# Patient Record
Sex: MALE | Race: BLACK OR AFRICAN AMERICAN | NOT HISPANIC OR LATINO | URBAN - METROPOLITAN AREA
[De-identification: names, ages, dates, MRNs, and addresses within clinical notes are randomized per-mention and may not be internally consistent; named-entity substitution may affect disease eponyms.]

---

## 2021-01-01 ENCOUNTER — INPATIENT (INPATIENT)
Facility: HOSPITAL | Age: 61
LOS: 24 days | End: 2021-03-25
Attending: INTERNAL MEDICINE | Admitting: INTERNAL MEDICINE
Payer: COMMERCIAL

## 2021-01-01 VITALS
TEMPERATURE: 98 F | RESPIRATION RATE: 30 BRPM | DIASTOLIC BLOOD PRESSURE: 70 MMHG | OXYGEN SATURATION: 87 % | HEART RATE: 106 BPM | SYSTOLIC BLOOD PRESSURE: 145 MMHG

## 2021-01-01 VITALS — RESPIRATION RATE: 30 BRPM

## 2021-01-01 DIAGNOSIS — R09.02 HYPOXEMIA: ICD-10-CM

## 2021-01-01 DIAGNOSIS — E87.1 HYPO-OSMOLALITY AND HYPONATREMIA: ICD-10-CM

## 2021-01-01 DIAGNOSIS — Z02.9 ENCOUNTER FOR ADMINISTRATIVE EXAMINATIONS, UNSPECIFIED: ICD-10-CM

## 2021-01-01 DIAGNOSIS — N17.9 ACUTE KIDNEY FAILURE, UNSPECIFIED: ICD-10-CM

## 2021-01-01 DIAGNOSIS — Z29.9 ENCOUNTER FOR PROPHYLACTIC MEASURES, UNSPECIFIED: ICD-10-CM

## 2021-01-01 DIAGNOSIS — U07.1 COVID-19: ICD-10-CM

## 2021-01-01 DIAGNOSIS — A41.89 OTHER SPECIFIED SEPSIS: ICD-10-CM

## 2021-01-01 DIAGNOSIS — D49.6 NEOPLASM OF UNSPECIFIED BEHAVIOR OF BRAIN: Chronic | ICD-10-CM

## 2021-01-01 DIAGNOSIS — E87.5 HYPERKALEMIA: ICD-10-CM

## 2021-01-01 DIAGNOSIS — E87.2 ACIDOSIS: ICD-10-CM

## 2021-01-01 LAB
-  ENTEROBACTER (NON-CLOACAE COMPLEX): SIGNIFICANT CHANGE UP
A1C WITH ESTIMATED AVERAGE GLUCOSE RESULT: 6 % — HIGH (ref 4–5.6)
ALBUMIN SERPL ELPH-MCNC: 1.8 G/DL — LOW (ref 3.3–5)
ALBUMIN SERPL ELPH-MCNC: 2 G/DL — LOW (ref 3.3–5)
ALBUMIN SERPL ELPH-MCNC: 2.1 G/DL — LOW (ref 3.3–5)
ALBUMIN SERPL ELPH-MCNC: 2.2 G/DL — LOW (ref 3.3–5)
ALBUMIN SERPL ELPH-MCNC: 2.2 G/DL — LOW (ref 3.3–5)
ALBUMIN SERPL ELPH-MCNC: 2.3 G/DL — LOW (ref 3.3–5)
ALBUMIN SERPL ELPH-MCNC: 2.3 G/DL — LOW (ref 3.3–5)
ALBUMIN SERPL ELPH-MCNC: 2.4 G/DL — LOW (ref 3.3–5)
ALBUMIN SERPL ELPH-MCNC: 2.6 G/DL — LOW (ref 3.3–5)
ALBUMIN SERPL ELPH-MCNC: 2.6 G/DL — LOW (ref 3.3–5)
ALBUMIN SERPL ELPH-MCNC: 2.8 G/DL — LOW (ref 3.3–5)
ALBUMIN SERPL ELPH-MCNC: 2.9 G/DL — LOW (ref 3.3–5)
ALBUMIN SERPL ELPH-MCNC: 2.9 G/DL — LOW (ref 3.3–5)
ALBUMIN SERPL ELPH-MCNC: 3 G/DL — LOW (ref 3.3–5)
ALBUMIN SERPL ELPH-MCNC: 3 G/DL — LOW (ref 3.3–5)
ALBUMIN SERPL ELPH-MCNC: 3.1 G/DL — LOW (ref 3.3–5)
ALBUMIN SERPL ELPH-MCNC: 3.2 G/DL — LOW (ref 3.3–5)
ALBUMIN SERPL ELPH-MCNC: 3.2 G/DL — LOW (ref 3.3–5)
ALBUMIN SERPL ELPH-MCNC: 3.3 G/DL — SIGNIFICANT CHANGE UP (ref 3.3–5)
ALBUMIN SERPL ELPH-MCNC: 3.3 G/DL — SIGNIFICANT CHANGE UP (ref 3.3–5)
ALBUMIN SERPL ELPH-MCNC: 3.4 G/DL — SIGNIFICANT CHANGE UP (ref 3.3–5)
ALBUMIN SERPL ELPH-MCNC: 3.4 G/DL — SIGNIFICANT CHANGE UP (ref 3.3–5)
ALP SERPL-CCNC: 102 U/L — SIGNIFICANT CHANGE UP (ref 40–120)
ALP SERPL-CCNC: 1078 U/L — HIGH (ref 40–120)
ALP SERPL-CCNC: 119 U/L — SIGNIFICANT CHANGE UP (ref 40–120)
ALP SERPL-CCNC: 120 U/L — SIGNIFICANT CHANGE UP (ref 40–120)
ALP SERPL-CCNC: 1309 U/L — HIGH (ref 40–120)
ALP SERPL-CCNC: 132 U/L — HIGH (ref 40–120)
ALP SERPL-CCNC: 1339 U/L — HIGH (ref 40–120)
ALP SERPL-CCNC: 369 U/L — HIGH (ref 40–120)
ALP SERPL-CCNC: 392 U/L — HIGH (ref 40–120)
ALP SERPL-CCNC: 410 U/L — HIGH (ref 40–120)
ALP SERPL-CCNC: 500 U/L — HIGH (ref 40–120)
ALP SERPL-CCNC: 632 U/L — HIGH (ref 40–120)
ALP SERPL-CCNC: 65 U/L — SIGNIFICANT CHANGE UP (ref 40–120)
ALP SERPL-CCNC: 66 U/L — SIGNIFICANT CHANGE UP (ref 40–120)
ALP SERPL-CCNC: 67 U/L — SIGNIFICANT CHANGE UP (ref 40–120)
ALP SERPL-CCNC: 69 U/L — SIGNIFICANT CHANGE UP (ref 40–120)
ALP SERPL-CCNC: 71 U/L — SIGNIFICANT CHANGE UP (ref 40–120)
ALP SERPL-CCNC: 72 U/L — SIGNIFICANT CHANGE UP (ref 40–120)
ALP SERPL-CCNC: 73 U/L — SIGNIFICANT CHANGE UP (ref 40–120)
ALP SERPL-CCNC: 74 U/L — SIGNIFICANT CHANGE UP (ref 40–120)
ALP SERPL-CCNC: 74 U/L — SIGNIFICANT CHANGE UP (ref 40–120)
ALP SERPL-CCNC: 76 U/L — SIGNIFICANT CHANGE UP (ref 40–120)
ALP SERPL-CCNC: 883 U/L — HIGH (ref 40–120)
ALP SERPL-CCNC: 90 U/L — SIGNIFICANT CHANGE UP (ref 40–120)
ALT FLD-CCNC: 146 U/L — HIGH (ref 4–41)
ALT FLD-CCNC: 162 U/L — HIGH (ref 4–41)
ALT FLD-CCNC: 178 U/L — HIGH (ref 4–41)
ALT FLD-CCNC: 196 U/L — HIGH (ref 4–41)
ALT FLD-CCNC: 2019 U/L — HIGH (ref 4–41)
ALT FLD-CCNC: 230 U/L — HIGH (ref 4–41)
ALT FLD-CCNC: 276 U/L — HIGH (ref 4–41)
ALT FLD-CCNC: 37 U/L — SIGNIFICANT CHANGE UP (ref 4–41)
ALT FLD-CCNC: 384 U/L — HIGH (ref 4–41)
ALT FLD-CCNC: 39 U/L — SIGNIFICANT CHANGE UP (ref 4–41)
ALT FLD-CCNC: 46 U/L — HIGH (ref 4–41)
ALT FLD-CCNC: 55 U/L — HIGH (ref 4–41)
ALT FLD-CCNC: 55 U/L — HIGH (ref 4–41)
ALT FLD-CCNC: 57 U/L — HIGH (ref 4–41)
ALT FLD-CCNC: 602 U/L — HIGH (ref 4–41)
ALT FLD-CCNC: 66 U/L — HIGH (ref 4–41)
ALT FLD-CCNC: 66 U/L — HIGH (ref 4–41)
ALT FLD-CCNC: 67 U/L — HIGH (ref 4–41)
ALT FLD-CCNC: 72 U/L — HIGH (ref 4–41)
ALT FLD-CCNC: 81 U/L — HIGH (ref 4–41)
ALT FLD-CCNC: 83 U/L — HIGH (ref 4–41)
ALT FLD-CCNC: 85 U/L — HIGH (ref 4–41)
ALT FLD-CCNC: 85 U/L — HIGH (ref 4–41)
ALT FLD-CCNC: 87 U/L — HIGH (ref 4–41)
ANION GAP SERPL CALC-SCNC: 10 MMOL/L — SIGNIFICANT CHANGE UP (ref 7–14)
ANION GAP SERPL CALC-SCNC: 10 MMOL/L — SIGNIFICANT CHANGE UP (ref 7–14)
ANION GAP SERPL CALC-SCNC: 12 MMOL/L — SIGNIFICANT CHANGE UP (ref 7–14)
ANION GAP SERPL CALC-SCNC: 13 MMOL/L — SIGNIFICANT CHANGE UP (ref 7–14)
ANION GAP SERPL CALC-SCNC: 14 MMOL/L — SIGNIFICANT CHANGE UP (ref 7–14)
ANION GAP SERPL CALC-SCNC: 15 MMOL/L — HIGH (ref 7–14)
ANION GAP SERPL CALC-SCNC: 16 MMOL/L — HIGH (ref 7–14)
ANION GAP SERPL CALC-SCNC: 17 MMOL/L — HIGH (ref 7–14)
ANION GAP SERPL CALC-SCNC: 18 MMOL/L — HIGH (ref 7–14)
ANION GAP SERPL CALC-SCNC: 19 MMOL/L — HIGH (ref 7–14)
ANION GAP SERPL CALC-SCNC: 20 MMOL/L — HIGH (ref 7–14)
ANION GAP SERPL CALC-SCNC: 22 MMOL/L — HIGH (ref 7–14)
ANION GAP SERPL CALC-SCNC: 26 MMOL/L — HIGH (ref 7–14)
ANION GAP SERPL CALC-SCNC: 5 MMOL/L — LOW (ref 7–14)
ANION GAP SERPL CALC-SCNC: 6 MMOL/L — LOW (ref 7–14)
ANION GAP SERPL CALC-SCNC: 7 MMOL/L — SIGNIFICANT CHANGE UP (ref 7–14)
ANION GAP SERPL CALC-SCNC: 8 MMOL/L — SIGNIFICANT CHANGE UP (ref 7–14)
ANION GAP SERPL CALC-SCNC: 9 MMOL/L — SIGNIFICANT CHANGE UP (ref 7–14)
APPEARANCE UR: ABNORMAL
APTT BLD: 106 SEC — HIGH (ref 27–36.3)
APTT BLD: 115.4 SEC — SIGNIFICANT CHANGE UP (ref 27–36.3)
APTT BLD: 26.6 SEC — LOW (ref 27–36.3)
APTT BLD: 32.9 SEC — SIGNIFICANT CHANGE UP (ref 27–36.3)
APTT BLD: 33.7 SEC — SIGNIFICANT CHANGE UP (ref 27–36.3)
APTT BLD: 33.9 SEC — SIGNIFICANT CHANGE UP (ref 27–36.3)
APTT BLD: 36.2 SEC — SIGNIFICANT CHANGE UP (ref 27–36.3)
APTT BLD: 37.9 SEC — HIGH (ref 27–36.3)
APTT BLD: 45.2 SEC — HIGH (ref 27–36.3)
APTT BLD: 46.5 SEC — HIGH (ref 27–36.3)
APTT BLD: 51 SEC — HIGH (ref 27–36.3)
APTT BLD: 60.7 SEC — HIGH (ref 27–36.3)
APTT BLD: 61.2 SEC — HIGH (ref 27–36.3)
APTT BLD: 62.3 SEC — HIGH (ref 27–36.3)
APTT BLD: 80.4 SEC — HIGH (ref 27–36.3)
APTT BLD: 82.4 SEC — HIGH (ref 27–36.3)
APTT BLD: 84.2 SEC — HIGH (ref 27–36.3)
APTT BLD: 88.5 SEC — HIGH (ref 27–36.3)
AST SERPL-CCNC: 139 U/L — HIGH (ref 4–40)
AST SERPL-CCNC: 161 U/L — HIGH (ref 4–40)
AST SERPL-CCNC: 169 U/L — HIGH (ref 4–40)
AST SERPL-CCNC: 185 U/L — HIGH (ref 4–40)
AST SERPL-CCNC: 196 U/L — HIGH (ref 4–40)
AST SERPL-CCNC: 257 U/L — HIGH (ref 4–40)
AST SERPL-CCNC: 4185 U/L — HIGH (ref 4–40)
AST SERPL-CCNC: 42 U/L — HIGH (ref 4–40)
AST SERPL-CCNC: 426 U/L — HIGH (ref 4–40)
AST SERPL-CCNC: 47 U/L — HIGH (ref 4–40)
AST SERPL-CCNC: 48 U/L — HIGH (ref 4–40)
AST SERPL-CCNC: 52 U/L — HIGH (ref 4–40)
AST SERPL-CCNC: 58 U/L — HIGH (ref 4–40)
AST SERPL-CCNC: 58 U/L — HIGH (ref 4–40)
AST SERPL-CCNC: 62 U/L — HIGH (ref 4–40)
AST SERPL-CCNC: 63 U/L — HIGH (ref 4–40)
AST SERPL-CCNC: 65 U/L — HIGH (ref 4–40)
AST SERPL-CCNC: 65 U/L — HIGH (ref 4–40)
AST SERPL-CCNC: 66 U/L — HIGH (ref 4–40)
AST SERPL-CCNC: 67 U/L — HIGH (ref 4–40)
AST SERPL-CCNC: 72 U/L — HIGH (ref 4–40)
AST SERPL-CCNC: 72 U/L — HIGH (ref 4–40)
AST SERPL-CCNC: 76 U/L — HIGH (ref 4–40)
AST SERPL-CCNC: 937 U/L — HIGH (ref 4–40)
BACTERIA # UR AUTO: NEGATIVE — SIGNIFICANT CHANGE UP
BASE EXCESS BLDA CALC-SCNC: -14.3 MMOL/L — LOW (ref -2–2)
BASE EXCESS BLDA CALC-SCNC: 3.3 MMOL/L — HIGH (ref -2–2)
BASE EXCESS BLDA CALC-SCNC: 3.4 MMOL/L — HIGH (ref -2–2)
BASE EXCESS BLDA CALC-SCNC: 3.8 MMOL/L — HIGH (ref -2–2)
BASE EXCESS BLDA CALC-SCNC: 4.6 MMOL/L — HIGH (ref -2–2)
BASE EXCESS BLDA CALC-SCNC: 4.8 MMOL/L — HIGH (ref -2–2)
BASE EXCESS BLDA CALC-SCNC: 7.6 MMOL/L — HIGH (ref -2–2)
BASOPHILS # BLD AUTO: 0 K/UL — SIGNIFICANT CHANGE UP (ref 0–0.2)
BASOPHILS NFR BLD AUTO: 0 % — SIGNIFICANT CHANGE UP (ref 0–2)
BILIRUB DIRECT SERPL-MCNC: 0.2 MG/DL — SIGNIFICANT CHANGE UP (ref 0–0.2)
BILIRUB DIRECT SERPL-MCNC: 2 MG/DL — HIGH (ref 0–0.2)
BILIRUB DIRECT SERPL-MCNC: 3.9 MG/DL — HIGH (ref 0–0.2)
BILIRUB DIRECT SERPL-MCNC: 5 MG/DL — HIGH (ref 0–0.2)
BILIRUB DIRECT SERPL-MCNC: 6.8 MG/DL — HIGH (ref 0–0.2)
BILIRUB DIRECT SERPL-MCNC: 6.9 MG/DL — HIGH (ref 0–0.2)
BILIRUB DIRECT SERPL-MCNC: <0.2 MG/DL — SIGNIFICANT CHANGE UP (ref 0–0.2)
BILIRUB INDIRECT FLD-MCNC: 0 MG/DL — SIGNIFICANT CHANGE UP (ref 0–1)
BILIRUB INDIRECT FLD-MCNC: 0 MG/DL — SIGNIFICANT CHANGE UP (ref 0–1)
BILIRUB INDIRECT FLD-MCNC: 0.2 MG/DL — SIGNIFICANT CHANGE UP (ref 0–1)
BILIRUB INDIRECT FLD-MCNC: 0.4 MG/DL — SIGNIFICANT CHANGE UP (ref 0–1)
BILIRUB INDIRECT FLD-MCNC: 0.4 MG/DL — SIGNIFICANT CHANGE UP (ref 0–1)
BILIRUB INDIRECT FLD-MCNC: 0.5 MG/DL — SIGNIFICANT CHANGE UP (ref 0–1)
BILIRUB INDIRECT FLD-MCNC: 0.5 MG/DL — SIGNIFICANT CHANGE UP (ref 0–1)
BILIRUB INDIRECT FLD-MCNC: 0.7 MG/DL — SIGNIFICANT CHANGE UP (ref 0–1)
BILIRUB INDIRECT FLD-MCNC: >0.2 MG/DL — SIGNIFICANT CHANGE UP (ref 0–1)
BILIRUB INDIRECT FLD-MCNC: >0.4 MG/DL — SIGNIFICANT CHANGE UP (ref 0–1)
BILIRUB SERPL-MCNC: 0.4 MG/DL — SIGNIFICANT CHANGE UP (ref 0.2–1.2)
BILIRUB SERPL-MCNC: 0.5 MG/DL — SIGNIFICANT CHANGE UP (ref 0.2–1.2)
BILIRUB SERPL-MCNC: 0.5 MG/DL — SIGNIFICANT CHANGE UP (ref 0.2–1.2)
BILIRUB SERPL-MCNC: 0.6 MG/DL — SIGNIFICANT CHANGE UP (ref 0.2–1.2)
BILIRUB SERPL-MCNC: 0.7 MG/DL — SIGNIFICANT CHANGE UP (ref 0.2–1.2)
BILIRUB SERPL-MCNC: 0.7 MG/DL — SIGNIFICANT CHANGE UP (ref 0.2–1.2)
BILIRUB SERPL-MCNC: 1.4 MG/DL — HIGH (ref 0.2–1.2)
BILIRUB SERPL-MCNC: 1.6 MG/DL — HIGH (ref 0.2–1.2)
BILIRUB SERPL-MCNC: 2 MG/DL — HIGH (ref 0.2–1.2)
BILIRUB SERPL-MCNC: 2.7 MG/DL — HIGH (ref 0.2–1.2)
BILIRUB SERPL-MCNC: 3.9 MG/DL — HIGH (ref 0.2–1.2)
BILIRUB SERPL-MCNC: 5.4 MG/DL — HIGH (ref 0.2–1.2)
BILIRUB SERPL-MCNC: 7.1 MG/DL — HIGH (ref 0.2–1.2)
BILIRUB SERPL-MCNC: 7.4 MG/DL — HIGH (ref 0.2–1.2)
BILIRUB SERPL-MCNC: 7.5 MG/DL — HIGH (ref 0.2–1.2)
BILIRUB UR-MCNC: ABNORMAL
BILIRUB UR-MCNC: NEGATIVE — SIGNIFICANT CHANGE UP
BILIRUB UR-MCNC: NEGATIVE — SIGNIFICANT CHANGE UP
BLOOD GAS ARTERIAL - LYTES,HGB,ICA,LACT RESULT: SIGNIFICANT CHANGE UP
BLOOD GAS ARTERIAL COMPREHENSIVE RESULT: SIGNIFICANT CHANGE UP
BLOOD GAS ARTERIAL COMPREHENSIVE WITH CREATININE RESULT: SIGNIFICANT CHANGE UP
BUN SERPL-MCNC: 10 MG/DL — SIGNIFICANT CHANGE UP (ref 7–23)
BUN SERPL-MCNC: 108 MG/DL — HIGH (ref 7–23)
BUN SERPL-MCNC: 111 MG/DL — HIGH (ref 7–23)
BUN SERPL-MCNC: 113 MG/DL — HIGH (ref 7–23)
BUN SERPL-MCNC: 120 MG/DL — HIGH (ref 7–23)
BUN SERPL-MCNC: 128 MG/DL — HIGH (ref 7–23)
BUN SERPL-MCNC: 130 MG/DL — HIGH (ref 7–23)
BUN SERPL-MCNC: 148 MG/DL — HIGH (ref 7–23)
BUN SERPL-MCNC: 156 MG/DL — HIGH (ref 7–23)
BUN SERPL-MCNC: 156 MG/DL — HIGH (ref 7–23)
BUN SERPL-MCNC: 16 MG/DL — SIGNIFICANT CHANGE UP (ref 7–23)
BUN SERPL-MCNC: 17 MG/DL — SIGNIFICANT CHANGE UP (ref 7–23)
BUN SERPL-MCNC: 174 MG/DL — HIGH (ref 7–23)
BUN SERPL-MCNC: 18 MG/DL — SIGNIFICANT CHANGE UP (ref 7–23)
BUN SERPL-MCNC: 180 MG/DL — HIGH (ref 7–23)
BUN SERPL-MCNC: 24 MG/DL — HIGH (ref 7–23)
BUN SERPL-MCNC: 24 MG/DL — HIGH (ref 7–23)
BUN SERPL-MCNC: 25 MG/DL — HIGH (ref 7–23)
BUN SERPL-MCNC: 26 MG/DL — HIGH (ref 7–23)
BUN SERPL-MCNC: 29 MG/DL — HIGH (ref 7–23)
BUN SERPL-MCNC: 30 MG/DL — HIGH (ref 7–23)
BUN SERPL-MCNC: 32 MG/DL — HIGH (ref 7–23)
BUN SERPL-MCNC: 41 MG/DL — HIGH (ref 7–23)
BUN SERPL-MCNC: 52 MG/DL — HIGH (ref 7–23)
BUN SERPL-MCNC: 60 MG/DL — HIGH (ref 7–23)
BUN SERPL-MCNC: 69 MG/DL — HIGH (ref 7–23)
BUN SERPL-MCNC: 80 MG/DL — HIGH (ref 7–23)
BUN SERPL-MCNC: 98 MG/DL — HIGH (ref 7–23)
CA-I BLDA-SCNC: 1.22 MMOL/L — SIGNIFICANT CHANGE UP (ref 1.15–1.29)
CALCIUM SERPL-MCNC: 7.5 MG/DL — LOW (ref 8.4–10.5)
CALCIUM SERPL-MCNC: 7.5 MG/DL — LOW (ref 8.4–10.5)
CALCIUM SERPL-MCNC: 7.8 MG/DL — LOW (ref 8.4–10.5)
CALCIUM SERPL-MCNC: 8.1 MG/DL — LOW (ref 8.4–10.5)
CALCIUM SERPL-MCNC: 8.1 MG/DL — LOW (ref 8.4–10.5)
CALCIUM SERPL-MCNC: 8.6 MG/DL — SIGNIFICANT CHANGE UP (ref 8.4–10.5)
CALCIUM SERPL-MCNC: 8.6 MG/DL — SIGNIFICANT CHANGE UP (ref 8.4–10.5)
CALCIUM SERPL-MCNC: 8.7 MG/DL — SIGNIFICANT CHANGE UP (ref 8.4–10.5)
CALCIUM SERPL-MCNC: 8.8 MG/DL — SIGNIFICANT CHANGE UP (ref 8.4–10.5)
CALCIUM SERPL-MCNC: 8.9 MG/DL — SIGNIFICANT CHANGE UP (ref 8.4–10.5)
CALCIUM SERPL-MCNC: 9 MG/DL — SIGNIFICANT CHANGE UP (ref 8.4–10.5)
CALCIUM SERPL-MCNC: 9.1 MG/DL — SIGNIFICANT CHANGE UP (ref 8.4–10.5)
CALCIUM SERPL-MCNC: 9.2 MG/DL — SIGNIFICANT CHANGE UP (ref 8.4–10.5)
CALCIUM SERPL-MCNC: 9.3 MG/DL — SIGNIFICANT CHANGE UP (ref 8.4–10.5)
CALCIUM SERPL-MCNC: 9.4 MG/DL — SIGNIFICANT CHANGE UP (ref 8.4–10.5)
CALCIUM SERPL-MCNC: 9.4 MG/DL — SIGNIFICANT CHANGE UP (ref 8.4–10.5)
CALCIUM SERPL-MCNC: 9.5 MG/DL — SIGNIFICANT CHANGE UP (ref 8.4–10.5)
CALCIUM SERPL-MCNC: 9.5 MG/DL — SIGNIFICANT CHANGE UP (ref 8.4–10.5)
CALCIUM SERPL-MCNC: 9.6 MG/DL — SIGNIFICANT CHANGE UP (ref 8.4–10.5)
CHLORIDE SERPL-SCNC: 100 MMOL/L — SIGNIFICANT CHANGE UP (ref 98–107)
CHLORIDE SERPL-SCNC: 101 MMOL/L — SIGNIFICANT CHANGE UP (ref 98–107)
CHLORIDE SERPL-SCNC: 102 MMOL/L — SIGNIFICANT CHANGE UP (ref 98–107)
CHLORIDE SERPL-SCNC: 103 MMOL/L — SIGNIFICANT CHANGE UP (ref 98–107)
CHLORIDE SERPL-SCNC: 104 MMOL/L — SIGNIFICANT CHANGE UP (ref 98–107)
CHLORIDE SERPL-SCNC: 105 MMOL/L — SIGNIFICANT CHANGE UP (ref 98–107)
CHLORIDE SERPL-SCNC: 105 MMOL/L — SIGNIFICANT CHANGE UP (ref 98–107)
CHLORIDE SERPL-SCNC: 107 MMOL/L — SIGNIFICANT CHANGE UP (ref 98–107)
CHLORIDE SERPL-SCNC: 108 MMOL/L — HIGH (ref 98–107)
CHLORIDE SERPL-SCNC: 110 MMOL/L — HIGH (ref 98–107)
CHLORIDE SERPL-SCNC: 95 MMOL/L — LOW (ref 98–107)
CHLORIDE SERPL-SCNC: 97 MMOL/L — LOW (ref 98–107)
CHLORIDE SERPL-SCNC: 98 MMOL/L — SIGNIFICANT CHANGE UP (ref 98–107)
CHLORIDE SERPL-SCNC: 98 MMOL/L — SIGNIFICANT CHANGE UP (ref 98–107)
CHLORIDE SERPL-SCNC: 99 MMOL/L — SIGNIFICANT CHANGE UP (ref 98–107)
CHLORIDE SERPL-SCNC: 99 MMOL/L — SIGNIFICANT CHANGE UP (ref 98–107)
CHOLEST SERPL-MCNC: 123 MG/DL — SIGNIFICANT CHANGE UP
CO2 SERPL-SCNC: 14 MMOL/L — LOW (ref 22–31)
CO2 SERPL-SCNC: 19 MMOL/L — LOW (ref 22–31)
CO2 SERPL-SCNC: 22 MMOL/L — SIGNIFICANT CHANGE UP (ref 22–31)
CO2 SERPL-SCNC: 22 MMOL/L — SIGNIFICANT CHANGE UP (ref 22–31)
CO2 SERPL-SCNC: 23 MMOL/L — SIGNIFICANT CHANGE UP (ref 22–31)
CO2 SERPL-SCNC: 25 MMOL/L — SIGNIFICANT CHANGE UP (ref 22–31)
CO2 SERPL-SCNC: 26 MMOL/L — SIGNIFICANT CHANGE UP (ref 22–31)
CO2 SERPL-SCNC: 27 MMOL/L — SIGNIFICANT CHANGE UP (ref 22–31)
CO2 SERPL-SCNC: 28 MMOL/L — SIGNIFICANT CHANGE UP (ref 22–31)
CO2 SERPL-SCNC: 28 MMOL/L — SIGNIFICANT CHANGE UP (ref 22–31)
CO2 SERPL-SCNC: 29 MMOL/L — SIGNIFICANT CHANGE UP (ref 22–31)
CO2 SERPL-SCNC: 29 MMOL/L — SIGNIFICANT CHANGE UP (ref 22–31)
CO2 SERPL-SCNC: 30 MMOL/L — SIGNIFICANT CHANGE UP (ref 22–31)
CO2 SERPL-SCNC: 31 MMOL/L — SIGNIFICANT CHANGE UP (ref 22–31)
CO2 SERPL-SCNC: 32 MMOL/L — HIGH (ref 22–31)
CO2 SERPL-SCNC: 33 MMOL/L — HIGH (ref 22–31)
COLOR SPEC: ABNORMAL
COLOR SPEC: YELLOW — SIGNIFICANT CHANGE UP
COLOR SPEC: YELLOW — SIGNIFICANT CHANGE UP
CREAT SERPL-MCNC: 0.71 MG/DL — SIGNIFICANT CHANGE UP (ref 0.5–1.3)
CREAT SERPL-MCNC: 0.75 MG/DL — SIGNIFICANT CHANGE UP (ref 0.5–1.3)
CREAT SERPL-MCNC: 0.76 MG/DL — SIGNIFICANT CHANGE UP (ref 0.5–1.3)
CREAT SERPL-MCNC: 0.78 MG/DL — SIGNIFICANT CHANGE UP (ref 0.5–1.3)
CREAT SERPL-MCNC: 0.8 MG/DL — SIGNIFICANT CHANGE UP (ref 0.5–1.3)
CREAT SERPL-MCNC: 0.8 MG/DL — SIGNIFICANT CHANGE UP (ref 0.5–1.3)
CREAT SERPL-MCNC: 0.81 MG/DL — SIGNIFICANT CHANGE UP (ref 0.5–1.3)
CREAT SERPL-MCNC: 0.81 MG/DL — SIGNIFICANT CHANGE UP (ref 0.5–1.3)
CREAT SERPL-MCNC: 0.83 MG/DL — SIGNIFICANT CHANGE UP (ref 0.5–1.3)
CREAT SERPL-MCNC: 0.84 MG/DL — SIGNIFICANT CHANGE UP (ref 0.5–1.3)
CREAT SERPL-MCNC: 0.84 MG/DL — SIGNIFICANT CHANGE UP (ref 0.5–1.3)
CREAT SERPL-MCNC: 0.85 MG/DL — SIGNIFICANT CHANGE UP (ref 0.5–1.3)
CREAT SERPL-MCNC: 0.87 MG/DL — SIGNIFICANT CHANGE UP (ref 0.5–1.3)
CREAT SERPL-MCNC: 0.87 MG/DL — SIGNIFICANT CHANGE UP (ref 0.5–1.3)
CREAT SERPL-MCNC: 0.88 MG/DL — SIGNIFICANT CHANGE UP (ref 0.5–1.3)
CREAT SERPL-MCNC: 0.91 MG/DL — SIGNIFICANT CHANGE UP (ref 0.5–1.3)
CREAT SERPL-MCNC: 0.96 MG/DL — SIGNIFICANT CHANGE UP (ref 0.5–1.3)
CREAT SERPL-MCNC: 0.97 MG/DL — SIGNIFICANT CHANGE UP (ref 0.5–1.3)
CREAT SERPL-MCNC: 1 MG/DL — SIGNIFICANT CHANGE UP (ref 0.5–1.3)
CREAT SERPL-MCNC: 1.53 MG/DL — HIGH (ref 0.5–1.3)
CREAT SERPL-MCNC: 1.67 MG/DL — HIGH (ref 0.5–1.3)
CREAT SERPL-MCNC: 2.04 MG/DL — HIGH (ref 0.5–1.3)
CREAT SERPL-MCNC: 2.1 MG/DL — HIGH (ref 0.5–1.3)
CREAT SERPL-MCNC: 2.16 MG/DL — HIGH (ref 0.5–1.3)
CREAT SERPL-MCNC: 2.22 MG/DL — HIGH (ref 0.5–1.3)
CREAT SERPL-MCNC: 3.08 MG/DL — HIGH (ref 0.5–1.3)
CREAT SERPL-MCNC: 3.6 MG/DL — HIGH (ref 0.5–1.3)
CREAT SERPL-MCNC: 3.93 MG/DL — HIGH (ref 0.5–1.3)
CREAT SERPL-MCNC: 4.03 MG/DL — HIGH (ref 0.5–1.3)
CREAT SERPL-MCNC: 4.04 MG/DL — HIGH (ref 0.5–1.3)
CREAT SERPL-MCNC: 4.14 MG/DL — HIGH (ref 0.5–1.3)
CREAT SERPL-MCNC: 4.2 MG/DL — HIGH (ref 0.5–1.3)
CREAT SERPL-MCNC: 4.3 MG/DL — HIGH (ref 0.5–1.3)
CREAT SERPL-MCNC: 4.8 MG/DL — HIGH (ref 0.5–1.3)
CREAT SERPL-MCNC: 5.11 MG/DL — HIGH (ref 0.5–1.3)
CREAT SERPL-MCNC: 5.16 MG/DL — HIGH (ref 0.5–1.3)
CREAT SERPL-MCNC: 5.36 MG/DL — HIGH (ref 0.5–1.3)
CRP SERPL-MCNC: 170.8 MG/L — HIGH
CRP SERPL-MCNC: 213.2 MG/L — HIGH
CRP SERPL-MCNC: 36.8 MG/L — HIGH
CRP SERPL-MCNC: 48.1 MG/L — HIGH
CRP SERPL-MCNC: 83.4 MG/L — HIGH
CULTURE RESULTS: NO GROWTH — SIGNIFICANT CHANGE UP
CULTURE RESULTS: SIGNIFICANT CHANGE UP
D DIMER BLD IA.RAPID-MCNC: 1311 NG/ML DDU — HIGH
D DIMER BLD IA.RAPID-MCNC: 1608 NG/ML DDU — HIGH
D DIMER BLD IA.RAPID-MCNC: 2003 NG/ML DDU — HIGH
D DIMER BLD IA.RAPID-MCNC: 213 NG/ML DDU — SIGNIFICANT CHANGE UP
D DIMER BLD IA.RAPID-MCNC: 371 NG/ML DDU — HIGH
D DIMER BLD IA.RAPID-MCNC: 452 NG/ML DDU — HIGH
D DIMER BLD IA.RAPID-MCNC: 548 NG/ML DDU — HIGH
D DIMER BLD IA.RAPID-MCNC: 550 NG/ML DDU — HIGH
D DIMER BLD IA.RAPID-MCNC: 722 NG/ML DDU — HIGH
D DIMER BLD IA.RAPID-MCNC: 909 NG/ML DDU — HIGH
DIFF PNL FLD: ABNORMAL
E COLI DNA BLD POS QL NAA+NON-PROBE: SIGNIFICANT CHANGE UP
EOSINOPHIL # BLD AUTO: 0 K/UL — SIGNIFICANT CHANGE UP (ref 0–0.5)
EOSINOPHIL NFR BLD AUTO: 0 % — SIGNIFICANT CHANGE UP (ref 0–6)
EPI CELLS # UR: SIGNIFICANT CHANGE UP /HPF (ref 0–5)
ESTIMATED AVERAGE GLUCOSE: 126 MG/DL — HIGH (ref 68–114)
FERRITIN SERPL-MCNC: 1016 NG/ML — HIGH (ref 30–400)
FERRITIN SERPL-MCNC: 562 NG/ML — HIGH (ref 30–400)
FERRITIN SERPL-MCNC: 588 NG/ML — HIGH (ref 30–400)
FERRITIN SERPL-MCNC: 696 NG/ML — HIGH (ref 30–400)
FERRITIN SERPL-MCNC: 730 NG/ML — HIGH (ref 30–400)
FIBRINOGEN PPP-MCNC: >1000 MG/DL — HIGH (ref 290–520)
GAS PNL BLDA: SIGNIFICANT CHANGE UP
GIANT PLATELETS BLD QL SMEAR: PRESENT — SIGNIFICANT CHANGE UP
GLUCOSE BLDC GLUCOMTR-MCNC: 100 MG/DL — HIGH (ref 70–99)
GLUCOSE BLDC GLUCOMTR-MCNC: 100 MG/DL — HIGH (ref 70–99)
GLUCOSE BLDC GLUCOMTR-MCNC: 102 MG/DL — HIGH (ref 70–99)
GLUCOSE BLDC GLUCOMTR-MCNC: 104 MG/DL — HIGH (ref 70–99)
GLUCOSE BLDC GLUCOMTR-MCNC: 105 MG/DL — HIGH (ref 70–99)
GLUCOSE BLDC GLUCOMTR-MCNC: 105 MG/DL — HIGH (ref 70–99)
GLUCOSE BLDC GLUCOMTR-MCNC: 107 MG/DL — HIGH (ref 70–99)
GLUCOSE BLDC GLUCOMTR-MCNC: 107 MG/DL — HIGH (ref 70–99)
GLUCOSE BLDC GLUCOMTR-MCNC: 108 MG/DL — HIGH (ref 70–99)
GLUCOSE BLDC GLUCOMTR-MCNC: 110 MG/DL — HIGH (ref 70–99)
GLUCOSE BLDC GLUCOMTR-MCNC: 113 MG/DL — HIGH (ref 70–99)
GLUCOSE BLDC GLUCOMTR-MCNC: 114 MG/DL — HIGH (ref 70–99)
GLUCOSE BLDC GLUCOMTR-MCNC: 117 MG/DL — HIGH (ref 70–99)
GLUCOSE BLDC GLUCOMTR-MCNC: 117 MG/DL — HIGH (ref 70–99)
GLUCOSE BLDC GLUCOMTR-MCNC: 119 MG/DL — HIGH (ref 70–99)
GLUCOSE BLDC GLUCOMTR-MCNC: 122 MG/DL — HIGH (ref 70–99)
GLUCOSE BLDC GLUCOMTR-MCNC: 122 MG/DL — HIGH (ref 70–99)
GLUCOSE BLDC GLUCOMTR-MCNC: 125 MG/DL — HIGH (ref 70–99)
GLUCOSE BLDC GLUCOMTR-MCNC: 127 MG/DL — HIGH (ref 70–99)
GLUCOSE BLDC GLUCOMTR-MCNC: 130 MG/DL — HIGH (ref 70–99)
GLUCOSE BLDC GLUCOMTR-MCNC: 131 MG/DL — HIGH (ref 70–99)
GLUCOSE BLDC GLUCOMTR-MCNC: 133 MG/DL — HIGH (ref 70–99)
GLUCOSE BLDC GLUCOMTR-MCNC: 134 MG/DL — HIGH (ref 70–99)
GLUCOSE BLDC GLUCOMTR-MCNC: 135 MG/DL — HIGH (ref 70–99)
GLUCOSE BLDC GLUCOMTR-MCNC: 138 MG/DL — HIGH (ref 70–99)
GLUCOSE BLDC GLUCOMTR-MCNC: 140 MG/DL — HIGH (ref 70–99)
GLUCOSE BLDC GLUCOMTR-MCNC: 143 MG/DL — HIGH (ref 70–99)
GLUCOSE BLDC GLUCOMTR-MCNC: 144 MG/DL — HIGH (ref 70–99)
GLUCOSE BLDC GLUCOMTR-MCNC: 145 MG/DL — HIGH (ref 70–99)
GLUCOSE BLDC GLUCOMTR-MCNC: 148 MG/DL — HIGH (ref 70–99)
GLUCOSE BLDC GLUCOMTR-MCNC: 149 MG/DL — HIGH (ref 70–99)
GLUCOSE BLDC GLUCOMTR-MCNC: 153 MG/DL — HIGH (ref 70–99)
GLUCOSE BLDC GLUCOMTR-MCNC: 156 MG/DL — HIGH (ref 70–99)
GLUCOSE BLDC GLUCOMTR-MCNC: 160 MG/DL — HIGH (ref 70–99)
GLUCOSE BLDC GLUCOMTR-MCNC: 165 MG/DL — HIGH (ref 70–99)
GLUCOSE BLDC GLUCOMTR-MCNC: 167 MG/DL — HIGH (ref 70–99)
GLUCOSE BLDC GLUCOMTR-MCNC: 180 MG/DL — HIGH (ref 70–99)
GLUCOSE BLDC GLUCOMTR-MCNC: 189 MG/DL — HIGH (ref 70–99)
GLUCOSE BLDC GLUCOMTR-MCNC: 206 MG/DL — HIGH (ref 70–99)
GLUCOSE BLDC GLUCOMTR-MCNC: 34 MG/DL — CRITICAL LOW (ref 70–99)
GLUCOSE BLDC GLUCOMTR-MCNC: 60 MG/DL — LOW (ref 70–99)
GLUCOSE BLDC GLUCOMTR-MCNC: 77 MG/DL — SIGNIFICANT CHANGE UP (ref 70–99)
GLUCOSE BLDC GLUCOMTR-MCNC: 81 MG/DL — SIGNIFICANT CHANGE UP (ref 70–99)
GLUCOSE BLDC GLUCOMTR-MCNC: 85 MG/DL — SIGNIFICANT CHANGE UP (ref 70–99)
GLUCOSE BLDC GLUCOMTR-MCNC: 93 MG/DL — SIGNIFICANT CHANGE UP (ref 70–99)
GLUCOSE BLDC GLUCOMTR-MCNC: 94 MG/DL — SIGNIFICANT CHANGE UP (ref 70–99)
GLUCOSE BLDC GLUCOMTR-MCNC: 94 MG/DL — SIGNIFICANT CHANGE UP (ref 70–99)
GLUCOSE BLDC GLUCOMTR-MCNC: 96 MG/DL — SIGNIFICANT CHANGE UP (ref 70–99)
GLUCOSE BLDC GLUCOMTR-MCNC: 98 MG/DL — SIGNIFICANT CHANGE UP (ref 70–99)
GLUCOSE BLDC GLUCOMTR-MCNC: <25 MG/DL — CRITICAL LOW (ref 70–99)
GLUCOSE BLDC GLUCOMTR-MCNC: <25 MG/DL — CRITICAL LOW (ref 70–99)
GLUCOSE SERPL-MCNC: 100 MG/DL — HIGH (ref 70–99)
GLUCOSE SERPL-MCNC: 101 MG/DL — HIGH (ref 70–99)
GLUCOSE SERPL-MCNC: 104 MG/DL — HIGH (ref 70–99)
GLUCOSE SERPL-MCNC: 115 MG/DL — HIGH (ref 70–99)
GLUCOSE SERPL-MCNC: 121 MG/DL — HIGH (ref 70–99)
GLUCOSE SERPL-MCNC: 124 MG/DL — HIGH (ref 70–99)
GLUCOSE SERPL-MCNC: 126 MG/DL — HIGH (ref 70–99)
GLUCOSE SERPL-MCNC: 127 MG/DL — HIGH (ref 70–99)
GLUCOSE SERPL-MCNC: 130 MG/DL — HIGH (ref 70–99)
GLUCOSE SERPL-MCNC: 131 MG/DL — HIGH (ref 70–99)
GLUCOSE SERPL-MCNC: 131 MG/DL — HIGH (ref 70–99)
GLUCOSE SERPL-MCNC: 135 MG/DL — HIGH (ref 70–99)
GLUCOSE SERPL-MCNC: 136 MG/DL — HIGH (ref 70–99)
GLUCOSE SERPL-MCNC: 144 MG/DL — HIGH (ref 70–99)
GLUCOSE SERPL-MCNC: 146 MG/DL — HIGH (ref 70–99)
GLUCOSE SERPL-MCNC: 152 MG/DL — HIGH (ref 70–99)
GLUCOSE SERPL-MCNC: 154 MG/DL — HIGH (ref 70–99)
GLUCOSE SERPL-MCNC: 157 MG/DL — HIGH (ref 70–99)
GLUCOSE SERPL-MCNC: 157 MG/DL — HIGH (ref 70–99)
GLUCOSE SERPL-MCNC: 170 MG/DL — HIGH (ref 70–99)
GLUCOSE SERPL-MCNC: 177 MG/DL — HIGH (ref 70–99)
GLUCOSE SERPL-MCNC: 179 MG/DL — HIGH (ref 70–99)
GLUCOSE SERPL-MCNC: 183 MG/DL — HIGH (ref 70–99)
GLUCOSE SERPL-MCNC: 192 MG/DL — HIGH (ref 70–99)
GLUCOSE SERPL-MCNC: 203 MG/DL — HIGH (ref 70–99)
GLUCOSE SERPL-MCNC: 31 MG/DL — CRITICAL LOW (ref 70–99)
GLUCOSE SERPL-MCNC: 55 MG/DL — LOW (ref 70–99)
GLUCOSE SERPL-MCNC: 92 MG/DL — SIGNIFICANT CHANGE UP (ref 70–99)
GLUCOSE SERPL-MCNC: 93 MG/DL — SIGNIFICANT CHANGE UP (ref 70–99)
GLUCOSE SERPL-MCNC: 98 MG/DL — SIGNIFICANT CHANGE UP (ref 70–99)
GLUCOSE SERPL-MCNC: 98 MG/DL — SIGNIFICANT CHANGE UP (ref 70–99)
GLUCOSE UR QL: ABNORMAL
GLUCOSE UR QL: ABNORMAL
GLUCOSE UR QL: NEGATIVE — SIGNIFICANT CHANGE UP
GRAM STN FLD: SIGNIFICANT CHANGE UP
HCO3 BLDA-SCNC: 12 MMOL/L — LOW (ref 22–26)
HCO3 BLDA-SCNC: 26 MMOL/L — SIGNIFICANT CHANGE UP (ref 22–26)
HCO3 BLDA-SCNC: 27 MMOL/L — HIGH (ref 22–26)
HCO3 BLDA-SCNC: 30 MMOL/L — HIGH (ref 22–26)
HCT VFR BLD CALC: 24.6 % — LOW (ref 39–50)
HCT VFR BLD CALC: 25.8 % — LOW (ref 39–50)
HCT VFR BLD CALC: 26 % — LOW (ref 39–50)
HCT VFR BLD CALC: 27.9 % — LOW (ref 39–50)
HCT VFR BLD CALC: 30.4 % — LOW (ref 39–50)
HCT VFR BLD CALC: 31.7 % — LOW (ref 39–50)
HCT VFR BLD CALC: 32.1 % — LOW (ref 39–50)
HCT VFR BLD CALC: 33.5 % — LOW (ref 39–50)
HCT VFR BLD CALC: 35.1 % — LOW (ref 39–50)
HCT VFR BLD CALC: 35.3 % — LOW (ref 39–50)
HCT VFR BLD CALC: 35.7 % — LOW (ref 39–50)
HCT VFR BLD CALC: 35.9 % — LOW (ref 39–50)
HCT VFR BLD CALC: 36.7 % — LOW (ref 39–50)
HCT VFR BLD CALC: 38.3 % — LOW (ref 39–50)
HCT VFR BLD CALC: 39.7 % — SIGNIFICANT CHANGE UP (ref 39–50)
HCT VFR BLD CALC: 41.3 % — SIGNIFICANT CHANGE UP (ref 39–50)
HCT VFR BLD CALC: 42.8 % — SIGNIFICANT CHANGE UP (ref 39–50)
HCT VFR BLD CALC: 43 % — SIGNIFICANT CHANGE UP (ref 39–50)
HCT VFR BLD CALC: 43.3 % — SIGNIFICANT CHANGE UP (ref 39–50)
HCT VFR BLD CALC: 43.5 % — SIGNIFICANT CHANGE UP (ref 39–50)
HCT VFR BLD CALC: 43.9 % — SIGNIFICANT CHANGE UP (ref 39–50)
HCT VFR BLD CALC: 44.4 % — SIGNIFICANT CHANGE UP (ref 39–50)
HCT VFR BLD CALC: 44.5 % — SIGNIFICANT CHANGE UP (ref 39–50)
HCT VFR BLD CALC: 45 % — SIGNIFICANT CHANGE UP (ref 39–50)
HCT VFR BLD CALC: 45.2 % — SIGNIFICANT CHANGE UP (ref 39–50)
HCT VFR BLD CALC: 45.5 % — SIGNIFICANT CHANGE UP (ref 39–50)
HCT VFR BLD CALC: 46 % — SIGNIFICANT CHANGE UP (ref 39–50)
HCT VFR BLD CALC: 48.7 % — SIGNIFICANT CHANGE UP (ref 39–50)
HCV AB S/CO SERPL IA: 0.12 S/CO — SIGNIFICANT CHANGE UP (ref 0–0.99)
HCV AB SERPL-IMP: SIGNIFICANT CHANGE UP
HDLC SERPL-MCNC: 37 MG/DL — LOW
HGB BLD-MCNC: 10.1 G/DL — LOW (ref 13–17)
HGB BLD-MCNC: 10.1 G/DL — LOW (ref 13–17)
HGB BLD-MCNC: 10.3 G/DL — LOW (ref 13–17)
HGB BLD-MCNC: 10.8 G/DL — LOW (ref 13–17)
HGB BLD-MCNC: 11 G/DL — LOW (ref 13–17)
HGB BLD-MCNC: 11.1 G/DL — LOW (ref 13–17)
HGB BLD-MCNC: 11.1 G/DL — LOW (ref 13–17)
HGB BLD-MCNC: 11.2 G/DL — LOW (ref 13–17)
HGB BLD-MCNC: 12 G/DL — LOW (ref 13–17)
HGB BLD-MCNC: 12.3 G/DL — LOW (ref 13–17)
HGB BLD-MCNC: 13.5 G/DL — SIGNIFICANT CHANGE UP (ref 13–17)
HGB BLD-MCNC: 13.7 G/DL — SIGNIFICANT CHANGE UP (ref 13–17)
HGB BLD-MCNC: 13.7 G/DL — SIGNIFICANT CHANGE UP (ref 13–17)
HGB BLD-MCNC: 14 G/DL — SIGNIFICANT CHANGE UP (ref 13–17)
HGB BLD-MCNC: 14.1 G/DL — SIGNIFICANT CHANGE UP (ref 13–17)
HGB BLD-MCNC: 14.2 G/DL — SIGNIFICANT CHANGE UP (ref 13–17)
HGB BLD-MCNC: 14.3 G/DL — SIGNIFICANT CHANGE UP (ref 13–17)
HGB BLD-MCNC: 14.5 G/DL — SIGNIFICANT CHANGE UP (ref 13–17)
HGB BLD-MCNC: 14.6 G/DL — SIGNIFICANT CHANGE UP (ref 13–17)
HGB BLD-MCNC: 14.8 G/DL — SIGNIFICANT CHANGE UP (ref 13–17)
HGB BLD-MCNC: 15.3 G/DL — SIGNIFICANT CHANGE UP (ref 13–17)
HGB BLD-MCNC: 8 G/DL — LOW (ref 13–17)
HGB BLD-MCNC: 8.1 G/DL — LOW (ref 13–17)
HGB BLD-MCNC: 8.4 G/DL — LOW (ref 13–17)
HGB BLD-MCNC: 9.1 G/DL — LOW (ref 13–17)
HGB BLD-MCNC: 9.6 G/DL — LOW (ref 13–17)
HYALINE CASTS # UR AUTO: SIGNIFICANT CHANGE UP /LPF (ref 0–7)
IANC: 22.84 K/UL — HIGH (ref 1.5–8.5)
INR BLD: 0.95 RATIO — SIGNIFICANT CHANGE UP (ref 0.88–1.16)
INR BLD: 0.99 RATIO — SIGNIFICANT CHANGE UP (ref 0.88–1.16)
INR BLD: 0.99 RATIO — SIGNIFICANT CHANGE UP (ref 0.88–1.16)
INR BLD: 1 RATIO — SIGNIFICANT CHANGE UP (ref 0.88–1.16)
INR BLD: 1.07 RATIO — SIGNIFICANT CHANGE UP (ref 0.88–1.16)
INR BLD: 1.13 RATIO — SIGNIFICANT CHANGE UP (ref 0.88–1.16)
INR BLD: 1.14 RATIO — SIGNIFICANT CHANGE UP (ref 0.88–1.16)
INR BLD: 1.14 RATIO — SIGNIFICANT CHANGE UP (ref 0.88–1.16)
INR BLD: 1.15 RATIO — SIGNIFICANT CHANGE UP (ref 0.88–1.16)
INR BLD: 1.17 RATIO — HIGH (ref 0.88–1.16)
INR BLD: 1.2 RATIO — HIGH (ref 0.88–1.16)
INR BLD: 1.21 RATIO — HIGH (ref 0.88–1.16)
INR BLD: 1.23 RATIO — HIGH (ref 0.88–1.16)
INR BLD: 1.28 RATIO — HIGH (ref 0.88–1.16)
INR BLD: 2.01 RATIO — HIGH (ref 0.88–1.16)
INR BLD: 2.39 RATIO — HIGH (ref 0.88–1.16)
INR BLD: 2.69 RATIO — HIGH (ref 0.88–1.16)
KETONES UR-MCNC: ABNORMAL
KETONES UR-MCNC: NEGATIVE — SIGNIFICANT CHANGE UP
KETONES UR-MCNC: NEGATIVE — SIGNIFICANT CHANGE UP
LACTATE SERPL-SCNC: 11.7 MMOL/L — CRITICAL HIGH (ref 0.5–2)
LDH SERPL L TO P-CCNC: 493 U/L — HIGH (ref 135–225)
LDH SERPL L TO P-CCNC: 533 U/L — HIGH (ref 135–225)
LDH SERPL L TO P-CCNC: 833 U/L — HIGH (ref 135–225)
LDH SERPL L TO P-CCNC: 884 U/L — HIGH (ref 135–225)
LDH SERPL L TO P-CCNC: 886 U/L — HIGH (ref 135–225)
LEUKOCYTE ESTERASE UR-ACNC: ABNORMAL
LEUKOCYTE ESTERASE UR-ACNC: NEGATIVE — SIGNIFICANT CHANGE UP
LEUKOCYTE ESTERASE UR-ACNC: NEGATIVE — SIGNIFICANT CHANGE UP
LIPID PNL WITH DIRECT LDL SERPL: 68 MG/DL — SIGNIFICANT CHANGE UP
LYMPHOCYTES # BLD AUTO: 0.88 K/UL — LOW (ref 1–3.3)
LYMPHOCYTES # BLD AUTO: 3.5 % — LOW (ref 13–44)
MAGNESIUM SERPL-MCNC: 2.1 MG/DL — SIGNIFICANT CHANGE UP (ref 1.6–2.6)
MAGNESIUM SERPL-MCNC: 2.1 MG/DL — SIGNIFICANT CHANGE UP (ref 1.6–2.6)
MAGNESIUM SERPL-MCNC: 2.2 MG/DL — SIGNIFICANT CHANGE UP (ref 1.6–2.6)
MAGNESIUM SERPL-MCNC: 2.4 MG/DL — SIGNIFICANT CHANGE UP (ref 1.6–2.6)
MAGNESIUM SERPL-MCNC: 2.5 MG/DL — SIGNIFICANT CHANGE UP (ref 1.6–2.6)
MAGNESIUM SERPL-MCNC: 2.6 MG/DL — SIGNIFICANT CHANGE UP (ref 1.6–2.6)
MAGNESIUM SERPL-MCNC: 2.7 MG/DL — HIGH (ref 1.6–2.6)
MAGNESIUM SERPL-MCNC: 2.8 MG/DL — HIGH (ref 1.6–2.6)
MAGNESIUM SERPL-MCNC: 2.8 MG/DL — HIGH (ref 1.6–2.6)
MAGNESIUM SERPL-MCNC: 2.9 MG/DL — HIGH (ref 1.6–2.6)
MAGNESIUM SERPL-MCNC: 3 MG/DL — HIGH (ref 1.6–2.6)
MANUAL SMEAR VERIFICATION: SIGNIFICANT CHANGE UP
MCHC RBC-ENTMCNC: 27.4 PG — SIGNIFICANT CHANGE UP (ref 27–34)
MCHC RBC-ENTMCNC: 27.5 PG — SIGNIFICANT CHANGE UP (ref 27–34)
MCHC RBC-ENTMCNC: 27.6 PG — SIGNIFICANT CHANGE UP (ref 27–34)
MCHC RBC-ENTMCNC: 27.6 PG — SIGNIFICANT CHANGE UP (ref 27–34)
MCHC RBC-ENTMCNC: 27.7 PG — SIGNIFICANT CHANGE UP (ref 27–34)
MCHC RBC-ENTMCNC: 27.8 PG — SIGNIFICANT CHANGE UP (ref 27–34)
MCHC RBC-ENTMCNC: 27.9 PG — SIGNIFICANT CHANGE UP (ref 27–34)
MCHC RBC-ENTMCNC: 28 PG — SIGNIFICANT CHANGE UP (ref 27–34)
MCHC RBC-ENTMCNC: 28.1 PG — SIGNIFICANT CHANGE UP (ref 27–34)
MCHC RBC-ENTMCNC: 28.3 PG — SIGNIFICANT CHANGE UP (ref 27–34)
MCHC RBC-ENTMCNC: 28.4 PG — SIGNIFICANT CHANGE UP (ref 27–34)
MCHC RBC-ENTMCNC: 28.7 PG — SIGNIFICANT CHANGE UP (ref 27–34)
MCHC RBC-ENTMCNC: 30.5 GM/DL — LOW (ref 32–36)
MCHC RBC-ENTMCNC: 30.6 GM/DL — LOW (ref 32–36)
MCHC RBC-ENTMCNC: 30.7 GM/DL — LOW (ref 32–36)
MCHC RBC-ENTMCNC: 30.8 GM/DL — LOW (ref 32–36)
MCHC RBC-ENTMCNC: 30.9 GM/DL — LOW (ref 32–36)
MCHC RBC-ENTMCNC: 31 GM/DL — LOW (ref 32–36)
MCHC RBC-ENTMCNC: 31 GM/DL — LOW (ref 32–36)
MCHC RBC-ENTMCNC: 31.1 GM/DL — LOW (ref 32–36)
MCHC RBC-ENTMCNC: 31.3 GM/DL — LOW (ref 32–36)
MCHC RBC-ENTMCNC: 31.3 GM/DL — LOW (ref 32–36)
MCHC RBC-ENTMCNC: 31.4 GM/DL — LOW (ref 32–36)
MCHC RBC-ENTMCNC: 31.5 GM/DL — LOW (ref 32–36)
MCHC RBC-ENTMCNC: 31.6 GM/DL — LOW (ref 32–36)
MCHC RBC-ENTMCNC: 31.9 GM/DL — LOW (ref 32–36)
MCHC RBC-ENTMCNC: 32.2 GM/DL — SIGNIFICANT CHANGE UP (ref 32–36)
MCHC RBC-ENTMCNC: 32.2 GM/DL — SIGNIFICANT CHANGE UP (ref 32–36)
MCHC RBC-ENTMCNC: 32.6 GM/DL — SIGNIFICANT CHANGE UP (ref 32–36)
MCHC RBC-ENTMCNC: 32.7 GM/DL — SIGNIFICANT CHANGE UP (ref 32–36)
MCHC RBC-ENTMCNC: 32.8 GM/DL — SIGNIFICANT CHANGE UP (ref 32–36)
MCHC RBC-ENTMCNC: 32.9 GM/DL — SIGNIFICANT CHANGE UP (ref 32–36)
MCHC RBC-ENTMCNC: 33.2 GM/DL — SIGNIFICANT CHANGE UP (ref 32–36)
MCHC RBC-ENTMCNC: 33.3 GM/DL — SIGNIFICANT CHANGE UP (ref 32–36)
MCV RBC AUTO: 83.8 FL — SIGNIFICANT CHANGE UP (ref 80–100)
MCV RBC AUTO: 84.3 FL — SIGNIFICANT CHANGE UP (ref 80–100)
MCV RBC AUTO: 84.6 FL — SIGNIFICANT CHANGE UP (ref 80–100)
MCV RBC AUTO: 84.6 FL — SIGNIFICANT CHANGE UP (ref 80–100)
MCV RBC AUTO: 85 FL — SIGNIFICANT CHANGE UP (ref 80–100)
MCV RBC AUTO: 85.3 FL — SIGNIFICANT CHANGE UP (ref 80–100)
MCV RBC AUTO: 85.4 FL — SIGNIFICANT CHANGE UP (ref 80–100)
MCV RBC AUTO: 85.7 FL — SIGNIFICANT CHANGE UP (ref 80–100)
MCV RBC AUTO: 86.2 FL — SIGNIFICANT CHANGE UP (ref 80–100)
MCV RBC AUTO: 86.3 FL — SIGNIFICANT CHANGE UP (ref 80–100)
MCV RBC AUTO: 86.9 FL — SIGNIFICANT CHANGE UP (ref 80–100)
MCV RBC AUTO: 87.1 FL — SIGNIFICANT CHANGE UP (ref 80–100)
MCV RBC AUTO: 87.2 FL — SIGNIFICANT CHANGE UP (ref 80–100)
MCV RBC AUTO: 87.5 FL — SIGNIFICANT CHANGE UP (ref 80–100)
MCV RBC AUTO: 87.6 FL — SIGNIFICANT CHANGE UP (ref 80–100)
MCV RBC AUTO: 87.9 FL — SIGNIFICANT CHANGE UP (ref 80–100)
MCV RBC AUTO: 88.1 FL — SIGNIFICANT CHANGE UP (ref 80–100)
MCV RBC AUTO: 88.5 FL — SIGNIFICANT CHANGE UP (ref 80–100)
MCV RBC AUTO: 88.6 FL — SIGNIFICANT CHANGE UP (ref 80–100)
MCV RBC AUTO: 88.6 FL — SIGNIFICANT CHANGE UP (ref 80–100)
MCV RBC AUTO: 88.8 FL — SIGNIFICANT CHANGE UP (ref 80–100)
MCV RBC AUTO: 88.9 FL — SIGNIFICANT CHANGE UP (ref 80–100)
MCV RBC AUTO: 89.7 FL — SIGNIFICANT CHANGE UP (ref 80–100)
MCV RBC AUTO: 90 FL — SIGNIFICANT CHANGE UP (ref 80–100)
MCV RBC AUTO: 90.2 FL — SIGNIFICANT CHANGE UP (ref 80–100)
MCV RBC AUTO: 90.7 FL — SIGNIFICANT CHANGE UP (ref 80–100)
MCV RBC AUTO: 90.8 FL — SIGNIFICANT CHANGE UP (ref 80–100)
MCV RBC AUTO: 91.6 FL — SIGNIFICANT CHANGE UP (ref 80–100)
METAMYELOCYTES # FLD: 0.9 % — SIGNIFICANT CHANGE UP (ref 0–1)
METHOD TYPE: SIGNIFICANT CHANGE UP
MONOCYTES # BLD AUTO: 0.23 K/UL — SIGNIFICANT CHANGE UP (ref 0–0.9)
MONOCYTES NFR BLD AUTO: 0.9 % — LOW (ref 2–14)
NEUTROPHILS # BLD AUTO: 23.8 K/UL — HIGH (ref 1.8–7.4)
NEUTROPHILS NFR BLD AUTO: 94.7 % — HIGH (ref 43–77)
NITRITE UR-MCNC: NEGATIVE — SIGNIFICANT CHANGE UP
NON HDL CHOLESTEROL: 86 MG/DL — SIGNIFICANT CHANGE UP
NRBC # BLD: 0 /100 WBCS — SIGNIFICANT CHANGE UP
NRBC # BLD: 2 /100 WBCS — SIGNIFICANT CHANGE UP
NRBC # BLD: 3 /100 WBCS — SIGNIFICANT CHANGE UP
NRBC # BLD: 3 /100 WBCS — SIGNIFICANT CHANGE UP
NRBC # BLD: 5 /100 WBCS — SIGNIFICANT CHANGE UP
NRBC # BLD: 6 /100 WBCS — SIGNIFICANT CHANGE UP
NRBC # FLD: 0 K/UL — SIGNIFICANT CHANGE UP
NRBC # FLD: 0.06 K/UL — HIGH
NRBC # FLD: 0.21 K/UL — HIGH
NRBC # FLD: 0.59 K/UL — HIGH
NRBC # FLD: 0.79 K/UL — HIGH
NRBC # FLD: 0.81 K/UL — HIGH
NRBC # FLD: 1.08 K/UL — HIGH
NRBC # FLD: 1.37 K/UL — HIGH
PCO2 BLDA: 61 MMHG — HIGH (ref 35–48)
PCO2 BLDA: 63 MMHG — HIGH (ref 35–48)
PCO2 BLDA: 65 MMHG — HIGH (ref 35–48)
PCO2 BLDA: 67 MMHG — HIGH (ref 35–48)
PCO2 BLDA: 68 MMHG — HIGH (ref 35–48)
PCO2 BLDA: 70 MMHG — CRITICAL HIGH (ref 35–48)
PCO2 BLDA: 72 MMHG — CRITICAL HIGH (ref 35–48)
PCO2 BLDA: 73 MMHG — CRITICAL HIGH (ref 35–48)
PCO2 BLDA: 88 MMHG — CRITICAL HIGH (ref 35–48)
PH BLDA: 6.98 — CRITICAL LOW (ref 7.35–7.45)
PH BLDA: 7.19 — CRITICAL LOW (ref 7.35–7.45)
PH BLDA: 7.24 — LOW (ref 7.35–7.45)
PH BLDA: 7.26 — LOW (ref 7.35–7.45)
PH BLDA: 7.26 — LOW (ref 7.35–7.45)
PH BLDA: 7.28 — LOW (ref 7.35–7.45)
PH BLDA: 7.29 — LOW (ref 7.35–7.45)
PH BLDA: 7.3 — LOW (ref 7.35–7.45)
PH BLDA: 7.32 — LOW (ref 7.35–7.45)
PH UR: 6 — SIGNIFICANT CHANGE UP (ref 5–8)
PH UR: 6.5 — SIGNIFICANT CHANGE UP (ref 5–8)
PH UR: 7 — SIGNIFICANT CHANGE UP (ref 5–8)
PHOSPHATE SERPL-MCNC: 2.9 MG/DL — SIGNIFICANT CHANGE UP (ref 2.5–4.5)
PHOSPHATE SERPL-MCNC: 3.2 MG/DL — SIGNIFICANT CHANGE UP (ref 2.5–4.5)
PHOSPHATE SERPL-MCNC: 3.3 MG/DL — SIGNIFICANT CHANGE UP (ref 2.5–4.5)
PHOSPHATE SERPL-MCNC: 3.6 MG/DL — SIGNIFICANT CHANGE UP (ref 2.5–4.5)
PHOSPHATE SERPL-MCNC: 3.7 MG/DL — SIGNIFICANT CHANGE UP (ref 2.5–4.5)
PHOSPHATE SERPL-MCNC: 4 MG/DL — SIGNIFICANT CHANGE UP (ref 2.5–4.5)
PHOSPHATE SERPL-MCNC: 4 MG/DL — SIGNIFICANT CHANGE UP (ref 2.5–4.5)
PHOSPHATE SERPL-MCNC: 4.1 MG/DL — SIGNIFICANT CHANGE UP (ref 2.5–4.5)
PHOSPHATE SERPL-MCNC: 4.6 MG/DL — HIGH (ref 2.5–4.5)
PHOSPHATE SERPL-MCNC: 5.4 MG/DL — HIGH (ref 2.5–4.5)
PHOSPHATE SERPL-MCNC: 5.7 MG/DL — HIGH (ref 2.5–4.5)
PHOSPHATE SERPL-MCNC: 5.7 MG/DL — HIGH (ref 2.5–4.5)
PHOSPHATE SERPL-MCNC: 5.8 MG/DL — HIGH (ref 2.5–4.5)
PHOSPHATE SERPL-MCNC: 8 MG/DL — HIGH (ref 2.5–4.5)
PHOSPHATE SERPL-MCNC: 8.3 MG/DL — HIGH (ref 2.5–4.5)
PLAT MORPH BLD: NORMAL — SIGNIFICANT CHANGE UP
PLATELET # BLD AUTO: 168 K/UL — SIGNIFICANT CHANGE UP (ref 150–400)
PLATELET # BLD AUTO: 215 K/UL — SIGNIFICANT CHANGE UP (ref 150–400)
PLATELET # BLD AUTO: 232 K/UL — SIGNIFICANT CHANGE UP (ref 150–400)
PLATELET # BLD AUTO: 259 K/UL — SIGNIFICANT CHANGE UP (ref 150–400)
PLATELET # BLD AUTO: 286 K/UL — SIGNIFICANT CHANGE UP (ref 150–400)
PLATELET # BLD AUTO: 290 K/UL — SIGNIFICANT CHANGE UP (ref 150–400)
PLATELET # BLD AUTO: 300 K/UL — SIGNIFICANT CHANGE UP (ref 150–400)
PLATELET # BLD AUTO: 321 K/UL — SIGNIFICANT CHANGE UP (ref 150–400)
PLATELET # BLD AUTO: 322 K/UL — SIGNIFICANT CHANGE UP (ref 150–400)
PLATELET # BLD AUTO: 322 K/UL — SIGNIFICANT CHANGE UP (ref 150–400)
PLATELET # BLD AUTO: 324 K/UL — SIGNIFICANT CHANGE UP (ref 150–400)
PLATELET # BLD AUTO: 326 K/UL — SIGNIFICANT CHANGE UP (ref 150–400)
PLATELET # BLD AUTO: 326 K/UL — SIGNIFICANT CHANGE UP (ref 150–400)
PLATELET # BLD AUTO: 330 K/UL — SIGNIFICANT CHANGE UP (ref 150–400)
PLATELET # BLD AUTO: 331 K/UL — SIGNIFICANT CHANGE UP (ref 150–400)
PLATELET # BLD AUTO: 335 K/UL — SIGNIFICANT CHANGE UP (ref 150–400)
PLATELET # BLD AUTO: 336 K/UL — SIGNIFICANT CHANGE UP (ref 150–400)
PLATELET # BLD AUTO: 346 K/UL — SIGNIFICANT CHANGE UP (ref 150–400)
PLATELET # BLD AUTO: 346 K/UL — SIGNIFICANT CHANGE UP (ref 150–400)
PLATELET # BLD AUTO: 347 K/UL — SIGNIFICANT CHANGE UP (ref 150–400)
PLATELET # BLD AUTO: 349 K/UL — SIGNIFICANT CHANGE UP (ref 150–400)
PLATELET # BLD AUTO: 350 K/UL — SIGNIFICANT CHANGE UP (ref 150–400)
PLATELET # BLD AUTO: 354 K/UL — SIGNIFICANT CHANGE UP (ref 150–400)
PLATELET # BLD AUTO: 358 K/UL — SIGNIFICANT CHANGE UP (ref 150–400)
PLATELET # BLD AUTO: 378 K/UL — SIGNIFICANT CHANGE UP (ref 150–400)
PLATELET # BLD AUTO: 390 K/UL — SIGNIFICANT CHANGE UP (ref 150–400)
PLATELET # BLD AUTO: 393 K/UL — SIGNIFICANT CHANGE UP (ref 150–400)
PLATELET # BLD AUTO: 408 K/UL — HIGH (ref 150–400)
PLATELET COUNT - ESTIMATE: NORMAL — SIGNIFICANT CHANGE UP
PO2 BLDA: 124 MMHG — HIGH (ref 83–108)
PO2 BLDA: 56 MMHG — LOW (ref 83–108)
PO2 BLDA: 59 MMHG — LOW (ref 83–108)
PO2 BLDA: 65 MMHG — LOW (ref 83–108)
PO2 BLDA: 73 MMHG — LOW (ref 83–108)
PO2 BLDA: 75 MMHG — LOW (ref 83–108)
PO2 BLDA: 84 MMHG — SIGNIFICANT CHANGE UP (ref 83–108)
PO2 BLDA: 87 MMHG — SIGNIFICANT CHANGE UP (ref 83–108)
PO2 BLDA: 88 MMHG — SIGNIFICANT CHANGE UP (ref 83–108)
POTASSIUM SERPL-MCNC: 3.5 MMOL/L — SIGNIFICANT CHANGE UP (ref 3.5–5.3)
POTASSIUM SERPL-MCNC: 3.6 MMOL/L — SIGNIFICANT CHANGE UP (ref 3.5–5.3)
POTASSIUM SERPL-MCNC: 3.7 MMOL/L — SIGNIFICANT CHANGE UP (ref 3.5–5.3)
POTASSIUM SERPL-MCNC: 3.8 MMOL/L — SIGNIFICANT CHANGE UP (ref 3.5–5.3)
POTASSIUM SERPL-MCNC: 3.9 MMOL/L — SIGNIFICANT CHANGE UP (ref 3.5–5.3)
POTASSIUM SERPL-MCNC: 4 MMOL/L — SIGNIFICANT CHANGE UP (ref 3.5–5.3)
POTASSIUM SERPL-MCNC: 4.1 MMOL/L — SIGNIFICANT CHANGE UP (ref 3.5–5.3)
POTASSIUM SERPL-MCNC: 4.3 MMOL/L — SIGNIFICANT CHANGE UP (ref 3.5–5.3)
POTASSIUM SERPL-MCNC: 4.4 MMOL/L — SIGNIFICANT CHANGE UP (ref 3.5–5.3)
POTASSIUM SERPL-MCNC: 4.4 MMOL/L — SIGNIFICANT CHANGE UP (ref 3.5–5.3)
POTASSIUM SERPL-MCNC: 4.8 MMOL/L — SIGNIFICANT CHANGE UP (ref 3.5–5.3)
POTASSIUM SERPL-MCNC: 4.8 MMOL/L — SIGNIFICANT CHANGE UP (ref 3.5–5.3)
POTASSIUM SERPL-MCNC: 4.9 MMOL/L — SIGNIFICANT CHANGE UP (ref 3.5–5.3)
POTASSIUM SERPL-MCNC: 5 MMOL/L — SIGNIFICANT CHANGE UP (ref 3.5–5.3)
POTASSIUM SERPL-MCNC: 5 MMOL/L — SIGNIFICANT CHANGE UP (ref 3.5–5.3)
POTASSIUM SERPL-MCNC: 5.1 MMOL/L — SIGNIFICANT CHANGE UP (ref 3.5–5.3)
POTASSIUM SERPL-MCNC: 5.2 MMOL/L — SIGNIFICANT CHANGE UP (ref 3.5–5.3)
POTASSIUM SERPL-MCNC: 5.4 MMOL/L — HIGH (ref 3.5–5.3)
POTASSIUM SERPL-MCNC: 5.5 MMOL/L — HIGH (ref 3.5–5.3)
POTASSIUM SERPL-MCNC: 5.5 MMOL/L — HIGH (ref 3.5–5.3)
POTASSIUM SERPL-MCNC: 5.6 MMOL/L — HIGH (ref 3.5–5.3)
POTASSIUM SERPL-MCNC: 5.8 MMOL/L — HIGH (ref 3.5–5.3)
POTASSIUM SERPL-MCNC: 5.9 MMOL/L — HIGH (ref 3.5–5.3)
POTASSIUM SERPL-MCNC: 6 MMOL/L — HIGH (ref 3.5–5.3)
POTASSIUM SERPL-MCNC: 6 MMOL/L — HIGH (ref 3.5–5.3)
POTASSIUM SERPL-MCNC: 6.3 MMOL/L — CRITICAL HIGH (ref 3.5–5.3)
POTASSIUM SERPL-MCNC: 6.8 MMOL/L — CRITICAL HIGH (ref 3.5–5.3)
POTASSIUM SERPL-SCNC: 3.5 MMOL/L — SIGNIFICANT CHANGE UP (ref 3.5–5.3)
POTASSIUM SERPL-SCNC: 3.6 MMOL/L — SIGNIFICANT CHANGE UP (ref 3.5–5.3)
POTASSIUM SERPL-SCNC: 3.7 MMOL/L — SIGNIFICANT CHANGE UP (ref 3.5–5.3)
POTASSIUM SERPL-SCNC: 3.8 MMOL/L — SIGNIFICANT CHANGE UP (ref 3.5–5.3)
POTASSIUM SERPL-SCNC: 3.9 MMOL/L — SIGNIFICANT CHANGE UP (ref 3.5–5.3)
POTASSIUM SERPL-SCNC: 4 MMOL/L — SIGNIFICANT CHANGE UP (ref 3.5–5.3)
POTASSIUM SERPL-SCNC: 4.1 MMOL/L — SIGNIFICANT CHANGE UP (ref 3.5–5.3)
POTASSIUM SERPL-SCNC: 4.3 MMOL/L — SIGNIFICANT CHANGE UP (ref 3.5–5.3)
POTASSIUM SERPL-SCNC: 4.4 MMOL/L — SIGNIFICANT CHANGE UP (ref 3.5–5.3)
POTASSIUM SERPL-SCNC: 4.4 MMOL/L — SIGNIFICANT CHANGE UP (ref 3.5–5.3)
POTASSIUM SERPL-SCNC: 4.8 MMOL/L — SIGNIFICANT CHANGE UP (ref 3.5–5.3)
POTASSIUM SERPL-SCNC: 4.8 MMOL/L — SIGNIFICANT CHANGE UP (ref 3.5–5.3)
POTASSIUM SERPL-SCNC: 4.9 MMOL/L — SIGNIFICANT CHANGE UP (ref 3.5–5.3)
POTASSIUM SERPL-SCNC: 5 MMOL/L — SIGNIFICANT CHANGE UP (ref 3.5–5.3)
POTASSIUM SERPL-SCNC: 5 MMOL/L — SIGNIFICANT CHANGE UP (ref 3.5–5.3)
POTASSIUM SERPL-SCNC: 5.1 MMOL/L — SIGNIFICANT CHANGE UP (ref 3.5–5.3)
POTASSIUM SERPL-SCNC: 5.2 MMOL/L — SIGNIFICANT CHANGE UP (ref 3.5–5.3)
POTASSIUM SERPL-SCNC: 5.4 MMOL/L — HIGH (ref 3.5–5.3)
POTASSIUM SERPL-SCNC: 5.5 MMOL/L — HIGH (ref 3.5–5.3)
POTASSIUM SERPL-SCNC: 5.5 MMOL/L — HIGH (ref 3.5–5.3)
POTASSIUM SERPL-SCNC: 5.6 MMOL/L — HIGH (ref 3.5–5.3)
POTASSIUM SERPL-SCNC: 5.8 MMOL/L — HIGH (ref 3.5–5.3)
POTASSIUM SERPL-SCNC: 5.9 MMOL/L — HIGH (ref 3.5–5.3)
POTASSIUM SERPL-SCNC: 6 MMOL/L — HIGH (ref 3.5–5.3)
POTASSIUM SERPL-SCNC: 6 MMOL/L — HIGH (ref 3.5–5.3)
POTASSIUM SERPL-SCNC: 6.3 MMOL/L — CRITICAL HIGH (ref 3.5–5.3)
POTASSIUM SERPL-SCNC: 6.8 MMOL/L — CRITICAL HIGH (ref 3.5–5.3)
PROCALCITONIN SERPL-MCNC: 0.1 NG/ML — SIGNIFICANT CHANGE UP (ref 0.02–0.1)
PROCALCITONIN SERPL-MCNC: 0.34 NG/ML — HIGH (ref 0.02–0.1)
PROCALCITONIN SERPL-MCNC: 0.34 NG/ML — HIGH (ref 0.02–0.1)
PROCALCITONIN SERPL-MCNC: 0.35 NG/ML — HIGH (ref 0.02–0.1)
PROCALCITONIN SERPL-MCNC: 0.35 NG/ML — HIGH (ref 0.02–0.1)
PROCALCITONIN SERPL-MCNC: 0.4 NG/ML — HIGH (ref 0.02–0.1)
PROCALCITONIN SERPL-MCNC: 0.41 NG/ML — HIGH (ref 0.02–0.1)
PROCALCITONIN SERPL-MCNC: 0.6 NG/ML — HIGH (ref 0.02–0.1)
PROCALCITONIN SERPL-MCNC: 11.08 NG/ML — HIGH (ref 0.02–0.1)
PROT SERPL-MCNC: 5.2 G/DL — LOW (ref 6–8.3)
PROT SERPL-MCNC: 5.4 G/DL — LOW (ref 6–8.3)
PROT SERPL-MCNC: 5.5 G/DL — LOW (ref 6–8.3)
PROT SERPL-MCNC: 5.7 G/DL — LOW (ref 6–8.3)
PROT SERPL-MCNC: 5.7 G/DL — LOW (ref 6–8.3)
PROT SERPL-MCNC: 5.8 G/DL — LOW (ref 6–8.3)
PROT SERPL-MCNC: 5.9 G/DL — LOW (ref 6–8.3)
PROT SERPL-MCNC: 6.2 G/DL — SIGNIFICANT CHANGE UP (ref 6–8.3)
PROT SERPL-MCNC: 6.2 G/DL — SIGNIFICANT CHANGE UP (ref 6–8.3)
PROT SERPL-MCNC: 6.3 G/DL — SIGNIFICANT CHANGE UP (ref 6–8.3)
PROT SERPL-MCNC: 6.4 G/DL — SIGNIFICANT CHANGE UP (ref 6–8.3)
PROT SERPL-MCNC: 6.5 G/DL — SIGNIFICANT CHANGE UP (ref 6–8.3)
PROT SERPL-MCNC: 6.5 G/DL — SIGNIFICANT CHANGE UP (ref 6–8.3)
PROT SERPL-MCNC: 6.7 G/DL — SIGNIFICANT CHANGE UP (ref 6–8.3)
PROT SERPL-MCNC: 6.7 G/DL — SIGNIFICANT CHANGE UP (ref 6–8.3)
PROT SERPL-MCNC: 6.8 G/DL — SIGNIFICANT CHANGE UP (ref 6–8.3)
PROT SERPL-MCNC: 6.9 G/DL — SIGNIFICANT CHANGE UP (ref 6–8.3)
PROT SERPL-MCNC: 6.9 G/DL — SIGNIFICANT CHANGE UP (ref 6–8.3)
PROT SERPL-MCNC: 7.1 G/DL — SIGNIFICANT CHANGE UP (ref 6–8.3)
PROT SERPL-MCNC: 7.2 G/DL — SIGNIFICANT CHANGE UP (ref 6–8.3)
PROT SERPL-MCNC: 7.4 G/DL — SIGNIFICANT CHANGE UP (ref 6–8.3)
PROT SERPL-MCNC: 7.4 G/DL — SIGNIFICANT CHANGE UP (ref 6–8.3)
PROT UR-MCNC: ABNORMAL
PROTHROM AB SERPL-ACNC: 10.9 SEC — SIGNIFICANT CHANGE UP (ref 10.6–13.6)
PROTHROM AB SERPL-ACNC: 11.3 SEC — SIGNIFICANT CHANGE UP (ref 10.6–13.6)
PROTHROM AB SERPL-ACNC: 11.3 SEC — SIGNIFICANT CHANGE UP (ref 10.6–13.6)
PROTHROM AB SERPL-ACNC: 11.4 SEC — SIGNIFICANT CHANGE UP (ref 10.6–13.6)
PROTHROM AB SERPL-ACNC: 12.3 SEC — SIGNIFICANT CHANGE UP (ref 10.6–13.6)
PROTHROM AB SERPL-ACNC: 12.8 SEC — SIGNIFICANT CHANGE UP (ref 10.6–13.6)
PROTHROM AB SERPL-ACNC: 12.9 SEC — SIGNIFICANT CHANGE UP (ref 10.6–13.6)
PROTHROM AB SERPL-ACNC: 13 SEC — SIGNIFICANT CHANGE UP (ref 10.6–13.6)
PROTHROM AB SERPL-ACNC: 13 SEC — SIGNIFICANT CHANGE UP (ref 10.6–13.6)
PROTHROM AB SERPL-ACNC: 13.4 SEC — SIGNIFICANT CHANGE UP (ref 10.6–13.6)
PROTHROM AB SERPL-ACNC: 13.6 SEC — SIGNIFICANT CHANGE UP (ref 10.6–13.6)
PROTHROM AB SERPL-ACNC: 13.7 SEC — HIGH (ref 10.6–13.6)
PROTHROM AB SERPL-ACNC: 13.9 SEC — HIGH (ref 10.6–13.6)
PROTHROM AB SERPL-ACNC: 14.5 SEC — HIGH (ref 10.6–13.6)
PROTHROM AB SERPL-ACNC: 22.4 SEC — HIGH (ref 10.6–13.6)
PROTHROM AB SERPL-ACNC: 26.1 SEC — HIGH (ref 10.6–13.6)
PROTHROM AB SERPL-ACNC: 29.3 SEC — HIGH (ref 10.6–13.6)
RBC # BLD: 2.85 M/UL — LOW (ref 4.2–5.8)
RBC # BLD: 2.9 M/UL — LOW (ref 4.2–5.8)
RBC # BLD: 3.01 M/UL — LOW (ref 4.2–5.8)
RBC # BLD: 3.27 M/UL — LOW (ref 4.2–5.8)
RBC # BLD: 3.42 M/UL — LOW (ref 4.2–5.8)
RBC # BLD: 3.62 M/UL — LOW (ref 4.2–5.8)
RBC # BLD: 3.67 M/UL — LOW (ref 4.2–5.8)
RBC # BLD: 3.69 M/UL — LOW (ref 4.2–5.8)
RBC # BLD: 3.89 M/UL — LOW (ref 4.2–5.8)
RBC # BLD: 3.89 M/UL — LOW (ref 4.2–5.8)
RBC # BLD: 4.03 M/UL — LOW (ref 4.2–5.8)
RBC # BLD: 4.05 M/UL — LOW (ref 4.2–5.8)
RBC # BLD: 4.08 M/UL — LOW (ref 4.2–5.8)
RBC # BLD: 4.18 M/UL — LOW (ref 4.2–5.8)
RBC # BLD: 4.47 M/UL — SIGNIFICANT CHANGE UP (ref 4.2–5.8)
RBC # BLD: 4.88 M/UL — SIGNIFICANT CHANGE UP (ref 4.2–5.8)
RBC # BLD: 4.97 M/UL — SIGNIFICANT CHANGE UP (ref 4.2–5.8)
RBC # BLD: 4.99 M/UL — SIGNIFICANT CHANGE UP (ref 4.2–5.8)
RBC # BLD: 5.05 M/UL — SIGNIFICANT CHANGE UP (ref 4.2–5.8)
RBC # BLD: 5.08 M/UL — SIGNIFICANT CHANGE UP (ref 4.2–5.8)
RBC # BLD: 5.13 M/UL — SIGNIFICANT CHANGE UP (ref 4.2–5.8)
RBC # BLD: 5.14 M/UL — SIGNIFICANT CHANGE UP (ref 4.2–5.8)
RBC # BLD: 5.14 M/UL — SIGNIFICANT CHANGE UP (ref 4.2–5.8)
RBC # BLD: 5.2 M/UL — SIGNIFICANT CHANGE UP (ref 4.2–5.8)
RBC # BLD: 5.22 M/UL — SIGNIFICANT CHANGE UP (ref 4.2–5.8)
RBC # BLD: 5.26 M/UL — SIGNIFICANT CHANGE UP (ref 4.2–5.8)
RBC # BLD: 5.41 M/UL — SIGNIFICANT CHANGE UP (ref 4.2–5.8)
RBC # BLD: 5.53 M/UL — SIGNIFICANT CHANGE UP (ref 4.2–5.8)
RBC # FLD: 12.9 % — SIGNIFICANT CHANGE UP (ref 10.3–14.5)
RBC # FLD: 13 % — SIGNIFICANT CHANGE UP (ref 10.3–14.5)
RBC # FLD: 13.1 % — SIGNIFICANT CHANGE UP (ref 10.3–14.5)
RBC # FLD: 13.1 % — SIGNIFICANT CHANGE UP (ref 10.3–14.5)
RBC # FLD: 13.2 % — SIGNIFICANT CHANGE UP (ref 10.3–14.5)
RBC # FLD: 13.2 % — SIGNIFICANT CHANGE UP (ref 10.3–14.5)
RBC # FLD: 13.3 % — SIGNIFICANT CHANGE UP (ref 10.3–14.5)
RBC # FLD: 13.4 % — SIGNIFICANT CHANGE UP (ref 10.3–14.5)
RBC # FLD: 13.5 % — SIGNIFICANT CHANGE UP (ref 10.3–14.5)
RBC # FLD: 13.5 % — SIGNIFICANT CHANGE UP (ref 10.3–14.5)
RBC # FLD: 13.6 % — SIGNIFICANT CHANGE UP (ref 10.3–14.5)
RBC # FLD: 13.6 % — SIGNIFICANT CHANGE UP (ref 10.3–14.5)
RBC # FLD: 13.7 % — SIGNIFICANT CHANGE UP (ref 10.3–14.5)
RBC # FLD: 13.8 % — SIGNIFICANT CHANGE UP (ref 10.3–14.5)
RBC # FLD: 14 % — SIGNIFICANT CHANGE UP (ref 10.3–14.5)
RBC # FLD: 14 % — SIGNIFICANT CHANGE UP (ref 10.3–14.5)
RBC # FLD: 14.1 % — SIGNIFICANT CHANGE UP (ref 10.3–14.5)
RBC # FLD: 14.6 % — HIGH (ref 10.3–14.5)
RBC # FLD: 14.6 % — HIGH (ref 10.3–14.5)
RBC # FLD: 14.8 % — HIGH (ref 10.3–14.5)
RBC # FLD: 15.5 % — HIGH (ref 10.3–14.5)
RBC # FLD: 15.9 % — HIGH (ref 10.3–14.5)
RBC # FLD: 16.3 % — HIGH (ref 10.3–14.5)
RBC # FLD: 16.6 % — HIGH (ref 10.3–14.5)
RBC # FLD: 16.7 % — HIGH (ref 10.3–14.5)
RBC # FLD: 16.8 % — HIGH (ref 10.3–14.5)
RBC # FLD: 17.2 % — HIGH (ref 10.3–14.5)
RBC # FLD: 17.9 % — HIGH (ref 10.3–14.5)
RBC BLD AUTO: NORMAL — SIGNIFICANT CHANGE UP
RBC CASTS # UR COMP ASSIST: >10 /HPF — HIGH (ref 0–4)
SAO2 % BLDA: 86.5 % — LOW (ref 95–99)
SAO2 % BLDA: 88.3 % — LOW (ref 95–99)
SAO2 % BLDA: 91.7 % — LOW (ref 95–99)
SAO2 % BLDA: 91.8 % — LOW (ref 95–99)
SAO2 % BLDA: 92.7 % — LOW (ref 95–99)
SAO2 % BLDA: 93.8 % — LOW (ref 95–99)
SAO2 % BLDA: 95.1 % — SIGNIFICANT CHANGE UP (ref 95–99)
SAO2 % BLDA: 95.8 % — SIGNIFICANT CHANGE UP (ref 95–99)
SAO2 % BLDA: 98.4 % — SIGNIFICANT CHANGE UP (ref 95–99)
SARS-COV-2 IGG SERPL QL IA: NEGATIVE — SIGNIFICANT CHANGE UP
SARS-COV-2 IGM SERPL IA-ACNC: 0.09 INDEX — SIGNIFICANT CHANGE UP
SMUDGE CELLS # BLD: PRESENT — SIGNIFICANT CHANGE UP
SODIUM SERPL-SCNC: 135 MMOL/L — SIGNIFICANT CHANGE UP (ref 135–145)
SODIUM SERPL-SCNC: 135 MMOL/L — SIGNIFICANT CHANGE UP (ref 135–145)
SODIUM SERPL-SCNC: 137 MMOL/L — SIGNIFICANT CHANGE UP (ref 135–145)
SODIUM SERPL-SCNC: 137 MMOL/L — SIGNIFICANT CHANGE UP (ref 135–145)
SODIUM SERPL-SCNC: 138 MMOL/L — SIGNIFICANT CHANGE UP (ref 135–145)
SODIUM SERPL-SCNC: 138 MMOL/L — SIGNIFICANT CHANGE UP (ref 135–145)
SODIUM SERPL-SCNC: 139 MMOL/L — SIGNIFICANT CHANGE UP (ref 135–145)
SODIUM SERPL-SCNC: 140 MMOL/L — SIGNIFICANT CHANGE UP (ref 135–145)
SODIUM SERPL-SCNC: 141 MMOL/L — SIGNIFICANT CHANGE UP (ref 135–145)
SODIUM SERPL-SCNC: 142 MMOL/L — SIGNIFICANT CHANGE UP (ref 135–145)
SODIUM SERPL-SCNC: 143 MMOL/L — SIGNIFICANT CHANGE UP (ref 135–145)
SODIUM SERPL-SCNC: 144 MMOL/L — SIGNIFICANT CHANGE UP (ref 135–145)
SODIUM SERPL-SCNC: 148 MMOL/L — HIGH (ref 135–145)
SP GR SPEC: 1.02 — SIGNIFICANT CHANGE UP (ref 1.01–1.02)
SP GR SPEC: 1.02 — SIGNIFICANT CHANGE UP (ref 1.01–1.02)
SP GR SPEC: 1.04 — HIGH (ref 1.01–1.02)
SPECIMEN SOURCE: SIGNIFICANT CHANGE UP
TRIGL SERPL-MCNC: 239 MG/DL — HIGH
TRIGL SERPL-MCNC: 381 MG/DL — HIGH
TRIGL SERPL-MCNC: 415 MG/DL — HIGH
TRIGL SERPL-MCNC: 429 MG/DL — HIGH
TRIGL SERPL-MCNC: 712 MG/DL — HIGH
TRIGL SERPL-MCNC: 88 MG/DL — SIGNIFICANT CHANGE UP
TROPONIN T, HIGH SENSITIVITY RESULT: <6 NG/L — SIGNIFICANT CHANGE UP
TSH SERPL-MCNC: 0.18 UIU/ML — LOW (ref 0.27–4.2)
UROBILINOGEN FLD QL: ABNORMAL
UROBILINOGEN FLD QL: SIGNIFICANT CHANGE UP
UROBILINOGEN FLD QL: SIGNIFICANT CHANGE UP
VANCOMYCIN FLD-MCNC: 6.2 UG/ML — SIGNIFICANT CHANGE UP
WBC # BLD: 10.39 K/UL — SIGNIFICANT CHANGE UP (ref 3.8–10.5)
WBC # BLD: 11.4 K/UL — HIGH (ref 3.8–10.5)
WBC # BLD: 13.74 K/UL — HIGH (ref 3.8–10.5)
WBC # BLD: 16.2 K/UL — HIGH (ref 3.8–10.5)
WBC # BLD: 16.94 K/UL — HIGH (ref 3.8–10.5)
WBC # BLD: 17.08 K/UL — HIGH (ref 3.8–10.5)
WBC # BLD: 17.15 K/UL — HIGH (ref 3.8–10.5)
WBC # BLD: 17.91 K/UL — HIGH (ref 3.8–10.5)
WBC # BLD: 18 K/UL — HIGH (ref 3.8–10.5)
WBC # BLD: 18.48 K/UL — HIGH (ref 3.8–10.5)
WBC # BLD: 18.74 K/UL — HIGH (ref 3.8–10.5)
WBC # BLD: 19.63 K/UL — HIGH (ref 3.8–10.5)
WBC # BLD: 19.9 K/UL — HIGH (ref 3.8–10.5)
WBC # BLD: 20.03 K/UL — HIGH (ref 3.8–10.5)
WBC # BLD: 21.32 K/UL — HIGH (ref 3.8–10.5)
WBC # BLD: 21.86 K/UL — HIGH (ref 3.8–10.5)
WBC # BLD: 22.16 K/UL — HIGH (ref 3.8–10.5)
WBC # BLD: 22.84 K/UL — HIGH (ref 3.8–10.5)
WBC # BLD: 24.85 K/UL — HIGH (ref 3.8–10.5)
WBC # BLD: 25.13 K/UL — HIGH (ref 3.8–10.5)
WBC # BLD: 25.68 K/UL — HIGH (ref 3.8–10.5)
WBC # BLD: 25.97 K/UL — HIGH (ref 3.8–10.5)
WBC # BLD: 26.06 K/UL — HIGH (ref 3.8–10.5)
WBC # BLD: 26.72 K/UL — HIGH (ref 3.8–10.5)
WBC # BLD: 28.96 K/UL — HIGH (ref 3.8–10.5)
WBC # BLD: 32.87 K/UL — HIGH (ref 3.8–10.5)
WBC # BLD: 4.97 K/UL — SIGNIFICANT CHANGE UP (ref 3.8–10.5)
WBC # BLD: 9.28 K/UL — SIGNIFICANT CHANGE UP (ref 3.8–10.5)
WBC # FLD AUTO: 10.39 K/UL — SIGNIFICANT CHANGE UP (ref 3.8–10.5)
WBC # FLD AUTO: 11.4 K/UL — HIGH (ref 3.8–10.5)
WBC # FLD AUTO: 13.74 K/UL — HIGH (ref 3.8–10.5)
WBC # FLD AUTO: 16.2 K/UL — HIGH (ref 3.8–10.5)
WBC # FLD AUTO: 16.94 K/UL — HIGH (ref 3.8–10.5)
WBC # FLD AUTO: 17.08 K/UL — HIGH (ref 3.8–10.5)
WBC # FLD AUTO: 17.15 K/UL — HIGH (ref 3.8–10.5)
WBC # FLD AUTO: 17.91 K/UL — HIGH (ref 3.8–10.5)
WBC # FLD AUTO: 18 K/UL — HIGH (ref 3.8–10.5)
WBC # FLD AUTO: 18.48 K/UL — HIGH (ref 3.8–10.5)
WBC # FLD AUTO: 18.74 K/UL — HIGH (ref 3.8–10.5)
WBC # FLD AUTO: 19.63 K/UL — HIGH (ref 3.8–10.5)
WBC # FLD AUTO: 19.9 K/UL — HIGH (ref 3.8–10.5)
WBC # FLD AUTO: 20.03 K/UL — HIGH (ref 3.8–10.5)
WBC # FLD AUTO: 21.32 K/UL — HIGH (ref 3.8–10.5)
WBC # FLD AUTO: 21.86 K/UL — HIGH (ref 3.8–10.5)
WBC # FLD AUTO: 22.16 K/UL — HIGH (ref 3.8–10.5)
WBC # FLD AUTO: 22.84 K/UL — HIGH (ref 3.8–10.5)
WBC # FLD AUTO: 24.85 K/UL — HIGH (ref 3.8–10.5)
WBC # FLD AUTO: 25.13 K/UL — HIGH (ref 3.8–10.5)
WBC # FLD AUTO: 25.68 K/UL — HIGH (ref 3.8–10.5)
WBC # FLD AUTO: 25.97 K/UL — HIGH (ref 3.8–10.5)
WBC # FLD AUTO: 26.06 K/UL — HIGH (ref 3.8–10.5)
WBC # FLD AUTO: 26.72 K/UL — HIGH (ref 3.8–10.5)
WBC # FLD AUTO: 28.96 K/UL — HIGH (ref 3.8–10.5)
WBC # FLD AUTO: 32.87 K/UL — HIGH (ref 3.8–10.5)
WBC # FLD AUTO: 4.97 K/UL — SIGNIFICANT CHANGE UP (ref 3.8–10.5)
WBC # FLD AUTO: 9.28 K/UL — SIGNIFICANT CHANGE UP (ref 3.8–10.5)
WBC UR QL: <5 /HPF — SIGNIFICANT CHANGE UP (ref 0–5)

## 2021-01-01 PROCEDURE — 99233 SBSQ HOSP IP/OBS HIGH 50: CPT

## 2021-01-01 PROCEDURE — 71045 X-RAY EXAM CHEST 1 VIEW: CPT | Mod: 26

## 2021-01-01 PROCEDURE — 99232 SBSQ HOSP IP/OBS MODERATE 35: CPT

## 2021-01-01 PROCEDURE — 71045 X-RAY EXAM CHEST 1 VIEW: CPT | Mod: 26,77

## 2021-01-01 PROCEDURE — 36620 INSERTION CATHETER ARTERY: CPT

## 2021-01-01 PROCEDURE — 99233 SBSQ HOSP IP/OBS HIGH 50: CPT | Mod: GC

## 2021-01-01 PROCEDURE — 99223 1ST HOSP IP/OBS HIGH 75: CPT | Mod: GC

## 2021-01-01 PROCEDURE — 99291 CRITICAL CARE FIRST HOUR: CPT

## 2021-01-01 PROCEDURE — 74018 RADEX ABDOMEN 1 VIEW: CPT | Mod: 26

## 2021-01-01 PROCEDURE — 76705 ECHO EXAM OF ABDOMEN: CPT | Mod: 26

## 2021-01-01 PROCEDURE — 76937 US GUIDE VASCULAR ACCESS: CPT | Mod: 26

## 2021-01-01 PROCEDURE — 93970 EXTREMITY STUDY: CPT | Mod: 26

## 2021-01-01 PROCEDURE — 71045 X-RAY EXAM CHEST 1 VIEW: CPT | Mod: 26,76

## 2021-01-01 PROCEDURE — 99291 CRITICAL CARE FIRST HOUR: CPT | Mod: 25

## 2021-01-01 PROCEDURE — 76770 US EXAM ABDO BACK WALL COMP: CPT | Mod: 26

## 2021-01-01 PROCEDURE — 99222 1ST HOSP IP/OBS MODERATE 55: CPT | Mod: GC

## 2021-01-01 PROCEDURE — 99232 SBSQ HOSP IP/OBS MODERATE 35: CPT | Mod: GC

## 2021-01-01 PROCEDURE — 36800 INSERTION OF CANNULA: CPT

## 2021-01-01 PROCEDURE — 36556 INSERT NON-TUNNEL CV CATH: CPT

## 2021-01-01 RX ORDER — DEXAMETHASONE 0.5 MG/5ML
10 ELIXIR ORAL ONCE
Refills: 0 | Status: COMPLETED | OUTPATIENT
Start: 2021-01-01 | End: 2021-01-01

## 2021-01-01 RX ORDER — REMDESIVIR 5 MG/ML
200 INJECTION INTRAVENOUS EVERY 24 HOURS
Refills: 0 | Status: COMPLETED | OUTPATIENT
Start: 2021-01-01 | End: 2021-01-01

## 2021-01-01 RX ORDER — CALCIUM GLUCONATE 100 MG/ML
2 VIAL (ML) INTRAVENOUS ONCE
Refills: 0 | Status: COMPLETED | OUTPATIENT
Start: 2021-01-01 | End: 2021-01-01

## 2021-01-01 RX ORDER — DEXTROSE 50 % IN WATER 50 %
25 SYRINGE (ML) INTRAVENOUS ONCE
Refills: 0 | Status: DISCONTINUED | OUTPATIENT
Start: 2021-01-01 | End: 2021-01-01

## 2021-01-01 RX ORDER — METOCLOPRAMIDE HCL 10 MG
10 TABLET ORAL EVERY 8 HOURS
Refills: 0 | Status: DISCONTINUED | OUTPATIENT
Start: 2021-01-01 | End: 2021-01-01

## 2021-01-01 RX ORDER — MIDAZOLAM HYDROCHLORIDE 1 MG/ML
2 INJECTION, SOLUTION INTRAMUSCULAR; INTRAVENOUS ONCE
Refills: 0 | Status: DISCONTINUED | OUTPATIENT
Start: 2021-01-01 | End: 2021-01-01

## 2021-01-01 RX ORDER — BUDESONIDE AND FORMOTEROL FUMARATE DIHYDRATE 160; 4.5 UG/1; UG/1
2 AEROSOL RESPIRATORY (INHALATION)
Refills: 0 | Status: DISCONTINUED | OUTPATIENT
Start: 2021-01-01 | End: 2021-01-01

## 2021-01-01 RX ORDER — MEROPENEM 1 G/30ML
1000 INJECTION INTRAVENOUS EVERY 8 HOURS
Refills: 0 | Status: DISCONTINUED | OUTPATIENT
Start: 2021-01-01 | End: 2021-01-01

## 2021-01-01 RX ORDER — LANOLIN ALCOHOL/MO/W.PET/CERES
3 CREAM (GRAM) TOPICAL AT BEDTIME
Refills: 0 | Status: DISCONTINUED | OUTPATIENT
Start: 2021-01-01 | End: 2021-01-01

## 2021-01-01 RX ORDER — DEXAMETHASONE 0.5 MG/5ML
6 ELIXIR ORAL ONCE
Refills: 0 | Status: COMPLETED | OUTPATIENT
Start: 2021-01-01 | End: 2021-01-01

## 2021-01-01 RX ORDER — SODIUM CHLORIDE 9 MG/ML
1000 INJECTION, SOLUTION INTRAVENOUS ONCE
Refills: 0 | Status: COMPLETED | OUTPATIENT
Start: 2021-01-01 | End: 2021-01-01

## 2021-01-01 RX ORDER — SODIUM ZIRCONIUM CYCLOSILICATE 10 G/10G
10 POWDER, FOR SUSPENSION ORAL ONCE
Refills: 0 | Status: COMPLETED | OUTPATIENT
Start: 2021-01-01 | End: 2021-01-01

## 2021-01-01 RX ORDER — INSULIN HUMAN 100 [IU]/ML
10 INJECTION, SOLUTION SUBCUTANEOUS ONCE
Refills: 0 | Status: DISCONTINUED | OUTPATIENT
Start: 2021-01-01 | End: 2021-01-01

## 2021-01-01 RX ORDER — ACETAMINOPHEN 500 MG
650 TABLET ORAL EVERY 6 HOURS
Refills: 0 | Status: DISCONTINUED | OUTPATIENT
Start: 2021-01-01 | End: 2021-01-01

## 2021-01-01 RX ORDER — ACETAMINOPHEN 500 MG
1000 TABLET ORAL ONCE
Refills: 0 | Status: COMPLETED | OUTPATIENT
Start: 2021-01-01 | End: 2021-01-01

## 2021-01-01 RX ORDER — CISATRACURIUM BESYLATE 2 MG/ML
3 INJECTION INTRAVENOUS
Qty: 200 | Refills: 0 | Status: DISCONTINUED | OUTPATIENT
Start: 2021-01-01 | End: 2021-01-01

## 2021-01-01 RX ORDER — ALBUTEROL 90 UG/1
4 AEROSOL, METERED ORAL EVERY 6 HOURS
Refills: 0 | Status: DISCONTINUED | OUTPATIENT
Start: 2021-01-01 | End: 2021-01-01

## 2021-01-01 RX ORDER — NOREPINEPHRINE BITARTRATE/D5W 8 MG/250ML
0.05 PLASTIC BAG, INJECTION (ML) INTRAVENOUS
Qty: 16 | Refills: 0 | Status: DISCONTINUED | OUTPATIENT
Start: 2021-01-01 | End: 2021-01-01

## 2021-01-01 RX ORDER — BUMETANIDE 0.25 MG/ML
4 INJECTION INTRAMUSCULAR; INTRAVENOUS ONCE
Refills: 0 | Status: COMPLETED | OUTPATIENT
Start: 2021-01-01 | End: 2021-01-01

## 2021-01-01 RX ORDER — PIPERACILLIN AND TAZOBACTAM 4; .5 G/20ML; G/20ML
3.38 INJECTION, POWDER, LYOPHILIZED, FOR SOLUTION INTRAVENOUS EVERY 12 HOURS
Refills: 0 | Status: DISCONTINUED | OUTPATIENT
Start: 2021-01-01 | End: 2021-01-01

## 2021-01-01 RX ORDER — ALBUTEROL 90 UG/1
2 AEROSOL, METERED ORAL EVERY 4 HOURS
Refills: 0 | Status: DISCONTINUED | OUTPATIENT
Start: 2021-01-01 | End: 2021-01-01

## 2021-01-01 RX ORDER — SODIUM ZIRCONIUM CYCLOSILICATE 10 G/10G
10 POWDER, FOR SUSPENSION ORAL EVERY 8 HOURS
Refills: 0 | Status: COMPLETED | OUTPATIENT
Start: 2021-01-01 | End: 2021-01-01

## 2021-01-01 RX ORDER — BUMETANIDE 0.25 MG/ML
2 INJECTION INTRAMUSCULAR; INTRAVENOUS
Refills: 0 | Status: DISCONTINUED | OUTPATIENT
Start: 2021-01-01 | End: 2021-01-01

## 2021-01-01 RX ORDER — SODIUM CHLORIDE 9 MG/ML
1000 INJECTION INTRAMUSCULAR; INTRAVENOUS; SUBCUTANEOUS
Refills: 0 | Status: DISCONTINUED | OUTPATIENT
Start: 2021-01-01 | End: 2021-01-01

## 2021-01-01 RX ORDER — REMDESIVIR 5 MG/ML
100 INJECTION INTRAVENOUS EVERY 24 HOURS
Refills: 0 | Status: COMPLETED | OUTPATIENT
Start: 2021-01-01 | End: 2021-01-01

## 2021-01-01 RX ORDER — ASCORBIC ACID 60 MG
500 TABLET,CHEWABLE ORAL DAILY
Refills: 0 | Status: DISCONTINUED | OUTPATIENT
Start: 2021-01-01 | End: 2021-01-01

## 2021-01-01 RX ORDER — DEXAMETHASONE 0.5 MG/5ML
6 ELIXIR ORAL EVERY 24 HOURS
Refills: 0 | Status: COMPLETED | OUTPATIENT
Start: 2021-01-01 | End: 2021-01-01

## 2021-01-01 RX ORDER — PIPERACILLIN AND TAZOBACTAM 4; .5 G/20ML; G/20ML
3.38 INJECTION, POWDER, LYOPHILIZED, FOR SOLUTION INTRAVENOUS ONCE
Refills: 0 | Status: DISCONTINUED | OUTPATIENT
Start: 2021-01-01 | End: 2021-01-01

## 2021-01-01 RX ORDER — CHLORHEXIDINE GLUCONATE 213 G/1000ML
15 SOLUTION TOPICAL EVERY 12 HOURS
Refills: 0 | Status: DISCONTINUED | OUTPATIENT
Start: 2021-01-01 | End: 2021-01-01

## 2021-01-01 RX ORDER — PANTOPRAZOLE SODIUM 20 MG/1
40 TABLET, DELAYED RELEASE ORAL
Refills: 0 | Status: DISCONTINUED | OUTPATIENT
Start: 2021-01-01 | End: 2021-01-01

## 2021-01-01 RX ORDER — PIPERACILLIN AND TAZOBACTAM 4; .5 G/20ML; G/20ML
3.38 INJECTION, POWDER, LYOPHILIZED, FOR SOLUTION INTRAVENOUS EVERY 8 HOURS
Refills: 0 | Status: DISCONTINUED | OUTPATIENT
Start: 2021-01-01 | End: 2021-01-01

## 2021-01-01 RX ORDER — VANCOMYCIN HCL 1 G
750 VIAL (EA) INTRAVENOUS EVERY 12 HOURS
Refills: 0 | Status: DISCONTINUED | OUTPATIENT
Start: 2021-01-01 | End: 2021-01-01

## 2021-01-01 RX ORDER — DEXTROSE 50 % IN WATER 50 %
12.5 SYRINGE (ML) INTRAVENOUS ONCE
Refills: 0 | Status: DISCONTINUED | OUTPATIENT
Start: 2021-01-01 | End: 2021-01-01

## 2021-01-01 RX ORDER — SODIUM BICARBONATE 1 MEQ/ML
50 SYRINGE (ML) INTRAVENOUS ONCE
Refills: 0 | Status: COMPLETED | OUTPATIENT
Start: 2021-01-01 | End: 2021-01-01

## 2021-01-01 RX ORDER — NOREPINEPHRINE BITARTRATE/D5W 8 MG/250ML
0.1 PLASTIC BAG, INJECTION (ML) INTRAVENOUS
Qty: 8 | Refills: 0 | Status: DISCONTINUED | OUTPATIENT
Start: 2021-01-01 | End: 2021-01-01

## 2021-01-01 RX ORDER — SODIUM CHLORIDE 9 MG/ML
1000 INJECTION, SOLUTION INTRAVENOUS
Refills: 0 | Status: DISCONTINUED | OUTPATIENT
Start: 2021-01-01 | End: 2021-01-01

## 2021-01-01 RX ORDER — INSULIN HUMAN 100 [IU]/ML
5 INJECTION, SOLUTION SUBCUTANEOUS ONCE
Refills: 0 | Status: COMPLETED | OUTPATIENT
Start: 2021-01-01 | End: 2021-01-01

## 2021-01-01 RX ORDER — HYDROMORPHONE HYDROCHLORIDE 2 MG/ML
1 INJECTION INTRAMUSCULAR; INTRAVENOUS; SUBCUTANEOUS ONCE
Refills: 0 | Status: DISCONTINUED | OUTPATIENT
Start: 2021-01-01 | End: 2021-01-01

## 2021-01-01 RX ORDER — PANTOPRAZOLE SODIUM 20 MG/1
40 TABLET, DELAYED RELEASE ORAL DAILY
Refills: 0 | Status: DISCONTINUED | OUTPATIENT
Start: 2021-01-01 | End: 2021-01-01

## 2021-01-01 RX ORDER — PIPERACILLIN AND TAZOBACTAM 4; .5 G/20ML; G/20ML
3.38 INJECTION, POWDER, LYOPHILIZED, FOR SOLUTION INTRAVENOUS ONCE
Refills: 0 | Status: COMPLETED | OUTPATIENT
Start: 2021-01-01 | End: 2021-01-01

## 2021-01-01 RX ORDER — FENTANYL CITRATE 50 UG/ML
0.5 INJECTION INTRAVENOUS
Qty: 2500 | Refills: 0 | Status: DISCONTINUED | OUTPATIENT
Start: 2021-01-01 | End: 2021-01-01

## 2021-01-01 RX ORDER — GLUCAGON INJECTION, SOLUTION 0.5 MG/.1ML
1 INJECTION, SOLUTION SUBCUTANEOUS ONCE
Refills: 0 | Status: DISCONTINUED | OUTPATIENT
Start: 2021-01-01 | End: 2021-01-01

## 2021-01-01 RX ORDER — ENOXAPARIN SODIUM 100 MG/ML
40 INJECTION SUBCUTANEOUS DAILY
Refills: 0 | Status: DISCONTINUED | OUTPATIENT
Start: 2021-01-01 | End: 2021-01-01

## 2021-01-01 RX ORDER — ASPIRIN/CALCIUM CARB/MAGNESIUM 324 MG
81 TABLET ORAL DAILY
Refills: 0 | Status: DISCONTINUED | OUTPATIENT
Start: 2021-01-01 | End: 2021-01-01

## 2021-01-01 RX ORDER — DEXAMETHASONE 0.5 MG/5ML
10 ELIXIR ORAL
Refills: 0 | Status: DISCONTINUED | OUTPATIENT
Start: 2021-01-01 | End: 2021-01-01

## 2021-01-01 RX ORDER — DEXTROSE 50 % IN WATER 50 %
50 SYRINGE (ML) INTRAVENOUS ONCE
Refills: 0 | Status: COMPLETED | OUTPATIENT
Start: 2021-01-01 | End: 2021-01-01

## 2021-01-01 RX ORDER — ACETAMINOPHEN 500 MG
650 TABLET ORAL EVERY 4 HOURS
Refills: 0 | Status: DISCONTINUED | OUTPATIENT
Start: 2021-01-01 | End: 2021-01-01

## 2021-01-01 RX ORDER — ALBUMIN HUMAN 25 %
250 VIAL (ML) INTRAVENOUS ONCE
Refills: 0 | Status: COMPLETED | OUTPATIENT
Start: 2021-01-01 | End: 2021-01-01

## 2021-01-01 RX ORDER — SODIUM BICARBONATE 1 MEQ/ML
50 SYRINGE (ML) INTRAVENOUS ONCE
Refills: 0 | Status: DISCONTINUED | OUTPATIENT
Start: 2021-01-01 | End: 2021-01-01

## 2021-01-01 RX ORDER — SODIUM CHLORIDE 0.65 %
1 AEROSOL, SPRAY (ML) NASAL THREE TIMES A DAY
Refills: 0 | Status: DISCONTINUED | OUTPATIENT
Start: 2021-01-01 | End: 2021-01-01

## 2021-01-01 RX ORDER — REMDESIVIR 5 MG/ML
INJECTION INTRAVENOUS
Refills: 0 | Status: COMPLETED | OUTPATIENT
Start: 2021-01-01 | End: 2021-01-01

## 2021-01-01 RX ORDER — HEPARIN SODIUM 5000 [USP'U]/ML
6500 INJECTION INTRAVENOUS; SUBCUTANEOUS ONCE
Refills: 0 | Status: COMPLETED | OUTPATIENT
Start: 2021-01-01 | End: 2021-01-01

## 2021-01-01 RX ORDER — VASOPRESSIN 20 [USP'U]/ML
0.04 INJECTION INTRAVENOUS
Qty: 50 | Refills: 0 | Status: DISCONTINUED | OUTPATIENT
Start: 2021-01-01 | End: 2021-01-01

## 2021-01-01 RX ORDER — INSULIN LISPRO 100/ML
VIAL (ML) SUBCUTANEOUS EVERY 6 HOURS
Refills: 0 | Status: DISCONTINUED | OUTPATIENT
Start: 2021-01-01 | End: 2021-01-01

## 2021-01-01 RX ORDER — MORPHINE SULFATE 50 MG/1
0.5 CAPSULE, EXTENDED RELEASE ORAL ONCE
Refills: 0 | Status: DISCONTINUED | OUTPATIENT
Start: 2021-01-01 | End: 2021-01-01

## 2021-01-01 RX ORDER — HEPARIN SODIUM 5000 [USP'U]/ML
5000 INJECTION INTRAVENOUS; SUBCUTANEOUS EVERY 8 HOURS
Refills: 0 | Status: DISCONTINUED | OUTPATIENT
Start: 2021-01-01 | End: 2021-01-01

## 2021-01-01 RX ORDER — KETAMINE HYDROCHLORIDE 100 MG/ML
0.5 INJECTION INTRAMUSCULAR; INTRAVENOUS
Qty: 1000 | Refills: 0 | Status: DISCONTINUED | OUTPATIENT
Start: 2021-01-01 | End: 2021-01-01

## 2021-01-01 RX ORDER — HEPARIN SODIUM 5000 [USP'U]/ML
5000 INJECTION INTRAVENOUS; SUBCUTANEOUS EVERY 12 HOURS
Refills: 0 | Status: DISCONTINUED | OUTPATIENT
Start: 2021-01-01 | End: 2021-01-01

## 2021-01-01 RX ORDER — BUMETANIDE 0.25 MG/ML
2 INJECTION INTRAMUSCULAR; INTRAVENOUS ONCE
Refills: 0 | Status: COMPLETED | OUTPATIENT
Start: 2021-01-01 | End: 2021-01-01

## 2021-01-01 RX ORDER — BUMETANIDE 0.25 MG/ML
1 INJECTION INTRAMUSCULAR; INTRAVENOUS
Qty: 20 | Refills: 0 | Status: DISCONTINUED | OUTPATIENT
Start: 2021-01-01 | End: 2021-01-01

## 2021-01-01 RX ORDER — DEXTROSE 50 % IN WATER 50 %
15 SYRINGE (ML) INTRAVENOUS ONCE
Refills: 0 | Status: DISCONTINUED | OUTPATIENT
Start: 2021-01-01 | End: 2021-01-01

## 2021-01-01 RX ORDER — BUMETANIDE 0.25 MG/ML
2 INJECTION INTRAMUSCULAR; INTRAVENOUS DAILY
Refills: 0 | Status: DISCONTINUED | OUTPATIENT
Start: 2021-01-01 | End: 2021-01-01

## 2021-01-01 RX ORDER — CHOLECALCIFEROL (VITAMIN D3) 125 MCG
2000 CAPSULE ORAL DAILY
Refills: 0 | Status: DISCONTINUED | OUTPATIENT
Start: 2021-01-01 | End: 2021-01-01

## 2021-01-01 RX ORDER — METOCLOPRAMIDE HCL 10 MG
10 TABLET ORAL ONCE
Refills: 0 | Status: COMPLETED | OUTPATIENT
Start: 2021-01-01 | End: 2021-01-01

## 2021-01-01 RX ORDER — PANTOPRAZOLE SODIUM 20 MG/1
80 TABLET, DELAYED RELEASE ORAL ONCE
Refills: 0 | Status: COMPLETED | OUTPATIENT
Start: 2021-01-01 | End: 2021-01-01

## 2021-01-01 RX ORDER — INFLUENZA VIRUS VACCINE 15; 15; 15; 15 UG/.5ML; UG/.5ML; UG/.5ML; UG/.5ML
0.5 SUSPENSION INTRAMUSCULAR ONCE
Refills: 0 | Status: DISCONTINUED | OUTPATIENT
Start: 2021-01-01 | End: 2021-01-01

## 2021-01-01 RX ORDER — BUMETANIDE 0.25 MG/ML
2 INJECTION INTRAMUSCULAR; INTRAVENOUS
Qty: 20 | Refills: 0 | Status: DISCONTINUED | OUTPATIENT
Start: 2021-01-01 | End: 2021-01-01

## 2021-01-01 RX ORDER — ZINC SULFATE TAB 220 MG (50 MG ZINC EQUIVALENT) 220 (50 ZN) MG
220 TAB ORAL DAILY
Refills: 0 | Status: COMPLETED | OUTPATIENT
Start: 2021-01-01 | End: 2021-01-01

## 2021-01-01 RX ORDER — DEXTROSE 50 % IN WATER 50 %
25 SYRINGE (ML) INTRAVENOUS ONCE
Refills: 0 | Status: COMPLETED | OUTPATIENT
Start: 2021-01-01 | End: 2021-01-01

## 2021-01-01 RX ORDER — CISATRACURIUM BESYLATE 2 MG/ML
20 INJECTION INTRAVENOUS ONCE
Refills: 0 | Status: COMPLETED | OUTPATIENT
Start: 2021-01-01 | End: 2021-01-01

## 2021-01-01 RX ORDER — SENNA PLUS 8.6 MG/1
10 TABLET ORAL AT BEDTIME
Refills: 0 | Status: DISCONTINUED | OUTPATIENT
Start: 2021-01-01 | End: 2021-01-01

## 2021-01-01 RX ORDER — KETOROLAC TROMETHAMINE 30 MG/ML
15 SYRINGE (ML) INJECTION ONCE
Refills: 0 | Status: DISCONTINUED | OUTPATIENT
Start: 2021-01-01 | End: 2021-01-01

## 2021-01-01 RX ORDER — VANCOMYCIN HCL 1 G
1000 VIAL (EA) INTRAVENOUS ONCE
Refills: 0 | Status: COMPLETED | OUTPATIENT
Start: 2021-01-01 | End: 2021-01-01

## 2021-01-01 RX ORDER — FENOFIBRATE,MICRONIZED 130 MG
48 CAPSULE ORAL DAILY
Refills: 0 | Status: DISCONTINUED | OUTPATIENT
Start: 2021-01-01 | End: 2021-01-01

## 2021-01-01 RX ORDER — DIAZEPAM 5 MG
10 TABLET ORAL EVERY 8 HOURS
Refills: 0 | Status: DISCONTINUED | OUTPATIENT
Start: 2021-01-01 | End: 2021-01-01

## 2021-01-01 RX ORDER — BUDESONIDE, MICRONIZED 100 %
0.5 POWDER (GRAM) MISCELLANEOUS
Refills: 0 | Status: DISCONTINUED | OUTPATIENT
Start: 2021-01-01 | End: 2021-01-01

## 2021-01-01 RX ORDER — DEXTROSE 50 % IN WATER 50 %
50 SYRINGE (ML) INTRAVENOUS ONCE
Refills: 0 | Status: DISCONTINUED | OUTPATIENT
Start: 2021-01-01 | End: 2021-01-01

## 2021-01-01 RX ORDER — MIDAZOLAM HYDROCHLORIDE 1 MG/ML
0.02 INJECTION, SOLUTION INTRAMUSCULAR; INTRAVENOUS
Qty: 100 | Refills: 0 | Status: DISCONTINUED | OUTPATIENT
Start: 2021-01-01 | End: 2021-01-01

## 2021-01-01 RX ORDER — SODIUM BICARBONATE 1 MEQ/ML
0.27 SYRINGE (ML) INTRAVENOUS
Qty: 150 | Refills: 0 | Status: DISCONTINUED | OUTPATIENT
Start: 2021-01-01 | End: 2021-01-01

## 2021-01-01 RX ORDER — HYDROCORTISONE 20 MG
100 TABLET ORAL ONCE
Refills: 0 | Status: COMPLETED | OUTPATIENT
Start: 2021-01-01 | End: 2021-01-01

## 2021-01-01 RX ORDER — HEPARIN SODIUM 5000 [USP'U]/ML
6500 INJECTION INTRAVENOUS; SUBCUTANEOUS EVERY 6 HOURS
Refills: 0 | Status: DISCONTINUED | OUTPATIENT
Start: 2021-01-01 | End: 2021-01-01

## 2021-01-01 RX ORDER — DIAZEPAM 5 MG
5 TABLET ORAL EVERY 6 HOURS
Refills: 0 | Status: DISCONTINUED | OUTPATIENT
Start: 2021-01-01 | End: 2021-01-01

## 2021-01-01 RX ORDER — POLYETHYLENE GLYCOL 3350 17 G/17G
17 POWDER, FOR SOLUTION ORAL DAILY
Refills: 0 | Status: DISCONTINUED | OUTPATIENT
Start: 2021-01-01 | End: 2021-01-01

## 2021-01-01 RX ORDER — KETAMINE HYDROCHLORIDE 100 MG/ML
0.25 INJECTION INTRAMUSCULAR; INTRAVENOUS
Qty: 1000 | Refills: 0 | Status: DISCONTINUED | OUTPATIENT
Start: 2021-01-01 | End: 2021-01-01

## 2021-01-01 RX ORDER — HYDROCORTISONE 20 MG
50 TABLET ORAL EVERY 6 HOURS
Refills: 0 | Status: DISCONTINUED | OUTPATIENT
Start: 2021-01-01 | End: 2021-01-01

## 2021-01-01 RX ORDER — HEPARIN SODIUM 5000 [USP'U]/ML
INJECTION INTRAVENOUS; SUBCUTANEOUS
Qty: 25000 | Refills: 0 | Status: DISCONTINUED | OUTPATIENT
Start: 2021-01-01 | End: 2021-01-01

## 2021-01-01 RX ORDER — HEPARIN SODIUM 5000 [USP'U]/ML
3000 INJECTION INTRAVENOUS; SUBCUTANEOUS EVERY 6 HOURS
Refills: 0 | Status: DISCONTINUED | OUTPATIENT
Start: 2021-01-01 | End: 2021-01-01

## 2021-01-01 RX ORDER — PROPOFOL 10 MG/ML
20 INJECTION, EMULSION INTRAVENOUS
Qty: 1000 | Refills: 0 | Status: DISCONTINUED | OUTPATIENT
Start: 2021-01-01 | End: 2021-01-01

## 2021-01-01 RX ADMIN — Medication 10 MILLIGRAM(S): at 21:17

## 2021-01-01 RX ADMIN — Medication 500 MILLIGRAM(S): at 12:22

## 2021-01-01 RX ADMIN — POLYETHYLENE GLYCOL 3350 17 GRAM(S): 17 POWDER, FOR SOLUTION ORAL at 11:32

## 2021-01-01 RX ADMIN — ENOXAPARIN SODIUM 40 MILLIGRAM(S): 100 INJECTION SUBCUTANEOUS at 12:46

## 2021-01-01 RX ADMIN — CISATRACURIUM BESYLATE 14.8 MICROGRAM(S)/KG/MIN: 2 INJECTION INTRAVENOUS at 20:40

## 2021-01-01 RX ADMIN — Medication 250 MILLIGRAM(S): at 13:00

## 2021-01-01 RX ADMIN — PROPOFOL 9.85 MICROGRAM(S)/KG/MIN: 10 INJECTION, EMULSION INTRAVENOUS at 12:46

## 2021-01-01 RX ADMIN — Medication 25 MILLILITER(S): at 17:25

## 2021-01-01 RX ADMIN — CISATRACURIUM BESYLATE 14.8 MICROGRAM(S)/KG/MIN: 2 INJECTION INTRAVENOUS at 13:43

## 2021-01-01 RX ADMIN — Medication 102 MILLIGRAM(S): at 17:11

## 2021-01-01 RX ADMIN — MIDAZOLAM HYDROCHLORIDE 1.64 MG/KG/HR: 1 INJECTION, SOLUTION INTRAMUSCULAR; INTRAVENOUS at 13:22

## 2021-01-01 RX ADMIN — ALBUTEROL 2 PUFF(S): 90 AEROSOL, METERED ORAL at 22:03

## 2021-01-01 RX ADMIN — Medication 10 MILLIGRAM(S): at 06:15

## 2021-01-01 RX ADMIN — Medication 10 MILLIGRAM(S): at 12:37

## 2021-01-01 RX ADMIN — PIPERACILLIN AND TAZOBACTAM 25 GRAM(S): 4; .5 INJECTION, POWDER, LYOPHILIZED, FOR SOLUTION INTRAVENOUS at 22:45

## 2021-01-01 RX ADMIN — ENOXAPARIN SODIUM 40 MILLIGRAM(S): 100 INJECTION SUBCUTANEOUS at 11:54

## 2021-01-01 RX ADMIN — SODIUM ZIRCONIUM CYCLOSILICATE 10 GRAM(S): 10 POWDER, FOR SUSPENSION ORAL at 01:33

## 2021-01-01 RX ADMIN — Medication 1 TABLET(S): at 13:34

## 2021-01-01 RX ADMIN — PIPERACILLIN AND TAZOBACTAM 25 GRAM(S): 4; .5 INJECTION, POWDER, LYOPHILIZED, FOR SOLUTION INTRAVENOUS at 06:52

## 2021-01-01 RX ADMIN — FENTANYL CITRATE 0.82 MICROGRAM(S)/KG/HR: 50 INJECTION INTRAVENOUS at 21:06

## 2021-01-01 RX ADMIN — CHLORHEXIDINE GLUCONATE 15 MILLILITER(S): 213 SOLUTION TOPICAL at 05:28

## 2021-01-01 RX ADMIN — Medication 500 MILLIGRAM(S): at 13:15

## 2021-01-01 RX ADMIN — KETAMINE HYDROCHLORIDE 2.05 MG/KG/HR: 100 INJECTION INTRAMUSCULAR; INTRAVENOUS at 23:16

## 2021-01-01 RX ADMIN — Medication 1000 MILLIGRAM(S): at 17:21

## 2021-01-01 RX ADMIN — FENTANYL CITRATE 0.82 MICROGRAM(S)/KG/HR: 50 INJECTION INTRAVENOUS at 16:22

## 2021-01-01 RX ADMIN — Medication 0.5 MILLIGRAM(S): at 19:24

## 2021-01-01 RX ADMIN — BUDESONIDE AND FORMOTEROL FUMARATE DIHYDRATE 2 PUFF(S): 160; 4.5 AEROSOL RESPIRATORY (INHALATION) at 19:30

## 2021-01-01 RX ADMIN — Medication 10 MILLIGRAM(S): at 21:02

## 2021-01-01 RX ADMIN — ALBUTEROL 2 PUFF(S): 90 AEROSOL, METERED ORAL at 05:09

## 2021-01-01 RX ADMIN — HEPARIN SODIUM 1500 UNIT(S)/HR: 5000 INJECTION INTRAVENOUS; SUBCUTANEOUS at 11:47

## 2021-01-01 RX ADMIN — BUMETANIDE 132 MILLIGRAM(S): 0.25 INJECTION INTRAMUSCULAR; INTRAVENOUS at 14:21

## 2021-01-01 RX ADMIN — POLYETHYLENE GLYCOL 3350 17 GRAM(S): 17 POWDER, FOR SOLUTION ORAL at 11:35

## 2021-01-01 RX ADMIN — FENTANYL CITRATE 0.82 MICROGRAM(S)/KG/HR: 50 INJECTION INTRAVENOUS at 17:06

## 2021-01-01 RX ADMIN — MIDAZOLAM HYDROCHLORIDE 1.64 MG/KG/HR: 1 INJECTION, SOLUTION INTRAMUSCULAR; INTRAVENOUS at 08:53

## 2021-01-01 RX ADMIN — Medication 2000 UNIT(S): at 12:18

## 2021-01-01 RX ADMIN — SENNA PLUS 10 MILLILITER(S): 8.6 TABLET ORAL at 21:05

## 2021-01-01 RX ADMIN — Medication 650 MILLIGRAM(S): at 22:19

## 2021-01-01 RX ADMIN — POLYETHYLENE GLYCOL 3350 17 GRAM(S): 17 POWDER, FOR SOLUTION ORAL at 11:54

## 2021-01-01 RX ADMIN — BUDESONIDE AND FORMOTEROL FUMARATE DIHYDRATE 2 PUFF(S): 160; 4.5 AEROSOL RESPIRATORY (INHALATION) at 21:01

## 2021-01-01 RX ADMIN — BUMETANIDE 10 MG/HR: 0.25 INJECTION INTRAMUSCULAR; INTRAVENOUS at 01:36

## 2021-01-01 RX ADMIN — POLYETHYLENE GLYCOL 3350 17 GRAM(S): 17 POWDER, FOR SOLUTION ORAL at 11:40

## 2021-01-01 RX ADMIN — Medication 48 MILLIGRAM(S): at 12:29

## 2021-01-01 RX ADMIN — Medication 2000 UNIT(S): at 11:54

## 2021-01-01 RX ADMIN — Medication 3.85 MICROGRAM(S)/KG/MIN: at 20:17

## 2021-01-01 RX ADMIN — Medication 1000 MILLIGRAM(S): at 23:35

## 2021-01-01 RX ADMIN — Medication 500 MILLIGRAM(S): at 11:59

## 2021-01-01 RX ADMIN — Medication 81 MILLIGRAM(S): at 11:39

## 2021-01-01 RX ADMIN — PROPOFOL 9.85 MICROGRAM(S)/KG/MIN: 10 INJECTION, EMULSION INTRAVENOUS at 21:12

## 2021-01-01 RX ADMIN — PROPOFOL 9.85 MICROGRAM(S)/KG/MIN: 10 INJECTION, EMULSION INTRAVENOUS at 09:43

## 2021-01-01 RX ADMIN — PANTOPRAZOLE SODIUM 40 MILLIGRAM(S): 20 TABLET, DELAYED RELEASE ORAL at 15:18

## 2021-01-01 RX ADMIN — ALBUTEROL 2 PUFF(S): 90 AEROSOL, METERED ORAL at 10:47

## 2021-01-01 RX ADMIN — PROPOFOL 9.85 MICROGRAM(S)/KG/MIN: 10 INJECTION, EMULSION INTRAVENOUS at 13:10

## 2021-01-01 RX ADMIN — Medication 10 MILLIGRAM(S): at 13:26

## 2021-01-01 RX ADMIN — Medication 3.85 MICROGRAM(S)/KG/MIN: at 13:44

## 2021-01-01 RX ADMIN — ALBUTEROL 2 PUFF(S): 90 AEROSOL, METERED ORAL at 13:18

## 2021-01-01 RX ADMIN — HEPARIN SODIUM 5000 UNIT(S): 5000 INJECTION INTRAVENOUS; SUBCUTANEOUS at 15:17

## 2021-01-01 RX ADMIN — CHLORHEXIDINE GLUCONATE 15 MILLILITER(S): 213 SOLUTION TOPICAL at 05:06

## 2021-01-01 RX ADMIN — KETAMINE HYDROCHLORIDE 4.11 MG/KG/HR: 100 INJECTION INTRAMUSCULAR; INTRAVENOUS at 06:00

## 2021-01-01 RX ADMIN — Medication 2000 UNIT(S): at 11:43

## 2021-01-01 RX ADMIN — Medication 2000 UNIT(S): at 11:30

## 2021-01-01 RX ADMIN — Medication 125 MILLILITER(S): at 03:52

## 2021-01-01 RX ADMIN — ALBUTEROL 2 PUFF(S): 90 AEROSOL, METERED ORAL at 01:55

## 2021-01-01 RX ADMIN — HEPARIN SODIUM 1500 UNIT(S)/HR: 5000 INJECTION INTRAVENOUS; SUBCUTANEOUS at 17:00

## 2021-01-01 RX ADMIN — Medication 1 TABLET(S): at 12:48

## 2021-01-01 RX ADMIN — Medication 102 MILLIGRAM(S): at 17:24

## 2021-01-01 RX ADMIN — ALBUTEROL 2 PUFF(S): 90 AEROSOL, METERED ORAL at 20:05

## 2021-01-01 RX ADMIN — FENTANYL CITRATE 0.82 MICROGRAM(S)/KG/HR: 50 INJECTION INTRAVENOUS at 20:02

## 2021-01-01 RX ADMIN — ALBUTEROL 2 PUFF(S): 90 AEROSOL, METERED ORAL at 05:51

## 2021-01-01 RX ADMIN — MEROPENEM 100 MILLIGRAM(S): 1 INJECTION INTRAVENOUS at 22:17

## 2021-01-01 RX ADMIN — REMDESIVIR 500 MILLIGRAM(S): 5 INJECTION INTRAVENOUS at 05:50

## 2021-01-01 RX ADMIN — FENTANYL CITRATE 0.82 MICROGRAM(S)/KG/HR: 50 INJECTION INTRAVENOUS at 20:39

## 2021-01-01 RX ADMIN — Medication 2000 UNIT(S): at 11:32

## 2021-01-01 RX ADMIN — ALBUTEROL 2 PUFF(S): 90 AEROSOL, METERED ORAL at 17:04

## 2021-01-01 RX ADMIN — Medication 50 MILLILITER(S): at 01:31

## 2021-01-01 RX ADMIN — CHLORHEXIDINE GLUCONATE 15 MILLILITER(S): 213 SOLUTION TOPICAL at 05:01

## 2021-01-01 RX ADMIN — PIPERACILLIN AND TAZOBACTAM 25 GRAM(S): 4; .5 INJECTION, POWDER, LYOPHILIZED, FOR SOLUTION INTRAVENOUS at 22:17

## 2021-01-01 RX ADMIN — MIDAZOLAM HYDROCHLORIDE 1.64 MG/KG/HR: 1 INJECTION, SOLUTION INTRAMUSCULAR; INTRAVENOUS at 00:05

## 2021-01-01 RX ADMIN — Medication 1 TABLET(S): at 11:29

## 2021-01-01 RX ADMIN — ALBUTEROL 2 PUFF(S): 90 AEROSOL, METERED ORAL at 10:42

## 2021-01-01 RX ADMIN — FENTANYL CITRATE 0.82 MICROGRAM(S)/KG/HR: 50 INJECTION INTRAVENOUS at 06:10

## 2021-01-01 RX ADMIN — Medication 10 MILLIGRAM(S): at 21:04

## 2021-01-01 RX ADMIN — INSULIN HUMAN 5 UNIT(S): 100 INJECTION, SOLUTION SUBCUTANEOUS at 01:31

## 2021-01-01 RX ADMIN — FENTANYL CITRATE 0.82 MICROGRAM(S)/KG/HR: 50 INJECTION INTRAVENOUS at 23:16

## 2021-01-01 RX ADMIN — ALBUTEROL 2 PUFF(S): 90 AEROSOL, METERED ORAL at 18:19

## 2021-01-01 RX ADMIN — ALBUTEROL 2 PUFF(S): 90 AEROSOL, METERED ORAL at 03:18

## 2021-01-01 RX ADMIN — Medication 2: at 12:23

## 2021-01-01 RX ADMIN — CISATRACURIUM BESYLATE 14.8 MICROGRAM(S)/KG/MIN: 2 INJECTION INTRAVENOUS at 20:14

## 2021-01-01 RX ADMIN — PANTOPRAZOLE SODIUM 40 MILLIGRAM(S): 20 TABLET, DELAYED RELEASE ORAL at 11:42

## 2021-01-01 RX ADMIN — FENTANYL CITRATE 0.82 MICROGRAM(S)/KG/HR: 50 INJECTION INTRAVENOUS at 09:24

## 2021-01-01 RX ADMIN — FENTANYL CITRATE 0.82 MICROGRAM(S)/KG/HR: 50 INJECTION INTRAVENOUS at 20:11

## 2021-01-01 RX ADMIN — Medication 81 MILLIGRAM(S): at 12:29

## 2021-01-01 RX ADMIN — Medication 2000 UNIT(S): at 11:35

## 2021-01-01 RX ADMIN — SODIUM ZIRCONIUM CYCLOSILICATE 10 GRAM(S): 10 POWDER, FOR SUSPENSION ORAL at 06:17

## 2021-01-01 RX ADMIN — Medication 15 MILLIGRAM(S): at 01:45

## 2021-01-01 RX ADMIN — ALBUTEROL 2 PUFF(S): 90 AEROSOL, METERED ORAL at 10:24

## 2021-01-01 RX ADMIN — Medication 3.85 MICROGRAM(S)/KG/MIN: at 09:51

## 2021-01-01 RX ADMIN — Medication 5 MILLIGRAM(S): at 11:59

## 2021-01-01 RX ADMIN — ALBUTEROL 2 PUFF(S): 90 AEROSOL, METERED ORAL at 15:59

## 2021-01-01 RX ADMIN — Medication 3.85 MICROGRAM(S)/KG/MIN: at 23:16

## 2021-01-01 RX ADMIN — CHLORHEXIDINE GLUCONATE 15 MILLILITER(S): 213 SOLUTION TOPICAL at 05:56

## 2021-01-01 RX ADMIN — SENNA PLUS 10 MILLILITER(S): 8.6 TABLET ORAL at 21:42

## 2021-01-01 RX ADMIN — Medication 650 MILLIGRAM(S): at 00:54

## 2021-01-01 RX ADMIN — BUDESONIDE AND FORMOTEROL FUMARATE DIHYDRATE 2 PUFF(S): 160; 4.5 AEROSOL RESPIRATORY (INHALATION) at 06:59

## 2021-01-01 RX ADMIN — Medication 10 MILLIGRAM(S): at 05:55

## 2021-01-01 RX ADMIN — HEPARIN SODIUM 5000 UNIT(S): 5000 INJECTION INTRAVENOUS; SUBCUTANEOUS at 17:11

## 2021-01-01 RX ADMIN — MEROPENEM 100 MILLIGRAM(S): 1 INJECTION INTRAVENOUS at 05:26

## 2021-01-01 RX ADMIN — VASOPRESSIN 2.4 UNIT(S)/MIN: 20 INJECTION INTRAVENOUS at 23:30

## 2021-01-01 RX ADMIN — HEPARIN SODIUM 5000 UNIT(S): 5000 INJECTION INTRAVENOUS; SUBCUTANEOUS at 05:42

## 2021-01-01 RX ADMIN — ALBUTEROL 2 PUFF(S): 90 AEROSOL, METERED ORAL at 14:26

## 2021-01-01 RX ADMIN — PANTOPRAZOLE SODIUM 80 MILLIGRAM(S): 20 TABLET, DELAYED RELEASE ORAL at 07:00

## 2021-01-01 RX ADMIN — BUMETANIDE 2 MILLIGRAM(S): 0.25 INJECTION INTRAMUSCULAR; INTRAVENOUS at 05:54

## 2021-01-01 RX ADMIN — CHLORHEXIDINE GLUCONATE 15 MILLILITER(S): 213 SOLUTION TOPICAL at 17:12

## 2021-01-01 RX ADMIN — Medication 81 MILLIGRAM(S): at 11:30

## 2021-01-01 RX ADMIN — CHLORHEXIDINE GLUCONATE 15 MILLILITER(S): 213 SOLUTION TOPICAL at 17:20

## 2021-01-01 RX ADMIN — BUMETANIDE 2 MILLIGRAM(S): 0.25 INJECTION INTRAMUSCULAR; INTRAVENOUS at 06:38

## 2021-01-01 RX ADMIN — KETAMINE HYDROCHLORIDE 4.11 MG/KG/HR: 100 INJECTION INTRAMUSCULAR; INTRAVENOUS at 05:47

## 2021-01-01 RX ADMIN — CHLORHEXIDINE GLUCONATE 15 MILLILITER(S): 213 SOLUTION TOPICAL at 18:10

## 2021-01-01 RX ADMIN — Medication 1000 MILLIGRAM(S): at 12:45

## 2021-01-01 RX ADMIN — Medication 500 MILLIGRAM(S): at 11:32

## 2021-01-01 RX ADMIN — Medication 2: at 11:41

## 2021-01-01 RX ADMIN — ALBUTEROL 2 PUFF(S): 90 AEROSOL, METERED ORAL at 06:51

## 2021-01-01 RX ADMIN — KETAMINE HYDROCHLORIDE 4.11 MG/KG/HR: 100 INJECTION INTRAMUSCULAR; INTRAVENOUS at 20:33

## 2021-01-01 RX ADMIN — ZINC SULFATE TAB 220 MG (50 MG ZINC EQUIVALENT) 220 MILLIGRAM(S): 220 (50 ZN) TAB at 12:38

## 2021-01-01 RX ADMIN — Medication 3.85 MICROGRAM(S)/KG/MIN: at 21:19

## 2021-01-01 RX ADMIN — ALBUTEROL 2 PUFF(S): 90 AEROSOL, METERED ORAL at 10:06

## 2021-01-01 RX ADMIN — SENNA PLUS 10 MILLILITER(S): 8.6 TABLET ORAL at 22:46

## 2021-01-01 RX ADMIN — BUDESONIDE AND FORMOTEROL FUMARATE DIHYDRATE 2 PUFF(S): 160; 4.5 AEROSOL RESPIRATORY (INHALATION) at 07:38

## 2021-01-01 RX ADMIN — Medication 10 MILLIGRAM(S): at 15:19

## 2021-01-01 RX ADMIN — HEPARIN SODIUM 5000 UNIT(S): 5000 INJECTION INTRAVENOUS; SUBCUTANEOUS at 06:21

## 2021-01-01 RX ADMIN — HEPARIN SODIUM 1300 UNIT(S)/HR: 5000 INJECTION INTRAVENOUS; SUBCUTANEOUS at 11:23

## 2021-01-01 RX ADMIN — Medication 6 MILLIGRAM(S): at 03:18

## 2021-01-01 RX ADMIN — HEPARIN SODIUM 5000 UNIT(S): 5000 INJECTION INTRAVENOUS; SUBCUTANEOUS at 21:44

## 2021-01-01 RX ADMIN — Medication 81 MILLIGRAM(S): at 11:43

## 2021-01-01 RX ADMIN — PANTOPRAZOLE SODIUM 40 MILLIGRAM(S): 20 TABLET, DELAYED RELEASE ORAL at 12:45

## 2021-01-01 RX ADMIN — Medication 5 MILLIGRAM(S): at 05:51

## 2021-01-01 RX ADMIN — Medication 500 MILLIGRAM(S): at 12:45

## 2021-01-01 RX ADMIN — ALBUTEROL 2 PUFF(S): 90 AEROSOL, METERED ORAL at 23:58

## 2021-01-01 RX ADMIN — Medication 2000 UNIT(S): at 10:30

## 2021-01-01 RX ADMIN — Medication 500 MILLIGRAM(S): at 11:43

## 2021-01-01 RX ADMIN — ENOXAPARIN SODIUM 40 MILLIGRAM(S): 100 INJECTION SUBCUTANEOUS at 12:18

## 2021-01-01 RX ADMIN — ALBUTEROL 2 PUFF(S): 90 AEROSOL, METERED ORAL at 23:10

## 2021-01-01 RX ADMIN — KETAMINE HYDROCHLORIDE 4.11 MG/KG/HR: 100 INJECTION INTRAMUSCULAR; INTRAVENOUS at 09:02

## 2021-01-01 RX ADMIN — KETAMINE HYDROCHLORIDE 4.11 MG/KG/HR: 100 INJECTION INTRAMUSCULAR; INTRAVENOUS at 21:30

## 2021-01-01 RX ADMIN — BUDESONIDE AND FORMOTEROL FUMARATE DIHYDRATE 2 PUFF(S): 160; 4.5 AEROSOL RESPIRATORY (INHALATION) at 08:30

## 2021-01-01 RX ADMIN — ALBUTEROL 2 PUFF(S): 90 AEROSOL, METERED ORAL at 12:45

## 2021-01-01 RX ADMIN — Medication 2: at 17:00

## 2021-01-01 RX ADMIN — Medication 5 MILLIGRAM(S): at 01:10

## 2021-01-01 RX ADMIN — Medication 1 TABLET(S): at 12:38

## 2021-01-01 RX ADMIN — POLYETHYLENE GLYCOL 3350 17 GRAM(S): 17 POWDER, FOR SOLUTION ORAL at 11:29

## 2021-01-01 RX ADMIN — HEPARIN SODIUM 5000 UNIT(S): 5000 INJECTION INTRAVENOUS; SUBCUTANEOUS at 22:18

## 2021-01-01 RX ADMIN — Medication 102 MILLIGRAM(S): at 10:06

## 2021-01-01 RX ADMIN — Medication 4: at 06:01

## 2021-01-01 RX ADMIN — ALBUTEROL 2 PUFF(S): 90 AEROSOL, METERED ORAL at 06:58

## 2021-01-01 RX ADMIN — PROPOFOL 9.85 MICROGRAM(S)/KG/MIN: 10 INJECTION, EMULSION INTRAVENOUS at 00:49

## 2021-01-01 RX ADMIN — SODIUM CHLORIDE 100 MILLILITER(S): 9 INJECTION INTRAMUSCULAR; INTRAVENOUS; SUBCUTANEOUS at 03:05

## 2021-01-01 RX ADMIN — MIDAZOLAM HYDROCHLORIDE 1.64 MG/KG/HR: 1 INJECTION, SOLUTION INTRAMUSCULAR; INTRAVENOUS at 18:10

## 2021-01-01 RX ADMIN — CISATRACURIUM BESYLATE 14.8 MICROGRAM(S)/KG/MIN: 2 INJECTION INTRAVENOUS at 03:23

## 2021-01-01 RX ADMIN — Medication 2000 UNIT(S): at 12:45

## 2021-01-01 RX ADMIN — Medication 500 MILLIGRAM(S): at 11:35

## 2021-01-01 RX ADMIN — POLYETHYLENE GLYCOL 3350 17 GRAM(S): 17 POWDER, FOR SOLUTION ORAL at 12:01

## 2021-01-01 RX ADMIN — HEPARIN SODIUM 5000 UNIT(S): 5000 INJECTION INTRAVENOUS; SUBCUTANEOUS at 13:11

## 2021-01-01 RX ADMIN — Medication 2000 UNIT(S): at 12:38

## 2021-01-01 RX ADMIN — PROPOFOL 9.85 MICROGRAM(S)/KG/MIN: 10 INJECTION, EMULSION INTRAVENOUS at 20:13

## 2021-01-01 RX ADMIN — ALBUTEROL 2 PUFF(S): 90 AEROSOL, METERED ORAL at 07:29

## 2021-01-01 RX ADMIN — Medication 500 MILLIGRAM(S): at 12:46

## 2021-01-01 RX ADMIN — ALBUTEROL 2 PUFF(S): 90 AEROSOL, METERED ORAL at 15:55

## 2021-01-01 RX ADMIN — ALBUTEROL 2 PUFF(S): 90 AEROSOL, METERED ORAL at 04:11

## 2021-01-01 RX ADMIN — MIDAZOLAM HYDROCHLORIDE 1.64 MG/KG/HR: 1 INJECTION, SOLUTION INTRAMUSCULAR; INTRAVENOUS at 01:17

## 2021-01-01 RX ADMIN — Medication 6 MILLIGRAM(S): at 02:01

## 2021-01-01 RX ADMIN — Medication 6 MILLIGRAM(S): at 01:28

## 2021-01-01 RX ADMIN — POLYETHYLENE GLYCOL 3350 17 GRAM(S): 17 POWDER, FOR SOLUTION ORAL at 12:24

## 2021-01-01 RX ADMIN — PANTOPRAZOLE SODIUM 40 MILLIGRAM(S): 20 TABLET, DELAYED RELEASE ORAL at 13:10

## 2021-01-01 RX ADMIN — Medication 5 MILLIGRAM(S): at 12:47

## 2021-01-01 RX ADMIN — ENOXAPARIN SODIUM 40 MILLIGRAM(S): 100 INJECTION SUBCUTANEOUS at 12:38

## 2021-01-01 RX ADMIN — Medication 0.5 MILLIGRAM(S): at 20:54

## 2021-01-01 RX ADMIN — ALBUTEROL 2 PUFF(S): 90 AEROSOL, METERED ORAL at 10:28

## 2021-01-01 RX ADMIN — Medication 50 MILLILITER(S): at 06:36

## 2021-01-01 RX ADMIN — REMDESIVIR 200 MILLIGRAM(S): 5 INJECTION INTRAVENOUS at 09:30

## 2021-01-01 RX ADMIN — CHLORHEXIDINE GLUCONATE 15 MILLILITER(S): 213 SOLUTION TOPICAL at 05:42

## 2021-01-01 RX ADMIN — Medication 500 MILLIGRAM(S): at 11:30

## 2021-01-01 RX ADMIN — SODIUM CHLORIDE 75 MILLILITER(S): 9 INJECTION, SOLUTION INTRAVENOUS at 18:38

## 2021-01-01 RX ADMIN — BUMETANIDE 10 MG/HR: 0.25 INJECTION INTRAMUSCULAR; INTRAVENOUS at 00:44

## 2021-01-01 RX ADMIN — ZINC SULFATE TAB 220 MG (50 MG ZINC EQUIVALENT) 220 MILLIGRAM(S): 220 (50 ZN) TAB at 12:18

## 2021-01-01 RX ADMIN — ALBUTEROL 2 PUFF(S): 90 AEROSOL, METERED ORAL at 00:26

## 2021-01-01 RX ADMIN — KETAMINE HYDROCHLORIDE 2.05 MG/KG/HR: 100 INJECTION INTRAMUSCULAR; INTRAVENOUS at 13:22

## 2021-01-01 RX ADMIN — Medication 10 MILLIGRAM(S): at 21:20

## 2021-01-01 RX ADMIN — ALBUTEROL 2 PUFF(S): 90 AEROSOL, METERED ORAL at 20:37

## 2021-01-01 RX ADMIN — ALBUTEROL 2 PUFF(S): 90 AEROSOL, METERED ORAL at 17:53

## 2021-01-01 RX ADMIN — Medication 1 TABLET(S): at 11:35

## 2021-01-01 RX ADMIN — KETAMINE HYDROCHLORIDE 4.11 MG/KG/HR: 100 INJECTION INTRAMUSCULAR; INTRAVENOUS at 04:22

## 2021-01-01 RX ADMIN — ALBUTEROL 2 PUFF(S): 90 AEROSOL, METERED ORAL at 05:04

## 2021-01-01 RX ADMIN — BUDESONIDE AND FORMOTEROL FUMARATE DIHYDRATE 2 PUFF(S): 160; 4.5 AEROSOL RESPIRATORY (INHALATION) at 07:10

## 2021-01-01 RX ADMIN — ZINC SULFATE TAB 220 MG (50 MG ZINC EQUIVALENT) 220 MILLIGRAM(S): 220 (50 ZN) TAB at 13:34

## 2021-01-01 RX ADMIN — ALBUTEROL 2 PUFF(S): 90 AEROSOL, METERED ORAL at 22:43

## 2021-01-01 RX ADMIN — Medication 100 MILLIGRAM(S): at 23:19

## 2021-01-01 RX ADMIN — KETAMINE HYDROCHLORIDE 4.11 MG/KG/HR: 100 INJECTION INTRAMUSCULAR; INTRAVENOUS at 18:10

## 2021-01-01 RX ADMIN — ALBUTEROL 2 PUFF(S): 90 AEROSOL, METERED ORAL at 23:59

## 2021-01-01 RX ADMIN — BUDESONIDE AND FORMOTEROL FUMARATE DIHYDRATE 2 PUFF(S): 160; 4.5 AEROSOL RESPIRATORY (INHALATION) at 08:28

## 2021-01-01 RX ADMIN — PROPOFOL 9.85 MICROGRAM(S)/KG/MIN: 10 INJECTION, EMULSION INTRAVENOUS at 16:15

## 2021-01-01 RX ADMIN — Medication 48 MILLIGRAM(S): at 11:32

## 2021-01-01 RX ADMIN — PANTOPRAZOLE SODIUM 40 MILLIGRAM(S): 20 TABLET, DELAYED RELEASE ORAL at 11:59

## 2021-01-01 RX ADMIN — MORPHINE SULFATE 0.5 MILLIGRAM(S): 50 CAPSULE, EXTENDED RELEASE ORAL at 06:52

## 2021-01-01 RX ADMIN — Medication 500 MILLIGRAM(S): at 12:10

## 2021-01-01 RX ADMIN — CHLORHEXIDINE GLUCONATE 15 MILLILITER(S): 213 SOLUTION TOPICAL at 17:35

## 2021-01-01 RX ADMIN — PIPERACILLIN AND TAZOBACTAM 25 GRAM(S): 4; .5 INJECTION, POWDER, LYOPHILIZED, FOR SOLUTION INTRAVENOUS at 14:07

## 2021-01-01 RX ADMIN — KETAMINE HYDROCHLORIDE 4.11 MG/KG/HR: 100 INJECTION INTRAMUSCULAR; INTRAVENOUS at 02:44

## 2021-01-01 RX ADMIN — MIDAZOLAM HYDROCHLORIDE 1.64 MG/KG/HR: 1 INJECTION, SOLUTION INTRAMUSCULAR; INTRAVENOUS at 22:27

## 2021-01-01 RX ADMIN — CISATRACURIUM BESYLATE 14.8 MICROGRAM(S)/KG/MIN: 2 INJECTION INTRAVENOUS at 09:50

## 2021-01-01 RX ADMIN — ALBUTEROL 2 PUFF(S): 90 AEROSOL, METERED ORAL at 07:40

## 2021-01-01 RX ADMIN — Medication 102 MILLIGRAM(S): at 05:06

## 2021-01-01 RX ADMIN — ALBUTEROL 2 PUFF(S): 90 AEROSOL, METERED ORAL at 11:56

## 2021-01-01 RX ADMIN — Medication 3.85 MICROGRAM(S)/KG/MIN: at 23:19

## 2021-01-01 RX ADMIN — POLYETHYLENE GLYCOL 3350 17 GRAM(S): 17 POWDER, FOR SOLUTION ORAL at 13:15

## 2021-01-01 RX ADMIN — MIDAZOLAM HYDROCHLORIDE 1.64 MG/KG/HR: 1 INJECTION, SOLUTION INTRAMUSCULAR; INTRAVENOUS at 10:41

## 2021-01-01 RX ADMIN — PIPERACILLIN AND TAZOBACTAM 25 GRAM(S): 4; .5 INJECTION, POWDER, LYOPHILIZED, FOR SOLUTION INTRAVENOUS at 06:40

## 2021-01-01 RX ADMIN — PROPOFOL 9.85 MICROGRAM(S)/KG/MIN: 10 INJECTION, EMULSION INTRAVENOUS at 20:39

## 2021-01-01 RX ADMIN — Medication 3.85 MICROGRAM(S)/KG/MIN: at 13:22

## 2021-01-01 RX ADMIN — ALBUTEROL 2 PUFF(S): 90 AEROSOL, METERED ORAL at 14:37

## 2021-01-01 RX ADMIN — PIPERACILLIN AND TAZOBACTAM 25 GRAM(S): 4; .5 INJECTION, POWDER, LYOPHILIZED, FOR SOLUTION INTRAVENOUS at 06:46

## 2021-01-01 RX ADMIN — Medication 500 MILLIGRAM(S): at 12:29

## 2021-01-01 RX ADMIN — Medication 1 TABLET(S): at 11:47

## 2021-01-01 RX ADMIN — BUDESONIDE AND FORMOTEROL FUMARATE DIHYDRATE 2 PUFF(S): 160; 4.5 AEROSOL RESPIRATORY (INHALATION) at 08:15

## 2021-01-01 RX ADMIN — HEPARIN SODIUM 1500 UNIT(S)/HR: 5000 INJECTION INTRAVENOUS; SUBCUTANEOUS at 16:09

## 2021-01-01 RX ADMIN — FENTANYL CITRATE 0.82 MICROGRAM(S)/KG/HR: 50 INJECTION INTRAVENOUS at 20:34

## 2021-01-01 RX ADMIN — SENNA PLUS 10 MILLILITER(S): 8.6 TABLET ORAL at 22:19

## 2021-01-01 RX ADMIN — MIDAZOLAM HYDROCHLORIDE 1.64 MG/KG/HR: 1 INJECTION, SOLUTION INTRAMUSCULAR; INTRAVENOUS at 00:38

## 2021-01-01 RX ADMIN — POLYETHYLENE GLYCOL 3350 17 GRAM(S): 17 POWDER, FOR SOLUTION ORAL at 12:36

## 2021-01-01 RX ADMIN — PIPERACILLIN AND TAZOBACTAM 200 GRAM(S): 4; .5 INJECTION, POWDER, LYOPHILIZED, FOR SOLUTION INTRAVENOUS at 14:36

## 2021-01-01 RX ADMIN — Medication 400 MILLIGRAM(S): at 21:31

## 2021-01-01 RX ADMIN — Medication 6 MILLIGRAM(S): at 01:01

## 2021-01-01 RX ADMIN — SODIUM CHLORIDE 2000 MILLILITER(S): 9 INJECTION, SOLUTION INTRAVENOUS at 15:35

## 2021-01-01 RX ADMIN — MORPHINE SULFATE 0.5 MILLIGRAM(S): 50 CAPSULE, EXTENDED RELEASE ORAL at 16:30

## 2021-01-01 RX ADMIN — KETAMINE HYDROCHLORIDE 4.11 MG/KG/HR: 100 INJECTION INTRAMUSCULAR; INTRAVENOUS at 01:10

## 2021-01-01 RX ADMIN — Medication 10 MILLIGRAM(S): at 06:48

## 2021-01-01 RX ADMIN — KETAMINE HYDROCHLORIDE 4.11 MG/KG/HR: 100 INJECTION INTRAMUSCULAR; INTRAVENOUS at 01:08

## 2021-01-01 RX ADMIN — KETAMINE HYDROCHLORIDE 4.11 MG/KG/HR: 100 INJECTION INTRAMUSCULAR; INTRAVENOUS at 22:19

## 2021-01-01 RX ADMIN — Medication 2000 UNIT(S): at 12:48

## 2021-01-01 RX ADMIN — Medication 500 MILLIGRAM(S): at 13:33

## 2021-01-01 RX ADMIN — HEPARIN SODIUM 5000 UNIT(S): 5000 INJECTION INTRAVENOUS; SUBCUTANEOUS at 21:05

## 2021-01-01 RX ADMIN — KETAMINE HYDROCHLORIDE 4.11 MG/KG/HR: 100 INJECTION INTRAMUSCULAR; INTRAVENOUS at 00:38

## 2021-01-01 RX ADMIN — Medication 50 MILLIEQUIVALENT(S): at 06:35

## 2021-01-01 RX ADMIN — KETAMINE HYDROCHLORIDE 4.11 MG/KG/HR: 100 INJECTION INTRAMUSCULAR; INTRAVENOUS at 16:18

## 2021-01-01 RX ADMIN — ALBUTEROL 2 PUFF(S): 90 AEROSOL, METERED ORAL at 13:03

## 2021-01-01 RX ADMIN — Medication 2000 UNIT(S): at 11:39

## 2021-01-01 RX ADMIN — FENTANYL CITRATE 0.82 MICROGRAM(S)/KG/HR: 50 INJECTION INTRAVENOUS at 18:24

## 2021-01-01 RX ADMIN — ENOXAPARIN SODIUM 40 MILLIGRAM(S): 100 INJECTION SUBCUTANEOUS at 12:10

## 2021-01-01 RX ADMIN — PANTOPRAZOLE SODIUM 40 MILLIGRAM(S): 20 TABLET, DELAYED RELEASE ORAL at 12:29

## 2021-01-01 RX ADMIN — Medication 5 MILLIGRAM(S): at 21:04

## 2021-01-01 RX ADMIN — BUMETANIDE 2 MILLIGRAM(S): 0.25 INJECTION INTRAMUSCULAR; INTRAVENOUS at 05:01

## 2021-01-01 RX ADMIN — HEPARIN SODIUM 5000 UNIT(S): 5000 INJECTION INTRAVENOUS; SUBCUTANEOUS at 05:28

## 2021-01-01 RX ADMIN — CHLORHEXIDINE GLUCONATE 15 MILLILITER(S): 213 SOLUTION TOPICAL at 06:16

## 2021-01-01 RX ADMIN — Medication 81 MILLIGRAM(S): at 11:35

## 2021-01-01 RX ADMIN — BUMETANIDE 2 MILLIGRAM(S): 0.25 INJECTION INTRAMUSCULAR; INTRAVENOUS at 18:10

## 2021-01-01 RX ADMIN — ALBUTEROL 2 PUFF(S): 90 AEROSOL, METERED ORAL at 22:29

## 2021-01-01 RX ADMIN — FENTANYL CITRATE 0.82 MICROGRAM(S)/KG/HR: 50 INJECTION INTRAVENOUS at 01:09

## 2021-01-01 RX ADMIN — Medication 500 MILLIGRAM(S): at 12:48

## 2021-01-01 RX ADMIN — ENOXAPARIN SODIUM 40 MILLIGRAM(S): 100 INJECTION SUBCUTANEOUS at 10:30

## 2021-01-01 RX ADMIN — HYDROMORPHONE HYDROCHLORIDE 1 MILLIGRAM(S): 2 INJECTION INTRAMUSCULAR; INTRAVENOUS; SUBCUTANEOUS at 17:46

## 2021-01-01 RX ADMIN — BUMETANIDE 10 MG/HR: 0.25 INJECTION INTRAMUSCULAR; INTRAVENOUS at 08:54

## 2021-01-01 RX ADMIN — ALBUTEROL 2 PUFF(S): 90 AEROSOL, METERED ORAL at 10:51

## 2021-01-01 RX ADMIN — SODIUM ZIRCONIUM CYCLOSILICATE 10 GRAM(S): 10 POWDER, FOR SUSPENSION ORAL at 21:21

## 2021-01-01 RX ADMIN — Medication 2000 UNIT(S): at 13:33

## 2021-01-01 RX ADMIN — BUDESONIDE AND FORMOTEROL FUMARATE DIHYDRATE 2 PUFF(S): 160; 4.5 AEROSOL RESPIRATORY (INHALATION) at 21:17

## 2021-01-01 RX ADMIN — ALBUTEROL 2 PUFF(S): 90 AEROSOL, METERED ORAL at 06:52

## 2021-01-01 RX ADMIN — ALBUTEROL 2 PUFF(S): 90 AEROSOL, METERED ORAL at 07:39

## 2021-01-01 RX ADMIN — ALBUTEROL 2 PUFF(S): 90 AEROSOL, METERED ORAL at 02:07

## 2021-01-01 RX ADMIN — CHLORHEXIDINE GLUCONATE 15 MILLILITER(S): 213 SOLUTION TOPICAL at 17:23

## 2021-01-01 RX ADMIN — Medication 1 TABLET(S): at 11:54

## 2021-01-01 RX ADMIN — ALBUTEROL 2 PUFF(S): 90 AEROSOL, METERED ORAL at 20:55

## 2021-01-01 RX ADMIN — HEPARIN SODIUM 5000 UNIT(S): 5000 INJECTION INTRAVENOUS; SUBCUTANEOUS at 06:15

## 2021-01-01 RX ADMIN — ALBUTEROL 2 PUFF(S): 90 AEROSOL, METERED ORAL at 03:29

## 2021-01-01 RX ADMIN — ALBUTEROL 2 PUFF(S): 90 AEROSOL, METERED ORAL at 17:39

## 2021-01-01 RX ADMIN — PANTOPRAZOLE SODIUM 40 MILLIGRAM(S): 20 TABLET, DELAYED RELEASE ORAL at 11:32

## 2021-01-01 RX ADMIN — SODIUM ZIRCONIUM CYCLOSILICATE 10 GRAM(S): 10 POWDER, FOR SUSPENSION ORAL at 17:45

## 2021-01-01 RX ADMIN — Medication 5 MILLIGRAM(S): at 11:43

## 2021-01-01 RX ADMIN — ZINC SULFATE TAB 220 MG (50 MG ZINC EQUIVALENT) 220 MILLIGRAM(S): 220 (50 ZN) TAB at 10:30

## 2021-01-01 RX ADMIN — ALBUTEROL 2 PUFF(S): 90 AEROSOL, METERED ORAL at 22:24

## 2021-01-01 RX ADMIN — CHLORHEXIDINE GLUCONATE 15 MILLILITER(S): 213 SOLUTION TOPICAL at 07:52

## 2021-01-01 RX ADMIN — Medication 1 TABLET(S): at 12:18

## 2021-01-01 RX ADMIN — KETAMINE HYDROCHLORIDE 4.11 MG/KG/HR: 100 INJECTION INTRAMUSCULAR; INTRAVENOUS at 21:21

## 2021-01-01 RX ADMIN — SODIUM CHLORIDE 75 MILLILITER(S): 9 INJECTION, SOLUTION INTRAVENOUS at 05:26

## 2021-01-01 RX ADMIN — ALBUTEROL 2 PUFF(S): 90 AEROSOL, METERED ORAL at 11:31

## 2021-01-01 RX ADMIN — CHLORHEXIDINE GLUCONATE 15 MILLILITER(S): 213 SOLUTION TOPICAL at 17:51

## 2021-01-01 RX ADMIN — MIDAZOLAM HYDROCHLORIDE 1.64 MG/KG/HR: 1 INJECTION, SOLUTION INTRAMUSCULAR; INTRAVENOUS at 23:16

## 2021-01-01 RX ADMIN — PIPERACILLIN AND TAZOBACTAM 25 GRAM(S): 4; .5 INJECTION, POWDER, LYOPHILIZED, FOR SOLUTION INTRAVENOUS at 13:38

## 2021-01-01 RX ADMIN — Medication 81 MILLIGRAM(S): at 11:59

## 2021-01-01 RX ADMIN — PIPERACILLIN AND TAZOBACTAM 200 GRAM(S): 4; .5 INJECTION, POWDER, LYOPHILIZED, FOR SOLUTION INTRAVENOUS at 23:19

## 2021-01-01 RX ADMIN — POLYETHYLENE GLYCOL 3350 17 GRAM(S): 17 POWDER, FOR SOLUTION ORAL at 12:28

## 2021-01-01 RX ADMIN — ALBUTEROL 2 PUFF(S): 90 AEROSOL, METERED ORAL at 15:18

## 2021-01-01 RX ADMIN — BUMETANIDE 5 MG/HR: 0.25 INJECTION INTRAMUSCULAR; INTRAVENOUS at 02:53

## 2021-01-01 RX ADMIN — HEPARIN SODIUM 5000 UNIT(S): 5000 INJECTION INTRAVENOUS; SUBCUTANEOUS at 05:01

## 2021-01-01 RX ADMIN — CHLORHEXIDINE GLUCONATE 15 MILLILITER(S): 213 SOLUTION TOPICAL at 17:09

## 2021-01-01 RX ADMIN — HEPARIN SODIUM 3000 UNIT(S): 5000 INJECTION INTRAVENOUS; SUBCUTANEOUS at 23:55

## 2021-01-01 RX ADMIN — SENNA PLUS 10 MILLILITER(S): 8.6 TABLET ORAL at 00:38

## 2021-01-01 RX ADMIN — CISATRACURIUM BESYLATE 14.8 MICROGRAM(S)/KG/MIN: 2 INJECTION INTRAVENOUS at 17:40

## 2021-01-01 RX ADMIN — BUMETANIDE 2 MILLIGRAM(S): 0.25 INJECTION INTRAMUSCULAR; INTRAVENOUS at 11:40

## 2021-01-01 RX ADMIN — ALBUTEROL 2 PUFF(S): 90 AEROSOL, METERED ORAL at 14:33

## 2021-01-01 RX ADMIN — KETAMINE HYDROCHLORIDE 2.05 MG/KG/HR: 100 INJECTION INTRAMUSCULAR; INTRAVENOUS at 06:36

## 2021-01-01 RX ADMIN — Medication 500 MILLIGRAM(S): at 11:39

## 2021-01-01 RX ADMIN — Medication 5 MILLIGRAM(S): at 18:51

## 2021-01-01 RX ADMIN — Medication 2: at 06:49

## 2021-01-01 RX ADMIN — Medication 3.85 MICROGRAM(S)/KG/MIN: at 11:22

## 2021-01-01 RX ADMIN — Medication 10 MILLIGRAM(S): at 05:53

## 2021-01-01 RX ADMIN — CHLORHEXIDINE GLUCONATE 15 MILLILITER(S): 213 SOLUTION TOPICAL at 18:48

## 2021-01-01 RX ADMIN — ALBUTEROL 2 PUFF(S): 90 AEROSOL, METERED ORAL at 14:57

## 2021-01-01 RX ADMIN — MEROPENEM 100 MILLIGRAM(S): 1 INJECTION INTRAVENOUS at 21:16

## 2021-01-01 RX ADMIN — ALBUTEROL 2 PUFF(S): 90 AEROSOL, METERED ORAL at 15:11

## 2021-01-01 RX ADMIN — Medication 10 MILLIGRAM(S): at 15:17

## 2021-01-01 RX ADMIN — CHLORHEXIDINE GLUCONATE 15 MILLILITER(S): 213 SOLUTION TOPICAL at 17:44

## 2021-01-01 RX ADMIN — HEPARIN SODIUM 5000 UNIT(S): 5000 INJECTION INTRAVENOUS; SUBCUTANEOUS at 05:23

## 2021-01-01 RX ADMIN — FENTANYL CITRATE 0.82 MICROGRAM(S)/KG/HR: 50 INJECTION INTRAVENOUS at 08:53

## 2021-01-01 RX ADMIN — Medication 2000 UNIT(S): at 13:16

## 2021-01-01 RX ADMIN — ALBUTEROL 2 PUFF(S): 90 AEROSOL, METERED ORAL at 03:00

## 2021-01-01 RX ADMIN — Medication 2: at 00:36

## 2021-01-01 RX ADMIN — HEPARIN SODIUM 5000 UNIT(S): 5000 INJECTION INTRAVENOUS; SUBCUTANEOUS at 21:02

## 2021-01-01 RX ADMIN — SENNA PLUS 10 MILLILITER(S): 8.6 TABLET ORAL at 00:52

## 2021-01-01 RX ADMIN — Medication 650 MILLIGRAM(S): at 05:53

## 2021-01-01 RX ADMIN — CISATRACURIUM BESYLATE 14.8 MICROGRAM(S)/KG/MIN: 2 INJECTION INTRAVENOUS at 08:54

## 2021-01-01 RX ADMIN — PROPOFOL 9.85 MICROGRAM(S)/KG/MIN: 10 INJECTION, EMULSION INTRAVENOUS at 14:03

## 2021-01-01 RX ADMIN — ALBUTEROL 2 PUFF(S): 90 AEROSOL, METERED ORAL at 18:31

## 2021-01-01 RX ADMIN — CISATRACURIUM BESYLATE 14.8 MICROGRAM(S)/KG/MIN: 2 INJECTION INTRAVENOUS at 21:06

## 2021-01-01 RX ADMIN — ZINC SULFATE TAB 220 MG (50 MG ZINC EQUIVALENT) 220 MILLIGRAM(S): 220 (50 ZN) TAB at 12:46

## 2021-01-01 RX ADMIN — ALBUTEROL 2 PUFF(S): 90 AEROSOL, METERED ORAL at 07:09

## 2021-01-01 RX ADMIN — Medication 15 MILLIGRAM(S): at 02:45

## 2021-01-01 RX ADMIN — ALBUTEROL 2 PUFF(S): 90 AEROSOL, METERED ORAL at 01:49

## 2021-01-01 RX ADMIN — BUMETANIDE 2 MILLIGRAM(S): 0.25 INJECTION INTRAMUSCULAR; INTRAVENOUS at 17:51

## 2021-01-01 RX ADMIN — Medication 250 MILLIGRAM(S): at 23:19

## 2021-01-01 RX ADMIN — Medication 100 MILLIGRAM(S): at 00:43

## 2021-01-01 RX ADMIN — ENOXAPARIN SODIUM 40 MILLIGRAM(S): 100 INJECTION SUBCUTANEOUS at 14:28

## 2021-01-01 RX ADMIN — ALBUTEROL 2 PUFF(S): 90 AEROSOL, METERED ORAL at 09:50

## 2021-01-01 RX ADMIN — ALBUTEROL 2 PUFF(S): 90 AEROSOL, METERED ORAL at 08:14

## 2021-01-01 RX ADMIN — FENTANYL CITRATE 0.82 MICROGRAM(S)/KG/HR: 50 INJECTION INTRAVENOUS at 16:18

## 2021-01-01 RX ADMIN — KETAMINE HYDROCHLORIDE 2.05 MG/KG/HR: 100 INJECTION INTRAMUSCULAR; INTRAVENOUS at 17:06

## 2021-01-01 RX ADMIN — Medication 2000 UNIT(S): at 12:46

## 2021-01-01 RX ADMIN — Medication 1 TABLET(S): at 11:43

## 2021-01-01 RX ADMIN — Medication 2000 UNIT(S): at 12:22

## 2021-01-01 RX ADMIN — ALBUTEROL 2 PUFF(S): 90 AEROSOL, METERED ORAL at 17:41

## 2021-01-01 RX ADMIN — PANTOPRAZOLE SODIUM 40 MILLIGRAM(S): 20 TABLET, DELAYED RELEASE ORAL at 12:37

## 2021-01-01 RX ADMIN — HEPARIN SODIUM 5000 UNIT(S): 5000 INJECTION INTRAVENOUS; SUBCUTANEOUS at 21:20

## 2021-01-01 RX ADMIN — KETAMINE HYDROCHLORIDE 4.11 MG/KG/HR: 100 INJECTION INTRAMUSCULAR; INTRAVENOUS at 06:15

## 2021-01-01 RX ADMIN — Medication 200 GRAM(S): at 01:31

## 2021-01-01 RX ADMIN — Medication 5 MILLIGRAM(S): at 16:21

## 2021-01-01 RX ADMIN — Medication 3.85 MICROGRAM(S)/KG/MIN: at 14:56

## 2021-01-01 RX ADMIN — REMDESIVIR 500 MILLIGRAM(S): 5 INJECTION INTRAVENOUS at 05:04

## 2021-01-01 RX ADMIN — ALBUTEROL 2 PUFF(S): 90 AEROSOL, METERED ORAL at 03:26

## 2021-01-01 RX ADMIN — Medication 10 MILLIGRAM(S): at 05:36

## 2021-01-01 RX ADMIN — ALBUTEROL 2 PUFF(S): 90 AEROSOL, METERED ORAL at 05:50

## 2021-01-01 RX ADMIN — ALBUTEROL 2 PUFF(S): 90 AEROSOL, METERED ORAL at 15:54

## 2021-01-01 RX ADMIN — Medication 500 MILLIGRAM(S): at 12:36

## 2021-01-01 RX ADMIN — ALBUTEROL 2 PUFF(S): 90 AEROSOL, METERED ORAL at 01:09

## 2021-01-01 RX ADMIN — BUMETANIDE 2 MILLIGRAM(S): 0.25 INJECTION INTRAMUSCULAR; INTRAVENOUS at 18:48

## 2021-01-01 RX ADMIN — ALBUTEROL 2 PUFF(S): 90 AEROSOL, METERED ORAL at 11:32

## 2021-01-01 RX ADMIN — PIPERACILLIN AND TAZOBACTAM 25 GRAM(S): 4; .5 INJECTION, POWDER, LYOPHILIZED, FOR SOLUTION INTRAVENOUS at 13:04

## 2021-01-01 RX ADMIN — Medication 6 MILLIGRAM(S): at 01:57

## 2021-01-01 RX ADMIN — ALBUTEROL 2 PUFF(S): 90 AEROSOL, METERED ORAL at 10:15

## 2021-01-01 RX ADMIN — KETAMINE HYDROCHLORIDE 4.11 MG/KG/HR: 100 INJECTION INTRAMUSCULAR; INTRAVENOUS at 09:25

## 2021-01-01 RX ADMIN — CISATRACURIUM BESYLATE 14.8 MICROGRAM(S)/KG/MIN: 2 INJECTION INTRAVENOUS at 12:00

## 2021-01-01 RX ADMIN — MEROPENEM 100 MILLIGRAM(S): 1 INJECTION INTRAVENOUS at 12:04

## 2021-01-01 RX ADMIN — Medication 1 TABLET(S): at 12:23

## 2021-01-01 RX ADMIN — ALBUTEROL 2 PUFF(S): 90 AEROSOL, METERED ORAL at 00:06

## 2021-01-01 RX ADMIN — MIDAZOLAM HYDROCHLORIDE 1.64 MG/KG/HR: 1 INJECTION, SOLUTION INTRAMUSCULAR; INTRAVENOUS at 06:07

## 2021-01-01 RX ADMIN — PANTOPRAZOLE SODIUM 40 MILLIGRAM(S): 20 TABLET, DELAYED RELEASE ORAL at 18:48

## 2021-01-01 RX ADMIN — Medication 10 MILLIGRAM(S): at 08:42

## 2021-01-01 RX ADMIN — ALBUTEROL 2 PUFF(S): 90 AEROSOL, METERED ORAL at 12:49

## 2021-01-01 RX ADMIN — PIPERACILLIN AND TAZOBACTAM 25 GRAM(S): 4; .5 INJECTION, POWDER, LYOPHILIZED, FOR SOLUTION INTRAVENOUS at 23:22

## 2021-01-01 RX ADMIN — Medication 500 MILLIGRAM(S): at 11:54

## 2021-01-01 RX ADMIN — BUDESONIDE AND FORMOTEROL FUMARATE DIHYDRATE 2 PUFF(S): 160; 4.5 AEROSOL RESPIRATORY (INHALATION) at 20:40

## 2021-01-01 RX ADMIN — POLYETHYLENE GLYCOL 3350 17 GRAM(S): 17 POWDER, FOR SOLUTION ORAL at 11:36

## 2021-01-01 RX ADMIN — KETAMINE HYDROCHLORIDE 4.11 MG/KG/HR: 100 INJECTION INTRAMUSCULAR; INTRAVENOUS at 20:13

## 2021-01-01 RX ADMIN — Medication 1 TABLET(S): at 11:36

## 2021-01-01 RX ADMIN — ALBUTEROL 2 PUFF(S): 90 AEROSOL, METERED ORAL at 20:56

## 2021-01-01 RX ADMIN — FENTANYL CITRATE 0.82 MICROGRAM(S)/KG/HR: 50 INJECTION INTRAVENOUS at 03:23

## 2021-01-01 RX ADMIN — Medication 2000 UNIT(S): at 12:36

## 2021-01-01 RX ADMIN — Medication 400 MILLIGRAM(S): at 11:46

## 2021-01-01 RX ADMIN — BUDESONIDE AND FORMOTEROL FUMARATE DIHYDRATE 2 PUFF(S): 160; 4.5 AEROSOL RESPIRATORY (INHALATION) at 20:14

## 2021-01-01 RX ADMIN — HEPARIN SODIUM 5000 UNIT(S): 5000 INJECTION INTRAVENOUS; SUBCUTANEOUS at 16:22

## 2021-01-01 RX ADMIN — Medication 650 MILLIGRAM(S): at 00:31

## 2021-01-01 RX ADMIN — Medication 1 TABLET(S): at 12:36

## 2021-01-01 RX ADMIN — ALBUTEROL 2 PUFF(S): 90 AEROSOL, METERED ORAL at 08:31

## 2021-01-01 RX ADMIN — ALBUTEROL 4 PUFF(S): 90 AEROSOL, METERED ORAL at 14:52

## 2021-01-01 RX ADMIN — Medication 10 MILLIGRAM(S): at 21:42

## 2021-01-01 RX ADMIN — Medication 10 MILLIGRAM(S): at 12:38

## 2021-01-01 RX ADMIN — Medication 81 MILLIGRAM(S): at 12:45

## 2021-01-01 RX ADMIN — FENTANYL CITRATE 0.82 MICROGRAM(S)/KG/HR: 50 INJECTION INTRAVENOUS at 17:40

## 2021-01-01 RX ADMIN — FENTANYL CITRATE 0.82 MICROGRAM(S)/KG/HR: 50 INJECTION INTRAVENOUS at 14:23

## 2021-01-01 RX ADMIN — ALBUTEROL 2 PUFF(S): 90 AEROSOL, METERED ORAL at 02:19

## 2021-01-01 RX ADMIN — MORPHINE SULFATE 0.5 MILLIGRAM(S): 50 CAPSULE, EXTENDED RELEASE ORAL at 06:30

## 2021-01-01 RX ADMIN — ALBUTEROL 2 PUFF(S): 90 AEROSOL, METERED ORAL at 04:06

## 2021-01-01 RX ADMIN — ALBUTEROL 2 PUFF(S): 90 AEROSOL, METERED ORAL at 10:01

## 2021-01-01 RX ADMIN — ALBUTEROL 2 PUFF(S): 90 AEROSOL, METERED ORAL at 03:24

## 2021-01-01 RX ADMIN — Medication 650 MILLIGRAM(S): at 12:29

## 2021-01-01 RX ADMIN — CISATRACURIUM BESYLATE 14.8 MICROGRAM(S)/KG/MIN: 2 INJECTION INTRAVENOUS at 16:18

## 2021-01-01 RX ADMIN — FENTANYL CITRATE 0.82 MICROGRAM(S)/KG/HR: 50 INJECTION INTRAVENOUS at 12:41

## 2021-01-01 RX ADMIN — PROPOFOL 9.85 MICROGRAM(S)/KG/MIN: 10 INJECTION, EMULSION INTRAVENOUS at 05:51

## 2021-01-01 RX ADMIN — Medication 5 MILLIGRAM(S): at 02:11

## 2021-01-01 RX ADMIN — ALBUTEROL 2 PUFF(S): 90 AEROSOL, METERED ORAL at 19:29

## 2021-01-01 RX ADMIN — CHLORHEXIDINE GLUCONATE 15 MILLILITER(S): 213 SOLUTION TOPICAL at 05:23

## 2021-01-01 RX ADMIN — Medication 1 TABLET(S): at 11:32

## 2021-01-01 RX ADMIN — CISATRACURIUM BESYLATE 14.8 MICROGRAM(S)/KG/MIN: 2 INJECTION INTRAVENOUS at 01:11

## 2021-01-01 RX ADMIN — Medication 10 MILLIGRAM(S): at 05:42

## 2021-01-01 RX ADMIN — Medication 10 MILLIGRAM(S): at 15:57

## 2021-01-01 RX ADMIN — POLYETHYLENE GLYCOL 3350 17 GRAM(S): 17 POWDER, FOR SOLUTION ORAL at 12:46

## 2021-01-01 RX ADMIN — ALBUTEROL 2 PUFF(S): 90 AEROSOL, METERED ORAL at 15:13

## 2021-01-01 RX ADMIN — Medication 400 MILLIGRAM(S): at 19:45

## 2021-01-01 RX ADMIN — MEROPENEM 100 MILLIGRAM(S): 1 INJECTION INTRAVENOUS at 14:15

## 2021-01-01 RX ADMIN — CHLORHEXIDINE GLUCONATE 15 MILLILITER(S): 213 SOLUTION TOPICAL at 17:33

## 2021-01-01 RX ADMIN — KETAMINE HYDROCHLORIDE 2.05 MG/KG/HR: 100 INJECTION INTRAMUSCULAR; INTRAVENOUS at 02:45

## 2021-01-01 RX ADMIN — PROPOFOL 9.85 MICROGRAM(S)/KG/MIN: 10 INJECTION, EMULSION INTRAVENOUS at 22:17

## 2021-01-01 RX ADMIN — ALBUTEROL 2 PUFF(S): 90 AEROSOL, METERED ORAL at 13:02

## 2021-01-01 RX ADMIN — Medication 81 MILLIGRAM(S): at 12:36

## 2021-01-01 RX ADMIN — ALBUTEROL 2 PUFF(S): 90 AEROSOL, METERED ORAL at 11:18

## 2021-01-01 RX ADMIN — MORPHINE SULFATE 0.5 MILLIGRAM(S): 50 CAPSULE, EXTENDED RELEASE ORAL at 17:13

## 2021-01-01 RX ADMIN — CHLORHEXIDINE GLUCONATE 15 MILLILITER(S): 213 SOLUTION TOPICAL at 18:15

## 2021-01-01 RX ADMIN — FENTANYL CITRATE 0.82 MICROGRAM(S)/KG/HR: 50 INJECTION INTRAVENOUS at 06:36

## 2021-01-01 RX ADMIN — Medication 5 MILLIGRAM(S): at 14:03

## 2021-01-01 RX ADMIN — ALBUTEROL 2 PUFF(S): 90 AEROSOL, METERED ORAL at 15:44

## 2021-01-01 RX ADMIN — Medication 10 MILLIGRAM(S): at 21:31

## 2021-01-01 RX ADMIN — HEPARIN SODIUM 5000 UNIT(S): 5000 INJECTION INTRAVENOUS; SUBCUTANEOUS at 21:42

## 2021-01-01 RX ADMIN — CISATRACURIUM BESYLATE 14.8 MICROGRAM(S)/KG/MIN: 2 INJECTION INTRAVENOUS at 10:00

## 2021-01-01 RX ADMIN — ALBUTEROL 2 PUFF(S): 90 AEROSOL, METERED ORAL at 20:14

## 2021-01-01 RX ADMIN — ALBUTEROL 2 PUFF(S): 90 AEROSOL, METERED ORAL at 15:37

## 2021-01-01 RX ADMIN — ALBUTEROL 2 PUFF(S): 90 AEROSOL, METERED ORAL at 22:54

## 2021-01-01 RX ADMIN — Medication 400 MILLIGRAM(S): at 16:36

## 2021-01-01 RX ADMIN — HEPARIN SODIUM 5000 UNIT(S): 5000 INJECTION INTRAVENOUS; SUBCUTANEOUS at 05:36

## 2021-01-01 RX ADMIN — INSULIN HUMAN 5 UNIT(S): 100 INJECTION, SOLUTION SUBCUTANEOUS at 22:54

## 2021-01-01 RX ADMIN — CISATRACURIUM BESYLATE 14.8 MICROGRAM(S)/KG/MIN: 2 INJECTION INTRAVENOUS at 18:19

## 2021-01-01 RX ADMIN — MEROPENEM 100 MILLIGRAM(S): 1 INJECTION INTRAVENOUS at 13:06

## 2021-01-01 RX ADMIN — MIDAZOLAM HYDROCHLORIDE 1.64 MG/KG/HR: 1 INJECTION, SOLUTION INTRAMUSCULAR; INTRAVENOUS at 16:22

## 2021-01-01 RX ADMIN — ALBUTEROL 2 PUFF(S): 90 AEROSOL, METERED ORAL at 08:27

## 2021-01-01 RX ADMIN — Medication 400 MILLIGRAM(S): at 02:20

## 2021-01-01 RX ADMIN — ALBUTEROL 2 PUFF(S): 90 AEROSOL, METERED ORAL at 15:32

## 2021-01-01 RX ADMIN — ALBUTEROL 2 PUFF(S): 90 AEROSOL, METERED ORAL at 04:23

## 2021-01-01 RX ADMIN — ALBUTEROL 2 PUFF(S): 90 AEROSOL, METERED ORAL at 01:28

## 2021-01-01 RX ADMIN — Medication 250 MILLIGRAM(S): at 17:53

## 2021-01-01 RX ADMIN — ZINC SULFATE TAB 220 MG (50 MG ZINC EQUIVALENT) 220 MILLIGRAM(S): 220 (50 ZN) TAB at 12:48

## 2021-01-01 RX ADMIN — ALBUTEROL 2 PUFF(S): 90 AEROSOL, METERED ORAL at 00:01

## 2021-01-01 RX ADMIN — Medication 50 MILLIEQUIVALENT(S): at 01:31

## 2021-01-01 RX ADMIN — ALBUTEROL 2 PUFF(S): 90 AEROSOL, METERED ORAL at 14:40

## 2021-01-01 RX ADMIN — Medication 500 MILLIGRAM(S): at 11:47

## 2021-01-01 RX ADMIN — CISATRACURIUM BESYLATE 20 MILLIGRAM(S): 2 INJECTION INTRAVENOUS at 13:00

## 2021-01-01 RX ADMIN — Medication 10 MILLIGRAM(S): at 14:55

## 2021-01-01 RX ADMIN — ALBUTEROL 2 PUFF(S): 90 AEROSOL, METERED ORAL at 11:47

## 2021-01-01 RX ADMIN — BUDESONIDE AND FORMOTEROL FUMARATE DIHYDRATE 2 PUFF(S): 160; 4.5 AEROSOL RESPIRATORY (INHALATION) at 22:45

## 2021-01-01 RX ADMIN — Medication 2000 UNIT(S): at 11:36

## 2021-01-01 RX ADMIN — ALBUTEROL 4 PUFF(S): 90 AEROSOL, METERED ORAL at 22:42

## 2021-01-01 RX ADMIN — BUDESONIDE AND FORMOTEROL FUMARATE DIHYDRATE 2 PUFF(S): 160; 4.5 AEROSOL RESPIRATORY (INHALATION) at 07:45

## 2021-01-01 RX ADMIN — CISATRACURIUM BESYLATE 14.8 MICROGRAM(S)/KG/MIN: 2 INJECTION INTRAVENOUS at 23:17

## 2021-01-01 RX ADMIN — BUMETANIDE 2 MILLIGRAM(S): 0.25 INJECTION INTRAMUSCULAR; INTRAVENOUS at 17:08

## 2021-01-01 RX ADMIN — Medication 6 MILLIGRAM(S): at 01:55

## 2021-01-01 RX ADMIN — Medication 81 MILLIGRAM(S): at 12:22

## 2021-01-01 RX ADMIN — REMDESIVIR 500 MILLIGRAM(S): 5 INJECTION INTRAVENOUS at 05:51

## 2021-01-01 RX ADMIN — ENOXAPARIN SODIUM 40 MILLIGRAM(S): 100 INJECTION SUBCUTANEOUS at 12:48

## 2021-01-01 RX ADMIN — KETAMINE HYDROCHLORIDE 4.11 MG/KG/HR: 100 INJECTION INTRAMUSCULAR; INTRAVENOUS at 16:56

## 2021-01-01 RX ADMIN — Medication 6 MILLIGRAM(S): at 02:19

## 2021-01-01 RX ADMIN — ALBUTEROL 2 PUFF(S): 90 AEROSOL, METERED ORAL at 04:53

## 2021-01-01 RX ADMIN — Medication 1000 MILLIGRAM(S): at 02:45

## 2021-01-01 RX ADMIN — KETAMINE HYDROCHLORIDE 4.11 MG/KG/HR: 100 INJECTION INTRAMUSCULAR; INTRAVENOUS at 09:09

## 2021-01-01 RX ADMIN — Medication 500 MILLIGRAM(S): at 10:30

## 2021-01-01 RX ADMIN — ALBUTEROL 2 PUFF(S): 90 AEROSOL, METERED ORAL at 21:13

## 2021-01-01 RX ADMIN — ALBUTEROL 2 PUFF(S): 90 AEROSOL, METERED ORAL at 00:13

## 2021-01-01 RX ADMIN — SODIUM ZIRCONIUM CYCLOSILICATE 10 GRAM(S): 10 POWDER, FOR SUSPENSION ORAL at 17:11

## 2021-01-01 RX ADMIN — KETAMINE HYDROCHLORIDE 2.05 MG/KG/HR: 100 INJECTION INTRAMUSCULAR; INTRAVENOUS at 21:17

## 2021-01-01 RX ADMIN — Medication 3.85 MICROGRAM(S)/KG/MIN: at 08:07

## 2021-01-01 RX ADMIN — PIPERACILLIN AND TAZOBACTAM 25 GRAM(S): 4; .5 INJECTION, POWDER, LYOPHILIZED, FOR SOLUTION INTRAVENOUS at 21:38

## 2021-01-01 RX ADMIN — Medication 1 TABLET(S): at 12:10

## 2021-01-01 RX ADMIN — Medication 1 TABLET(S): at 11:39

## 2021-01-01 RX ADMIN — Medication 100 MILLIGRAM(S): at 17:27

## 2021-01-01 RX ADMIN — PIPERACILLIN AND TAZOBACTAM 25 GRAM(S): 4; .5 INJECTION, POWDER, LYOPHILIZED, FOR SOLUTION INTRAVENOUS at 06:45

## 2021-01-01 RX ADMIN — HEPARIN SODIUM 5000 UNIT(S): 5000 INJECTION INTRAVENOUS; SUBCUTANEOUS at 13:15

## 2021-01-01 RX ADMIN — PROPOFOL 9.85 MICROGRAM(S)/KG/MIN: 10 INJECTION, EMULSION INTRAVENOUS at 05:33

## 2021-01-01 RX ADMIN — ALBUTEROL 2 PUFF(S): 90 AEROSOL, METERED ORAL at 17:26

## 2021-01-01 RX ADMIN — ALBUTEROL 2 PUFF(S): 90 AEROSOL, METERED ORAL at 03:45

## 2021-01-01 RX ADMIN — Medication 10 MILLIGRAM(S): at 13:11

## 2021-01-01 RX ADMIN — Medication 150 MEQ/KG/HR: at 17:39

## 2021-01-01 RX ADMIN — KETAMINE HYDROCHLORIDE 2.05 MG/KG/HR: 100 INJECTION INTRAMUSCULAR; INTRAVENOUS at 08:42

## 2021-01-01 RX ADMIN — HEPARIN SODIUM 1700 UNIT(S)/HR: 5000 INJECTION INTRAVENOUS; SUBCUTANEOUS at 23:54

## 2021-01-01 RX ADMIN — PANTOPRAZOLE SODIUM 40 MILLIGRAM(S): 20 TABLET, DELAYED RELEASE ORAL at 11:35

## 2021-01-01 RX ADMIN — ALBUTEROL 2 PUFF(S): 90 AEROSOL, METERED ORAL at 22:23

## 2021-01-01 RX ADMIN — Medication 2000 UNIT(S): at 11:59

## 2021-01-01 RX ADMIN — FENTANYL CITRATE 0.82 MICROGRAM(S)/KG/HR: 50 INJECTION INTRAVENOUS at 02:15

## 2021-01-01 RX ADMIN — ALBUTEROL 4 PUFF(S): 90 AEROSOL, METERED ORAL at 03:42

## 2021-01-01 RX ADMIN — Medication 0.5 MILLIGRAM(S): at 20:09

## 2021-01-01 RX ADMIN — Medication 5 MILLIGRAM(S): at 04:19

## 2021-01-01 RX ADMIN — KETAMINE HYDROCHLORIDE 4.11 MG/KG/HR: 100 INJECTION INTRAMUSCULAR; INTRAVENOUS at 14:02

## 2021-01-01 RX ADMIN — KETAMINE HYDROCHLORIDE 2.05 MG/KG/HR: 100 INJECTION INTRAMUSCULAR; INTRAVENOUS at 22:27

## 2021-01-01 RX ADMIN — Medication 48 MILLIGRAM(S): at 11:43

## 2021-01-01 RX ADMIN — ALBUTEROL 2 PUFF(S): 90 AEROSOL, METERED ORAL at 21:53

## 2021-01-01 RX ADMIN — PROPOFOL 9.85 MICROGRAM(S)/KG/MIN: 10 INJECTION, EMULSION INTRAVENOUS at 16:57

## 2021-01-01 RX ADMIN — SENNA PLUS 10 MILLILITER(S): 8.6 TABLET ORAL at 21:02

## 2021-01-01 RX ADMIN — ALBUTEROL 2 PUFF(S): 90 AEROSOL, METERED ORAL at 07:45

## 2021-01-01 RX ADMIN — FENTANYL CITRATE 0.82 MICROGRAM(S)/KG/HR: 50 INJECTION INTRAVENOUS at 13:22

## 2021-01-01 RX ADMIN — HEPARIN SODIUM 5000 UNIT(S): 5000 INJECTION INTRAVENOUS; SUBCUTANEOUS at 21:30

## 2021-01-01 RX ADMIN — Medication 48 MILLIGRAM(S): at 12:45

## 2021-01-01 RX ADMIN — Medication 650 MILLIGRAM(S): at 12:28

## 2021-01-01 RX ADMIN — ALBUTEROL 2 PUFF(S): 90 AEROSOL, METERED ORAL at 11:21

## 2021-01-01 RX ADMIN — FENTANYL CITRATE 0.82 MICROGRAM(S)/KG/HR: 50 INJECTION INTRAVENOUS at 21:11

## 2021-01-01 RX ADMIN — CHLORHEXIDINE GLUCONATE 15 MILLILITER(S): 213 SOLUTION TOPICAL at 05:52

## 2021-01-01 RX ADMIN — Medication 1000 MILLIGRAM(S): at 21:16

## 2021-01-01 RX ADMIN — SODIUM ZIRCONIUM CYCLOSILICATE 10 GRAM(S): 10 POWDER, FOR SUSPENSION ORAL at 07:46

## 2021-01-01 RX ADMIN — Medication 250 MILLIGRAM(S): at 17:44

## 2021-01-01 RX ADMIN — PANTOPRAZOLE SODIUM 40 MILLIGRAM(S): 20 TABLET, DELAYED RELEASE ORAL at 12:23

## 2021-01-01 RX ADMIN — PROPOFOL 9.85 MICROGRAM(S)/KG/MIN: 10 INJECTION, EMULSION INTRAVENOUS at 05:05

## 2021-01-01 RX ADMIN — HEPARIN SODIUM 5000 UNIT(S): 5000 INJECTION INTRAVENOUS; SUBCUTANEOUS at 05:51

## 2021-01-01 RX ADMIN — Medication 100 MILLIGRAM(S): at 22:19

## 2021-01-01 RX ADMIN — MEROPENEM 100 MILLIGRAM(S): 1 INJECTION INTRAVENOUS at 05:33

## 2021-01-01 RX ADMIN — PROPOFOL 9.85 MICROGRAM(S)/KG/MIN: 10 INJECTION, EMULSION INTRAVENOUS at 03:22

## 2021-01-01 RX ADMIN — Medication 102 MILLIGRAM(S): at 05:51

## 2021-01-01 RX ADMIN — Medication 50 MILLILITER(S): at 17:02

## 2021-01-01 RX ADMIN — Medication 5 MILLIGRAM(S): at 10:07

## 2021-01-01 RX ADMIN — HEPARIN SODIUM 5000 UNIT(S): 5000 INJECTION INTRAVENOUS; SUBCUTANEOUS at 16:17

## 2021-01-01 RX ADMIN — Medication 48 MILLIGRAM(S): at 13:14

## 2021-01-01 RX ADMIN — BUDESONIDE AND FORMOTEROL FUMARATE DIHYDRATE 2 PUFF(S): 160; 4.5 AEROSOL RESPIRATORY (INHALATION) at 06:51

## 2021-01-01 RX ADMIN — Medication 48 MILLIGRAM(S): at 11:36

## 2021-01-01 RX ADMIN — ZINC SULFATE TAB 220 MG (50 MG ZINC EQUIVALENT) 220 MILLIGRAM(S): 220 (50 ZN) TAB at 12:11

## 2021-01-01 RX ADMIN — Medication 1 TABLET(S): at 12:29

## 2021-01-01 RX ADMIN — Medication 81 MILLIGRAM(S): at 17:24

## 2021-01-01 RX ADMIN — Medication 500 MILLIGRAM(S): at 12:18

## 2021-01-01 RX ADMIN — HEPARIN SODIUM 1500 UNIT(S)/HR: 5000 INJECTION INTRAVENOUS; SUBCUTANEOUS at 01:23

## 2021-01-01 RX ADMIN — HEPARIN SODIUM 5000 UNIT(S): 5000 INJECTION INTRAVENOUS; SUBCUTANEOUS at 21:17

## 2021-01-01 RX ADMIN — ALBUTEROL 2 PUFF(S): 90 AEROSOL, METERED ORAL at 05:12

## 2021-01-01 RX ADMIN — FENTANYL CITRATE 0.82 MICROGRAM(S)/KG/HR: 50 INJECTION INTRAVENOUS at 23:29

## 2021-01-01 RX ADMIN — FENTANYL CITRATE 0.82 MICROGRAM(S)/KG/HR: 50 INJECTION INTRAVENOUS at 03:20

## 2021-01-01 RX ADMIN — CHLORHEXIDINE GLUCONATE 15 MILLILITER(S): 213 SOLUTION TOPICAL at 17:43

## 2021-01-01 RX ADMIN — ALBUTEROL 2 PUFF(S): 90 AEROSOL, METERED ORAL at 23:30

## 2021-01-01 RX ADMIN — HEPARIN SODIUM 1500 UNIT(S)/HR: 5000 INJECTION INTRAVENOUS; SUBCUTANEOUS at 18:36

## 2021-01-01 RX ADMIN — ALBUTEROL 2 PUFF(S): 90 AEROSOL, METERED ORAL at 14:41

## 2021-01-01 RX ADMIN — Medication 3.85 MICROGRAM(S)/KG/MIN: at 18:20

## 2021-01-01 RX ADMIN — HEPARIN SODIUM 1500 UNIT(S)/HR: 5000 INJECTION INTRAVENOUS; SUBCUTANEOUS at 06:02

## 2021-01-01 RX ADMIN — Medication 6 MILLIGRAM(S): at 02:06

## 2021-01-01 RX ADMIN — BUMETANIDE 10 MG/HR: 0.25 INJECTION INTRAMUSCULAR; INTRAVENOUS at 20:13

## 2021-01-01 RX ADMIN — ALBUTEROL 2 PUFF(S): 90 AEROSOL, METERED ORAL at 02:09

## 2021-01-01 RX ADMIN — Medication 15.4 MICROGRAM(S)/KG/MIN: at 08:54

## 2021-01-01 RX ADMIN — Medication 500 MILLIGRAM(S): at 12:38

## 2021-01-01 RX ADMIN — Medication 5 MILLIGRAM(S): at 20:54

## 2021-01-01 RX ADMIN — ALBUTEROL 2 PUFF(S): 90 AEROSOL, METERED ORAL at 20:49

## 2021-01-01 RX ADMIN — ALBUTEROL 2 PUFF(S): 90 AEROSOL, METERED ORAL at 04:18

## 2021-01-01 RX ADMIN — HEPARIN SODIUM 5000 UNIT(S): 5000 INJECTION INTRAVENOUS; SUBCUTANEOUS at 13:26

## 2021-01-01 RX ADMIN — FENTANYL CITRATE 0.82 MICROGRAM(S)/KG/HR: 50 INJECTION INTRAVENOUS at 02:45

## 2021-01-01 RX ADMIN — CHLORHEXIDINE GLUCONATE 15 MILLILITER(S): 213 SOLUTION TOPICAL at 05:36

## 2021-01-01 RX ADMIN — CISATRACURIUM BESYLATE 14.8 MICROGRAM(S)/KG/MIN: 2 INJECTION INTRAVENOUS at 05:04

## 2021-01-01 RX ADMIN — ENOXAPARIN SODIUM 40 MILLIGRAM(S): 100 INJECTION SUBCUTANEOUS at 11:43

## 2021-01-01 RX ADMIN — ALBUTEROL 2 PUFF(S): 90 AEROSOL, METERED ORAL at 20:09

## 2021-01-01 RX ADMIN — Medication 25 MILLILITER(S): at 23:26

## 2021-01-01 RX ADMIN — FENTANYL CITRATE 0.82 MICROGRAM(S)/KG/HR: 50 INJECTION INTRAVENOUS at 21:21

## 2021-01-01 RX ADMIN — ALBUTEROL 2 PUFF(S): 90 AEROSOL, METERED ORAL at 23:08

## 2021-01-01 RX ADMIN — Medication 5 MILLIGRAM(S): at 17:44

## 2021-01-01 RX ADMIN — SENNA PLUS 10 MILLILITER(S): 8.6 TABLET ORAL at 05:24

## 2021-01-01 RX ADMIN — Medication 10 MILLIGRAM(S): at 21:44

## 2021-01-01 RX ADMIN — FENTANYL CITRATE 0.82 MICROGRAM(S)/KG/HR: 50 INJECTION INTRAVENOUS at 12:00

## 2021-01-01 RX ADMIN — Medication 650 MILLIGRAM(S): at 01:28

## 2021-01-01 RX ADMIN — Medication 10 MILLIGRAM(S): at 21:45

## 2021-01-01 RX ADMIN — Medication 10 MILLIGRAM(S): at 05:54

## 2021-01-01 RX ADMIN — BUMETANIDE 10 MG/HR: 0.25 INJECTION INTRAMUSCULAR; INTRAVENOUS at 06:36

## 2021-01-01 RX ADMIN — FENTANYL CITRATE 0.82 MICROGRAM(S)/KG/HR: 50 INJECTION INTRAVENOUS at 00:38

## 2021-01-01 RX ADMIN — HEPARIN SODIUM 5000 UNIT(S): 5000 INJECTION INTRAVENOUS; SUBCUTANEOUS at 12:38

## 2021-01-01 RX ADMIN — ENOXAPARIN SODIUM 40 MILLIGRAM(S): 100 INJECTION SUBCUTANEOUS at 15:42

## 2021-01-01 RX ADMIN — INSULIN HUMAN 5 UNIT(S): 100 INJECTION, SOLUTION SUBCUTANEOUS at 06:37

## 2021-01-01 RX ADMIN — ALBUTEROL 2 PUFF(S): 90 AEROSOL, METERED ORAL at 05:03

## 2021-01-01 RX ADMIN — HEPARIN SODIUM 5000 UNIT(S): 5000 INJECTION INTRAVENOUS; SUBCUTANEOUS at 21:04

## 2021-01-01 RX ADMIN — BUDESONIDE AND FORMOTEROL FUMARATE DIHYDRATE 2 PUFF(S): 160; 4.5 AEROSOL RESPIRATORY (INHALATION) at 20:49

## 2021-01-01 RX ADMIN — BUMETANIDE 2 MILLIGRAM(S): 0.25 INJECTION INTRAMUSCULAR; INTRAVENOUS at 05:42

## 2021-01-01 RX ADMIN — KETAMINE HYDROCHLORIDE 2.05 MG/KG/HR: 100 INJECTION INTRAMUSCULAR; INTRAVENOUS at 05:36

## 2021-01-01 RX ADMIN — HYDROMORPHONE HYDROCHLORIDE 1 MILLIGRAM(S): 2 INJECTION INTRAMUSCULAR; INTRAVENOUS; SUBCUTANEOUS at 16:21

## 2021-01-01 RX ADMIN — PROPOFOL 9.85 MICROGRAM(S)/KG/MIN: 10 INJECTION, EMULSION INTRAVENOUS at 10:44

## 2021-01-01 RX ADMIN — HEPARIN SODIUM 5000 UNIT(S): 5000 INJECTION INTRAVENOUS; SUBCUTANEOUS at 14:55

## 2021-01-01 RX ADMIN — FENTANYL CITRATE 0.82 MICROGRAM(S)/KG/HR: 50 INJECTION INTRAVENOUS at 02:19

## 2021-01-01 RX ADMIN — ALBUTEROL 2 PUFF(S): 90 AEROSOL, METERED ORAL at 13:35

## 2021-01-01 RX ADMIN — FENTANYL CITRATE 0.82 MICROGRAM(S)/KG/HR: 50 INJECTION INTRAVENOUS at 17:55

## 2021-01-01 RX ADMIN — Medication 6 MILLIGRAM(S): at 00:31

## 2021-01-01 RX ADMIN — Medication 102 MILLIGRAM(S): at 17:44

## 2021-01-01 RX ADMIN — Medication 1 TABLET(S): at 12:46

## 2021-01-01 RX ADMIN — REMDESIVIR 500 MILLIGRAM(S): 5 INJECTION INTRAVENOUS at 05:03

## 2021-01-01 RX ADMIN — Medication 6 MILLIGRAM(S): at 00:50

## 2021-01-01 RX ADMIN — BUMETANIDE 2 MILLIGRAM(S): 0.25 INJECTION INTRAMUSCULAR; INTRAVENOUS at 01:32

## 2021-01-01 RX ADMIN — HEPARIN SODIUM 5000 UNIT(S): 5000 INJECTION INTRAVENOUS; SUBCUTANEOUS at 15:57

## 2021-01-01 RX ADMIN — CHLORHEXIDINE GLUCONATE 15 MILLILITER(S): 213 SOLUTION TOPICAL at 05:53

## 2021-01-01 RX ADMIN — ALBUTEROL 2 PUFF(S): 90 AEROSOL, METERED ORAL at 19:14

## 2021-01-01 RX ADMIN — Medication 1 TABLET(S): at 12:45

## 2021-01-01 RX ADMIN — MIDAZOLAM HYDROCHLORIDE 1.64 MG/KG/HR: 1 INJECTION, SOLUTION INTRAMUSCULAR; INTRAVENOUS at 05:51

## 2021-01-01 RX ADMIN — ALBUTEROL 2 PUFF(S): 90 AEROSOL, METERED ORAL at 13:17

## 2021-01-01 RX ADMIN — ALBUTEROL 2 PUFF(S): 90 AEROSOL, METERED ORAL at 10:38

## 2021-01-01 RX ADMIN — FENTANYL CITRATE 0.82 MICROGRAM(S)/KG/HR: 50 INJECTION INTRAVENOUS at 05:54

## 2021-01-01 RX ADMIN — Medication 2: at 18:24

## 2021-01-01 RX ADMIN — KETAMINE HYDROCHLORIDE 4.11 MG/KG/HR: 100 INJECTION INTRAMUSCULAR; INTRAVENOUS at 21:44

## 2021-01-01 RX ADMIN — SODIUM ZIRCONIUM CYCLOSILICATE 10 GRAM(S): 10 POWDER, FOR SUSPENSION ORAL at 15:17

## 2021-01-01 RX ADMIN — SODIUM ZIRCONIUM CYCLOSILICATE 10 GRAM(S): 10 POWDER, FOR SUSPENSION ORAL at 05:50

## 2021-01-01 RX ADMIN — Medication 650 MILLIGRAM(S): at 23:01

## 2021-01-01 RX ADMIN — Medication 48 MILLIGRAM(S): at 11:59

## 2021-01-01 RX ADMIN — Medication 250 MILLIGRAM(S): at 06:02

## 2021-01-01 RX ADMIN — Medication 2000 UNIT(S): at 12:11

## 2021-01-01 RX ADMIN — Medication 10 MILLIGRAM(S): at 14:00

## 2021-01-01 RX ADMIN — ALBUTEROL 2 PUFF(S): 90 AEROSOL, METERED ORAL at 07:42

## 2021-01-01 RX ADMIN — Medication 250 MILLIGRAM(S): at 09:03

## 2021-01-01 RX ADMIN — ALBUTEROL 2 PUFF(S): 90 AEROSOL, METERED ORAL at 07:41

## 2021-01-01 RX ADMIN — Medication 48 MILLIGRAM(S): at 12:22

## 2021-01-01 RX ADMIN — MIDAZOLAM HYDROCHLORIDE 1.64 MG/KG/HR: 1 INJECTION, SOLUTION INTRAMUSCULAR; INTRAVENOUS at 20:34

## 2021-01-01 RX ADMIN — HEPARIN SODIUM 1500 UNIT(S)/HR: 5000 INJECTION INTRAVENOUS; SUBCUTANEOUS at 11:29

## 2021-01-01 RX ADMIN — SODIUM ZIRCONIUM CYCLOSILICATE 10 GRAM(S): 10 POWDER, FOR SUSPENSION ORAL at 07:00

## 2021-01-01 RX ADMIN — SENNA PLUS 10 MILLILITER(S): 8.6 TABLET ORAL at 22:17

## 2021-01-01 RX ADMIN — CHLORHEXIDINE GLUCONATE 15 MILLILITER(S): 213 SOLUTION TOPICAL at 05:33

## 2021-01-01 RX ADMIN — ALBUTEROL 2 PUFF(S): 90 AEROSOL, METERED ORAL at 17:45

## 2021-01-01 RX ADMIN — Medication 1 TABLET(S): at 10:30

## 2021-01-01 RX ADMIN — MIDAZOLAM HYDROCHLORIDE 2 MILLIGRAM(S): 1 INJECTION, SOLUTION INTRAMUSCULAR; INTRAVENOUS at 01:53

## 2021-01-01 RX ADMIN — ALBUTEROL 2 PUFF(S): 90 AEROSOL, METERED ORAL at 20:51

## 2021-01-01 RX ADMIN — Medication 2000 UNIT(S): at 11:47

## 2021-01-01 RX ADMIN — BUMETANIDE 5 MG/HR: 0.25 INJECTION INTRAMUSCULAR; INTRAVENOUS at 12:27

## 2021-01-01 RX ADMIN — BUDESONIDE AND FORMOTEROL FUMARATE DIHYDRATE 2 PUFF(S): 160; 4.5 AEROSOL RESPIRATORY (INHALATION) at 20:56

## 2021-01-01 RX ADMIN — FENTANYL CITRATE 0.82 MICROGRAM(S)/KG/HR: 50 INJECTION INTRAVENOUS at 22:27

## 2021-01-01 RX ADMIN — KETAMINE HYDROCHLORIDE 4.11 MG/KG/HR: 100 INJECTION INTRAMUSCULAR; INTRAVENOUS at 16:21

## 2021-01-01 RX ADMIN — KETAMINE HYDROCHLORIDE 4.11 MG/KG/HR: 100 INJECTION INTRAMUSCULAR; INTRAVENOUS at 20:40

## 2021-01-01 RX ADMIN — Medication 81 MILLIGRAM(S): at 13:15

## 2021-01-01 RX ADMIN — PANTOPRAZOLE SODIUM 40 MILLIGRAM(S): 20 TABLET, DELAYED RELEASE ORAL at 11:30

## 2021-01-01 RX ADMIN — Medication 2000 UNIT(S): at 12:29

## 2021-01-01 RX ADMIN — ALBUTEROL 2 PUFF(S): 90 AEROSOL, METERED ORAL at 10:44

## 2021-02-28 NOTE — ED PROVIDER NOTE - CLINICAL SUMMARY MEDICAL DECISION MAKING FREE TEXT BOX
61 yo M with hx of brain ca s/p resection in 2019 p/w sob, cough x 5 days, COVID negative 1 week ago. Hypoxic to 87% uncomfortable and tachypneic, placed on 6L NC with improvement to 94%. Plan for labs, cxr, ekg and admit for hypoxia.

## 2021-02-28 NOTE — ED ADULT NURSE NOTE - OBJECTIVE STATEMENT
Received PT to room 8, AAO4, ambulatory at baseline, c/o SOB x 3 days. PT states was recently tested covid (-), however became symptomatic. Presents to ED tachypneic, sp02 5L NC 98%, RA 90%. Pmhx: brain CA. Denies chest pain, headache, dizziness, fever, dysuria, n/v/d. Left 20g AC placed, labs drawn, bed in lowest position, will continue to monitor. Received PT to room 8, AAO4, ambulatory at baseline, c/o SOB x 3 days. PT states was recently tested covid (-), however became symptomatic. Presents to ED tachypneic, sp02 5L NC 98%, RA 90%. Pmhx: brain CA, left facial paralysis. Denies chest pain, headache, dizziness, fever, dysuria, n/v/d. Left 20g AC placed, labs drawn, bed in lowest position, will continue to monitor.

## 2021-02-28 NOTE — ED ADULT TRIAGE NOTE - CHIEF COMPLAINT QUOTE
sob    sob and cough for 5 days... saw pmd.. had neg covid test and told bronchitis but not feeling better.  o2 sat on ra 87%.... on 4l viz nc is 92%.  hx of brain tumor.  denies fever,  loss of taste or smell, diarrhea.  wife- mara- [phone]

## 2021-02-28 NOTE — ED PROVIDER NOTE - PHYSICAL EXAMINATION
Gen: Patient is nontoxic uncomfortable appearing, AAOx3.  HEENT: NCAT, EOMI, JOAO, normal conjunctiva, tongue midline, oral mucosa moist.  Lung: Tachypneic, CTAB, no wheezes/rhonchi/rales B/L, speaking in full sentences.  CV: RRR, no murmurs, rubs or gallops, distal pulses 2+ b/l.  Abd: soft, NT, ND, no guarding, no rigidity, no rebound tenderness, no CVA tenderness.   MSK: no visible deformities, ROM normal in UE/LE, no back TTP.  Neuro: No focal sensory or motor deficits.  Skin: Warm, well perfused, no rash, no leg swelling.  Psych: normal affect, calm.

## 2021-02-28 NOTE — ED ADULT NURSE NOTE - CHIEF COMPLAINT QUOTE
sob    sob and cough for 5 days... saw pmd.. had neg covid test and told bronchitis but not feeling better.  o2 sat on ra 87%.... on 4l viz nc is 92%.  hx of brain tumor.  denies fever,  loss of taste or smell, diarrhea.  wife- mara- [phone]

## 2021-02-28 NOTE — ED PROVIDER NOTE - ATTENDING CONTRIBUTION TO CARE
Patient is a 61 yo M with history of brain tumor s/p resection in presenting with worsening shortness of breath and non-productive cough for past week. Patient states he went to his doctor, was told it was bronchitis and given an inhaler.  No known sick contacts. Denies fevers, nausea, vomiting, diarrhea. No abdominal pain. No chest pain. No leg swelling. No history of DVT/PE.     VS noted - 87% RA, 94% on 6L NC   Gen. no acute distress, fatigued  HEENT: EOMI, mmm  Lungs: tachypneic, lungs CTA, poor air movement  CVS: RRR   Abd; Soft non tender, non distended   Ext: no edema  Skin: no rash  Neuro AAOx3 non focal clear speech  a/p: hypoxia/SOB/cough - concern for COVID19, PNA. plan for labs, CXR, COVID swab, likely admit.   - Lonny CHACKO

## 2021-02-28 NOTE — ED PROVIDER NOTE - OBJECTIVE STATEMENT
59 yo M with hx of brain ca s/p resection in 2019 p/w sob, cough x 5 days. Patient tested negative 1 week ago for COVID and given inhaler by PMD, no relief. Patient denies cp, n/v/d/c.

## 2021-03-01 NOTE — H&P ADULT - PROBLEM SELECTOR PLAN 3
- Likely 2/2 to COVID-19 pneumonia   - d-dimer unremarkable, unlikely underlying PE.   - procalcitonin 0.17, no leukocytosis, unlikely superimposed bacterial PNA.   - c/w dexamethasone and Remdesivir  - continuous pulse ox  - c/w Supplemental Oxygen via 6L NC

## 2021-03-01 NOTE — H&P ADULT - PROBLEM SELECTOR PLAN 2
- 2/2 to COVID-19 infection  - c/w Dexamethasone and Remdesivir  - Inflammatory markers q72hrs   - Trend fever curve

## 2021-03-01 NOTE — H&P ADULT - HISTORY OF PRESENT ILLNESS
This is a 61yo Male w/ PMHx of a Brain tumor s/p resection in 2018, who is presenting with worsening shortness of breath and non-productive cough for past week. Pt reports he went to his PMD for his symptoms, had a negative COVID swab, was instructed to take an inhaler; however, it has given him little relief.  His SOB was getting worse which prompted him to come to ED. He has increased lethargy and ALVARES. Decreased appetite. He denies having fevers at home, no sick contacts, no recent travel, no loss of taste/smell. Pt denies having chest pain, palpitations, HA, dizziness, visual changes, abdominal pain, N/V/D/C, dysuria, hematuria, melena or hematochezia.     ED Course: EK bpm, Sinus tachycardia, qtc: 419. CXR: c/w COVID PNA. COVID-19 PCR: Positive. Initially SPO2 87% on RA. Now SPO2 95% on 6L NC. s/p Decadron and NS IVF in ED.

## 2021-03-01 NOTE — ED ADULT NURSE REASSESSMENT NOTE - NS ED NURSE REASSESS COMMENT FT1
Pt received from MANNY Mcbride. Pt currently resting in bed, NAD noted. IVF running as ordered. NRB and NC in place, neg SOB at this time. Medicated as per eMAR. Pending bed placement.

## 2021-03-01 NOTE — H&P ADULT - GASTROINTESTINAL DETAILS
soft/nontender/no distention/no masses palpable/bowel sounds normal/no rebound tenderness/no guarding/no rigidity

## 2021-03-01 NOTE — H&P ADULT - NEGATIVE NEUROLOGICAL SYMPTOMS
no transient paralysis/no weakness/no paresthesias/no generalized seizures/no syncope/no tremors/no vertigo/no loss of sensation/no difficulty walking/no headache/no loss of consciousness/no confusion

## 2021-03-01 NOTE — H&P ADULT - NSHPLABSRESULTS_GEN_ALL_CORE
Labs:                        13.4   5.44  )-----------( 162      ( 28 Feb 2021 23:02 )             41.7     02-28    134<L>  |  97<L>  |  9   ----------------------------<  132<H>  4.0   |  25  |  1.09    Ca    8.7      28 Feb 2021 23:00    TPro  7.4  /  Alb  3.5  /  TBili  0.4  /  DBili  x   /  AST  63<H>  /  ALT  49<H>  /  AlkPhos  69  02-28      Blood Gas Venous:  pH: 7.42 | HCO3: 26 | pCO2: 45 | pO2: <24 | Lactate: 2.3 (02-28-21 @ 23:02)      COVID-19/Full RVP Panel:  Detected (02-28-21 @ 23:02)

## 2021-03-01 NOTE — PATIENT PROFILE ADULT - ARE SIGNIFICANT INDICATORS COMPLETE.
Explained post-operative information to the patient. The patient will have a red and sore eye for about a week. This is completely normal. If the pain becomes severe, we ask the patient to come back in immediately, 24/7 ER discussed. We provided post-operative drops with instructions and an eye patch for the patient to wear at night. The patient will begin Moxifloxacin four times a day and Pred Forte every two hours. The patient will need to be in head down positioning for 55 minutes out of every hour for the next week. The presence of an intraocular gas bubble means the patient will not be able to fly until the bubble has dissipated and the patient has been cleared. This will take about 2-2.5 months. We ask that the patient avoid excess water in the eye when showering and try to avoid swimming. This will help reduce possibility of infection. We ask that the patient wear the eye patch we give them at night while sleeping to avoid abrasions and excess rubbing of the eye while sleeping. We ask the patient to sleep on their side or stomach while in bed so the gas bubble is positioned correctly. We also ask that the patient the patient not participate in strenuous activity for the next few weeks and not lift more than 20 lbs for another week or two. Yes

## 2021-03-01 NOTE — H&P ADULT - ASSESSMENT
This is a 61yo Male w/ PMHx of a Brain tumor s/p resection in 2018, who is presenting with worsening shortness of breath and non-productive cough for past week. Found to be COVID-19 Positive. Py hypoxic to 87% on RA CXR c/w COVID pneumonia. Admitted for further treatment of COVID pneumonia/hypoxemia.

## 2021-03-01 NOTE — PROGRESS NOTE ADULT - PROBLEM SELECTOR PLAN 1
- COVID-19 PCR: Positive  - Isolation precautions per hospital protocol   - CXR showing b/l opacities c/w COVID PNA   - Pt was initially hypoxic to 87% on RA.   - Pt currently requiring supplemental oxygen via NC 6L.  - c/w Dexamethasone and Remdesivir   - procalcitonin 0.17, no leukocytosis, unlikely superimposed bacterial PNA. No indication for antibiotics at this time.   - Inflammatory markers q72hrs   - Monitor SP02 on continuous pulse ox  - Prone positioning, Incentive spirometry and Chest PT PRN.  - Albuterol, Tessalon pearls, Tylenol PRN  - HOB elevation

## 2021-03-01 NOTE — H&P ADULT - PROBLEM SELECTOR PLAN 1
- COVID-19 PCR: Positive  - Isolation precautions per hospital protocol   - CXR showing b/l opacities c/w COVID PNA   - Pt was initially hypoxic to 87% on RA.   - Pt currently requiring supplemental oxygen via NC 6L.  - c/w Dexamethasone and Remdesivir   - procalcitonin 0.17, no leukocytosis, unlikely superimposed bacterial PNA. No indication for antibiotics at this time.   - Inflammatory markers q72hrs   - Monitor SP02 on continuous pulse ox  - Prone positioning, Incentive spirometry and Chest PT PRN. - COVID-19 PCR: Positive  - Isolation precautions per hospital protocol   - CXR showing b/l opacities c/w COVID PNA   - Pt was initially hypoxic to 87% on RA.   - Pt currently requiring supplemental oxygen via NC 6L.  - c/w Dexamethasone and Remdesivir   - procalcitonin 0.17, no leukocytosis, unlikely superimposed bacterial PNA. No indication for antibiotics at this time.   - Inflammatory markers q72hrs   - Monitor SP02 on continuous pulse ox  - Prone positioning, Incentive spirometry and Chest PT PRN.  - Albuterol, Tessalon pearls, Tylenol PRN  - HOB elevation

## 2021-03-01 NOTE — PROGRESS NOTE ADULT - SUBJECTIVE AND OBJECTIVE BOX
Medicine Progress Note    Patient is a 60y old  Male who presents with a chief complaint of COVID-19 Pneumonia (01 Mar 2021 05:15)      SUBJECTIVE / OVERNIGHT EVENTS:    ADDITIONAL REVIEW OF SYSTEMS:    MEDICATIONS  (STANDING):  ALBUTerol    90 MICROgram(s) HFA Inhaler 2 Puff(s) Inhalation every 4 hours  ascorbic acid 500 milliGRAM(s) Oral daily  cholecalciferol 2000 Unit(s) Oral daily  enoxaparin Injectable 40 milliGRAM(s) SubCutaneous daily  multivitamin 1 Tablet(s) Oral daily  remdesivir  IVPB   IV Intermittent   zinc sulfate 220 milliGRAM(s) Oral daily    MEDICATIONS  (PRN):  acetaminophen   Tablet .. 650 milliGRAM(s) Oral every 4 hours PRN Temp greater or equal to 38.5C (101.3F)  benzonatate 100 milliGRAM(s) Oral three times a day PRN Cough  melatonin 3 milliGRAM(s) Oral at bedtime PRN Insomnia    CAPILLARY BLOOD GLUCOSE        I&O's Summary    01 Mar 2021 07:01  -  01 Mar 2021 22:14  --------------------------------------------------------  IN: 0 mL / OUT: 200 mL / NET: -200 mL        PHYSICAL EXAM:  Vital Signs Last 24 Hrs  T(C): 36.5 (01 Mar 2021 13:33), Max: 37.3 (01 Mar 2021 02:17)  T(F): 97.7 (01 Mar 2021 13:33), Max: 99.1 (01 Mar 2021 02:17)  HR: 77 (01 Mar 2021 13:33) (77 - 105)  BP: 139/85 (01 Mar 2021 13:33) (132/91 - 139/85)  BP(mean): --  RR: 20 (01 Mar 2021 13:33) (20 - 32)  SpO2: 92% (01 Mar 2021 13:33) (91% - 98%)  CONSTITUTIONAL: NAD, well-developed, well-groomed  ENMT: Moist oral mucosa, no pharyngeal injection or exudates; normal dentition  RESPIRATORY: Normal respiratory effort; lungs are clear to auscultation bilaterally  CARDIOVASCULAR: Regular rate and rhythm, normal S1 and S2, no murmur/rub/gallop; No lower extremity edema; Peripheral pulses are 2+ bilaterally  ABDOMEN: Nontender to palpation, normoactive bowel sounds, no rebound/guarding; No hepatosplenomegaly  PSYCH: A+O to person, place, and time; affect appropriate  NEUROLOGY: CN 2-12 are intact and symmetric; no gross sensory deficits   SKIN: No rashes; no palpable lesions    LABS:                        13.5   4.97  )-----------( 168      ( 01 Mar 2021 08:07 )             41.3     03-01    135  |  100  |  10  ----------------------------<  127<H>  4.4   |  23  |  0.96    Ca    8.8      01 Mar 2021 08:07  Mg     2.1     03-01    TPro  6.9  /  Alb  3.0<L>  /  TBili  0.4  /  DBili  x   /  AST  67<H>  /  ALT  55<H>  /  AlkPhos  65  03-01    PT/INR - ( 01 Mar 2021 06:43 )   PT: 13.0 sec;   INR: 1.15 ratio                   COVID-19 PCR: Detected (28 Feb 2021 23:02)      RADIOLOGY & ADDITIONAL TESTS:  Imaging from Last 24 Hours:    Electrocardiogram/QTc Interval:    COORDINATION OF CARE:  Care Discussed with Consultants/Other Providers:   Medicine Progress Note    Patient is a 60y old  Male who presents with a chief complaint of COVID-19 Pneumonia (01 Mar 2021 05:15)      SUBJECTIVE / OVERNIGHT EVENTS:+ on oxygen     ADDITIONAL REVIEW OF SYSTEMS:    MEDICATIONS  (STANDING):  ALBUTerol    90 MICROgram(s) HFA Inhaler 2 Puff(s) Inhalation every 4 hours  ascorbic acid 500 milliGRAM(s) Oral daily  cholecalciferol 2000 Unit(s) Oral daily  enoxaparin Injectable 40 milliGRAM(s) SubCutaneous daily  multivitamin 1 Tablet(s) Oral daily  remdesivir  IVPB   IV Intermittent   zinc sulfate 220 milliGRAM(s) Oral daily    MEDICATIONS  (PRN):  acetaminophen   Tablet .. 650 milliGRAM(s) Oral every 4 hours PRN Temp greater or equal to 38.5C (101.3F)  benzonatate 100 milliGRAM(s) Oral three times a day PRN Cough  melatonin 3 milliGRAM(s) Oral at bedtime PRN Insomnia    CAPILLARY BLOOD GLUCOSE        I&O's Summary    01 Mar 2021 07:01  -  01 Mar 2021 22:14  --------------------------------------------------------  IN: 0 mL / OUT: 200 mL / NET: -200 mL        PHYSICAL EXAM:  Vital Signs Last 24 Hrs  T(C): 36.5 (01 Mar 2021 13:33), Max: 37.3 (01 Mar 2021 02:17)  T(F): 97.7 (01 Mar 2021 13:33), Max: 99.1 (01 Mar 2021 02:17)  HR: 77 (01 Mar 2021 13:33) (77 - 105)  BP: 139/85 (01 Mar 2021 13:33) (132/91 - 139/85)  BP(mean): --  RR: 20 (01 Mar 2021 13:33) (20 - 32)  SpO2: 92% (01 Mar 2021 13:33) (91% - 98%)  CONSTITUTIONAL: NAD, well-developed, well-groomed  ENMT: Moist oral mucosa, no pharyngeal injection or exudates; normal dentition  RESPIRATORY: Normal respiratory effort; lungs are clear to auscultation bilaterally  CARDIOVASCULAR: Regular rate and rhythm, normal S1 and S2, no murmur/rub/gallop; No lower extremity edema; Peripheral pulses are 2+ bilaterally  ABDOMEN: Nontender to palpation, normoactive bowel sounds, no rebound/guarding; No hepatosplenomegaly  PSYCH: A+O to person, place, and time; affect appropriate  NEUROLOGY: CN 2-12 are intact and symmetric; no gross sensory deficits   SKIN: No rashes; no palpable lesions    LABS:                        13.5   4.97  )-----------( 168      ( 01 Mar 2021 08:07 )             41.3     03-01    135  |  100  |  10  ----------------------------<  127<H>  4.4   |  23  |  0.96    Ca    8.8      01 Mar 2021 08:07  Mg     2.1     03-01    TPro  6.9  /  Alb  3.0<L>  /  TBili  0.4  /  DBili  x   /  AST  67<H>  /  ALT  55<H>  /  AlkPhos  65  03-01    PT/INR - ( 01 Mar 2021 06:43 )   PT: 13.0 sec;   INR: 1.15 ratio                   COVID-19 PCR: Detected (28 Feb 2021 23:02)      RADIOLOGY & ADDITIONAL TESTS:  Imaging from Last 24 Hours:    Electrocardiogram/QTc Interval:    COORDINATION OF CARE:  Care Discussed with Consultants/Other Providers:

## 2021-03-01 NOTE — ED ADULT NURSE REASSESSMENT NOTE - NS ED NURSE REASSESS COMMENT FT1
PT sp02 91% on 6L NC, c/o SOB. PT put on 15L nonrebreather and 2L NC, sp02 95%. MAR and ED resident Dr. Braswell aware, will continue to monitor.

## 2021-03-01 NOTE — H&P ADULT - NEUROLOGICAL DETAILS
alert and oriented x 3/sensation intact/cranial nerves intact/no spontaneous movement/normal strength

## 2021-03-02 NOTE — PROGRESS NOTE ADULT - ASSESSMENT
This is a 59yo Male w/ PMHx of a Brain tumor s/p resection in 2018, who is presenting with worsening shortness of breath and non-productive cough for past week. Found to be COVID-19 Positive. Py hypoxic to 87% on RA CXR c/w COVID pneumonia. Admitted for further treatment of COVID pneumonia/hypoxemia.

## 2021-03-02 NOTE — CONSULT NOTE ADULT - SUBJECTIVE AND OBJECTIVE BOX
CHIEF COMPLAINT:Patient is a 60y old  Male who presents with a chief complaint of COVID-19 Pneumonia (01 Mar 2021 22:14)      HISTORY OF PRESENT ILLNESS:HPI:  This is a 59yo Male w/ PMHx of a Brain tumor s/p resection in 2018, who is presenting with worsening shortness of breath and non-productive cough for past week. Pt reports he went to his PMD for his symptoms, had a negative COVID swab, was instructed to take an inhaler; however, it has given him little relief.  His SOB was getting worse which prompted him to come to ED. He has increased lethargy and ALVARES. Decreased appetite. He denies having fevers at home, no sick contacts, no recent travel, no loss of taste/smell. Pt denies having chest pain, palpitations, HA, dizziness, visual changes, abdominal pain, N/V/D/C, dysuria, hematuria, melena or hematochezia.       PAST MEDICAL & SURGICAL HISTORY:  Brain cancer  in remission.    Brain tumor  s/p ressection in 2018.      MEDICATIONS:  enoxaparin Injectable 40 milliGRAM(s) SubCutaneous daily  remdesivir  IVPB   IV Intermittent   remdesivir  IVPB 100 milliGRAM(s) IV Intermittent every 24 hours  ALBUTerol    90 MICROgram(s) HFA Inhaler 2 Puff(s) Inhalation every 4 hours  benzonatate 100 milliGRAM(s) Oral three times a day PRN  acetaminophen   Tablet .. 650 milliGRAM(s) Oral every 4 hours PRN  melatonin 3 milliGRAM(s) Oral at bedtime PRN  dexAMETHasone  Injectable 6 milliGRAM(s) IV Push every 24 hours  ascorbic acid 500 milliGRAM(s) Oral daily  cholecalciferol 2000 Unit(s) Oral daily  multivitamin 1 Tablet(s) Oral daily  zinc sulfate 220 milliGRAM(s) Oral daily      FAMILY HISTORY:  FH: type 2 diabetes        Non-contributory    SOCIAL HISTORY:    [ ] Tobacco  [ ] Drugs  [ ] Alcohol    Allergies    No Known Allergies    Intolerances    	    REVIEW OF SYSTEMS:  CONSTITUTIONAL: No fever  EYES: No eye pain, visual disturbances, or discharge  ENMT:  No difficulty hearing, tinnitus  NECK: No pain or stiffness  RESPIRATORY: + cough, wheezing + Shortness of breath   CARDIOVASCULAR: No chest pain, palpitations, passing out, dizziness, or leg swelling  GASTROINTESTINAL:  No nausea, vomiting, diarrhea or constipation. No melena.  GENITOURINARY: No dysuria, hematuria  NEUROLOGICAL: No stroke like symptoms  SKIN: No burning or lesions   ENDOCRINE: No heat or cold intolerance  MUSCULOSKELETAL: No joint pain or swelling  PSYCHIATRIC: No  anxiety, mood swings  HEME/LYMPH: No bleeding gums  ALLERGY AND IMMUNOLOGIC: No hives or eczema	    All other ROS negative    PHYSICAL EXAM:  T(C): 36.8 (03-02-21 @ 12:35), Max: 37.1 (03-01-21 @ 21:00)  HR: 83 (03-02-21 @ 12:35) (77 - 90)  BP: 140/84 (03-02-21 @ 12:35) (122/70 - 140/84)  RR: 18 (03-02-21 @ 12:35) (18 - 24)  SpO2: 94% (03-02-21 @ 12:35) (91% - 96%)  Wt(kg): --  I&O's Summary    01 Mar 2021 07:01  -  02 Mar 2021 07:00  --------------------------------------------------------  IN: 480 mL / OUT: 1100 mL / NET: -620 mL      Appearance: Normal	  HEENT:   Normal oral mucosa, EOMI	  Cardiovascular:  S1 S2, No JVD,    Respiratory: Lungs clear to auscultation	  Psychiatry: Alert  Gastrointestinal:  Soft, Non-tender, + BS	  Skin: No rashes   Neurologic: Non-focal  Extremities:  No edema  Vascular: Peripheral pulses palpable    	    	  CARDIAC MARKERS:  Labs personally reviewed by me                        13.5   4.97  )-----------( 168      ( 01 Mar 2021 08:07 )             41.3     03-02    x   |  x   |  x   ----------------------------<  x   x    |  x   |  0.84    Ca    8.8      01 Mar 2021 08:07  Mg     2.1     03-01    TPro  7.4  /  Alb  3.4  /  TBili  0.4  /  DBili  <0.2  /  AST  72<H>  /  ALT  66<H>  /  AlkPhos  71  03-02  EKG: Personally reviewed by me -     Radiology: Personally reviewed by me -   < from: Xray Chest 1 View- PORTABLE-Urgent (Xray Chest 1 View- PORTABLE-Urgent .) (02.28.21 @ 23:33) >  IMPRESSION:  Covid 19 pneumonia.    < end of copied text >    Assessment /Plan:   This is a 59yo Male w/ PMHx of a Brain tumor s/p resection in 2018, who is presenting with worsening shortness of breath and non-productive cough for past week. Found to be COVID-19 Positive.Patient Sinus tachycardic in Emergency room.     1. Sinus Tachycardia 2/2 to hypoxia from COVID-19 Pneumonia vs dehydration- in ED patient was sating 87% on room air  - currently on high flow sating >90% - heart rate well controlled in the 80's   - S/P IVF hydration  - D Dimer 178, low suspicion for Pulmonary Embolus, trop negative  - can obtain TTE to eval heart function once acute issues resolve    2. Hypoxemia 2/2 Covid-19 pna  - remdesevir and decadron initiated   - continuous pulse ox  - c/w Supplemental Oxygen - now on high flow    3. Hyponatremic- 134 on admissiont likely from dehydration   - nearly normalized s/.p IVF hydration    4. Transaminitis  - elevated LFTs on admission, albumin slightly low however now nearly normalized, bilirubin wnl   - continue to monitor       5.  Need for prophylactic measure.  Plan: - DVT: Sub q Lovenox.         Arpita Rodriguez ANP-C  Jaswant Weiss DO [facility]  Cardiovascular Medicine  [address] [place] [address], [address]  Office [phone]  Cell [phone] CHIEF COMPLAINT:Patient is a 60y old  Male who presents with a chief complaint of COVID-19 Pneumonia (01 Mar 2021 22:14)      HISTORY OF PRESENT ILLNESS:HPI:  This is a 61yo Male w/ PMHx of a Brain tumor s/p resection in 2018, who is presenting with worsening shortness of breath and non-productive cough for past week. Pt reports he went to his PMD for his symptoms, had a negative COVID swab, was instructed to take an inhaler; however, it has given him little relief.  His SOB was getting worse which prompted him to come to ED. He has increased lethargy and ALVARES. Decreased appetite. He denies having fevers at home, no sick contacts, no recent travel, no loss of taste/smell. Pt denies having chest pain, palpitations, HA, dizziness, visual changes, abdominal pain, N/V/D/C, dysuria, hematuria, melena or hematochezia.       PAST MEDICAL & SURGICAL HISTORY:  Brain cancer  in remission.    Brain tumor  s/p ressection in 2018.      MEDICATIONS:  enoxaparin Injectable 40 milliGRAM(s) SubCutaneous daily  remdesivir  IVPB   IV Intermittent   remdesivir  IVPB 100 milliGRAM(s) IV Intermittent every 24 hours  ALBUTerol    90 MICROgram(s) HFA Inhaler 2 Puff(s) Inhalation every 4 hours  benzonatate 100 milliGRAM(s) Oral three times a day PRN  acetaminophen   Tablet .. 650 milliGRAM(s) Oral every 4 hours PRN  melatonin 3 milliGRAM(s) Oral at bedtime PRN  dexAMETHasone  Injectable 6 milliGRAM(s) IV Push every 24 hours  ascorbic acid 500 milliGRAM(s) Oral daily  cholecalciferol 2000 Unit(s) Oral daily  multivitamin 1 Tablet(s) Oral daily  zinc sulfate 220 milliGRAM(s) Oral daily      FAMILY HISTORY:  FH: type 2 diabetes        Non-contributory    SOCIAL HISTORY:    not a smoker  Allergies    No Known Allergies    Intolerances    	    REVIEW OF SYSTEMS:  CONSTITUTIONAL: No fever  EYES: No eye pain, visual disturbances, or discharge  ENMT:  No difficulty hearing, tinnitus  NECK: No pain or stiffness  RESPIRATORY: + cough, wheezing + Shortness of breath   CARDIOVASCULAR: No chest pain, palpitations, passing out, dizziness, or leg swelling  GASTROINTESTINAL:  No nausea, vomiting, diarrhea or constipation. No melena.  GENITOURINARY: No dysuria, hematuria  NEUROLOGICAL: No stroke like symptoms  SKIN: No burning or lesions   ENDOCRINE: No heat or cold intolerance  MUSCULOSKELETAL: No joint pain or swelling  PSYCHIATRIC: No  anxiety, mood swings  HEME/LYMPH: No bleeding gums  ALLERGY AND IMMUNOLOGIC: No hives or eczema	    All other ROS negative    PHYSICAL EXAM:  T(C): 36.8 (03-02-21 @ 12:35), Max: 37.1 (03-01-21 @ 21:00)  HR: 83 (03-02-21 @ 12:35) (77 - 90)  BP: 140/84 (03-02-21 @ 12:35) (122/70 - 140/84)  RR: 18 (03-02-21 @ 12:35) (18 - 24)  SpO2: 94% (03-02-21 @ 12:35) (91% - 96%)  Wt(kg): --  I&O's Summary    01 Mar 2021 07:01  -  02 Mar 2021 07:00  --------------------------------------------------------  IN: 480 mL / OUT: 1100 mL / NET: -620 mL      Appearance: Normal	  HEENT:   Normal oral mucosa, EOMI	  Cardiovascular:  S1 S2, No JVD,    Respiratory: Lungs clear to auscultation	  Psychiatry: Alert  Gastrointestinal:  Soft, Non-tender, + BS	  Skin: No rashes   Neurologic: Non-focal  Extremities:  No edema  Vascular: Peripheral pulses palpable    	    	  CARDIAC MARKERS:  Labs personally reviewed by me                        13.5   4.97  )-----------( 168      ( 01 Mar 2021 08:07 )             41.3     03-02    x   |  x   |  x   ----------------------------<  x   x    |  x   |  0.84    Ca    8.8      01 Mar 2021 08:07  Mg     2.1     03-01    TPro  7.4  /  Alb  3.4  /  TBili  0.4  /  DBili  <0.2  /  AST  72<H>  /  ALT  66<H>  /  AlkPhos  71  03-02  EKG: Personally reviewed by me -     Radiology: Personally reviewed by me -   < from: Xray Chest 1 View- PORTABLE-Urgent (Xray Chest 1 View- PORTABLE-Urgent .) (02.28.21 @ 23:33) >  IMPRESSION:  Covid 19 pneumonia.    < end of copied text >    Assessment /Plan:   This is a 61yo Male w/ PMHx of a Brain tumor s/p resection in 2018, who is presenting with worsening shortness of breath and non-productive cough for past week. Found to be COVID-19 Positive.Patient Sinus tachycardic in Emergency room.     1. Sinus Tachycardia 2/2 to hypoxia from COVID-19 Pneumonia vs dehydration- in ED patient was sating 87% on room air  - currently on high flow sating >90% - heart rate well controlled in the 80's   - S/P IVF hydration  - D Dimer 178, low suspicion for Pulmonary Embolus, trop negative  - can obtain TTE to eval heart function once acute issues resolve    2. Hypoxemia 2/2 Covid-19 pna  - remdesevir and decadron initiated   - continuous pulse ox  - c/w Supplemental Oxygen - now on high flow    3. Hyponatremic- 134 on admissiont likely from dehydration   - nearly normalized s/.p IVF hydration    4. Transaminitis  - elevated LFTs on admission, albumin slightly low however now nearly normalized, bilirubin wnl   - continue to monitor       5.  Need for prophylactic measure.  Plan: - DVT: Sub q Lovenox.     Advanced care planning/advanced directives discussed with patient/family. DNR status including forceful chest compressions to attempt to restart the heart, ventilator support/artificial breathing, electric shock, artificial nutrition, health care proxy, Molst form all discussed with pt. Pt wishes to consider.  More than fifteen minutes spent on discussing advanced directives. One hundred twenty five minutes spent on total encounter, of which more than fifty percent of the encounter was spent on counseling and/or coordinating care by the attending physician.      Arpita Rodriguez ANPRashaadC  Jaswant Weiss DO [facility]  Cardiovascular Medicine  [address] [place] [address], [address]  Office [phone]  Cell [phone]

## 2021-03-02 NOTE — PROGRESS NOTE ADULT - SUBJECTIVE AND OBJECTIVE BOX
Medicine Progress Note    Patient is a 60y old  Male who presents with a chief complaint of COVID-19 Pneumonia (02 Mar 2021 13:25)      SUBJECTIVE / OVERNIGHT EVENTS:    ADDITIONAL REVIEW OF SYSTEMS:    MEDICATIONS  (STANDING):  ALBUTerol    90 MICROgram(s) HFA Inhaler 2 Puff(s) Inhalation every 4 hours  ascorbic acid 500 milliGRAM(s) Oral daily  cholecalciferol 2000 Unit(s) Oral daily  dexAMETHasone  Injectable 6 milliGRAM(s) IV Push every 24 hours  enoxaparin Injectable 40 milliGRAM(s) SubCutaneous daily  multivitamin 1 Tablet(s) Oral daily  remdesivir  IVPB   IV Intermittent   remdesivir  IVPB 100 milliGRAM(s) IV Intermittent every 24 hours  zinc sulfate 220 milliGRAM(s) Oral daily    MEDICATIONS  (PRN):  acetaminophen   Tablet .. 650 milliGRAM(s) Oral every 4 hours PRN Temp greater or equal to 38.5C (101.3F)  benzonatate 100 milliGRAM(s) Oral three times a day PRN Cough  melatonin 3 milliGRAM(s) Oral at bedtime PRN Insomnia    CAPILLARY BLOOD GLUCOSE        I&O's Summary    01 Mar 2021 07:01  -  02 Mar 2021 07:00  --------------------------------------------------------  IN: 480 mL / OUT: 1100 mL / NET: -620 mL    02 Mar 2021 07:01  -  02 Mar 2021 23:37  --------------------------------------------------------  IN: 650 mL / OUT: 700 mL / NET: -50 mL        PHYSICAL EXAM:  Vital Signs Last 24 Hrs  T(C): 36.6 (02 Mar 2021 21:10), Max: 36.8 (02 Mar 2021 06:00)  T(F): 97.9 (02 Mar 2021 21:10), Max: 98.3 (02 Mar 2021 06:00)  HR: 86 (02 Mar 2021 21:10) (80 - 90)  BP: 122/71 (02 Mar 2021 21:10) (122/70 - 140/84)  BP(mean): --  RR: 20 (02 Mar 2021 21:10) (18 - 20)  SpO2: 94% (02 Mar 2021 21:10) (90% - 96%)  CONSTITUTIONAL: NAD, well-developed, well-groomed  ENMT: Moist oral mucosa, no pharyngeal injection or exudates; normal dentition  RESPIRATORY: Normal respiratory effort; lungs are clear to auscultation bilaterally  CARDIOVASCULAR: Regular rate and rhythm, normal S1 and S2, no murmur/rub/gallop; No lower extremity edema; Peripheral pulses are 2+ bilaterally  ABDOMEN: Nontender to palpation, normoactive bowel sounds, no rebound/guarding; No hepatosplenomegaly  PSYCH: A+O to person, place, and time; affect appropriate  NEUROLOGY: CN 2-12 are intact and symmetric; no gross sensory deficits   SKIN: No rashes; no palpable lesions    LABS:                        13.5   4.97  )-----------( 168      ( 01 Mar 2021 08:07 )             41.3     03-02    x   |  x   |  x   ----------------------------<  x   x    |  x   |  0.84    Ca    8.8      01 Mar 2021 08:07  Mg     2.1     03-01    TPro  7.4  /  Alb  3.4  /  TBili  0.4  /  DBili  <0.2  /  AST  72<H>  /  ALT  66<H>  /  AlkPhos  71  03-02    PT/INR - ( 02 Mar 2021 07:01 )   PT: 12.3 sec;   INR: 1.07 ratio                   COVID-19 PCR: Detected (28 Feb 2021 23:02)      RADIOLOGY & ADDITIONAL TESTS:  Imaging from Last 24 Hours:    Electrocardiogram/QTc Interval:    COORDINATION OF CARE:  Care Discussed with Consultants/Other Providers:   Medicine Progress Note    Patient is a 60y old  Male who presents with a chief complaint of COVID-19 Pneumonia (02 Mar 2021 13:25)      SUBJECTIVE / OVERNIGHT EVENTS: on [employer]    ADDITIONAL REVIEW OF SYSTEMS:    MEDICATIONS  (STANDING):  ALBUTerol    90 MICROgram(s) HFA Inhaler 2 Puff(s) Inhalation every 4 hours  ascorbic acid 500 milliGRAM(s) Oral daily  cholecalciferol 2000 Unit(s) Oral daily  dexAMETHasone  Injectable 6 milliGRAM(s) IV Push every 24 hours  enoxaparin Injectable 40 milliGRAM(s) SubCutaneous daily  multivitamin 1 Tablet(s) Oral daily  remdesivir  IVPB   IV Intermittent   remdesivir  IVPB 100 milliGRAM(s) IV Intermittent every 24 hours  zinc sulfate 220 milliGRAM(s) Oral daily    MEDICATIONS  (PRN):  acetaminophen   Tablet .. 650 milliGRAM(s) Oral every 4 hours PRN Temp greater or equal to 38.5C (101.3F)  benzonatate 100 milliGRAM(s) Oral three times a day PRN Cough  melatonin 3 milliGRAM(s) Oral at bedtime PRN Insomnia    CAPILLARY BLOOD GLUCOSE        I&O's Summary    01 Mar 2021 07:01  -  02 Mar 2021 07:00  --------------------------------------------------------  IN: 480 mL / OUT: 1100 mL / NET: -620 mL    02 Mar 2021 07:01  -  02 Mar 2021 23:37  --------------------------------------------------------  IN: 650 mL / OUT: 700 mL / NET: -50 mL        PHYSICAL EXAM:  Vital Signs Last 24 Hrs  T(C): 36.6 (02 Mar 2021 21:10), Max: 36.8 (02 Mar 2021 06:00)  T(F): 97.9 (02 Mar 2021 21:10), Max: 98.3 (02 Mar 2021 06:00)  HR: 86 (02 Mar 2021 21:10) (80 - 90)  BP: 122/71 (02 Mar 2021 21:10) (122/70 - 140/84)  BP(mean): --  RR: 20 (02 Mar 2021 21:10) (18 - 20)  SpO2: 94% (02 Mar 2021 21:10) (90% - 96%)  CONSTITUTIONAL: NAD, well-developed, well-groomed  ENMT: Moist oral mucosa, no pharyngeal injection or exudates; normal dentition  RESPIRATORY: Normal respiratory effort; lungs are clear to auscultation bilaterally  CARDIOVASCULAR: Regular rate and rhythm, normal S1 and S2, no murmur/rub/gallop; No lower extremity edema; Peripheral pulses are 2+ bilaterally  ABDOMEN: Nontender to palpation, normoactive bowel sounds, no rebound/guarding; No hepatosplenomegaly  PSYCH: A+O to person, place, and time; affect appropriate  NEUROLOGY: CN 2-12 are intact and symmetric; no gross sensory deficits   SKIN: No rashes; no palpable lesions    LABS:                        13.5   4.97  )-----------( 168      ( 01 Mar 2021 08:07 )             41.3     03-02    x   |  x   |  x   ----------------------------<  x   x    |  x   |  0.84    Ca    8.8      01 Mar 2021 08:07  Mg     2.1     03-01    TPro  7.4  /  Alb  3.4  /  TBili  0.4  /  DBili  <0.2  /  AST  72<H>  /  ALT  66<H>  /  AlkPhos  71  03-02    PT/INR - ( 02 Mar 2021 07:01 )   PT: 12.3 sec;   INR: 1.07 ratio                   COVID-19 PCR: Detected (28 Feb 2021 23:02)      RADIOLOGY & ADDITIONAL TESTS:  Imaging from Last 24 Hours:    Electrocardiogram/QTc Interval:    COORDINATION OF CARE:  Care Discussed with Consultants/Other Providers:

## 2021-03-02 NOTE — PROGRESS NOTE ADULT - PROBLEM SELECTOR PLAN 1
- COVID-19 PCR: Positive  - CXR showing b/l opacities c/w COVID PNA   - Pt was initially hypoxic to 87% on RA.   - Pt currently requiring supplemental oxygen via NC 6L.  - c/w Dexamethasone and Remdesivir   - Inflammatory markers q72hrs   - Monitor SP02 on continuous pulse ox  - Prone positioning, Incentive spirometry and Chest PT PRN.  - Albuterol, Tessalon pearls, Tylenol PRN  - HOB elevation

## 2021-03-03 NOTE — PROGRESS NOTE ADULT - SUBJECTIVE AND OBJECTIVE BOX
DATE OF SERVICE: 03-04-21 @ 00:09    Patient is a 60y old  Male who presents with a chief complaint of COVID-19 Pneumonia (03 Mar 2021 20:19)      INTERVAL HISTORY: feels the same    REVIEW OF SYSTEMS:  CONSTITUTIONAL: No weakness  EYES/ENT: No visual changes;  No throat pain   NECK: No pain or stiffness  RESPIRATORY: + cough, wheezing; + shortness of breath  CARDIOVASCULAR: No chest pain or palpitations  GASTROINTESTINAL: No abdominal  pain. No nausea, vomiting, or hematemesis  GENITOURINARY: No dysuria, frequency or hematuria  NEUROLOGICAL: No stroke like symptoms  SKIN: No rashes         PHYSICAL EXAM:  T(C): 36.4 (03-03-21 @ 22:01), Max: 36.6 (03-03-21 @ 05:50)  HR: 61 (03-03-21 @ 23:43) (61 - 81)  BP: 107/60 (03-03-21 @ 22:01) (107/60 - 111/60)  RR: 18 (03-03-21 @ 23:43) (18 - 21)  SpO2: 99% (03-03-21 @ 23:43) (90% - 99%)  Wt(kg): --  I&O's Summary    02 Mar 2021 07:01  -  03 Mar 2021 07:00  --------------------------------------------------------  IN: 650 mL / OUT: 1000 mL / NET: -350 mL          Appearance: In no distress	  HEENT:    PERRL, EOMI	  Cardiovascular:  S1 S2, No JVD  Respiratory: Lungs clear to auscultation	  Gastrointestinal:  Soft, Non-tender, + BS	  Vascularature:  No edema of LE  Psychiatric: Appropriate affect   Neuro: no acute focal deficits                               14.5   9.28  )-----------( 259      ( 03 Mar 2021 07:28 )             45.0     03-03    140  |  101  |  17  ----------------------------<  115<H>  4.0   |  27  |  0.81    Ca    9.1      03 Mar 2021 07:28  Phos  3.2     03-03  Mg     2.2     03-03    TPro  7.2  /  Alb  3.3  /  TBili  0.4  /  DBili  <0.2  /  AST  58<H>  /  ALT  67<H>  /  AlkPhos  66  03-03        Labs personally reviewed      Radiology: Personally reviewed by me -   < from: Xray Chest 1 View- PORTABLE-Urgent (Xray Chest 1 View- PORTABLE-Urgent .) (02.28.21 @ 23:33) >  IMPRESSION:  Covid 19 pneumonia.    < end of copied text >    Assessment /Plan:   This is a 61yo Male w/ PMHx of a Brain tumor s/p resection in 2018, who is presenting with worsening shortness of breath and non-productive cough for past week. Found to be COVID-19 Positive.Patient Sinus tachycardic in Emergency room.     1. Sinus Tachycardia 2/2 to hypoxia from COVID-19 Pneumonia vs dehydration- in ED patient was sating 87% on room air  - currently on high flow sating >90% - heart rate well controlled in the 80's   - S/P IVF hydration  - D Dimer 178, low suspicion for Pulmonary Embolus, trop negative  - can obtain TTE to eval heart function once acute issues resolve    2. Hypoxemia 2/2 Covid-19 pna  - remdesevir and decadron initiated   - continuous pulse ox  - c/w Supplemental Oxygen - now on high flow    3. Hyponatremic- 134 on admissiont likely from dehydration   - nearly normalized s/.p IVF hydration    4. Transaminitis  - elevated LFTs on admission, albumin slightly low however now nearly normalized, bilirubin wnl   - continue to monitor       5.  Need for prophylactic measure.  Plan: - DVT: Sub q Lovenox.     Advanced care planning/advanced directives discussed with patient/family. DNR status including forceful chest compressions to attempt to restart the heart, ventilator support/artificial breathing, electric shock, artificial nutrition, health care proxy, Molst form all discussed with pt. Pt wishes to consider.  More than fifteen minutes spent on discussing advanced directives. Fifty minutes spent on total encounter, of which more than fifty percent of the encounter was spent on counseling and/or coordinating care by the attending physician.            Jaswant Weiss DO [facility]  Cardiovascular Medicine  [address], [address]  Office: [phone]  Cell: [phone]

## 2021-03-03 NOTE — CONSULT NOTE ADULT - ASSESSMENT
This is a 59yo Male w/ PMHx of a Brain tumor s/p resection in 2018, who is presenting with worsening shortness of breath and non-productive cough for past week. Found to be COVID-19 Positive. Py hypoxic to 87% on RA CXR c/w COVID pneumonia. Admitted for further treatment of COVID pneumonia/hypoxemia.     Covid 19 Pneumonia: Hypoxic resp failure  Brain tumor:        3/3:      Covid 19 Pneumonia: Hypoxic resp failure: on covid rx: he is on 50% fio2: LFTS are mildly elevated: folow d dimer: currently OK  Brain tumor:  s/p resection:  DVT [facility]

## 2021-03-03 NOTE — CONSULT NOTE ADULT - SUBJECTIVE AND OBJECTIVE BOX
03-03-21 @ 13:52    Patient is a 60y old  Male who presents with a chief complaint of COVID-19 Pneumonia (02 Mar 2021 23:36)      HPI:  This is a 59yo Male w/ PMHx of a Brain tumor s/p resection in 2018, who is presenting with worsening shortness of breath and non-productive cough for past week. Pt reports he went to his PMD for his symptoms, had a negative COVID swab, was instructed to take an inhaler; however, it has given him little relief.  His SOB was getting worse which prompted him to come to ED. He has increased lethargy and ALVARES. Decreased appetite. He denies having fevers at home, no sick contacts, no recent travel, no loss of taste/smell. Pt denies having chest pain, palpitations, HA, dizziness, visual changes, abdominal pain, N/V/D/C, dysuria, hematuria, melena or hematochezia.     ED Course: EK bpm, Sinus tachycardia, qtc: 419. CXR: c/w COVID PNA. COVID-19 PCR: Positive. Initially SPO2 87% on RA. Now SPO2 95% on 6L NC. s/p Decadron and NS IVF in ED.  (01 Mar 2021 05:15)    he is on 50% high flow:: he has no underlying  lung disease:       ?FOLLOWING PRESENT  [ x] Hx of PE/DVT, x[ ] Hx COPD, [x ] Hx of Asthma, [ x] Hx of Hospitalization, [ x]  Hx of BiPAP/CPAP use, [x ] Hx of SARIAH    Allergies    No Known Allergies    Intolerances        PAST MEDICAL & SURGICAL HISTORY:  Brain cancer  in remission.    Brain tumor  s/p ressection in 2018.        FAMILY HISTORY:  FH: type 2 diabetes        Social History: [x  ] TOBACCO                  [ x ] ETOH                                 [  x] IVDA/DRUGS    REVIEW OF SYSTEMS      General:	x    Skin/Breast:x  	  Ophthalmologic:x  	  ENMT:	x    Respiratory and Thorax: sob, cough   	  Cardiovascular:	x    Gastrointestinal:	x    Genitourinary:	x    Musculoskeletal:	x    Neurological:x	    Psychiatric:	x    Hematology/Lymphatics:	x    Endocrine:	x    Allergic/Immunologic:	x    MEDICATIONS  (STANDING):  ALBUTerol    90 MICROgram(s) HFA Inhaler 2 Puff(s) Inhalation every 4 hours  ascorbic acid 500 milliGRAM(s) Oral daily  cholecalciferol 2000 Unit(s) Oral daily  dexAMETHasone  Injectable 6 milliGRAM(s) IV Push every 24 hours  enoxaparin Injectable 40 milliGRAM(s) SubCutaneous daily  multivitamin 1 Tablet(s) Oral daily  remdesivir  IVPB   IV Intermittent   remdesivir  IVPB 100 milliGRAM(s) IV Intermittent every 24 hours  zinc sulfate 220 milliGRAM(s) Oral daily    MEDICATIONS  (PRN):  acetaminophen   Tablet .. 650 milliGRAM(s) Oral every 4 hours PRN Temp greater or equal to 38.5C (101.3F)  benzonatate 100 milliGRAM(s) Oral three times a day PRN Cough  melatonin 3 milliGRAM(s) Oral at bedtime PRN Insomnia       Vital Signs Last 24 Hrs  T(C): 36.6 (03 Mar 2021 10:49), Max: 36.6 (02 Mar 2021 21:10)  T(F): 97.8 (03 Mar 2021 10:49), Max: 97.9 (02 Mar 2021 21:10)  HR: 76 (03 Mar 2021 10:49) (67 - 90)  BP: 111/60 (03 Mar 2021 10:49) (109/64 - 122/79)  BP(mean): --  RR: 19 (03 Mar 2021 10:49) (18 - 21)  SpO2: 96% (03 Mar 2021 10:49) (90% - 98%)Orthostatic VS          I&O's Summary    02 Mar 2021 07:01  -  03 Mar 2021 07:00  --------------------------------------------------------  IN: 650 mL / OUT: 1000 mL / NET: -350 mL        Physical Exam:   GENERAL: NAD, well-groomed, well-developed  HEENT: JOAO/   Atraumatic, Normocephalic  ENMT: No tonsillar erythema, exudates, or enlargement; Moist mucous membranes, Good dentition, No lesions  NECK: Supple, No JVD, Normal thyroid  CHEST/LUNG: Clear to auscultation bilaterally  CVS: Regular rate and rhythm; No murmurs, rubs, or gallops  GI: : Soft, Nontender, Nondistended; Bowel sounds present  NERVOUS SYSTEM:  Alert & Oriented X3  EXTREMITIES:  - edema  LYMPH: No lymphadenopathy noted  SKIN: No rashes or lesions  ENDOCRINOLOGY: No Thyromegaly  PSYCH: calm+    Labs:  4.2<45<4>><24<<7.425>>4.2<<3><<4><<5<<<249>>COVID-19 PCR: Detected (2021 23:02)                              14.5   9.28  )-----------( 259      ( 03 Mar 2021 07:28 )             45.0                         13.5   4.97  )-----------( 168      ( 01 Mar 2021 08:07 )             41.3                         13.4   5.44  )-----------( 162      ( 2021 23:02 )             41.7     03-03    140  |  101  |  17  ----------------------------<  115<H>  4.0   |  27  |  0.81  03-02    x   |  x   |  x   ----------------------------<  x   x    |  x   |  0.84  03-01    135  |  100  |  10  ----------------------------<  127<H>  4.4   |  23  |  0.96  03-01    x   |  x   |  x   ----------------------------<  x   x    |  x   |  1.00      134<L>  |  97<L>  |  9   ----------------------------<  132<H>  4.0   |  25  |  1.09    Ca    9.1      03 Mar 2021 07:28  Phos  3.2     03-03  Mg     2.2     03-03    TPro  7.2  /  Alb  3.3  /  TBili  0.4  /  DBili  <0.2  /  AST  58<H>  /  ALT  67<H>  /  AlkPhos  66  03-03  TPro  7.4  /  Alb  3.4  /  TBili  0.4  /  DBili  <0.2  /  AST  72<H>  /  ALT  66<H>  /  AlkPhos  71  -  TPro  6.9  /  Alb  3.0<L>  /  TBili  0.4  /  DBili  x   /  AST  67<H>  /  ALT  55<H>  /  AlkPhos  65    TPro  7.2  /  Alb  3.2<L>  /  TBili  0.4  /  DBili  <0.2  /  AST  65<H>  /  ALT  55<H>  /  AlkPhos  67    TPro  7.4  /  Alb  3.5  /  TBili  0.4  /  DBili  x   /  AST  63<H>  /  ALT  49<H>  /  AlkPhos  69      CAPILLARY BLOOD GLUCOSE      POCT Blood Glucose.: 131 mg/dL (03 Mar 2021 13:02)    LIVER FUNCTIONS - ( 03 Mar 2021 07:28 )  Alb: 3.3 g/dL / Pro: 7.2 g/dL / ALK PHOS: 66 U/L / ALT: 67 U/L / AST: 58 U/L / GGT: x           PT/INR - ( 02 Mar 2021 07:01 )   PT: 12.3 sec;   INR: 1.07 ratio             D DImer  Procalcitonin, Serum: 0.10 ng/mL ( @ 07:28)  Procalcitonin, Serum: 0.17 ng/mL ( @ 23:00)  D-Dimer Assay, Quantitative: 213 ng/mL DDU ( @ 07:28)  D-Dimer Assay, Quantitative: 178 ng/mL DDU ( @ 23:03)      Studies  Chest X-RAY  CT SCAN Chest   CT Abdomen  Venous Dopplers: LE:   Others    r< from: Xray Chest 1 View- PORTABLE-Urgent (Xray Chest 1 View- PORTABLE-Urgent .) (21 @ 23:33) >      PROCEDURE DATE:  2021         INTERPRETATION:  EXAMINATION: XR CHEST URGENT    CLINICAL INDICATION: sob, likely covid    TECHNIQUE: Single portable view of the chest was obtained.    COMPARISON: None.    FINDINGS:    The cardiomediastinal silhouette is not well evaluated in this projection.    Extensive bilateral patchy focal airspace opacities with a predominantly peripheral distribution consistent with Covid 19.    No large pleural effusions orpneumothoraces. Degenerative changes of the spine.    IMPRESSION:  Covid 19 pneumonia.            JANETH CAMPBELL MD; Resident Radiology  This document has been electronically signed.  LEONARDO PELLETIER MD; Attending Radiologist  This document has been electronically signed. Mar  1 2021  6:51AM    < end of copied text >

## 2021-03-03 NOTE — PROGRESS NOTE ADULT - SUBJECTIVE AND OBJECTIVE BOX
Medicine Progress Note    Patient is a 60y old  Male who presents with a chief complaint of COVID-19 Pneumonia (03 Mar 2021 13:51)      SUBJECTIVE / OVERNIGHT EVENTS:    ADDITIONAL REVIEW OF SYSTEMS:    MEDICATIONS  (STANDING):  ALBUTerol    90 MICROgram(s) HFA Inhaler 2 Puff(s) Inhalation every 4 hours  ascorbic acid 500 milliGRAM(s) Oral daily  cholecalciferol 2000 Unit(s) Oral daily  dexAMETHasone  Injectable 6 milliGRAM(s) IV Push every 24 hours  enoxaparin Injectable 40 milliGRAM(s) SubCutaneous daily  multivitamin 1 Tablet(s) Oral daily  remdesivir  IVPB   IV Intermittent   remdesivir  IVPB 100 milliGRAM(s) IV Intermittent every 24 hours  zinc sulfate 220 milliGRAM(s) Oral daily    MEDICATIONS  (PRN):  acetaminophen   Tablet .. 650 milliGRAM(s) Oral every 4 hours PRN Temp greater or equal to 38.5C (101.3F)  benzonatate 100 milliGRAM(s) Oral three times a day PRN Cough  melatonin 3 milliGRAM(s) Oral at bedtime PRN Insomnia    CAPILLARY BLOOD GLUCOSE      POCT Blood Glucose.: 131 mg/dL (03 Mar 2021 13:02)    I&O's Summary    02 Mar 2021 07:01  -  03 Mar 2021 07:00  --------------------------------------------------------  IN: 650 mL / OUT: 1000 mL / NET: -350 mL        PHYSICAL EXAM:  Vital Signs Last 24 Hrs  T(C): 36.6 (03 Mar 2021 10:49), Max: 36.6 (02 Mar 2021 21:10)  T(F): 97.8 (03 Mar 2021 10:49), Max: 97.9 (02 Mar 2021 21:10)  HR: 75 (03 Mar 2021 20:04) (67 - 86)  BP: 111/60 (03 Mar 2021 10:49) (109/64 - 122/71)  BP(mean): --  RR: 18 (03 Mar 2021 20:04) (18 - 21)  SpO2: 93% (03 Mar 2021 20:04) (90% - 98%)  CONSTITUTIONAL: NAD, well-developed, well-groomed  ENMT: Moist oral mucosa, no pharyngeal injection or exudates; normal dentition  RESPIRATORY: Normal respiratory effort; lungs are clear to auscultation bilaterally  CARDIOVASCULAR: Regular rate and rhythm, normal S1 and S2, no murmur/rub/gallop; No lower extremity edema; Peripheral pulses are 2+ bilaterally  ABDOMEN: Nontender to palpation, normoactive bowel sounds, no rebound/guarding; No hepatosplenomegaly  PSYCH: A+O to person, place, and time; affect appropriate  NEUROLOGY: CN 2-12 are intact and symmetric; no gross sensory deficits   SKIN: No rashes; no palpable lesions    LABS:                        14.5   9.28  )-----------( 259      ( 03 Mar 2021 07:28 )             45.0     03-03    140  |  101  |  17  ----------------------------<  115<H>  4.0   |  27  |  0.81    Ca    9.1      03 Mar 2021 07:28  Phos  3.2     03-03  Mg     2.2     03-03    TPro  7.2  /  Alb  3.3  /  TBili  0.4  /  DBili  <0.2  /  AST  58<H>  /  ALT  67<H>  /  AlkPhos  66  03-03    PT/INR - ( 02 Mar 2021 07:01 )   PT: 12.3 sec;   INR: 1.07 ratio                   COVID-19 PCR: Detected (28 Feb 2021 23:02)      RADIOLOGY & ADDITIONAL TESTS:  Imaging from Last 24 Hours:    Electrocardiogram/QTc Interval:    COORDINATION OF CARE:  Care Discussed with Consultants/Other Providers:   Medicine Progress Note    Patient is a 60y old  Male who presents with a chief complaint of COVID-19 Pneumonia (03 Mar 2021 13:51)      SUBJECTIVE / OVERNIGHT EVENTS: seen and examined, remain on Hiflo    ADDITIONAL REVIEW OF SYSTEMS:    MEDICATIONS  (STANDING):  ALBUTerol    90 MICROgram(s) HFA Inhaler 2 Puff(s) Inhalation every 4 hours  ascorbic acid 500 milliGRAM(s) Oral daily  cholecalciferol 2000 Unit(s) Oral daily  dexAMETHasone  Injectable 6 milliGRAM(s) IV Push every 24 hours  enoxaparin Injectable 40 milliGRAM(s) SubCutaneous daily  multivitamin 1 Tablet(s) Oral daily  remdesivir  IVPB   IV Intermittent   remdesivir  IVPB 100 milliGRAM(s) IV Intermittent every 24 hours  zinc sulfate 220 milliGRAM(s) Oral daily    MEDICATIONS  (PRN):  acetaminophen   Tablet .. 650 milliGRAM(s) Oral every 4 hours PRN Temp greater or equal to 38.5C (101.3F)  benzonatate 100 milliGRAM(s) Oral three times a day PRN Cough  melatonin 3 milliGRAM(s) Oral at bedtime PRN Insomnia    CAPILLARY BLOOD GLUCOSE      POCT Blood Glucose.: 131 mg/dL (03 Mar 2021 13:02)    I&O's Summary    02 Mar 2021 07:01  -  03 Mar 2021 07:00  --------------------------------------------------------  IN: 650 mL / OUT: 1000 mL / NET: -350 mL        PHYSICAL EXAM:  Vital Signs Last 24 Hrs  T(C): 36.6 (03 Mar 2021 10:49), Max: 36.6 (02 Mar 2021 21:10)  T(F): 97.8 (03 Mar 2021 10:49), Max: 97.9 (02 Mar 2021 21:10)  HR: 75 (03 Mar 2021 20:04) (67 - 86)  BP: 111/60 (03 Mar 2021 10:49) (109/64 - 122/71)  BP(mean): --  RR: 18 (03 Mar 2021 20:04) (18 - 21)  SpO2: 93% (03 Mar 2021 20:04) (90% - 98%)  CONSTITUTIONAL: NAD, well-developed, well-groomed  ENMT: Moist oral mucosa, no pharyngeal injection or exudates; normal dentition  RESPIRATORY: Normal respiratory effort; lungs are clear to auscultation bilaterally  CARDIOVASCULAR: Regular rate and rhythm, normal S1 and S2, no murmur/rub/gallop; No lower extremity edema; Peripheral pulses are 2+ bilaterally  ABDOMEN: Nontender to palpation, normoactive bowel sounds, no rebound/guarding; No hepatosplenomegaly  PSYCH: A+O to person, place, and time; affect appropriate  NEUROLOGY: CN 2-12 are intact and symmetric; no gross sensory deficits   SKIN: No rashes; no palpable lesions    LABS:                        14.5   9.28  )-----------( 259      ( 03 Mar 2021 07:28 )             45.0     03-03    140  |  101  |  17  ----------------------------<  115<H>  4.0   |  27  |  0.81    Ca    9.1      03 Mar 2021 07:28  Phos  3.2     03-03  Mg     2.2     03-03    TPro  7.2  /  Alb  3.3  /  TBili  0.4  /  DBili  <0.2  /  AST  58<H>  /  ALT  67<H>  /  AlkPhos  66  03-03    PT/INR - ( 02 Mar 2021 07:01 )   PT: 12.3 sec;   INR: 1.07 ratio                   COVID-19 PCR: Detected (28 Feb 2021 23:02)      RADIOLOGY & ADDITIONAL TESTS:  Imaging from Last 24 Hours:    Electrocardiogram/QTc Interval:    COORDINATION OF CARE:  Care Discussed with Consultants/Other Providers:

## 2021-03-04 NOTE — PROGRESS NOTE ADULT - ASSESSMENT
This is a 61yo Male w/ PMHx of a Brain tumor s/p resection in 2018, who is presenting with worsening shortness of breath and non-productive cough for past week. Found to be COVID-19 Positive. Py hypoxic to 87% on RA CXR c/w COVID pneumonia. Admitted for further treatment of COVID pneumonia/hypoxemia.     Covid 19 Pneumonia: Hypoxic resp failure  Brain tumor:        3/3:      Covid 19 Pneumonia: Hypoxic resp failure: on covid rx: he is on 50% fio2: LFTS are mildly elevated: folow d dimer: currently OK  Brain tumor:  s/p resection:  DVT kathleenyxharinder  dw team    3/4:      Covid 19 Pneumonia: Hypoxic resp failure: on covid rx: he is on 90% fio2 today : increased fr om yesterdday : LFTS are mildly elevated: folow d dimer: slightly increased yesterday : cont to follow : all inflammatory markers are up: Increased LFTS : He may end up on bipap   Brain tumor:  s/p resection:  DVT propahyxlianas  dw team

## 2021-03-04 NOTE — PROGRESS NOTE ADULT - ATTENDING COMMENTS
Pt care and plan discussed and reviewed with NP. Plan as outlined above edited by me to reflect our discussion.

## 2021-03-04 NOTE — PROGRESS NOTE ADULT - SUBJECTIVE AND OBJECTIVE BOX
Patient is a 60y old  Male who presents with a chief complaint of COVID-19 Pneumonia (04 Mar 2021 14:29)      INTERVAL HISTORY: feels ok    REVIEW OF SYSTEMS:   CONSTITUTIONAL: No weakness  EYES/ENT: No visual changes; No throat pain  Neck: No pain or stiffness  Respiratory: No cough, wheezing, No shortness of breath  CARDIOVASCULAR: no chest pain or palpitations  GASTROINTESTINAL: No abdominal pain, no nausea, vomiting or hematemesis  GENITOURINARY: No dysuria, frequency or hematuria  NEUROLOGICAL: No stroke like symptoms  SKIN: No rashes      MEDICATIONS:    PHYSICAL EXAM:  T(C): 36.4 (03-04-21 @ 05:45), Max: 36.4 (03-03-21 @ 22:01)  HR: 72 (03-04-21 @ 15:22) (61 - 81)  BP: 137/75 (03-04-21 @ 05:45) (107/60 - 137/75)  RR: 18 (03-04-21 @ 15:22) (17 - 20)  SpO2: 95% (03-04-21 @ 15:22) (93% - 99%)  Wt(kg): --  I&O's Summary    Appearance: In no distress	  HEENT:    PERRL, EOMI	  Cardiovascular:  S1 S2, No JVD  Respiratory: Lungs clear to auscultation	  Gastrointestinal:  Soft, Non-tender, + BS	  Vascularature:  No edema of LE  Psychiatric: Appropriate affect   Neuro: no acute focal deficits                           15.3   10.39 )-----------( 335      ( 04 Mar 2021 07:52 )             48.7     03-04    142  |  101  |  18  ----------------------------<  101<H>  4.0   |  27  |  0.81    Ca    9.2      04 Mar 2021 07:59  Phos  3.2     03-04  Mg     2.2     03-04    TPro  7.4  /  Alb  3.4  /  TBili  0.6  /  DBili  <0.2  /  AST  76<H>  /  ALT  87<H>  /  AlkPhos  76  03-04  Labs personally reviewed    ASSESSMENT/PLAN: 	  This is a 61yo Male w/ PMHx of a Brain tumor s/p resection in 2018, who is presenting with worsening shortness of breath and non-productive cough for past week. Found to be COVID-19 Positive.Patient Sinus tachycardic in Emergency room.     1. Sinus Tachycardia 2/2 to hypoxia from COVID-19 Pneumonia vs dehydration- in ED patient was sating 87% on room air  - currently on high flow sating >90% - heart rate well controlled in the 80's   - S/P IVF hydration  - D Dimer 178, low suspicion for Pulmonary Embolus, trop negative  - can obtain TTE to eval heart function once acute issues resolve    2. Hypoxemia 2/2 Covid-19 pna  - remdesevir and decadron initiated   - continuous pulse ox  - c/w Supplemental Oxygen - now on high flow    3. Hyponatremic- 134 on admissiont likely from dehydration   - nearly normalized s/.p IVF hydration    4. Transaminitis  - elevated LFTs on admission, albumin slightly low however now nearly normalized, bilirubin wnl   - continue to monitor       5.  Need for prophylactic measure.  Plan: - DVT: Sub q Lovenox.           Arpita JONES-C  Jaswant Weiss DO [facility]  Cardiovascular Medicine  [address] [place] [address], [address]  Office: [phone]  Cell: [phone] Patient is a 60y old  Male who presents with a chief complaint of COVID-19 Pneumonia (04 Mar 2021 14:29)      INTERVAL HISTORY: feels ok    REVIEW OF SYSTEMS:   CONSTITUTIONAL: + weakness  EYES/ENT: No visual changes; No throat pain  Neck: No pain or stiffness  Respiratory: + cough, wheezing, + shortness of breath  CARDIOVASCULAR: no chest pain or palpitations  GASTROINTESTINAL: No abdominal pain, no nausea, vomiting or hematemesis  GENITOURINARY: No dysuria, frequency or hematuria  NEUROLOGICAL: No stroke like symptoms  SKIN: No rashes      MEDICATIONS:    PHYSICAL EXAM:  T(C): 36.4 (03-04-21 @ 05:45), Max: 36.4 (03-03-21 @ 22:01)  HR: 72 (03-04-21 @ 15:22) (61 - 81)  BP: 137/75 (03-04-21 @ 05:45) (107/60 - 137/75)  RR: 18 (03-04-21 @ 15:22) (17 - 20)  SpO2: 95% (03-04-21 @ 15:22) (93% - 99%)  Wt(kg): --  I&O's Summary    Appearance: In no distress	  HEENT:    PERRL, EOMI	  Cardiovascular:  S1 S2, No JVD  Respiratory: Lungs clear to auscultation	  Gastrointestinal:  Soft, Non-tender, + BS	  Vascularature:  No edema of LE  Psychiatric: Appropriate affect   Neuro: no acute focal deficits                           15.3   10.39 )-----------( 335      ( 04 Mar 2021 07:52 )             48.7     03-04    142  |  101  |  18  ----------------------------<  101<H>  4.0   |  27  |  0.81    Ca    9.2      04 Mar 2021 07:59  Phos  3.2     03-04  Mg     2.2     03-04    TPro  7.4  /  Alb  3.4  /  TBili  0.6  /  DBili  <0.2  /  AST  76<H>  /  ALT  87<H>  /  AlkPhos  76  03-04  Labs personally reviewed    ASSESSMENT/PLAN: 	  This is a 61yo Male w/ PMHx of a Brain tumor s/p resection in 2018, who is presenting with worsening shortness of breath and non-productive cough for past week. Found to be COVID-19 Positive.Patient Sinus tachycardic in Emergency room.     1. Sinus Tachycardia 2/2 to hypoxia from COVID-19 Pneumonia vs dehydration- in ED patient was sating 87% on room air  - currently on high flow sating >90% - heart rate well controlled in the 80's   - S/P IVF hydration  - D Dimer 178, low suspicion for Pulmonary Embolus, trop negative  - can obtain TTE to eval heart function once acute issues resolve    2. Hypoxemia 2/2 Covid-19 pna  - remdesevir and decadron initiated   - continuous pulse ox  - c/w Supplemental Oxygen - now on high flow not requiring less than yesterday  - pt frustrated that the HF oxygen isnt helping him and not allowing him to eat. Took HF off for few seconds nd demonstrated oxygen say in 70s. Pt agreed to continue wearing HF    3. Hyponatremic- 134 on admissiont likely from dehydration   - nearly normalized s/.p IVF hydration    4. Transaminitis  - elevated LFTs on admission, albumin slightly low however now nearly normalized, bilirubin wnl   - continue to monitor       5.  Need for prophylactic measure.  Plan: - DVT: Sub q Lovenox.           Arpita Rodriguez ANPRashaadC  Jaswant Weiss DO [facility]  Cardiovascular Medicine  [address], [address]  Office: [phone]  Cell: [phone]

## 2021-03-04 NOTE — PROGRESS NOTE ADULT - SUBJECTIVE AND OBJECTIVE BOX
Date of Service: 03-04-21 @ 14:30    Patient is a 60y old  Male who presents with a chief complaint of COVID-19 Pneumonia (03 Mar 2021 20:19)      Any change in ROS: on 90% today     MEDICATIONS  (STANDING):  ALBUTerol    90 MICROgram(s) HFA Inhaler 2 Puff(s) Inhalation every 4 hours  ascorbic acid 500 milliGRAM(s) Oral daily  cholecalciferol 2000 Unit(s) Oral daily  dexAMETHasone  Injectable 6 milliGRAM(s) IV Push every 24 hours  enoxaparin Injectable 40 milliGRAM(s) SubCutaneous daily  multivitamin 1 Tablet(s) Oral daily  remdesivir  IVPB   IV Intermittent   remdesivir  IVPB 100 milliGRAM(s) IV Intermittent every 24 hours  zinc sulfate 220 milliGRAM(s) Oral daily    MEDICATIONS  (PRN):  acetaminophen   Tablet .. 650 milliGRAM(s) Oral every 4 hours PRN Temp greater or equal to 38.5C (101.3F)  benzonatate 100 milliGRAM(s) Oral three times a day PRN Cough  melatonin 3 milliGRAM(s) Oral at bedtime PRN Insomnia    Vital Signs Last 24 Hrs  T(C): 36.4 (04 Mar 2021 05:45), Max: 36.4 (03 Mar 2021 22:01)  T(F): 97.6 (04 Mar 2021 05:45), Max: 97.6 (03 Mar 2021 22:01)  HR: 78 (04 Mar 2021 07:32) (61 - 81)  BP: 137/75 (04 Mar 2021 05:45) (107/60 - 137/75)  BP(mean): --  RR: 20 (04 Mar 2021 07:32) (17 - 20)  SpO2: 94% (04 Mar 2021 07:32) (93% - 99%)    I&O's Summary        Physical Exam:   GENERAL: NAD, well-groomed, well-developed  HEENT: JOAO/   Atraumatic, Normocephalic  ENMT: No tonsillar erythema, exudates, or enlargement; Moist mucous membranes, Good dentition, No lesions  NECK: Supple, No JVD, Normal thyroid  CHEST/LUNG: bibasilar crackles+  CVS: Regular rate and rhythm; No murmurs, rubs, or gallops  GI: : Soft, Nontender, Nondistended; Bowel sounds present  NERVOUS SYSTEM:  Alert & Oriented X3  EXTREMITIES:  2+ Peripheral Pulses, No clubbing, cyanosis, or edema  LYMPH: No lymphadenopathy noted  SKIN: No rashes or lesions  ENDOCRINOLOGY: No Thyromegaly  PSYCH: Appropriate    Labs:  26                            15.3   10.39 )-----------( 335      ( 04 Mar 2021 07:52 )             48.7                         14.5   9.28  )-----------( 259      ( 03 Mar 2021 07:28 )             45.0                         13.5   4.97  )-----------( 168      ( 01 Mar 2021 08:07 )             41.3                         13.4   5.44  )-----------( 162      ( 28 Feb 2021 23:02 )             41.7     03-04    142  |  101  |  18  ----------------------------<  101<H>  4.0   |  27  |  0.81  03-03    140  |  101  |  17  ----------------------------<  115<H>  4.0   |  27  |  0.81  03-02    x   |  x   |  x   ----------------------------<  x   x    |  x   |  0.84  03-01    135  |  100  |  10  ----------------------------<  127<H>  4.4   |  23  |  0.96  03-01    x   |  x   |  x   ----------------------------<  x   x    |  x   |  1.00  02-28    134<L>  |  97<L>  |  9   ----------------------------<  132<H>  4.0   |  25  |  1.09    Ca    9.2      04 Mar 2021 07:59  Ca    9.1      03 Mar 2021 07:28  Phos  3.2     03-04  Phos  3.2     03-03  Mg     2.2     03-04  Mg     2.2     03-03    TPro  7.4  /  Alb  3.4  /  TBili  0.6  /  DBili  <0.2  /  AST  76<H>  /  ALT  87<H>  /  AlkPhos  76  03-04  TPro  7.2  /  Alb  3.3  /  TBili  0.4  /  DBili  <0.2  /  AST  58<H>  /  ALT  67<H>  /  AlkPhos  66  03-03  TPro  7.4  /  Alb  3.4  /  TBili  0.4  /  DBili  <0.2  /  AST  72<H>  /  ALT  66<H>  /  AlkPhos  71  03-02  TPro  6.9  /  Alb  3.0<L>  /  TBili  0.4  /  DBili  x   /  AST  67<H>  /  ALT  55<H>  /  AlkPhos  65  03-01  TPro  7.2  /  Alb  3.2<L>  /  TBili  0.4  /  DBili  <0.2  /  AST  65<H>  /  ALT  55<H>  /  AlkPhos  67  03-01  TPro  7.4  /  Alb  3.5  /  TBili  0.4  /  DBili  x   /  AST  63<H>  /  ALT  49<H>  /  AlkPhos  69  02-28    CAPILLARY BLOOD GLUCOSE          LIVER FUNCTIONS - ( 04 Mar 2021 07:59 )  Alb: 3.4 g/dL / Pro: 7.4 g/dL / ALK PHOS: 76 U/L / ALT: 87 U/L / AST: 76 U/L / GGT: x           PT/INR - ( 04 Mar 2021 07:52 )   PT: 12.9 sec;   INR: 1.14 ratio             D-Dimer Assay, Quantitative: 213 ng/mL DDU (03-03 @ 07:28)  D-Dimer Assay, Quantitative: 178 ng/mL DDU (02-28 @ 23:03)  Procalcitonin, Serum: 0.10 ng/mL (03-03 @ 07:28)  Procalcitonin, Serum: 0.17 ng/mL (02-28 @ 23:00)        RECENT CULTURES:    r< from: Xray Chest 1 View- PORTABLE-Urgent (Xray Chest 1 View- PORTABLE-Urgent .) (02.28.21 @ 23:33) >  EXAM:  XR CHEST PORTABLE URGENT 1V        PROCEDURE DATE:  Feb 28 2021         INTERPRETATION:  EXAMINATION: XR CHEST URGENT    CLINICAL INDICATION: sob, likely covid    TECHNIQUE: Single portable view of the chest was obtained.    COMPARISON: None.    FINDINGS:    The cardiomediastinal silhouette is not well evaluated in this projection.    Extensive bilateral patchy focal airspace opacities with a predominantly peripheral distribution consistent with Covid 19.    No large pleural effusions orpneumothoraces. Degenerative changes of the spine.    IMPRESSION:  Covid 19 pneumonia.            JANETH CAMPBELL MD; Resident Radiology  This document has been electronically signed.  LEONARDO PELLETIER MD; Attending Radiologist  This document has been electronically signed. Mar  1 2021  6:51AM    < end of copied text >      RESPIRATORY CULTURES:          Studies  Chest X-RAY  CT SCAN Chest   Venous Dopplers: LE:   CT Abdomen  Others

## 2021-03-04 NOTE — PROGRESS NOTE ADULT - SUBJECTIVE AND OBJECTIVE BOX
Medicine Progress Note    Patient is a 60y old  Male who presents with a chief complaint of COVID-19 Pneumonia (04 Mar 2021 15:45)      SUBJECTIVE / OVERNIGHT EVENTS: still on Hiflo     ADDITIONAL REVIEW OF SYSTEMS:    MEDICATIONS  (STANDING):  ALBUTerol    90 MICROgram(s) HFA Inhaler 2 Puff(s) Inhalation every 4 hours  ascorbic acid 500 milliGRAM(s) Oral daily  cholecalciferol 2000 Unit(s) Oral daily  dexAMETHasone  Injectable 6 milliGRAM(s) IV Push every 24 hours  enoxaparin Injectable 40 milliGRAM(s) SubCutaneous daily  multivitamin 1 Tablet(s) Oral daily  remdesivir  IVPB   IV Intermittent   remdesivir  IVPB 100 milliGRAM(s) IV Intermittent every 24 hours  zinc sulfate 220 milliGRAM(s) Oral daily    MEDICATIONS  (PRN):  acetaminophen   Tablet .. 650 milliGRAM(s) Oral every 4 hours PRN Temp greater or equal to 38.5C (101.3F)  benzonatate 100 milliGRAM(s) Oral three times a day PRN Cough  melatonin 3 milliGRAM(s) Oral at bedtime PRN Insomnia    CAPILLARY BLOOD GLUCOSE        I&O's Summary      PHYSICAL EXAM:  Vital Signs Last 24 Hrs  T(C): 36.7 (04 Mar 2021 17:05), Max: 36.7 (04 Mar 2021 17:05)  T(F): 98 (04 Mar 2021 17:05), Max: 98 (04 Mar 2021 17:05)  HR: 73 (04 Mar 2021 17:05) (61 - 81)  BP: 117/83 (04 Mar 2021 17:05) (107/60 - 137/75)  BP(mean): --  RR: 20 (04 Mar 2021 17:05) (17 - 20)  SpO2: 97% (04 Mar 2021 17:05) (93% - 99%)  CONSTITUTIONAL: NAD, well-developed, well-groomed  ENMT: Moist oral mucosa, no pharyngeal injection or exudates; normal dentition  RESPIRATORY: Normal respiratory effort; lungs are clear to auscultation bilaterally  CARDIOVASCULAR: Regular rate and rhythm, normal S1 and S2, no murmur/rub/gallop; No lower extremity edema; Peripheral pulses are 2+ bilaterally  ABDOMEN: Nontender to palpation, normoactive bowel sounds, no rebound/guarding; No hepatosplenomegaly  PSYCH: A+O to person, place, and time; affect appropriate  NEUROLOGY: CN 2-12 are intact and symmetric; no gross sensory deficits   SKIN: No rashes; no palpable lesions    LABS:                        15.3   10.39 )-----------( 335      ( 04 Mar 2021 07:52 )             48.7     03-04    142  |  101  |  18  ----------------------------<  101<H>  4.0   |  27  |  0.81    Ca    9.2      04 Mar 2021 07:59  Phos  3.2     03-04  Mg     2.2     03-04    TPro  7.4  /  Alb  3.4  /  TBili  0.6  /  DBili  <0.2  /  AST  76<H>  /  ALT  87<H>  /  AlkPhos  76  03-04    PT/INR - ( 04 Mar 2021 07:52 )   PT: 12.9 sec;   INR: 1.14 ratio                   COVID-19 PCR: Detected (28 Feb 2021 23:02)      RADIOLOGY & ADDITIONAL TESTS:  Imaging from Last 24 Hours:    Electrocardiogram/QTc Interval:    COORDINATION OF CARE:  Care Discussed with Consultants/Other Providers:

## 2021-03-05 NOTE — PROGRESS NOTE ADULT - SUBJECTIVE AND OBJECTIVE BOX
Patient is a 60y old  Male who presents with a chief complaint of COVID-19 Pneumonia (05 Mar 2021 13:03)      INTERVAL HISTORY: feels ok    REVIEW OF SYSTEMS:   CONSTITUTIONAL: No weakness  EYES/ENT: No visual changes; No throat pain  Neck: No pain or stiffness  Respiratory: No cough, wheezing, No shortness of breath  CARDIOVASCULAR: no chest pain or palpitations  GASTROINTESTINAL: No abdominal pain, no nausea, vomiting or hematemesis  GENITOURINARY: No dysuria, frequency or hematuria  NEUROLOGICAL: No stroke like symptoms  SKIN: No rashes      MEDICATIONS:    PHYSICAL EXAM:  T(C): 36.7 (03-05-21 @ 10:10), Max: 36.8 (03-05-21 @ 05:00)  HR: 94 (03-05-21 @ 11:54) (72 - 94)  BP: 118/67 (03-05-21 @ 10:10) (109/65 - 127/69)  RR: 20 (03-05-21 @ 11:54) (18 - 22)  SpO2: 92% (03-05-21 @ 11:54) (91% - 99%)  Wt(kg): --  I&O's Summary    Appearance: In no distress	  HEENT:    PERRL, EOMI	  Cardiovascular:  S1 S2, No JVD  Respiratory: Lungs clear to auscultation	  Gastrointestinal:  Soft, Non-tender, + BS	  Vascularature:  No edema of LE  Psychiatric: Appropriate affect   Neuro: no acute focal deficits                           13.7   11.40 )-----------( 326      ( 05 Mar 2021 07:27 )             43.3     03-05    139  |  102  |  16  ----------------------------<  100<H>  3.9   |  25  |  0.75    Ca    8.9      05 Mar 2021 07:39  Phos  2.9     03-05  Mg     2.1     03-05    TPro  6.8  /  Alb  3.0<L>  /  TBili  0.6  /  DBili  0.2  /  AST  65<H>  /  ALT  83<H>  /  AlkPhos  73  03-05  Labspesonally reviewed    ASSESSMENT/PLAN: 	  This is a 59yo Male w/ PMHx of a Brain tumor s/p resection in 2018, who is presenting with worsening shortness of breath and non-productive cough for past week. Found to be COVID-19 Positive.Patient Sinus tachycardic in Emergency room.     1. Sinus Tachycardia 2/2 to hypoxia from COVID-19 Pneumonia vs dehydration- in ED patient was sating 87% on room air  - currently on high flow sating >90% - heart rate well controlled in the 80's   - S/P IVF hydration  - D Dimer 178, low suspicion for Pulmonary Embolus, trop negative  - can obtain TTE to eval heart function once acute issues resolve    2. Hypoxemia 2/2 Covid-19 pna  - remdesevir and decadron initiated   - continuous pulse ox  - c/w Supplemental Oxygen - now on high flow not requiring less than yesterday  - pt frustrated that the HF oxygen isnt helping him and not allowing him to eat. Took HF off for few seconds and demonstrated oxygen sat in 70s. Pt agreed to continue wearing HF    3. Hyponatremic- 134 on admission likely from dehydration   - nearly normalized s/p IVF hydration    4. Transaminitis  - elevated LFTs on admission, albumin slightly low however now nearly normalized, bilirubin wnl   - continue to monitor       5.  Need for prophylactic measure.  Plan: - DVT: Sub q Lovenox.       Arpita ANNC  Jaswant Weiss DO [facility]  Cardiovascular Medicine  [address], [address]  Office: [phone]  Cell: [phone] Patient is a 60y old  Male who presents with a chief complaint of COVID-19 Pneumonia (05 Mar 2021 13:03)      INTERVAL HISTORY: feels ok    REVIEW OF SYSTEMS:   CONSTITUTIONAL: No weakness  EYES/ENT: No visual changes; No throat pain  Neck: No pain or stiffness  Respiratory: No cough, wheezing, No shortness of breath  CARDIOVASCULAR: no chest pain or palpitations  GASTROINTESTINAL: No abdominal pain, no nausea, vomiting or hematemesis  GENITOURINARY: No dysuria, frequency or hematuria  NEUROLOGICAL: No stroke like symptoms  SKIN: No rashes      MEDICATIONS:    PHYSICAL EXAM:  T(C): 36.7 (03-05-21 @ 10:10), Max: 36.8 (03-05-21 @ 05:00)  HR: 94 (03-05-21 @ 11:54) (72 - 94)  BP: 118/67 (03-05-21 @ 10:10) (109/65 - 127/69)  RR: 20 (03-05-21 @ 11:54) (18 - 22)  SpO2: 92% (03-05-21 @ 11:54) (91% - 99%)  Wt(kg): --  I&O's Summary    Appearance: In no distress	  HEENT:    PERRL, EOMI	  Cardiovascular:  S1 S2, No JVD  Respiratory: Lungs clear to auscultation	  Gastrointestinal:  Soft, Non-tender, + BS	  Vascularature:  No edema of LE  Psychiatric: Appropriate affect   Neuro: no acute focal deficits                           13.7   11.40 )-----------( 326      ( 05 Mar 2021 07:27 )             43.3     03-05    139  |  102  |  16  ----------------------------<  100<H>  3.9   |  25  |  0.75    Ca    8.9      05 Mar 2021 07:39  Phos  2.9     03-05  Mg     2.1     03-05    TPro  6.8  /  Alb  3.0<L>  /  TBili  0.6  /  DBili  0.2  /  AST  65<H>  /  ALT  83<H>  /  AlkPhos  73  03-05  Labspesonally reviewed    ASSESSMENT/PLAN: 	  This is a 59yo Male w/ PMHx of a Brain tumor s/p resection in 2018, who is presenting with worsening shortness of breath and non-productive cough for past week. Found to be COVID-19 Positive.Patient Sinus tachycardic in Emergency room.     1. Sinus Tachycardia 2/2 to hypoxia from COVID-19 Pneumonia vs dehydration- in ED patient was sating 87% on room air  - currently on high flow sating >90% - heart rate well controlled in the 80's   - S/P IVF hydration  - D Dimer 178, low suspicion for Pulmonary Embolus, trop negative  - can obtain TTE to eval heart function once acute issues resolve    2. Hypoxemia 2/2 Covid-19 pna  - remdesevir and decadron initiated   - continuous pulse ox  - c/w Supplemental Oxygen - now on high flow not requiring less than yesterday  - 3/4/21 --pt frustrated that the HF oxygen isnt helping him and not allowing him to eat. Took HF off for few seconds and demonstrated oxygen sat in 70s. Pt agreed to continue wearing HF  - 3/5 oxygenation stable, still requiring high flow     3. Hyponatremic- 134 on admission likely from dehydration   - nearly normalized s/p IVF hydration    4. Transaminitis  - elevated LFTs on admission, albumin slightly low however now nearly normalized, bilirubin wnl   - continue to monitor       5.  Need for prophylactic measure.  Plan: - DVT: Sub q Lovenox.       Arpita JONES-C  Jaswant Weiss DO [facility]  Cardiovascular Medicine  [address], [address]  Office: [phone]  Cell: [phone]

## 2021-03-05 NOTE — PROGRESS NOTE ADULT - ASSESSMENT
This is a 59yo Male w/ PMHx of a Brain tumor s/p resection in 2018, who is presenting with worsening shortness of breath and non-productive cough for past week. Found to be COVID-19 Positive. Py hypoxic to 87% on RA CXR c/w COVID pneumonia. Admitted for further treatment of COVID pneumonia/hypoxemia.     Covid 19 Pneumonia: Hypoxic resp failure  Brain tumor:        3/3:      Covid 19 Pneumonia: Hypoxic resp failure: on covid rx: he is on 50% fio2: LFTS are mildly elevated: folow d dimer: currently OK  Brain tumor:  s/p resection:  DVT [facility]  dw team    3/4:      Covid 19 Pneumonia: Hypoxic resp failure: on covid rx: he is on 50% fio2 today : increased fr om yesterday :looks better then yesterday : no overnight events Cont dexa"for a total of 10 dys: LFT as are improving and d dimer has mildly increased:   Brain tumor:  s/p resection:  DVT [facility] : overall p rognosis is very giuarded"

## 2021-03-05 NOTE — PROGRESS NOTE ADULT - SUBJECTIVE AND OBJECTIVE BOX
Date of Service: 03-05-21 @ 13:03    Patient is a 60y old  Male who presents with a chief complaint of COVID-19 Pneumonia (04 Mar 2021 19:33)      Any change in ROS: doing ok :     MEDICATIONS  (STANDING):  ALBUTerol    90 MICROgram(s) HFA Inhaler 2 Puff(s) Inhalation every 4 hours  ascorbic acid 500 milliGRAM(s) Oral daily  cholecalciferol 2000 Unit(s) Oral daily  dexAMETHasone  Injectable 6 milliGRAM(s) IV Push every 24 hours  enoxaparin Injectable 40 milliGRAM(s) SubCutaneous daily  multivitamin 1 Tablet(s) Oral daily  zinc sulfate 220 milliGRAM(s) Oral daily    MEDICATIONS  (PRN):  acetaminophen   Tablet .. 650 milliGRAM(s) Oral every 4 hours PRN Temp greater or equal to 38.5C (101.3F)  benzonatate 100 milliGRAM(s) Oral three times a day PRN Cough  melatonin 3 milliGRAM(s) Oral at bedtime PRN Insomnia    Vital Signs Last 24 Hrs  T(C): 36.7 (05 Mar 2021 10:10), Max: 36.8 (05 Mar 2021 05:00)  T(F): 98 (05 Mar 2021 10:10), Max: 98.2 (05 Mar 2021 05:00)  HR: 94 (05 Mar 2021 11:54) (72 - 94)  BP: 118/67 (05 Mar 2021 10:10) (109/65 - 127/69)  BP(mean): --  RR: 20 (05 Mar 2021 11:54) (18 - 22)  SpO2: 92% (05 Mar 2021 11:54) (91% - 99%)    I&O's Summary        Physical Exam:   GENERAL: NAD, well-groomed, well-developed  HEENT: JOAO/   Atraumatic, Normocephalic  ENMT: No tonsillar erythema, exudates, or enlargement; Moist mucous membranes, Good dentition, No lesions  NECK: Supple, No JVD, Normal thyroid  CHEST/LUNG: Clear to auscultaion, ; No rales, rhonchi, wheezing, or rubs  CVS: Regular rate and rhythm; No murmurs, rubs, or gallops  GI: : Soft, Nontender, Nondistended; Bowel sounds present  NERVOUS SYSTEM:  Alert & Oriented X3  EXTREMITIES:  2+ Peripheral Pulses, No clubbing, cyanosis, or edema  LYMPH: No lymphadenopathy noted  SKIN: No rashes or lesions  ENDOCRINOLOGY: No Thyromegaly  PSYCH: Appropriate    Labs:  26                            13.7   11.40 )-----------( 326      ( 05 Mar 2021 07:27 )             43.3                         15.3   10.39 )-----------( 335      ( 04 Mar 2021 07:52 )             48.7                         14.5   9.28  )-----------( 259      ( 03 Mar 2021 07:28 )             45.0     03-05    139  |  102  |  16  ----------------------------<  100<H>  3.9   |  25  |  0.75  03-04    142  |  101  |  18  ----------------------------<  101<H>  4.0   |  27  |  0.81  03-03    140  |  101  |  17  ----------------------------<  115<H>  4.0   |  27  |  0.81  03-02    x   |  x   |  x   ----------------------------<  x   x    |  x   |  0.84    Ca    8.9      05 Mar 2021 07:39  Ca    9.2      04 Mar 2021 07:59  Phos  2.9     03-05  Phos  3.2     03-04  Mg     2.1     03-05  Mg     2.2     03-04    TPro  6.8  /  Alb  3.0<L>  /  TBili  0.6  /  DBili  0.2  /  AST  65<H>  /  ALT  83<H>  /  AlkPhos  73  03-05  TPro  7.4  /  Alb  3.4  /  TBili  0.6  /  DBili  <0.2  /  AST  76<H>  /  ALT  87<H>  /  AlkPhos  76  03-04  TPro  7.2  /  Alb  3.3  /  TBili  0.4  /  DBili  <0.2  /  AST  58<H>  /  ALT  67<H>  /  AlkPhos  66  03-03  TPro  7.4  /  Alb  3.4  /  TBili  0.4  /  DBili  <0.2  /  AST  72<H>  /  ALT  66<H>  /  AlkPhos  71  03-02    CAPILLARY BLOOD GLUCOSE          LIVER FUNCTIONS - ( 05 Mar 2021 07:39 )  Alb: 3.0 g/dL / Pro: 6.8 g/dL / ALK PHOS: 73 U/L / ALT: 83 U/L / AST: 65 U/L / GGT: x           PT/INR - ( 04 Mar 2021 07:52 )   PT: 12.9 sec;   INR: 1.14 ratio             D-Dimer Assay, Quantitative: 371 ng/mL DDU (03-05 @ 07:27)  D-Dimer Assay, Quantitative: 213 ng/mL DDU (03-03 @ 07:28)  D-Dimer Assay, Quantitative: 178 ng/mL DDU (02-28 @ 23:03)  Procalcitonin, Serum: 0.10 ng/mL (03-03 @ 07:28)        RECENT CULTURES:  r< from: Xray Chest 1 View- PORTABLE-Urgent (Xray Chest 1 View- PORTABLE-Urgent .) (02.28.21 @ 23:33) >    INTERPRETATION:  EXAMINATION: XR CHEST URGENT    CLINICAL INDICATION: sob, likely covid    TECHNIQUE: Single portable view of the chest was obtained.    COMPARISON: None.    FINDINGS:    The cardiomediastinal silhouette is not well evaluated in this projection.    Extensive bilateral patchy focal airspace opacities with a predominantly peripheral distribution consistent with Covid 19.    No large pleural effusions orpneumothoraces. Degenerative changes of the spine.    IMPRESSION:  Covid 19 pneumonia.            JANETH CAMPBELL MD; Resident Radiology  This document has been electronically signed.  LEONARDO PELLETIER MD; Attending Radiologist  This document has been electronically signed. Mar  1 2021  6:51AM    < end of copied text >        RESPIRATORY CULTURES:          Studies  Chest X-RAY  CT SCAN Chest   Venous Dopplers: LE:   CT Abdomen  Others

## 2021-03-05 NOTE — PROGRESS NOTE ADULT - SUBJECTIVE AND OBJECTIVE BOX
Medicine Progress Note    Patient is a 60y old  Male who presents with a chief complaint of COVID-19 Pneumonia (05 Mar 2021 13:49)      SUBJECTIVE / OVERNIGHT EVENTS: pt. seen and examined , remain on Hiflo     ADDITIONAL REVIEW OF SYSTEMS:    MEDICATIONS  (STANDING):  ALBUTerol    90 MICROgram(s) HFA Inhaler 2 Puff(s) Inhalation every 4 hours  ascorbic acid 500 milliGRAM(s) Oral daily  cholecalciferol 2000 Unit(s) Oral daily  dexAMETHasone  Injectable 6 milliGRAM(s) IV Push every 24 hours  enoxaparin Injectable 40 milliGRAM(s) SubCutaneous daily  multivitamin 1 Tablet(s) Oral daily  zinc sulfate 220 milliGRAM(s) Oral daily    MEDICATIONS  (PRN):  acetaminophen   Tablet .. 650 milliGRAM(s) Oral every 4 hours PRN Temp greater or equal to 38.5C (101.3F)  benzonatate 100 milliGRAM(s) Oral three times a day PRN Cough  melatonin 3 milliGRAM(s) Oral at bedtime PRN Insomnia    CAPILLARY BLOOD GLUCOSE        I&O's Summary      PHYSICAL EXAM:  Vital Signs Last 24 Hrs  T(C): 38.4 (05 Mar 2021 23:01), Max: 38.4 (05 Mar 2021 23:01)  T(F): 101.1 (05 Mar 2021 23:01), Max: 101.1 (05 Mar 2021 23:01)  HR: 97 (05 Mar 2021 22:51) (72 - 100)  BP: 113/73 (05 Mar 2021 22:51) (109/65 - 154/85)  BP(mean): --  RR: 20 (05 Mar 2021 22:51) (18 - 22)  SpO2: 97% (05 Mar 2021 22:51) (83% - 99%)  CONSTITUTIONAL: NAD, well-developed, well-groomed  ENMT: Moist oral mucosa, no pharyngeal injection or exudates; normal dentition  RESPIRATORY: Normal respiratory effort; lungs are clear to auscultation bilaterally  CARDIOVASCULAR: Regular rate and rhythm, normal S1 and S2, no murmur/rub/gallop; No lower extremity edema; Peripheral pulses are 2+ bilaterally  ABDOMEN: Nontender to palpation, normoactive bowel sounds, no rebound/guarding; No hepatosplenomegaly  PSYCH: A+O to person, place, and time; affect appropriate  NEUROLOGY: CN 2-12 are intact and symmetric; no gross sensory deficits   SKIN: No rashes; no palpable lesions    LABS:                        13.7   11.40 )-----------( 326      ( 05 Mar 2021 07:27 )             43.3     03-05    139  |  102  |  16  ----------------------------<  100<H>  3.9   |  25  |  0.75    Ca    8.9      05 Mar 2021 07:39  Phos  2.9     03-05  Mg     2.1     03-05    TPro  6.8  /  Alb  3.0<L>  /  TBili  0.6  /  DBili  0.2  /  AST  65<H>  /  ALT  83<H>  /  AlkPhos  73  03-05    PT/INR - ( 04 Mar 2021 07:52 )   PT: 12.9 sec;   INR: 1.14 ratio                   COVID-19 PCR: Detected (28 Feb 2021 23:02)      RADIOLOGY & ADDITIONAL TESTS:  Imaging from Last 24 Hours:    Electrocardiogram/QTc Interval:    COORDINATION OF CARE:  Care Discussed with Consultants/Other Providers:

## 2021-03-06 NOTE — PROGRESS NOTE ADULT - SUBJECTIVE AND OBJECTIVE BOX
Medicine Progress Note    Patient is a 60y old  Male who presents with a chief complaint of COVID-19 Pneumonia (06 Mar 2021 11:48)      SUBJECTIVE / OVERNIGHT EVENTS: remain on Hiflo , still SOB , unable to taper oxygen     ADDITIONAL REVIEW OF SYSTEMS:    MEDICATIONS  (STANDING):  ALBUTerol    90 MICROgram(s) HFA Inhaler 2 Puff(s) Inhalation every 4 hours  ascorbic acid 500 milliGRAM(s) Oral daily  cholecalciferol 2000 Unit(s) Oral daily  dexAMETHasone  Injectable 6 milliGRAM(s) IV Push every 24 hours  enoxaparin Injectable 40 milliGRAM(s) SubCutaneous daily  multivitamin 1 Tablet(s) Oral daily  zinc sulfate 220 milliGRAM(s) Oral daily    MEDICATIONS  (PRN):  acetaminophen   Tablet .. 650 milliGRAM(s) Oral every 4 hours PRN Temp greater or equal to 38.5C (101.3F)  benzonatate 100 milliGRAM(s) Oral three times a day PRN Cough  melatonin 3 milliGRAM(s) Oral at bedtime PRN Insomnia    CAPILLARY BLOOD GLUCOSE        I&O's Summary      PHYSICAL EXAM:  Vital Signs Last 24 Hrs  T(C): 36.6 (06 Mar 2021 18:26), Max: 38.4 (05 Mar 2021 23:01)  T(F): 97.9 (06 Mar 2021 18:26), Max: 101.1 (05 Mar 2021 23:01)  HR: 97 (06 Mar 2021 19:41) (77 - 104)  BP: 110/62 (06 Mar 2021 18:26) (110/62 - 126/74)  BP(mean): --  RR: 20 (06 Mar 2021 19:41) (20 - 23)  SpO2: 100% (06 Mar 2021 19:41) (91% - 100%)  CONSTITUTIONAL: NAD, well-developed, well-groomed  ENMT: Moist oral mucosa, no pharyngeal injection or exudates; normal dentition  RESPIRATORY: Normal respiratory effort; lungs are clear to auscultation bilaterally  CARDIOVASCULAR: Regular rate and rhythm, normal S1 and S2, no murmur/rub/gallop; No lower extremity edema; Peripheral pulses are 2+ bilaterally  ABDOMEN: Nontender to palpation, normoactive bowel sounds, no rebound/guarding; No hepatosplenomegaly  PSYCH: A+O to person, place, and time; affect appropriate  NEUROLOGY: CN 2-12 are intact and symmetric; no gross sensory deficits   SKIN: No rashes; no palpable lesions    LABS:                        13.7   13.74 )-----------( 346      ( 06 Mar 2021 08:12 )             43.5     03-06    137  |  102  |  18  ----------------------------<  92  4.3   |  25  |  0.78    Ca    9.0      06 Mar 2021 08:12  Phos  3.7     03-06  Mg     2.2     03-06    TPro  6.3  /  Alb  2.9<L>  /  TBili  0.7  /  DBili  0.2  /  AST  63<H>  /  ALT  81<H>  /  AlkPhos  74  03-06              COVID-19 PCR: Detected (28 Feb 2021 23:02)      RADIOLOGY & ADDITIONAL TESTS:  Imaging from Last 24 Hours:    Electrocardiogram/QTc Interval:    COORDINATION OF CARE:  Care Discussed with Consultants/Other Providers:

## 2021-03-06 NOTE — PROGRESS NOTE ADULT - SUBJECTIVE AND OBJECTIVE BOX
Patient is a 60y old  Male who presents with a chief complaint of COVID-19 Pneumonia (05 Mar 2021 19:17)      INTERVAL HISTORY: feels ok    REVIEW OF SYSTEMS:   CONSTITUTIONAL: No weakness  EYES/ENT: No visual changes; No throat pain  Neck: No pain or stiffness  Respiratory: No cough, wheezing, No shortness of breath  CARDIOVASCULAR: no chest pain or palpitations  GASTROINTESTINAL: No abdominal pain, no nausea, vomiting or hematemesis  GENITOURINARY: No dysuria, frequency or hematuria  NEUROLOGICAL: No stroke like symptoms  SKIN: No rashes    	  MEDICATIONS:        PHYSICAL EXAM:  T(C): 37.4 (03-06-21 @ 04:51), Max: 38.4 (03-05-21 @ 23:01)  HR: 86 (03-06-21 @ 10:46) (77 - 100)  BP: 113/61 (03-06-21 @ 04:51) (113/61 - 154/85)  RR: 23 (03-06-21 @ 10:46) (18 - 23)  SpO2: 91% (03-06-21 @ 10:46) (83% - 100%)  Wt(kg): --  I&O's Summary      Appearance: In no distress	  HEENT:    PERRL, EOMI	  Cardiovascular:  S1 S2, No JVD  Respiratory: Lungs clear to auscultation	  Gastrointestinal:  Soft, Non-tender, + BS	  Vascularature:  No edema of LE  Psychiatric: Appropriate affect   Neuro: no acute focal deficits                         13.7   13.74 )-----------( 346      ( 06 Mar 2021 08:12 )             43.5     03-06    137  |  102  |  18  ----------------------------<  92  4.3   |  25  |  0.78    Ca    9.0      06 Mar 2021 08:12  Phos  3.7     03-06  Mg     2.2     03-06    TPro  6.3  /  Alb  2.9<L>  /  TBili  0.7  /  DBili  0.2  /  AST  63<H>  /  ALT  81<H>  /  AlkPhos  74  03-0    Labs personally reviewed    ASSESSMENT/PLAN: 	  This is a 61yo Male w/ PMHx of a Brain tumor s/p resection in 2018, who is presenting with worsening shortness of breath and non-productive cough for past week. Found to be COVID-19 Positive.Patient Sinus tachycardic in Emergency room.     1. Sinus Tachycardia 2/2 to hypoxia from COVID-19 Pneumonia vs dehydration- in ED patient was sating 87% on room air  - currently on high flow sating >90% - heart rate well controlled in the 80's   - S/P IVF hydration  - D Dimer 178, low suspicion for Pulmonary Embolus, trop negative  - can obtain TTE to eval heart function once acute issues resolve    2. Hypoxemia 2/2 Covid-19 pna  - remdesevir and decadron initiated   - continuous pulse ox  - c/w Supplemental Oxygen - now on high flow not requiring less than yesterday  - 3/4/21 --pt frustrated that the HF oxygen isnt helping him and not allowing him to eat. Took HF off for few seconds and demonstrated oxygen sat in 70s. Pt agreed to continue wearing HF  - 3/5 oxygenation stable, still requiring high flow     3. Hyponatremic- 134 on admission likely from dehydration   - nearly normalized s/p IVF hydration    4. Transaminitis  - elevated LFTs on admission, albumin slightly low however now nearly normalized, bilirubin wnl   - continue to monitor       5.  Need for prophylactic measure.  Plan: - DVT: Sub q Lovenox.             Arpita Rodriguez ANP-C  Jaswant Weiss DO [facility]  Cardiovascular Medicine  [address] [place] [address], [address]  Office: [phone]  Cell: [phone]

## 2021-03-07 NOTE — PROGRESS NOTE ADULT - PROBLEM SELECTOR PLAN 1
- COVID-19 PCR: Positive  - CXR showing b/l opacities c/w COVID PNA   - Pt was initially hypoxic to 87% on RA.   - Pt currently requiring supplemental oxygen via NC 6L.  - c/w Dexamethasone 7/10 days , completed  Remdesivir   - Inflammatory markers q72hrs   - Monitor SP02 on continuous pulse ox  - Prone positioning, Incentive spirometry and Chest PT PRN.  - Albuterol, Tessalon pearls, Tylenol PRN  - HOB elevation

## 2021-03-07 NOTE — CONSULT NOTE ADULT - SUBJECTIVE AND OBJECTIVE BOX
HPI:  60 m with Brain tumor s/p resection in 2018, presented 2/28 with SOB and cough and was admitted for hypoxic respiratory failure due to COVID  initially afebrile with normal WBC  s/p remdesivir and on dexa since 3/1, now day 7  started to have fevers 3/5 and WBC increased to 18  procalcitonin was 0.10 and increased to 0.41  he denied new cough, no sputum and was comfortable on high flow     PAST MEDICAL & SURGICAL HISTORY:  Brain cancer  in remission.    Brain tumor  s/p ressection in 2018.        Allergies    No Known Allergies    Intolerances        ANTIMICROBIALS:  piperacillin/tazobactam IVPB. 3.375 once  piperacillin/tazobactam IVPB.. 3.375 every 8 hours      OTHER MEDS:  acetaminophen   Tablet .. 650 milliGRAM(s) Oral every 4 hours PRN  ALBUTerol    90 MICROgram(s) HFA Inhaler 2 Puff(s) Inhalation every 4 hours  ascorbic acid 500 milliGRAM(s) Oral daily  benzonatate 100 milliGRAM(s) Oral three times a day PRN  cholecalciferol 2000 Unit(s) Oral daily  dexAMETHasone  Injectable 6 milliGRAM(s) IV Push every 24 hours  enoxaparin Injectable 40 milliGRAM(s) SubCutaneous daily  melatonin 3 milliGRAM(s) Oral at bedtime PRN  multivitamin 1 Tablet(s) Oral daily      SOCIAL HISTORY:  , lives with his wife  no smoking, alcohol or drug abuse      FAMILY HISTORY:  FH: type 2 diabetes        ROS:    All other systems negative     Constitutional: + fever  Eye: no eye pain, no redness, no vision changes  ENT:  no sore throat, no rhinorrhea  Cardiovascular:  no chest pain, no palpitation  Respiratory:   SOB, no significant cough  GI:  no abd pain, no vomiting, no diarrhea  urinary: no dysuria, no hematuria, no flank pain  : no penile discharge or bleeding  musculoskeletal:  no joint pain, no joint swelling  skin:  no rash  neurology:  no headache, no seizure, no change in mental status  psych: no anxiety, no depression     Physical Exam:    General:  non toxic  Head: atraumatic, normocephalic  Eyes: normal sclera and conjunctiva  ENT:   no oropharyngeal lesions, no LAD, neck supple  Cardio:    regular S1,S2  Respiratory:  comfortable on high flow clear b/l  abd:   soft, BS +, not tender  :     no CVAT, no suprapubic tenderness, no noel  Musculoskeletal : no joint swelling, no edema  Skin:    no rash  vascular: no phlebitis  Neurologic:     no focal deficits  psych: normal affect      Drug Dosing Weight  Height (cm): 180.3 (01 Mar 2021 21:00)  Weight (kg): 82.1 (01 Mar 2021 21:00)  BMI (kg/m2): 25.3 (01 Mar 2021 21:00)  BSA (m2): 2.02 (01 Mar 2021 21:00)    Vital Signs Last 24 Hrs  T(F): 97.8 (03-07-21 @ 10:01), Max: 101.1 (03-05-21 @ 23:01)    Vital Signs Last 24 Hrs  HR: 89 (03-07-21 @ 11:03) (78 - 106)  BP: 126/67 (03-07-21 @ 10:01) (106/62 - 126/67)  RR: 21 (03-07-21 @ 11:03)  SpO2: 94% (03-07-21 @ 11:03) (92% - 100%)  Wt(kg): --                          14.2   18.00 )-----------( 358      ( 07 Mar 2021 07:34 )             42.8       03-07    138  |  101  |  18  ----------------------------<  135<H>  4.1   |  25  |  0.85    Ca    9.1      07 Mar 2021 07:34  Phos  4.0     03-07  Mg     2.2     03-07    TPro  6.3  /  Alb  2.9<L>  /  TBili  0.7  /  DBili  0.2  /  AST  63<H>  /  ALT  81<H>  /  AlkPhos  74  03-06          MICROBIOLOGY:  v              RADIOLOGY:    Images independently visualized and reviewed personally,  findings as below    < from: Xray Chest 1 View- PORTABLE-Urgent (Xray Chest 1 View- PORTABLE-Urgent .) (02.28.21 @ 23:33) >  FINDINGS:    The cardiomediastinal silhouette is not well evaluated in this projection.    Extensive bilateral patchy focal airspace opacities with a predominantly peripheral distribution consistent with Covid 19.    No large pleural effusions orpneumothoraces. Degenerative changes of the spine.    IMPRESSION:  Covid 19 pneumonia.

## 2021-03-07 NOTE — PROGRESS NOTE ADULT - SUBJECTIVE AND OBJECTIVE BOX
Patient is a 60y old  Male who presents with a chief complaint of COVID-19 Pneumonia (07 Mar 2021 13:04)      INTERVAL HISTORY: feels ok    REVIEW OF SYSTEMS:   CONSTITUTIONAL: No weakness  EYES/ENT: No visual changes; No throat pain  Neck: No pain or stiffness  Respiratory: No cough, wheezing, No shortness of breath  CARDIOVASCULAR: no chest pain or palpitations  GASTROINTESTINAL: No abdominal pain, no nausea, vomiting or hematemesis  GENITOURINARY: No dysuria, frequency or hematuria  NEUROLOGICAL: No stroke like symptoms  SKIN: No rashes      MEDICATIONS:    PHYSICAL EXAM:  T(C): 36.6 (03-07-21 @ 10:01), Max: 38.1 (03-06-21 @ 22:10)  HR: 89 (03-07-21 @ 11:03) (78 - 106)  BP: 126/67 (03-07-21 @ 10:01) (106/62 - 126/67)  RR: 21 (03-07-21 @ 11:03) (19 - 24)  SpO2: 94% (03-07-21 @ 11:03) (92% - 100%)  Wt(kg): --  I&O's Summary    Appearance: In no distress	  HEENT:    PERRL, EOMI	  Cardiovascular:  S1 S2, No JVD  Respiratory: Lungs clear to auscultation	  Gastrointestinal:  Soft, Non-tender, + BS	  Vascularature:  No edema of LE  Psychiatric: Appropriate affect   Neuro: no acute focal deficits                         14.2   18.00 )-----------( 358      ( 07 Mar 2021 07:34 )             42.8     03-07    138  |  101  |  18  ----------------------------<  135<H>  4.1   |  25  |  0.85    Ca    9.1      07 Mar 2021 07:34  Phos  4.0     03-07  Mg     2.2     03-07    TPro  6.3  /  Alb  2.9<L>  /  TBili  0.7  /  DBili  0.2  /  AST  63<H>  /  ALT  81<H>  /  AlkPhos  74  03-06  Labs personally reviewed    ASESSMENT/PLAN: 	    This is a 59yo Male w/ PMHx of a Brain tumor s/p resection in 2018, who is presenting with worsening shortness of breath and non-productive cough for past week. Found to be COVID-19 Positive.Patient Sinus tachycardic in Emergency room.     1. Sinus Tachycardia 2/2 to hypoxia from COVID-19 Pneumonia vs dehydration- in ED patient was sating 87% on room air  - currently on high flow sating >90% - heart rate well controlled in the 80's   - S/P IVF hydration  - D Dimer 178, low suspicion for Pulmonary Embolus, trop negative  - can obtain TTE to eval heart function once acute issues resolve    2. Hypoxemia 2/2 Covid-19 pna  - remdesevir and decadron initiated   - continuous pulse ox  - c/w Supplemental Oxygen - now on high flow not requiring less than yesterday  - 3/4/21 --pt frustrated that the HF oxygen isnt helping him and not allowing him to eat. Took HF off for few seconds and demonstrated oxygen sat in 70s. Pt agreed to continue wearing HF  - 3/5 oxygenation stable, still requiring high flow     3. Hyponatremic- 134 on admission likely from dehydration   - nearly normalized s/p IVF hydration    4. Transaminitis  - elevated LFTs on admission, albumin slightly low however now nearly normalized, bilirubin wnl   - continue to monitor       5.  Need for prophylactic measure.  Plan: - DVT: Sub q Lovenox.           Arpita JONES-C  Jaswant Weiss DO [facility]  Cardiovascular Medicine  [address], [address]  Office: [phone]  Cell: [phone] Patient is a 60y old  Male who presents with a chief complaint of COVID-19 Pneumonia (07 Mar 2021 13:04)      INTERVAL HISTORY: feels ok    REVIEW OF SYSTEMS:   CONSTITUTIONAL: No weakness  EYES/ENT: No visual changes; No throat pain  Neck: No pain or stiffness  Respiratory: No cough, wheezing, No shortness of breath  CARDIOVASCULAR: no chest pain or palpitations  GASTROINTESTINAL: No abdominal pain, no nausea, vomiting or hematemesis  GENITOURINARY: No dysuria, frequency or hematuria  NEUROLOGICAL: No stroke like symptoms  SKIN: No rashes      MEDICATIONS:    PHYSICAL EXAM:  T(C): 36.6 (03-07-21 @ 10:01), Max: 38.1 (03-06-21 @ 22:10)  HR: 89 (03-07-21 @ 11:03) (78 - 106)  BP: 126/67 (03-07-21 @ 10:01) (106/62 - 126/67)  RR: 21 (03-07-21 @ 11:03) (19 - 24)  SpO2: 94% (03-07-21 @ 11:03) (92% - 100%)  Wt(kg): --  I&O's Summary    Appearance: In no distress	  HEENT:    PERRL, EOMI	  Cardiovascular:  S1 S2, No JVD  Respiratory: Lungs clear to auscultation	  Gastrointestinal:  Soft, Non-tender, + BS	  Vascularature:  No edema of LE  Psychiatric: Appropriate affect   Neuro: no acute focal deficits                         14.2   18.00 )-----------( 358      ( 07 Mar 2021 07:34 )             42.8     03-07    138  |  101  |  18  ----------------------------<  135<H>  4.1   |  25  |  0.85    Ca    9.1      07 Mar 2021 07:34  Phos  4.0     03-07  Mg     2.2     03-07    TPro  6.3  /  Alb  2.9<L>  /  TBili  0.7  /  DBili  0.2  /  AST  63<H>  /  ALT  81<H>  /  AlkPhos  74  03-06  Labs personally reviewed    ASESSMENT/PLAN: 	    This is a 61yo Male w/ PMHx of a Brain tumor s/p resection in 2018, who is presenting with worsening shortness of breath and non-productive cough for past week. Found to be COVID-19 Positive.Patient Sinus tachycardic in Emergency room.     1. Sinus Tachycardia 2/2 to hypoxia from COVID-19 Pneumonia vs dehydration- in ED patient was sating 87% on room air  - currently on high flow sating >90% but requiring increasing oxygen - heart rate well controlled in the 80's   - S/P IVF hydration  - D Dimer 178, low suspicion for Pulmonary Embolus, trop negative  - can obtain TTE to eval heart function once acute issues resolve    2. Hypoxemia 2/2 Covid-19 pna  - remdesevir and decadron initiated   - continuous pulse ox  - c/w Supplemental Oxygen - now on high flow not requiring less than yesterday  - 3/4/21 --pt frustrated that the HF oxygen isnt helping him and not allowing him to eat. Took HF off for few seconds and demonstrated oxygen sat in 70s. Pt agreed to continue wearing HF  - 3/7 oxygenation stable, still requiring high flow and higher oxygen demand    3. Hyponatremic- 134 on admission likely from dehydration   - nearly normalized s/p IVF hydration    4. Transaminitis  - elevated LFTs on admission, albumin slightly low however now nearly normalized, bilirubin wnl   - continue to monitor       5.  Fevers/Leukocytosis -  Plan: - Started on Zosyn, ID consulted           Arpita Weiss DO [facility]  Cardiovascular Medicine  [address] [place] [address], [address]  Office: [phone]  Cell: [phone]

## 2021-03-07 NOTE — PROGRESS NOTE ADULT - ASSESSMENT
This is a 59yo Male w/ PMHx of a Brain tumor s/p resection in 2018, who is presenting with worsening shortness of breath and non-productive cough for past week. Found to be COVID-19 Positive. Py hypoxic to 87% on RA CXR c/w COVID pneumonia. Admitted for further treatment of COVID pneumonia/hypoxemia.     Covid 19 Pneumonia: Hypoxic resp failure  Brain tumor:        3/3:      Covid 19 Pneumonia: Hypoxic resp failure: on covid rx: he is on 50% fio2: LFTS are mildly elevated: folow d dimer: currently OK  Brain tumor:  s/p resection:  DVT proparminyxlianas  dw team    3/4:      Covid 19 Pneumonia: Hypoxic resp failure: on covid rx: he is on 50% fio2 today : increased fr om yesterday :looks better then yesterday : no overnight events Cont dexa"for a total of 10 dys: LFT as are improving and d dimer has mildly increased:   Brain tumor:  s/p resection:  DVT [facility] : overall p rognosis is very giuarded"     3/7:      Covid 19 Pneumonia: Hypoxic resp failure: on covid rx: he is on100% fio2 today : remdesivir finished: on dexa: 7/10: pretty hypoxic: and has low grade fever: wbc is increasing: will start empiric zosyn: do chest xray : ID consult  Brain tumor:  s/p resection:  DVT propahyxlis  : overall p rognosis is very guarded stll :  KAYLYNN NP

## 2021-03-07 NOTE — PROGRESS NOTE ADULT - SUBJECTIVE AND OBJECTIVE BOX
Medicine Progress Note    Patient is a 60y old  Male who presents with a chief complaint of COVID-19 Pneumonia (07 Mar 2021 13:33)      SUBJECTIVE / OVERNIGHT EVENTS: still on Hiflo , remain SOB , completed remdisivir     ADDITIONAL REVIEW OF SYSTEMS:    MEDICATIONS  (STANDING):  ALBUTerol    90 MICROgram(s) HFA Inhaler 2 Puff(s) Inhalation every 4 hours  ascorbic acid 500 milliGRAM(s) Oral daily  cholecalciferol 2000 Unit(s) Oral daily  dexAMETHasone  Injectable 6 milliGRAM(s) IV Push every 24 hours  enoxaparin Injectable 40 milliGRAM(s) SubCutaneous daily  multivitamin 1 Tablet(s) Oral daily  piperacillin/tazobactam IVPB.. 3.375 Gram(s) IV Intermittent every 8 hours    MEDICATIONS  (PRN):  acetaminophen   Tablet .. 650 milliGRAM(s) Oral every 4 hours PRN Temp greater or equal to 38.5C (101.3F)  benzonatate 100 milliGRAM(s) Oral three times a day PRN Cough  melatonin 3 milliGRAM(s) Oral at bedtime PRN Insomnia    CAPILLARY BLOOD GLUCOSE        I&O's Summary    07 Mar 2021 07:01  -  08 Mar 2021 00:18  --------------------------------------------------------  IN: 0 mL / OUT: 900 mL / NET: -900 mL        PHYSICAL EXAM:  Vital Signs Last 24 Hrs  T(C): 36.6 (07 Mar 2021 22:18), Max: 36.6 (07 Mar 2021 10:01)  T(F): 97.8 (07 Mar 2021 22:18), Max: 97.8 (07 Mar 2021 10:01)  HR: 98 (07 Mar 2021 22:18) (83 - 99)  BP: 114/58 (07 Mar 2021 22:18) (114/58 - 126/67)  BP(mean): --  RR: 20 (07 Mar 2021 22:18) (19 - 21)  SpO2: 94% (07 Mar 2021 22:18) (94% - 100%)  CONSTITUTIONAL: NAD, well-developed, well-groomed  ENMT: Moist oral mucosa, no pharyngeal injection or exudates; normal dentition  RESPIRATORY: Normal respiratory effort; lungs are clear to auscultation bilaterally  CARDIOVASCULAR: Regular rate and rhythm, normal S1 and S2, no murmur/rub/gallop; No lower extremity edema; Peripheral pulses are 2+ bilaterally  ABDOMEN: Nontender to palpation, normoactive bowel sounds, no rebound/guarding; No hepatosplenomegaly  PSYCH: A+O to person, place, and time; affect appropriate  NEUROLOGY: CN 2-12 are intact and symmetric; no gross sensory deficits   SKIN: No rashes; no palpable lesions    LABS:                        14.2   18.00 )-----------( 358      ( 07 Mar 2021 07:34 )             42.8     03-07    138  |  101  |  18  ----------------------------<  135<H>  4.1   |  25  |  0.85    Ca    9.1      07 Mar 2021 07:34  Phos  4.0     03-07  Mg     2.2     03-07    TPro  6.3  /  Alb  2.9<L>  /  TBili  0.7  /  DBili  0.2  /  AST  63<H>  /  ALT  81<H>  /  AlkPhos  74  03-06    PT/INR - ( 07 Mar 2021 07:34 )   PT: 13.0 sec;   INR: 1.14 ratio                   COVID-19 PCR: Detected (28 Feb 2021 23:02)      RADIOLOGY & ADDITIONAL TESTS:  Imaging from Last 24 Hours:    Electrocardiogram/QTc Interval:    COORDINATION OF CARE:  Care Discussed with Consultants/Other Providers:

## 2021-03-07 NOTE — PROGRESS NOTE ADULT - SUBJECTIVE AND OBJECTIVE BOX
Date of Service: 03-07-21 @ 12:03    Patient is a 60y old  Male who presents with a chief complaint of COVID-19 Pneumonia (06 Mar 2021 20:18)      Any change in ROS: on 1005 high flow: he moves and he desats:     MEDICATIONS  (STANDING):  ALBUTerol    90 MICROgram(s) HFA Inhaler 2 Puff(s) Inhalation every 4 hours  ascorbic acid 500 milliGRAM(s) Oral daily  cholecalciferol 2000 Unit(s) Oral daily  dexAMETHasone  Injectable 6 milliGRAM(s) IV Push every 24 hours  enoxaparin Injectable 40 milliGRAM(s) SubCutaneous daily  multivitamin 1 Tablet(s) Oral daily  zinc sulfate 220 milliGRAM(s) Oral daily    MEDICATIONS  (PRN):  acetaminophen   Tablet .. 650 milliGRAM(s) Oral every 4 hours PRN Temp greater or equal to 38.5C (101.3F)  benzonatate 100 milliGRAM(s) Oral three times a day PRN Cough  melatonin 3 milliGRAM(s) Oral at bedtime PRN Insomnia    Vital Signs Last 24 Hrs  T(C): 36.6 (07 Mar 2021 10:01), Max: 38.1 (06 Mar 2021 22:10)  T(F): 97.8 (07 Mar 2021 10:01), Max: 100.6 (06 Mar 2021 22:10)  HR: 89 (07 Mar 2021 11:03) (78 - 106)  BP: 126/67 (07 Mar 2021 10:01) (106/62 - 126/74)  BP(mean): --  RR: 21 (07 Mar 2021 11:03) (19 - 24)  SpO2: 94% (07 Mar 2021 11:03) (92% - 100%)    I&O's Summary        Physical Exam:   GENERAL: NAD, well-groomed, well-developed  HEENT: JOAO/   Atraumatic, Normocephalic  ENMT: No tonsillar erythema, exudates, or enlargement; Moist mucous membranes, Good dentition, No lesions  NECK: Supple, No JVD, Normal thyroid  CHEST/LUNG: bibasilar crackles+  CVS: Regular rate and rhythm; No murmurs, rubs, or gallops  GI: : Soft, Nontender, Nondistended; Bowel sounds present  NERVOUS SYSTEM:  Alert & Oriented X3  EXTREMITIES: - edema  LYMPH: No lymphadenopathy noted  SKIN: No rashes or lesions  ENDOCRINOLOGY: No Thyromegaly  PSYCH: Appropriate    Labs:  26                            14.2   18.00 )-----------( 358      ( 07 Mar 2021 07:34 )             42.8                         13.7   13.74 )-----------( 346      ( 06 Mar 2021 08:12 )             43.5                         13.7   11.40 )-----------( 326      ( 05 Mar 2021 07:27 )             43.3                         15.3   10.39 )-----------( 335      ( 04 Mar 2021 07:52 )             48.7     03-07    138  |  101  |  18  ----------------------------<  135<H>  4.1   |  25  |  0.85  03-06    137  |  102  |  18  ----------------------------<  92  4.3   |  25  |  0.78  03-05    139  |  102  |  16  ----------------------------<  100<H>  3.9   |  25  |  0.75  03-04    142  |  101  |  18  ----------------------------<  101<H>  4.0   |  27  |  0.81    Ca    9.1      07 Mar 2021 07:34  Ca    9.0      06 Mar 2021 08:12  Phos  4.0     03-07  Phos  3.7     03-06  Mg     2.2     03-07  Mg     2.2     03-06    TPro  6.3  /  Alb  2.9<L>  /  TBili  0.7  /  DBili  0.2  /  AST  63<H>  /  ALT  81<H>  /  AlkPhos  74  03-06  TPro  6.8  /  Alb  3.0<L>  /  TBili  0.6  /  DBili  0.2  /  AST  65<H>  /  ALT  83<H>  /  AlkPhos  73  03-05  TPro  7.4  /  Alb  3.4  /  TBili  0.6  /  DBili  <0.2  /  AST  76<H>  /  ALT  87<H>  /  AlkPhos  76  03-04    CAPILLARY BLOOD GLUCOSE          LIVER FUNCTIONS - ( 06 Mar 2021 08:12 )  Alb: 2.9 g/dL / Pro: 6.3 g/dL / ALK PHOS: 74 U/L / ALT: 81 U/L / AST: 63 U/L / GGT: x           PT/INR - ( 07 Mar 2021 07:34 )   PT: 13.0 sec;   INR: 1.14 ratio             D-Dimer Assay, Quantitative: 550 ng/mL DDU (03-06 @ 08:12)  D-Dimer Assay, Quantitative: 371 ng/mL DDU (03-05 @ 07:27)  D-Dimer Assay, Quantitative: 213 ng/mL DDU (03-03 @ 07:28)  D-Dimer Assay, Quantitative: 178 ng/mL DDU (02-28 @ 23:03)  Procalcitonin, Serum: 0.41 ng/mL (03-06 @ 08:12)        RECENT CULTURES:        RESPIRATORY CULTURES:        rds< from: Xray Chest 1 View- PORTABLE-Urgent (Xray Chest 1 View- PORTABLE-Urgent .) (02.28.21 @ 23:33) >  N:  EXAMINATION: XR CHEST URGENT    CLINICAL INDICATION: sob, likely covid    TECHNIQUE: Single portable view of the chest was obtained.    COMPARISON: None.    FINDINGS:    The cardiomediastinal silhouette is not well evaluated in this projection.    Extensive bilateral patchy focal airspace opacities with a predominantly peripheral distribution consistent with Covid 19.    No large pleural effusions orpneumothoraces. Degenerative changes of the spine.    IMPRESSION:  Covid 19 pneumonia.            JANETH CAMPBELL MD; Resident Radiology  This document has been electronically signed.  LEONARDO PELLETIER MD; Attending Radiologist  This document has been electronically signed. Mar  1 2021  6:51AM    < end of copied text >    Studies  Chest X-RAY  CT SCAN Chest   Venous Dopplers: LE:   CT Abdomen  Others

## 2021-03-07 NOTE — CONSULT NOTE ADULT - ASSESSMENT
60 m with Brain tumor s/p resection in 2018, presented 2/28 with SOB and cough and was admitted for hypoxic respiratory failure due to COVID  initially afebrile with normal WBC  s/p remdesivir and on dexa since 3/1, now day 7  started to have fevers 3/5 and WBC increased to 18  procalcitonin was 0.10 and increased to 0.41  he denied new cough, no sputum and was comfortable on high flow     hypoxic respiratory failure due ot COVID pneumonia  new fever and leukocytosis, r/o bacterial infection, could also be reactive or steroid induced  procalcitonin also increased but still not significantly higher now 0.41    * repeat a CXR  * check a blood  cx  * c/w zosyn for now until the cultures are back  * monitor the CRP, Ferritin D-dimer  * monitor the O2 sat and taper as tolerated  * c/w dexa 6 qd for up to a 10 day course, started 3/1, now day 7    The above assessment and plan was discussed with the primary team    Anita Alexis MD  Pager [phone]  After 5pm and on weekends call [phone]

## 2021-03-08 NOTE — PROGRESS NOTE ADULT - SUBJECTIVE AND OBJECTIVE BOX
Follow Up:  COVID, leukocytosis    Interval History: pt still tachycardic and WBC increased to 22, D-Dimer also significantly higher to 2003    ROS:      All other systems negative    Constitutional: no fever, no chills  Respiratory: +SOB, slight dry cough  GI:  no abd pain, no vomiting, no diarrhea  urinary: no dysuria, no hematuria, no flank pain  musculoskeletal:  no joint pain, no joint swelling  skin:  no rash  neurology:  no headache, no seizure    Allergies  No Known Allergies        ANTIMICROBIALS:  piperacillin/tazobactam IVPB.. 3.375 every 8 hours      OTHER MEDS:  acetaminophen   Tablet .. 650 milliGRAM(s) Oral every 4 hours PRN  ALBUTerol    90 MICROgram(s) HFA Inhaler 2 Puff(s) Inhalation every 4 hours  ascorbic acid 500 milliGRAM(s) Oral daily  benzonatate 100 milliGRAM(s) Oral three times a day PRN  cholecalciferol 2000 Unit(s) Oral daily  dexAMETHasone  Injectable 6 milliGRAM(s) IV Push every 24 hours  enoxaparin Injectable 40 milliGRAM(s) SubCutaneous daily  melatonin 3 milliGRAM(s) Oral at bedtime PRN  multivitamin 1 Tablet(s) Oral daily  sodium chloride 0.65% Nasal 1 Spray(s) Both Nostrils three times a day PRN      Vital Signs Last 24 Hrs  T(C): 36.8 (08 Mar 2021 13:02), Max: 36.8 (08 Mar 2021 05:34)  T(F): 98.2 (08 Mar 2021 13:02), Max: 98.2 (08 Mar 2021 05:34)  HR: 117 (08 Mar 2021 14:16) (83 - 119)  BP: 141/74 (08 Mar 2021 13:02) (114/58 - 148/65)  BP(mean): --  RR: 24 (08 Mar 2021 13:02) (20 - 24)  SpO2: 100% (08 Mar 2021 14:16) (92% - 100%)    Physical Exam:  General:  non toxic  Cardio:  tachy  regular S1,S2  Respiratory:  on high flow, slightly tachypneic  abd:   soft, BS +, not tender  :     no CVAT, no suprapubic tenderness, no noel  Musculoskeletal : no joint swelling, no edema  Skin:    no rash  vascular: no phlebitis                          14.6   22.84 )-----------( 322      ( 08 Mar 2021 07:05 )             44.5       03-08    139  |  103  |  18  ----------------------------<  104<H>  3.8   |  22  |  0.88    Ca    9.4      08 Mar 2021 07:05  Phos  3.2     03-08  Mg     2.2     03-08    TPro  6.9  /  Alb  2.9<L>  /  TBili  0.7  /  DBili  0.2  /  AST  62<H>  /  ALT  72<H>  /  AlkPhos  90  03-08          MICROBIOLOGY:  v            RADIOLOGY:  Images independently visualized and reviewed personally, findings as below  < from: Xray Chest 1 View- PORTABLE-Urgent (Xray Chest 1 View- PORTABLE-Urgent .) (03.07.21 @ 13:08) >  FINDINGS/  IMPRESSION:  The heart is similar in size.  Progression of extensive patchy airspace opacities (left more than right).  No large pleural effusion. No pneumothorax.    < end of copied text >

## 2021-03-08 NOTE — CHART NOTE - NSCHARTNOTEFT_GEN_A_CORE
RRT called for hypoxia, pt was noted to be desatting and was subsequently found to be disconnected from Hiflow nasal cannula. Pt satting is low 70's-80s on arrival. Pt was switched to AVAPS during RRT and SPO2 improved to 99%. Pt also noted to be febrile to 101.5 - 1g Tylenol given IV. Discussed with respiratory, will attempt to switch to Bipap later this afternoon/evening if pt remains stable. Pt's wife updated on events. Discussed case with ID as well - cont Zosyn for while blood pending. As per Dr. Dennis start on full dose AC if cleared by neurosurgery due to elevated/increasing D-Dimer.    Was able to get in touch with patient's neurosurgery team at [facility], Dr. Ousmane Lozano's ALISSA Cooper who follows the patient - there is no contraindication to starting full dose AC, will therefore begin AC with full dose heparin gtt. Dr. Paul made aware. RRT called for hypoxia, pt was noted to be desatting and was subsequently found to be disconnected from Hiflow nasal cannula. Pt satting is low 70's-80s on arrival. Pt was switched to AVAPS during RRT and SPO2 improved to 99%. Pt also noted to be febrile to 101.5 - 1g Tylenol given IV. Discussed with respiratory, will attempt to switch to Bipap later this afternoon/evening if pt remains stable. Pt's wife updated on events. Discussed case with ID as well - cont Zosyn for now while blood cultures are pending. As per Dr. Barroso start on full dose AC if cleared by neurosurgery due to elevated/increasing D-Dimer.    Was able to get in touch with patient's neurosurgery team at [facility], Dr. Ousmane Lozano's PA Allison who follows the patient - there is no contraindication to starting full dose AC, will therefore begin AC with full dose heparin gtt. Dr. Paul made aware. RRT called for hypoxia, pt was noted to be desatting and was subsequently found to be disconnected from Hiflow nasal cannula. Pt satting is low 70's-80s on arrival. Pt was switched to AVAPS during RRT and SPO2 improved to 99%. Pt also noted to be febrile to 101.5 - 1g Tylenol given IV. Discussed with respiratory, will attempt to switch to Bipap later this afternoon/evening if pt remains stable. Pt's wife updated on events. Discussed case with ID as well - cont Zosyn for now while blood cultures are pending. As per Dr. Barroso start on full dose AC if cleared by neurosurgery due to elevated/increasing D-Dimer.    Was able to get in touch with patient's neurosurgery team at [facility], Dr. Ousmane Lozano's PA Allison who follows the patient - there is no contraindication to starting full dose AC, will therefore begin AC with full dose heparin gtt. Dr. Paul made aware and updated on events.

## 2021-03-08 NOTE — PROGRESS NOTE ADULT - ASSESSMENT
This is a 61yo Male w/ PMHx of a Brain tumor s/p resection in 2018, who is presenting with worsening shortness of breath and non-productive cough for past week. Found to be COVID-19 Positive. Py hypoxic to 87% on RA CXR c/w COVID pneumonia. Admitted for further treatment of COVID pneumonia/hypoxemia.     Covid 19 Pneumonia: Hypoxic resp failure  Brain tumor:        3/3:      Covid 19 Pneumonia: Hypoxic resp failure: on covid rx: he is on 50% fio2: LFTS are mildly elevated: folow d dimer: currently OK  Brain tumor:  s/p resection:  DVT propahyxlis  dw team    3/4:      Covid 19 Pneumonia: Hypoxic resp failure: on covid rx: he is on 50% fio2 today : increased fr om yesterday :looks better then yesterday : no overnight events Cont dexa"for a total of 10 dys: LFT as are improving and d dimer has mildly increased:   Brain tumor:  s/p resection:  DVT kathleenyxlianas  kaylynn team : overall p rognosis is very giuarded"     3/7:      Covid 19 Pneumonia: Hypoxic resp failure: on covid rx: he is on100% fio2 today : remdesivir finished: on dexa: 7/10: pretty hypoxic: and has low grade fever: wbc is increasing: will start empiric zosyn: do chest xray : ID consult  Brain tumor:  s/p resection:  DVT propahyxlis  : overall p rognosis is very guarded stll :  KAYLYNN NP     3/8    Covid 19 Pneumonia: Hypoxic resp failure: on covid rx: he is on100% fio2 today : remdesivir finished: on dexa: 8/10: pretty hypoxic: and has low grade fever: wbc is increasing:  started empiric zosyn: do chest xray : with worsening: seen by ID:  His d dimer has increased significantly: start heparin  : call MICU with furterh deterioration as he might need intubation any time  Brain tumor:  s/p resection:  DVT propahyxlis  : overall prognosis is very guarded stll :  KAYLYNN CHAIDEZ NP

## 2021-03-08 NOTE — PROGRESS NOTE ADULT - PROBLEM SELECTOR PLAN 1
- COVID-19 PCR: Positive  - CXR showing b/l opacities c/w COVID PNA   - Pt was initially hypoxic to 87% on RA.   - Pt currently requiring supplemental oxygen via NC 6L.  - c/w Dexamethasone 7/10 days , completed  Remdesivir   - Inflammatory markers q72hrs   - Monitor SP02 on continuous pulse ox  - Prone positioning, Incentive spirometry and Chest PT PRN.  - Albuterol, Tessalon pearls, Tylenol PRN  - HOB elevation  started on zosyn

## 2021-03-08 NOTE — PROGRESS NOTE ADULT - PROBLEM SELECTOR PLAN 2
- 2/2 to COVID-19 infection  - c/w Dexamethasone , completed  Remdesivir  - Inflammatory markers q72hrs   - Trend fever curve

## 2021-03-08 NOTE — CONSULT NOTE ADULT - SUBJECTIVE AND OBJECTIVE BOX
CHIEF COMPLAINT:    HPI: 60M w/ PMHx of a Brain tumor s/p resection in 2018, who is presenting with worsening shortness of breath and non-productive cough for past week. Pt reports he went to his PMD for his symptoms, had a negative COVID swab, was instructed to take an inhaler; however, it has given him little relief.  His SOB was getting worse which prompted him to come to ED. He has increased lethargy and ALVARES. Decreased appetite. He denies having fevers at home, no sick contacts, no recent travel, no loss of taste/smell. Pt denies having chest pain, palpitations, HA, dizziness, visual changes, abdominal pain, N/V/D/C, dysuria, hematuria, melena or hematochezia.     ED Course: EK bpm, Sinus tachycardia, qtc: 419. CXR: c/w COVID PNA. COVID-19 PCR: Positive. Initially SPO2 87% on RA. Now SPO2 95% on 6L NC. s/p Decadron and NS IVF in ED.     Pt admitted for hypoxic respiratory failure 2/2 COVID. RRT called for worsening hypoxic respiratory failure today, found to be disconnected from high flow NC. Placed on AVAPS with Vt 350, RR 14, EPAP 6, FiO2 100%. Pt seen and examined at bedside. Says his breathing his subjectively improved since the morning.     PAST MEDICAL & SURGICAL HISTORY:  Brain cancer  in remission.    Brain tumor  s/p resection in 2018.    FAMILY HISTORY:  FH: type 2 diabetes    SOCIAL HISTORY:  No toxic habits x 3. Lives at home with wife.   Advance Directives:    Allergies    No Known Allergies    Intolerances        Home Medications:      REVIEW OF SYSTEMS:  Constitutional:   Eyes:  ENT:  CV:  Resp:  GI:  :  MSK:  Integumentary:  Neurological:  Psychiatric:  Endocrine:  Hematologic/Lymphatic:  Allergic/Immunologic:  [ ] All other systems negative  [X] Unable to assess ROS because difficulty communicating on AVAPS    OBJECTIVE:  ICU Vital Signs Last 24 Hrs  T(C): 37.6 (08 Mar 2021 15:31), Max: 37.6 (08 Mar 2021 15:31)  T(F): 99.7 (08 Mar 2021 15:31), Max: 99.7 (08 Mar 2021 15:31)  HR: 106 (08 Mar 2021 15:31) (95 - 119)  BP: 124/80 (08 Mar 2021 15:31) (114/58 - 148/65)  RR: 24 (08 Mar 2021 15:31) (20 - 24)  SpO2: 96% (08 Mar 2021 15:31) (92% - 100%)    Mode: NIV (Noninvasive Ventilation), RR (machine): 14, TV (machine): 350, FiO2: 100, PEEP: 6, ITime: 1, PIP: 12     @ 07:  -   @ 07:00  --------------------------------------------------------  IN: 120 mL / OUT: 1200 mL / NET: -1080 mL     @ 07: @ 17:07  --------------------------------------------------------  IN: 15 mL / OUT: 400 mL / NET: -385 mL    CAPILLARY BLOOD GLUCOSE    POCT Blood Glucose.: 105 mg/dL (08 Mar 2021 14:00)    PHYSICAL EXAM:  General: resting comfortably sitting supine  HEENT: NCAT  Respiratory: tachypneic, CTABL  Cardiovascular: S1, S2, no JVD  Abdomen: soft, nt ,nd   Extremities: no peripheral edema  Skin: no visible lesions  Neurological: no focal deficits  Psychiatry: appropriate affect    HOSPITAL MEDICATIONS:  MEDICATIONS  (STANDING):  ALBUTerol    90 MICROgram(s) HFA Inhaler 2 Puff(s) Inhalation every 4 hours  ascorbic acid 500 milliGRAM(s) Oral daily  cholecalciferol 2000 Unit(s) Oral daily  dexAMETHasone  Injectable 6 milliGRAM(s) IV Push every 24 hours  heparin  Infusion.  Unit(s)/Hr (15 mL/Hr) IV Continuous <Continuous>  multivitamin 1 Tablet(s) Oral daily  piperacillin/tazobactam IVPB.. 3.375 Gram(s) IV Intermittent every 8 hours    MEDICATIONS  (PRN):  acetaminophen   Tablet .. 650 milliGRAM(s) Oral every 4 hours PRN Temp greater or equal to 38.5C (101.3F)  benzonatate 100 milliGRAM(s) Oral three times a day PRN Cough  heparin   Injectable 6500 Unit(s) IV Push every 6 hours PRN For aPTT less than 40  heparin   Injectable 3000 Unit(s) IV Push every 6 hours PRN For aPTT between 40 - 57  melatonin 3 milliGRAM(s) Oral at bedtime PRN Insomnia  sodium chloride 0.65% Nasal 1 Spray(s) Both Nostrils three times a day PRN Nasal Dryness      LABS:                        14.6   22.84 )-----------( 322      ( 08 Mar 2021 07:05 )             44.5     03-08    139  |  103  |  18  ----------------------------<  104<H>  3.8   |  22  |  0.88    Ca    9.4      08 Mar 2021 07:05  Phos  3.2     03-08  Mg     2.2     03-08    TPro  6.9  /  Alb  2.9<L>  /  TBili  0.7  /  DBili  0.2  /  AST  62<H>  /  ALT  72<H>  /  AlkPhos  90  03-08    PT/INR - ( 08 Mar 2021 07:05 )   PT: 13.6 sec;   INR: 1.20 ratio      MICROBIOLOGY:     RADIOLOGY:  [ ] Reviewed and interpreted by me    EKG:   CHIEF COMPLAINT:    HPI: 60M w/ PMHx of a Brain tumor s/p resection in 2018, who is presenting with worsening shortness of breath and non-productive cough for past week. Pt reports he went to his PMD for his symptoms, had a negative COVID swab, was instructed to take an inhaler; however, it has given him little relief.  His SOB was getting worse which prompted him to come to ED. He has increased lethargy and ALVARES. Decreased appetite. He denies having fevers at home, no sick contacts, no recent travel, no loss of taste/smell. Pt denies having chest pain, palpitations, HA, dizziness, visual changes, abdominal pain, N/V/D/C, dysuria, hematuria, melena or hematochezia.     ED Course: EK bpm, Sinus tachycardia, qtc: 419. CXR: c/w COVID PNA. COVID-19 PCR: Positive. Initially SPO2 87% on RA. Now SPO2 95% on 6L NC. s/p Decadron and NS IVF in ED.     Pt admitted for hypoxic respiratory failure 2/2 COVID. RRT called for worsening hypoxic respiratory failure today, found to be disconnected from high flow NC. Placed on AVAPS with Vt 350, RR 14, EPAP 6, FiO2 100%. Pt seen and examined at bedside. Says his breathing his subjectively improved since the morning.     PAST MEDICAL & SURGICAL HISTORY:  Brain cancer  in remission.    Brain tumor  s/p resection in 2018.    FAMILY HISTORY:  FH: type 2 diabetes    SOCIAL HISTORY:  No toxic habits x 3. Lives at home with wife.   Advance Directives:    Allergies    No Known Allergies    Intolerances      Home Medications:      REVIEW OF SYSTEMS:  Constitutional:   Eyes:  ENT:  CV:  Resp:  GI:  :  MSK:  Integumentary:  Neurological:  Psychiatric:  Endocrine:  Hematologic/Lymphatic:  Allergic/Immunologic:  [ ] All other systems negative  [X] Unable to assess ROS because difficulty communicating on AVAPS    OBJECTIVE:  ICU Vital Signs Last 24 Hrs  T(C): 37.6 (08 Mar 2021 15:31), Max: 37.6 (08 Mar 2021 15:31)  T(F): 99.7 (08 Mar 2021 15:31), Max: 99.7 (08 Mar 2021 15:31)  HR: 106 (08 Mar 2021 15:31) (95 - 119)  BP: 124/80 (08 Mar 2021 15:31) (114/58 - 148/65)  RR: 24 (08 Mar 2021 15:31) (20 - 24)  SpO2: 96% (08 Mar 2021 15:31) (92% - 100%)    Mode: NIV (Noninvasive Ventilation), RR (machine): 14, TV (machine): 350, FiO2: 100, PEEP: 6, ITime: 1, PIP: 12     @ 07:  -   @ 07:00  --------------------------------------------------------  IN: 120 mL / OUT: 1200 mL / NET: -1080 mL     @ 07: @ 17:07  --------------------------------------------------------  IN: 15 mL / OUT: 400 mL / NET: -385 mL    CAPILLARY BLOOD GLUCOSE    POCT Blood Glucose.: 105 mg/dL (08 Mar 2021 14:00)    PHYSICAL EXAM:  General: resting comfortably sitting supine  HEENT: NCAT  Respiratory: tachypneic, L rhonchi otherwise CTABL  Cardiovascular: S1, S2, no JVD  Abdomen: soft, nt ,nd   Extremities: no peripheral edema  Skin: no visible lesions  Neurological: no focal deficits  Psychiatry: appropriate affect    HOSPITAL MEDICATIONS:  MEDICATIONS  (STANDING):  ALBUTerol    90 MICROgram(s) HFA Inhaler 2 Puff(s) Inhalation every 4 hours  ascorbic acid 500 milliGRAM(s) Oral daily  cholecalciferol 2000 Unit(s) Oral daily  dexAMETHasone  Injectable 6 milliGRAM(s) IV Push every 24 hours  heparin  Infusion.  Unit(s)/Hr (15 mL/Hr) IV Continuous <Continuous>  multivitamin 1 Tablet(s) Oral daily  piperacillin/tazobactam IVPB.. 3.375 Gram(s) IV Intermittent every 8 hours    MEDICATIONS  (PRN):  acetaminophen   Tablet .. 650 milliGRAM(s) Oral every 4 hours PRN Temp greater or equal to 38.5C (101.3F)  benzonatate 100 milliGRAM(s) Oral three times a day PRN Cough  heparin   Injectable 6500 Unit(s) IV Push every 6 hours PRN For aPTT less than 40  heparin   Injectable 3000 Unit(s) IV Push every 6 hours PRN For aPTT between 40 - 57  melatonin 3 milliGRAM(s) Oral at bedtime PRN Insomnia  sodium chloride 0.65% Nasal 1 Spray(s) Both Nostrils three times a day PRN Nasal Dryness      LABS:                        14.6   22.84 )-----------( 322      ( 08 Mar 2021 07:05 )             44.5     03-08    139  |  103  |  18  ----------------------------<  104<H>  3.8   |  22  |  0.88    Ca    9.4      08 Mar 2021 07:05  Phos  3.2     03-08  Mg     2.2     03-08    TPro  6.9  /  Alb  2.9<L>  /  TBili  0.7  /  DBili  0.2  /  AST  62<H>  /  ALT  72<H>  /  AlkPhos  90  03-08    PT/INR - ( 08 Mar 2021 07:05 )   PT: 13.6 sec;   INR: 1.20 ratio      MICROBIOLOGY:     RADIOLOGY:  [ ] Reviewed and interpreted by me    EKG:   CHIEF COMPLAINT:    HPI: 60M w/ PMHx of a Brain tumor s/p resection in 2018, who is presenting with worsening shortness of breath and non-productive cough for past week. Pt reports he went to his PMD for his symptoms, had a negative COVID swab, was instructed to take an inhaler; however, it has given him little relief.  His SOB was getting worse which prompted him to come to ED. He has increased lethargy and ALVARES. Decreased appetite. He denies having fevers at home, no sick contacts, no recent travel, no loss of taste/smell. Pt denies having chest pain, palpitations, HA, dizziness, visual changes, abdominal pain, N/V/D/C, dysuria, hematuria, melena or hematochezia.     ED Course: EK bpm, Sinus tachycardia, qtc: 419. CXR: c/w COVID PNA. COVID-19 PCR: Positive. Initially SPO2 87% on RA. Now SPO2 95% on 6L NC. s/p Decadron and NS IVF in ED.     Pt admitted for hypoxic respiratory failure 2/2 COVID. RRT called for worsening hypoxic respiratory failure today, found to be disconnected from high flow NC. Placed on AVAPS with Vt 350, RR 14, EPAP 6, FiO2 100%. Pt seen and examined at bedside. Says his breathing his subjectively improved since the morning. Pt pulling 700-1100 TV with RR 30s-40s. On re-exam, patient was more calm, breathing at 32-34.     PAST MEDICAL & SURGICAL HISTORY:  Brain cancer  in remission.    Brain tumor  s/p resection in 2018.    FAMILY HISTORY:  FH: type 2 diabetes    SOCIAL HISTORY:  No toxic habits x 3. Lives at home with wife.   Advance Directives:    Allergies    No Known Allergies    Intolerances      Home Medications:      REVIEW OF SYSTEMS:  Constitutional:   Eyes:  ENT:  CV:  Resp:  GI:  :  MSK:  Integumentary:  Neurological:  Psychiatric:  Endocrine:  Hematologic/Lymphatic:  Allergic/Immunologic:  [ ] All other systems negative  [X] Unable to assess ROS because difficulty communicating on AVAPS    OBJECTIVE:  ICU Vital Signs Last 24 Hrs  T(C): 37.6 (08 Mar 2021 15:31), Max: 37.6 (08 Mar 2021 15:31)  T(F): 99.7 (08 Mar 2021 15:31), Max: 99.7 (08 Mar 2021 15:31)  HR: 106 (08 Mar 2021 15:31) (95 - 119)  BP: 124/80 (08 Mar 2021 15:31) (114/58 - 148/65)  RR: 24 (08 Mar 2021 15:31) (20 - 24)  SpO2: 96% (08 Mar 2021 15:31) (92% - 100%)    Mode: NIV (Noninvasive Ventilation), RR (machine): 14, TV (machine): 350, FiO2: 100, PEEP: 6, ITime: 1, PIP: 12     @ 07:01  -  -08 @ 07:00  --------------------------------------------------------  IN: 120 mL / OUT: 1200 mL / NET: -1080 mL     @ 07:01  -  08 @ 17:07  --------------------------------------------------------  IN: 15 mL / OUT: 400 mL / NET: -385 mL    CAPILLARY BLOOD GLUCOSE    POCT Blood Glucose.: 105 mg/dL (08 Mar 2021 14:00)    PHYSICAL EXAM:  General: resting comfortably sitting supine  HEENT: NCAT  Respiratory: tachypneic to 30s-40s, L rhonchi otherwise CTABL  Cardiovascular: S1, S2, no JVD  Abdomen: soft, nt ,nd   Extremities: no peripheral edema  Skin: no visible lesions  Neurological: no focal deficits  Psychiatry: appropriate affect    HOSPITAL MEDICATIONS:  MEDICATIONS  (STANDING):  ALBUTerol    90 MICROgram(s) HFA Inhaler 2 Puff(s) Inhalation every 4 hours  ascorbic acid 500 milliGRAM(s) Oral daily  cholecalciferol 2000 Unit(s) Oral daily  dexAMETHasone  Injectable 6 milliGRAM(s) IV Push every 24 hours  heparin  Infusion.  Unit(s)/Hr (15 mL/Hr) IV Continuous <Continuous>  multivitamin 1 Tablet(s) Oral daily  piperacillin/tazobactam IVPB.. 3.375 Gram(s) IV Intermittent every 8 hours    MEDICATIONS  (PRN):  acetaminophen   Tablet .. 650 milliGRAM(s) Oral every 4 hours PRN Temp greater or equal to 38.5C (101.3F)  benzonatate 100 milliGRAM(s) Oral three times a day PRN Cough  heparin   Injectable 6500 Unit(s) IV Push every 6 hours PRN For aPTT less than 40  heparin   Injectable 3000 Unit(s) IV Push every 6 hours PRN For aPTT between 40 - 57  melatonin 3 milliGRAM(s) Oral at bedtime PRN Insomnia  sodium chloride 0.65% Nasal 1 Spray(s) Both Nostrils three times a day PRN Nasal Dryness      LABS:                        14.6   22.84 )-----------( 322      ( 08 Mar 2021 07:05 )             44.5     03-08    139  |  103  |  18  ----------------------------<  104<H>  3.8   |  22  |  0.88    Ca    9.4      08 Mar 2021 07:05  Phos  3.2     03-08  Mg     2.2     03-08    TPro  6.9  /  Alb  2.9<L>  /  TBili  0.7  /  DBili  0.2  /  AST  62<H>  /  ALT  72<H>  /  AlkPhos  90  03-08    PT/INR - ( 08 Mar 2021 07:05 )   PT: 13.6 sec;   INR: 1.20 ratio      MICROBIOLOGY:     RADIOLOGY:  [ ] Reviewed and interpreted by me    EKG:

## 2021-03-08 NOTE — CONSULT NOTE ADULT - ASSESSMENT
60M with PMHx brain tumor s/p resection in 2018 presents with worsening SOB, nonproductive cough, found to have COVID, admitted for hypoxic respiratory failure s/p remdesivir 3/1-3/5, started empirically on zosyn 3/7 for superimposed PNA, dexamethasone 6 mg 3/2 -  60M with PMHx brain tumor s/p resection in 2018 presents with worsening SOB, nonproductive cough, found to have COVID, admitted for hypoxic respiratory failure s/p remdesivir 3/1-3/5, started empirically on zosyn 3/7 for superimposed PNA, dexamethasone 6 mg (3/2 - ) on supplemental oxygen.     # Hypoxic respiratory failure 2/2 COVID  Pt was on HFNC at 60L/100% FiO2 and became hypoxic to 80s. RRT called and was noted to have a disconnected HFNC. Placed on AVAPS  - C/w AVAPS; de-escalate as tolerated  - discussed GoC with patient; pt shook head no to intubation, even if temporary, even if it resulted in his demise. he explained by typing on his phone that he was previously intubated for brain tumor removal and did not want to experience that again. wife was called bedside and reviewed his wishes with her  - continue goals of care  - c/w zosyn for empiric PNA coverage 60M with PMHx brain tumor s/p resection in 2018 presents with worsening SOB, nonproductive cough, found to have COVID, admitted for hypoxic respiratory failure s/p remdesivir 3/1-3/5, started empirically on zosyn 3/7 for superimposed PNA, dexamethasone 6 mg (3/2 - ) on supplemental oxygen.     # Hypoxic respiratory failure 2/2 COVID  Pt was on HFNC at 60L/100% FiO2 and became hypoxic to 80s. RRT called and was noted to have a disconnected HFNC. Placed on AVAPS  - C/w AVAPS; de-escalate as tolerated  - discussed GoC with patient; pt shook head no to intubation, even if temporary, even if it resulted in his demise. he explained by typing on his phone that he was previously intubated for brain tumor removal and did not want to experience that again. wife was called bedside and reviewed his wishes with her  - continue goals of care with patient  - c/w zosyn for empiric PNA coverage    Pt not a MICU candidate at this time. If pt becomes amenable to intubation, reconsult as needed.

## 2021-03-08 NOTE — PROGRESS NOTE ADULT - SUBJECTIVE AND OBJECTIVE BOX
Patient is a 60y old  Male who presents with a chief complaint of COVID-19 Pneumonia (08 Mar 2021 14:35)      INTERVAL HISTORY: feels ok    REVIEW OF SYSTEMS:   CONSTITUTIONAL: No weakness  EYES/ENT: No visual changes; No throat pain  Neck: No pain or stiffness  Respiratory: No cough, wheezing, No shortness of breath  CARDIOVASCULAR: no chest pain or palpitations  GASTROINTESTINAL: No abdominal pain, no nausea, vomiting or hematemesis  GENITOURINARY: No dysuria, frequency or hematuria  NEUROLOGICAL: No stroke like symptoms  SKIN: No rashes  	  MEDICATIONS:    PHYSICAL EXAM:  T(C): 36.8 (03-08-21 @ 13:02), Max: 36.8 (03-08-21 @ 05:34)  HR: 117 (03-08-21 @ 14:16) (83 - 119)  BP: 141/74 (03-08-21 @ 13:02) (114/58 - 148/65)  RR: 24 (03-08-21 @ 13:02) (20 - 24)  SpO2: 100% (03-08-21 @ 14:16) (92% - 100%)  Wt(kg): --  I&O's Summary    07 Mar 2021 07:01  -  08 Mar 2021 07:00  --------------------------------------------------------  IN: 120 mL / OUT: 1200 mL / NET: -1080 mL    08 Mar 2021 07:01  -  08 Mar 2021 14:47  --------------------------------------------------------  IN: 0 mL / OUT: 400 mL / NET: -400 mL    Appearance: In no distress	  HEENT:    PERRL, EOMI	  Cardiovascular:  S1 S2, No JVD  Respiratory: Lungs clear to auscultation	  Gastrointestinal:  Soft, Non-tender, + BS	  Vascularature:  No edema of LE  Psychiatric: Appropriate affect   Neuro: no acute focal deficits                         14.6   22.84 )-----------( 322      ( 08 Mar 2021 07:05 )             44.5     03-08    139  |  103  |  18  ----------------------------<  104<H>  3.8   |  22  |  0.88    Ca    9.4      08 Mar 2021 07:05  Phos  3.2     03-08  Mg     2.2     03-08    TPro  6.9  /  Alb  2.9<L>  /  TBili  0.7  /  DBili  0.2  /  AST  62<H>  /  ALT  72<H>  /  AlkPhos  90  03-08  Labs personally reviewed    ASSESSMENT/PLAN: 	    This is a 61yo Male w/ PMHx of a Brain tumor s/p resection in 2018, who is presenting with worsening shortness of breath and non-productive cough for past week. Found to be COVID-19 Positive.Patient Sinus tachycardic in Emergency room.     1. Sinus Tachycardia 2/2 to hypoxia from COVID-19 Pneumonia vs dehydration- in ED patient was sating 87% on room air  - currently on high flow sating >90% but requiring increasing oxygen - heart rate well controlled in the 80's   - S/P IVF hydration  - trop negative  - D Dimer rising 2003 now- would need CTA r/o PE however given his high oxygen requirement likely not stable enough for test  - can obtain TTE to eval heart function once acute issues resolve    2. Hypoxemia 2/2 Covid-19 pna  - remdesevir completed. Deca for 10 days.. initiated on 3/1   - IZ zosyn on board  - c/w Supplemental Oxygen       3. Hyponatremic- 134 on admission likely from dehydration   - nearly normalized s/p IVF hydration    4. Transaminitis  - elevated LFTs on admission, albumin slightly low however now nearly normalized, bilirubin wnl   - continue to monitor     5.  Fevers/Leukocytosis -  Plan: -on Zosyn, ID following pt  f/u Bcx        Arpita Rodriguez ANP-C  Jaswant Weiss DO [facility]  Cardiovascular Medicine  [address] [place] [address], [address]  Office: [phone]  Cell: [phone] Patient is a 60y old  Male who presents with a chief complaint of COVID-19 Pneumonia (08 Mar 2021 14:35)      INTERVAL HISTORY:s/p RRT for hypoxia- now on AVAP    REVIEW OF SYSTEMS:   CONSTITUTIONAL: No weakness  EYES/ENT: No visual changes; No throat pain  Neck: No pain or stiffness  Respiratory: No cough, wheezing, +shortness of breath  CARDIOVASCULAR: no chest pain or palpitations  GASTROINTESTINAL: No abdominal pain, no nausea, vomiting or hematemesis  GENITOURINARY: No dysuria, frequency or hematuria  NEUROLOGICAL: No stroke like symptoms  SKIN: No rashes  	  MEDICATIONS:    PHYSICAL EXAM:  T(C): 36.8 (03-08-21 @ 13:02), Max: 36.8 (03-08-21 @ 05:34)  HR: 117 (03-08-21 @ 14:16) (83 - 119)  BP: 141/74 (03-08-21 @ 13:02) (114/58 - 148/65)  RR: 24 (03-08-21 @ 13:02) (20 - 24)  SpO2: 100% (03-08-21 @ 14:16) (92% - 100%)  Wt(kg): --  I&O's Summary    07 Mar 2021 07:01  -  08 Mar 2021 07:00  --------------------------------------------------------  IN: 120 mL / OUT: 1200 mL / NET: -1080 mL    08 Mar 2021 07:01  -  08 Mar 2021 14:47  --------------------------------------------------------  IN: 0 mL / OUT: 400 mL / NET: -400 mL    Appearance: In no distress	  HEENT:    PERRL, EOMI	  Cardiovascular:  S1 S2, No JVD  Respiratory: Lungs clear to auscultation	  Gastrointestinal:  Soft, Non-tender, + BS	  Vascularature:  No edema of LE  Psychiatric: Appropriate affect   Neuro: no acute focal deficits                         14.6   22.84 )-----------( 322      ( 08 Mar 2021 07:05 )             44.5     03-08    139  |  103  |  18  ----------------------------<  104<H>  3.8   |  22  |  0.88    Ca    9.4      08 Mar 2021 07:05  Phos  3.2     03-08  Mg     2.2     03-08    TPro  6.9  /  Alb  2.9<L>  /  TBili  0.7  /  DBili  0.2  /  AST  62<H>  /  ALT  72<H>  /  AlkPhos  90  03-08  Labs personally reviewed    ASSESSMENT/PLAN: 	    This is a 61yo Male w/ PMHx of a Brain tumor s/p resection in 2018, who is presenting with worsening shortness of breath and non-productive cough for past week. Found to be COVID-19 Positive.Patient Sinus tachycardic in Emergency room.     1. Sinus Tachycardia 2/2 to hypoxia from COVID-19 Pneumonia vs dehydration- in ED patient was sating 87% on room air  - currently on high flow sating >90% but requiring increasing oxygen - heart rate well controlled in the 80's   - S/P IVF hydration  - trop negative  - D Dimer rising 2003 now- would need CTA r/o PE however given his high oxygen requirement likely not stable enough for test  - can obtain TTE to eval heart function once acute issues resolve    2. Hypoxemia 2/2 Covid-19 pna  - remdesevir completed. Deca for 10 days.. initiated on 3/1   - IZ zosyn on board  - s/p RRT for hypoxia 3/8 - now on avap      3. Hyponatremic- 134 on admission likely from dehydration   - nearly normalized s/p IVF hydration    4. Transaminitis  - elevated LFTs on admission, albumin slightly low however now nearly normalized, bilirubin wnl   - continue to monitor     5.  Fevers/Leukocytosis -  Plan: -on Zosyn, ID following pt  f/u Bcx        Arpita Rodriguez ANP-C  Jaswant Weiss DO [facility]  Cardiovascular Medicine  [address], [address]  Office: [phone]  Cell: [phone]

## 2021-03-08 NOTE — RAPID RESPONSE TEAM SUMMARY - NSSITUATIONBACKGROUNDRRT_GEN_ALL_CORE
59yo Male w/ PMHx of a Brain tumor s/p resection in 2018, who is presenting with worsening shortness of breath and non-productive cough for past week. Found to be COVID-19 Positive. Py hypoxic to 87% on RA CXR c/w COVID pneumonia.    RRT called for hypoxia to low 80s. Patient found on NRB however extremely anxious. SpO2 70s to 80s. Previously on HFNC which disconnected and as per primary team patient is a "mouth breather" and likely patient de-recruited. Placed on AVAPs with SpO2 responding > 92%. HR 120s to 130s. BP initially in 200s with repeat in 170s. On Decadron s/p remdesivir. D-dimer rising to 2000s. Patient appeared much more comfortable once placed on AVAPs and reports feeling better. Rectal temp 101.5F. Give 1g of IV Tylenol. Suspect COVID-induced fever however patient on Zosyn for empiric coverage for bacterial PNA? Procalcitonin levels low and leukocytosis probably 2/2 to glucocorticoids. Blood cultures pending. Recommended to repeat BP in 1 hour as patient was initially in a lot of distress. Clarify GOC. Primary team will f/u with NSGY regarding if safe to start chemoprophylaxis with subQ heparin. Pulm following. Primary and nursing staff in agreement with plan. RRT concluded.

## 2021-03-08 NOTE — CHART NOTE - NSCHARTNOTEFT_GEN_A_CORE
Discussed intubation with patient - patient is agreeable to trial of intubation if needed. MICU made aware - will f/u recs. COntinue AVAPS for now

## 2021-03-08 NOTE — PROGRESS NOTE ADULT - ASSESSMENT
60 m with Brain tumor s/p resection in 2018, presented 2/28 with SOB and cough and was admitted for hypoxic respiratory failure due to COVID  initially afebrile with normal WBC  s/p remdesivir and on dexa since 3/1, now day 7  started to have fevers 3/5 and WBC increased to 18  procalcitonin was 0.10 and increased to 0.41  he denied new cough, no sputum and was comfortable on high flow     hypoxic respiratory failure due ot COVID pneumonia  new fever and leukocytosis, r/o bacterial infection, could also be reactive or steroid induced  procalcitonin also increased but still not significantly higher now 0.41  D-Dimer now 2003  * would get a chest CTA to r/o PE  * f/u the blood  cx  * c/w zosyn for now, started 3/7, now day 2  * monitor the O2 sat and taper as tolerated  * s/p a course of remdesivir  * c/w dexa 6 qd for up to a 10 day course, started 3/1, now day 8    The above assessment and plan was discussed with the primary team    Anita Alexis MD  Pager [phone]  After 5pm and on weekends call [phone]

## 2021-03-08 NOTE — PROGRESS NOTE ADULT - SUBJECTIVE AND OBJECTIVE BOX
Date of Service: 03-08-21 @ 12:53    Patient is a 60y old  Male who presents with a chief complaint of COVID-19 Pneumonia (07 Mar 2021 20:18)      Any change in ROS: not doing well today : he is on 100% high flow as wellas 100% NRB     MEDICATIONS  (STANDING):  ALBUTerol    90 MICROgram(s) HFA Inhaler 2 Puff(s) Inhalation every 4 hours  ascorbic acid 500 milliGRAM(s) Oral daily  cholecalciferol 2000 Unit(s) Oral daily  dexAMETHasone  Injectable 6 milliGRAM(s) IV Push every 24 hours  enoxaparin Injectable 40 milliGRAM(s) SubCutaneous daily  multivitamin 1 Tablet(s) Oral daily  piperacillin/tazobactam IVPB.. 3.375 Gram(s) IV Intermittent every 8 hours    MEDICATIONS  (PRN):  acetaminophen   Tablet .. 650 milliGRAM(s) Oral every 4 hours PRN Temp greater or equal to 38.5C (101.3F)  benzonatate 100 milliGRAM(s) Oral three times a day PRN Cough  melatonin 3 milliGRAM(s) Oral at bedtime PRN Insomnia  sodium chloride 0.65% Nasal 1 Spray(s) Both Nostrils three times a day PRN Nasal Dryness    Vital Signs Last 24 Hrs  T(C): 36.8 (08 Mar 2021 05:34), Max: 36.8 (08 Mar 2021 05:34)  T(F): 98.2 (08 Mar 2021 05:34), Max: 98.2 (08 Mar 2021 05:34)  HR: 114 (08 Mar 2021 11:08) (83 - 119)  BP: 148/65 (08 Mar 2021 05:34) (114/58 - 148/65)  BP(mean): --  RR: 24 (08 Mar 2021 11:08) (20 - 24)  SpO2: 92% (08 Mar 2021 11:08) (92% - 100%)    I&O's Summary    07 Mar 2021 07:01  -  08 Mar 2021 07:00  --------------------------------------------------------  IN: 120 mL / OUT: 1200 mL / NET: -1080 mL    08 Mar 2021 07:01  -  08 Mar 2021 12:53  --------------------------------------------------------  IN: 0 mL / OUT: 400 mL / NET: -400 mL          Physical Exam:   GENERAL: NAD, well-groomed, well-developed  HEENT: JOAO/   Atraumatic, Normocephalic  ENMT: No tonsillar erythema, exudates, or enlargement; Moist mucous membranes, Good dentition, No lesions  NECK: Supple, No JVD, Normal thyroid  CHEST/LUNG: bibasilar crackles+  CVS: Regular rate and rhythm; No murmurs, rubs, or gallops  GI: : Soft, Nontender, Nondistended; Bowel sounds present  NERVOUS SYSTEM:  Alert & Oriented X3  EXTREMITIES:  2+ Peripheral Pulses, No clubbing, cyanosis, or edema  LYMPH: No lymphadenopathy noted  SKIN: No rashes or lesions  ENDOCRINOLOGY: No Thyromegaly  PSYCH: Appropriate    Labs:                              14.6   22.84 )-----------( 322      ( 08 Mar 2021 07:05 )             44.5                         14.2   18.00 )-----------( 358      ( 07 Mar 2021 07:34 )             42.8                         13.7   13.74 )-----------( 346      ( 06 Mar 2021 08:12 )             43.5                         13.7   11.40 )-----------( 326      ( 05 Mar 2021 07:27 )             43.3     03-08    139  |  103  |  18  ----------------------------<  104<H>  3.8   |  22  |  0.88  03-07    138  |  101  |  18  ----------------------------<  135<H>  4.1   |  25  |  0.85  03-06    137  |  102  |  18  ----------------------------<  92  4.3   |  25  |  0.78  03-05    139  |  102  |  16  ----------------------------<  100<H>  3.9   |  25  |  0.75    Ca    9.4      08 Mar 2021 07:05  Ca    9.1      07 Mar 2021 07:34  Phos  3.2     03-08  Phos  4.0     03-07  Mg     2.2     03-08  Mg     2.2     03-07    TPro  6.9  /  Alb  2.9<L>  /  TBili  0.7  /  DBili  0.2  /  AST  62<H>  /  ALT  72<H>  /  AlkPhos  90  03-08  TPro  6.3  /  Alb  2.9<L>  /  TBili  0.7  /  DBili  0.2  /  AST  63<H>  /  ALT  81<H>  /  AlkPhos  74  03-06  TPro  6.8  /  Alb  3.0<L>  /  TBili  0.6  /  DBili  0.2  /  AST  65<H>  /  ALT  83<H>  /  AlkPhos  73  03-05    CAPILLARY BLOOD GLUCOSE          LIVER FUNCTIONS - ( 08 Mar 2021 07:05 )  Alb: 2.9 g/dL / Pro: 6.9 g/dL / ALK PHOS: 90 U/L / ALT: 72 U/L / AST: 62 U/L / GGT: x           PT/INR - ( 08 Mar 2021 07:05 )   PT: 13.6 sec;   INR: 1.20 ratio             D-Dimer Assay, Quantitative: 2003 ng/mL DDU (03-08 @ 07:05)  D-Dimer Assay, Quantitative: 550 ng/mL DDU (03-06 @ 08:12)  D-Dimer Assay, Quantitative: 371 ng/mL DDU (03-05 @ 07:27)  D-Dimer Assay, Quantitative: 213 ng/mL DDU (03-03 @ 07:28)  Procalcitonin, Serum: 0.41 ng/mL (03-06 @ 08:12)        RECENT CULTURES:    r< from: Xray Chest 1 View- PORTABLE-Urgent (Xray Chest 1 View- PORTABLE-Urgent .) (03.07.21 @ 13:08) >    EXAM:  XR CHEST PORTABLE URGENT 1V        PROCEDURE DATE:  Mar  7 2021         INTERPRETATION:  DATE OF STUDY: 3/7/21    PRIOR: 2/28/21    CLINICAL INDICATION: Hypoxia. SOB.    TECHNIQUE: portable chest.    FINDINGS/  IMPRESSION:  The heart is similar in size.  Progression of extensive patchy airspace opacities (left more than right).  No large pleural effusion. No pneumothorax.                ISIDRO DIAZ MD; Attending Radiologist  This document has been electronically signed. Mar  7 2021  3:41PM    < end of copied text >      RESPIRATORY CULTURES:          Studies  Chest X-RAY  CT SCAN Chest   Venous Dopplers: LE:   CT Abdomen  Others    r< from: Xray Chest 1 View- PORTABLE-Urgent (Xray Chest 1 View- PORTABLE-Urgent .) (03.07.21 @ 13:08) >        INTERPRETATION:  DATE OF STUDY: 3/7/21    PRIOR: 2/28/21    CLINICAL INDICATION: Hypoxia. SOB.    TECHNIQUE: portable chest.    FINDINGS/  IMPRESSION:  The heart is similar in size.  Progression of extensive patchy airspace opacities (left more than right).  No large pleural effusion. No pneumothorax.                ISIDRO DIAZ MD; Attending Radiologist  This document has been electronically signed. Mar  7 2021  3:41PM    < end of copied text >

## 2021-03-08 NOTE — PROGRESS NOTE ADULT - SUBJECTIVE AND OBJECTIVE BOX
Medicine Progress Note    Patient is a 60y old  Male who presents with a chief complaint of COVID-19 Pneumonia (08 Mar 2021 17:07)      SUBJECTIVE / OVERNIGHT EVENTS: pt. seen and examined, still on Hiflo   s/p RRT for hypoxia , seen by MICU . stable now     ADDITIONAL REVIEW OF SYSTEMS:    MEDICATIONS  (STANDING):  ALBUTerol    90 MICROgram(s) HFA Inhaler 2 Puff(s) Inhalation every 4 hours  ascorbic acid 500 milliGRAM(s) Oral daily  cholecalciferol 2000 Unit(s) Oral daily  dexAMETHasone  Injectable 6 milliGRAM(s) IV Push every 24 hours  heparin  Infusion.  Unit(s)/Hr (15 mL/Hr) IV Continuous <Continuous>  multivitamin 1 Tablet(s) Oral daily  piperacillin/tazobactam IVPB.. 3.375 Gram(s) IV Intermittent every 8 hours    MEDICATIONS  (PRN):  acetaminophen   Tablet .. 650 milliGRAM(s) Oral every 4 hours PRN Temp greater or equal to 38.5C (101.3F)  benzonatate 100 milliGRAM(s) Oral three times a day PRN Cough  heparin   Injectable 6500 Unit(s) IV Push every 6 hours PRN For aPTT less than 40  heparin   Injectable 3000 Unit(s) IV Push every 6 hours PRN For aPTT between 40 - 57  melatonin 3 milliGRAM(s) Oral at bedtime PRN Insomnia  sodium chloride 0.65% Nasal 1 Spray(s) Both Nostrils three times a day PRN Nasal Dryness    CAPILLARY BLOOD GLUCOSE      POCT Blood Glucose.: 105 mg/dL (08 Mar 2021 14:00)    I&O's Summary    07 Mar 2021 07:01  -  08 Mar 2021 07:00  --------------------------------------------------------  IN: 120 mL / OUT: 1200 mL / NET: -1080 mL    08 Mar 2021 07:01  -  09 Mar 2021 00:12  --------------------------------------------------------  IN: 45 mL / OUT: 400 mL / NET: -355 mL        PHYSICAL EXAM:  Vital Signs Last 24 Hrs  T(C): 37.1 (08 Mar 2021 23:18), Max: 37.6 (08 Mar 2021 15:31)  T(F): 98.7 (08 Mar 2021 23:18), Max: 99.7 (08 Mar 2021 15:31)  HR: 113 (09 Mar 2021 00:01) (96 - 119)  BP: 145/87 (08 Mar 2021 23:18) (124/80 - 148/65)  BP(mean): --  RR: 24 (08 Mar 2021 23:18) (20 - 24)  SpO2: 94% (09 Mar 2021 00:01) (92% - 100%)  CONSTITUTIONAL: NAD, well-developed, well-groomed  ENMT: Moist oral mucosa, no pharyngeal injection or exudates; normal dentition  RESPIRATORY: Normal respiratory effort; lungs are clear to auscultation bilaterally  CARDIOVASCULAR: Regular rate and rhythm, normal S1 and S2, no murmur/rub/gallop; No lower extremity edema; Peripheral pulses are 2+ bilaterally  ABDOMEN: Nontender to palpation, normoactive bowel sounds, no rebound/guarding; No hepatosplenomegaly  PSYCH: A+O to person, place, and time; affect appropriate  NEUROLOGY: CN 2-12 are intact and symmetric; no gross sensory deficits   SKIN: No rashes; no palpable lesions    LABS:                        14.8   21.86 )-----------( 330      ( 08 Mar 2021 23:24 )             46.0     03-08    139  |  103  |  18  ----------------------------<  104<H>  3.8   |  22  |  0.88    Ca    9.4      08 Mar 2021 07:05  Phos  3.2     03-08  Mg     2.2     03-08    TPro  6.9  /  Alb  2.9<L>  /  TBili  0.7  /  DBili  0.2  /  AST  62<H>  /  ALT  72<H>  /  AlkPhos  90  03-08    PT/INR - ( 08 Mar 2021 07:05 )   PT: 13.6 sec;   INR: 1.20 ratio         PTT - ( 08 Mar 2021 23:24 )  PTT:51.0 sec          Culture - Blood (collected 07 Mar 2021 14:31)  Source: .Blood Blood-Peripheral  Preliminary Report (08 Mar 2021 15:02):    No growth to date.    Culture - Blood (collected 07 Mar 2021 14:31)  Source: .Blood Blood-Peripheral  Preliminary Report (08 Mar 2021 15:02):    No growth to date.      COVID-19 PCR: Detected (28 Feb 2021 23:02)      RADIOLOGY & ADDITIONAL TESTS:  Imaging from Last 24 Hours:    Electrocardiogram/QTc Interval:    COORDINATION OF CARE:  Care Discussed with Consultants/Other Providers:

## 2021-03-09 NOTE — CHART NOTE - NSCHARTNOTEFT_GEN_A_CORE
SUBJECTIVE:    CC: Hypoxia  HPI: Alerted by RN that Pt tachycardic, using accessory muscles, O2 Sat intermittently desat to 88 on AVAPs but improved and currently 96% on BiPAP 18/6. Pt assessed at bedside. Hx limited 2/2 NIV mask. He expresses that he would like a drink but is resting comfortably. He affirms that his SOB is improved and is having no difficulty breathing currently. Denies F/C, N/V, CP, current SOB, palpitations, abd pain, pain.     Discussed tenuous oxygen/respiratory status with patient including prognosis and educated on risks and benefits of intubation. Pt nods in affirmation of understanding and affirms that he would want to be intubated if deemed necessary by the medical staff.     Positives and pertinent negatives noted, all other system points negative.     OBJECTIVE:  Vital Signs Last 24 Hrs  T(C): 36.9 (03-09-21 @ 01:42), Max: 37.6 (03-08-21 @ 15:31)  T(F): 98.4 (03-09-21 @ 01:42), Max: 99.7 (03-08-21 @ 15:31)  HR: 112 (03-09-21 @ 03:21) (96 - 119)  BP: 143/84 (03-09-21 @ 01:42) (124/80 - 148/65)  RR: 20 (03-09-21 @ 03:21) (19 - 24)  SpO2: 97% (03-09-21 @ 03:21) (90% - 100%) on (O2)    Physical Exam:   General: NAD, A&Ox4, WN/WD, lying calm and comfortably in bed  Resp: left sided rhonchi, otherwise CTA b/l, no wheezing, rales. No intercostal retractions, abdominal, or other accessory muscle use, RR 20.   Cardio: tachycardic, regular rhythm, nl S1/2, no murmurs, rubs, or gallops  Extremities: no edema, radial/DP pulses 2+ b/l, no acrocyanosis, Cap refill<3 sec  Psych: appropriate mood/affect    Reviewed all relevant lab results, tests, radiology studies, medications, telemetry, nursing assessments, and EKG.     ASSESSMENT:    60y Male admitted with complaints of Patient is a 60y old  Male who presents with a chief complaint of COVID-19 Pneumonia (08 Mar 2021 20:11)    Problem/Plan  1) Covid-19  - C/w BiPAP, O2 Currently 96%, stable >88% throughout night.   - afebrile, hemodynamically stable, tachycardic to 110s throughout day, likely 2/2 presumed PE, c/w hep gtt  - Wean O2 as tolerated  - C/w continuous pulse Ox, Titrate O2 to SpO2 >88  - CXR  - f/u am CBC, CMP  - Acetaminophen prn fever  - C/w Dex  - trend imflammatory markers q72h  - GOC discussed: Currently IS Amendable to Trial of intubation.   - consider MICU c/s if persistently hypoxic SpO2<88 or clinical decompensation    Case d/w RN and Pt, in agreement w/ assessment and plan.     Will continue to monitor SUBJECTIVE:    CC: Hypoxia  HPI: Alerted by RN that Pt tachycardic, using accessory muscles, O2 Sat intermittently desat to 88 on AVAPs but improved and currently 96% on BiPAP 18/6. Pt assessed at bedside. Hx limited 2/2 NIV mask. He expresses that he would like a drink but is resting comfortably. He affirms that his SOB is improved and is having no difficulty breathing currently. Denies F/C, N/V, CP, current SOB, palpitations, abd pain, pain.     Discussed tenuous oxygen/respiratory status with patient including prognosis and educated on risks and benefits of intubation. Pt nods in affirmation of understanding and affirms that he would want to be intubated if deemed necessary by the medical staff.     Positives and pertinent negatives noted, all other system points negative.     OBJECTIVE:  Vital Signs Last 24 Hrs  T(C): 36.9 (03-09-21 @ 01:42), Max: 37.6 (03-08-21 @ 15:31)  T(F): 98.4 (03-09-21 @ 01:42), Max: 99.7 (03-08-21 @ 15:31)  HR: 112 (03-09-21 @ 03:21) (96 - 119)  BP: 143/84 (03-09-21 @ 01:42) (124/80 - 148/65)  RR: 20 (03-09-21 @ 03:21) (19 - 24)  SpO2: 97% (03-09-21 @ 03:21) (90% - 100%) on (O2)    Physical Exam:   General: NAD, A&Ox4, WN/WD, lying calm and comfortably in bed  Resp: left sided rhonchi, otherwise CTA b/l, no wheezing, rales. No intercostal retractions, abdominal, or other accessory muscle use, RR 20.   Cardio: tachycardic, regular rhythm, nl S1/2, no murmurs, rubs, or gallops  Extremities: no edema, radial/DP pulses 2+ b/l, no acrocyanosis, Cap refill<3 sec  Psych: appropriate mood/affect    Reviewed all relevant lab results, tests, radiology studies, medications, telemetry, nursing assessments, and EKG.     ASSESSMENT:    60y Male admitted with complaints of Patient is a 60y old  Male who presents with a chief complaint of COVID-19 Pneumonia (08 Mar 2021 20:11)    Problem/Plan  1) Covid-19  - C/w BiPAP, O2 Currently 96%, stable >88% throughout night.   - afebrile, hemodynamically stable, tachycardic to 110s throughout day, likely 2/2 presumed PE, c/w hep gtt  - Leukocytosis, c/w zosyn  - Wean O2 as tolerated  - C/w continuous pulse Ox, Titrate O2 to SpO2 >88  - CXR  - f/u am CBC, CMP  - Acetaminophen prn fever  - C/w Dex  - trend imflammatory markers q72h  - GOC discussed: Currently IS Amendable to Trial of intubation.   - consider MICU c/s if persistently hypoxic SpO2<88 or clinical decompensation    Case d/w RN and Pt, in agreement w/ assessment and plan.     Will continue to monitor

## 2021-03-09 NOTE — PROGRESS NOTE ADULT - SUBJECTIVE AND OBJECTIVE BOX
DATE OF SERVICE: 03-09-21 @ 23:17    Patient is a 60y old  Male who presents with a chief complaint of COVID-19 Pneumonia (09 Mar 2021 21:04)      INTERVAL HISTORY: still on BIPAP    REVIEW OF SYSTEMS:  CONSTITUTIONAL: + weakness  EYES/ENT: No visual changes;  No throat pain   NECK: No pain or stiffness  RESPIRATORY: + cough, wheezing; + shortness of breath  CARDIOVASCULAR: No chest pain or palpitations  GASTROINTESTINAL: No abdominal  pain. No nausea, vomiting, or hematemesis  GENITOURINARY: No dysuria, frequency or hematuria  NEUROLOGICAL: No stroke like symptoms  SKIN: No rashes        PHYSICAL EXAM:  T(C): 39.3 (03-09-21 @ 21:30), Max: 39.3 (03-09-21 @ 21:30)  HR: 129 (03-09-21 @ 21:30) (101 - 129)  BP: 151/65 (03-09-21 @ 21:30) (129/80 - 151/65)  RR: 22 (03-09-21 @ 21:30) (19 - 24)  SpO2: 92% (03-09-21 @ 21:30) (90% - 97%)  Wt(kg): --  I&O's Summary    08 Mar 2021 07:01  -  09 Mar 2021 07:00  --------------------------------------------------------  IN: 273 mL / OUT: 700 mL / NET: -427 mL          Appearance: In no distress, on BIPAP	  HEENT:    PERRL, EOMI	  Cardiovascular:  S1 S2, No JVD  Respiratory: Lungs clear to auscultation	  Gastrointestinal:  Soft, Non-tender, + BS	  Vascularature:  No edema of LE  Psychiatric: Appropriate affect   Neuro: no acute focal deficits                               14.3   18.48 )-----------( 300      ( 09 Mar 2021 07:19 )             43.0     03-09    139  |  103  |  24<H>  ----------------------------<  131<H>  4.0   |  23  |  0.83    Ca    9.2      09 Mar 2021 07:19  Phos  3.2     03-08  Mg     2.4     03-09    TPro  6.7  /  Alb  2.6<L>  /  TBili  0.6  /  DBili  x   /  AST  42<H>  /  ALT  57<H>  /  AlkPhos  102  03-09        Labs personally reviewed      ASSESSMENT/PLAN: 	    This is a 59yo Male w/ PMHx of a Brain tumor s/p resection in 2018, who is presenting with worsening shortness of breath and non-productive cough for past week. Found to be COVID-19 Positive.Patient Sinus tachycardic in Emergency room.     1. Sinus Tachycardia 2/2 to hypoxia from COVID-19 Pneumonia vs dehydration- in ED patient was sating 87% on room air  - currently on high flow sating >90% but requiring increasing oxygen - heart rate well controlled in the 80's   - S/P IVF hydration  - trop negative  - D Dimer rising 2003 now- would need CTA r/o PE however given his high oxygen requirement likely not stable enough for test  - can obtain TTE to eval heart function once acute issues resolve    2. Hypoxemia 2/2 Covid-19 pna  - remdesevir completed. Deca for 10 days.. initiated on 3/1   - IZ zosyn on board  - s/p RRT for hypoxia 3/8 - now on avap      3. Hyponatremic- 134 on admission likely from dehydration   - nearly normalized s/p IVF hydration    4. Transaminitis  - elevated LFTs on admission, albumin slightly low however now nearly normalized, bilirubin wnl   - continue to monitor     5.  Fevers/Leukocytosis -  Plan: -on Zosyn, ID following pt  f/u Bcx               Jaswant Weiss DO [facility]  Cardiovascular Medicine  [address] [place] [address], [address]  Office: [phone]  Cell: [phone]

## 2021-03-09 NOTE — PROGRESS NOTE ADULT - SUBJECTIVE AND OBJECTIVE BOX
Follow Up:  COVID, leukocytosis    Interval History: pt had more respiratory distress and was placed on BiPAP    ROS:      All other systems negative    Constitutional: no fever, no chills  Respiratory: +SOB, slight dry cough  GI:  no abd pain, no vomiting, no diarrhea  urinary: no dysuria, no hematuria, no flank pain  musculoskeletal:  no joint pain, no joint swelling  skin:  no rash  neurology:  no headache, no seizure        Allergies  No Known Allergies        ANTIMICROBIALS:  piperacillin/tazobactam IVPB.. 3.375 every 8 hours      OTHER MEDS:  acetaminophen   Tablet .. 650 milliGRAM(s) Oral every 4 hours PRN  ALBUTerol    90 MICROgram(s) HFA Inhaler 2 Puff(s) Inhalation every 4 hours  ascorbic acid 500 milliGRAM(s) Oral daily  benzonatate 100 milliGRAM(s) Oral three times a day PRN  cholecalciferol 2000 Unit(s) Oral daily  dexAMETHasone  Injectable 6 milliGRAM(s) IV Push every 24 hours  heparin   Injectable 6500 Unit(s) IV Push every 6 hours PRN  heparin   Injectable 3000 Unit(s) IV Push every 6 hours PRN  heparin  Infusion.  Unit(s)/Hr IV Continuous <Continuous>  melatonin 3 milliGRAM(s) Oral at bedtime PRN  multivitamin 1 Tablet(s) Oral daily  sodium chloride 0.65% Nasal 1 Spray(s) Both Nostrils three times a day PRN      Vital Signs Last 24 Hrs  T(C): 37.1 (09 Mar 2021 06:38), Max: 37.6 (08 Mar 2021 15:31)  T(F): 98.7 (09 Mar 2021 06:38), Max: 99.7 (08 Mar 2021 15:31)  HR: 105 (09 Mar 2021 11:58) (101 - 118)  BP: 137/60 (09 Mar 2021 10:51) (124/80 - 145/87)  BP(mean): --  RR: 20 (09 Mar 2021 10:51) (19 - 24)  SpO2: 96% (09 Mar 2021 11:58) (90% - 100%)    Physical Exam:  General:  non toxic  Cardio:  tachy  regular S1,S2  Respiratory:  on BiPAP but comfortable not in distress  abd:   soft, BS +, not tender  :     no CVAT, no suprapubic tenderness, no noel  Musculoskeletal : no joint swelling, no edema  Skin:    no rash  vascular: no phlebitis                        14.3   18.48 )-----------( 300      ( 09 Mar 2021 07:19 )             43.0       03-09    139  |  103  |  24<H>  ----------------------------<  131<H>  4.0   |  23  |  0.83    Ca    9.2      09 Mar 2021 07:19  Phos  3.2     03-08  Mg     2.4     03-09    TPro  6.7  /  Alb  2.6<L>  /  TBili  0.6  /  DBili  x   /  AST  42<H>  /  ALT  57<H>  /  AlkPhos  102  03-09          MICROBIOLOGY:  v  .Blood Blood-Peripheral  03-07-21   No growth to date.  --  --                RADIOLOGY:  Images independently visualized and reviewed personally, findings as below  < from: Xray Chest 1 View- PORTABLE-Urgent (Xray Chest 1 View- PORTABLE-Urgent .) (03.09.21 @ 04:41) >  IMPRESSION: Bilateral diffuse hazy airspace opacities, unchanged.      < end of copied text >

## 2021-03-09 NOTE — PROGRESS NOTE ADULT - ASSESSMENT
This is a 61yo Male w/ PMHx of a Brain tumor s/p resection in 2018, who is presenting with worsening shortness of breath and non-productive cough for past week. Found to be COVID-19 Positive. Py hypoxic to 87% on RA CXR c/w COVID pneumonia. Admitted for further treatment of COVID pneumonia/hypoxemia.     Covid 19 Pneumonia: Hypoxic resp failure  Brain tumor:        3/3:      Covid 19 Pneumonia: Hypoxic resp failure: on covid rx: he is on 50% fio2: LFTS are mildly elevated: folow d dimer: currently OK  Brain tumor:  s/p resection:  DVT proparminyxlis  kaylynn team    3/4:      Covid 19 Pneumonia: Hypoxic resp failure: on covid rx: he is on 50% fio2 today : increased fr om yesterday :looks better then yesterday : no overnight events Cont dexa"for a total of 10 dys: LFT as are improving and d dimer has mildly increased:   Brain tumor:  s/p resection:  DVT cole chaidez team : overall p rognosis is very giuarded"     3/7:      Covid 19 Pneumonia: Hypoxic resp failure: on covid rx: he is on100% fio2 today : remdesivir finished: on dexa: 7/10: pretty hypoxic: and has low grade fever: wbc is increasing: will start empiric zosyn: do chest xray : ID consult  Brain tumor:  s/p resection:  DVT propahyxlis  : overall p rognosis is very guarded stll :  KAYLYNN NP     3/8    Covid 19 Pneumonia: Hypoxic resp failure: on covid rx: he is on100% fio2 today : remdesivir finished: on dexa: 8/10: pretty hypoxic: and has low grade fever: wbc is increasing:  started empiric zosyn: do chest xray : with worsening: seen by ID:  His d dimer has increased significantly: start heparin  : call MICU with furterh deterioration as he might need intubation any time  Brain tumor:  s/p resection:  DVT propahyxlis  : overall prognosis is very guarded stll :  KAYLYNN CHAIDEZ NP     3/9:    Covid 19 Pneumonia: Hypoxic resp failure: on covid rx: he is on100% fio2 today : remdesivir finished: on dexa: 9/10: on zosyn: on bipap at 1005: try to wean down o2 today : his dexa may need to be extended as he isnot doing well; Yesterday his d dimer had increased 4 x : started on empiric heparin today d d iemr has decreased!  Brain tumor:  s/p resection:  DVT propahyxlis  overall prognosis is very guarded stll :  KAYLYNN Gray

## 2021-03-09 NOTE — PROGRESS NOTE ADULT - PROBLEM SELECTOR PLAN 1
still not doing well   -ac resp failure   -on Bipap  pul / cardio/ ID following  -completed remdisivir   -c/w dexa and lovenox

## 2021-03-09 NOTE — PROGRESS NOTE ADULT - SUBJECTIVE AND OBJECTIVE BOX
Medicine Progress Note    Patient is a 60y old  Male who presents with a chief complaint of COVID-19 Pneumonia (09 Mar 2021 12:35)      SUBJECTIVE / OVERNIGHT EVENTS:    ADDITIONAL REVIEW OF SYSTEMS:    MEDICATIONS  (STANDING):  ALBUTerol    90 MICROgram(s) HFA Inhaler 2 Puff(s) Inhalation every 4 hours  ascorbic acid 500 milliGRAM(s) Oral daily  cholecalciferol 2000 Unit(s) Oral daily  dexAMETHasone  Injectable 6 milliGRAM(s) IV Push every 24 hours  heparin  Infusion.  Unit(s)/Hr (15 mL/Hr) IV Continuous <Continuous>  multivitamin 1 Tablet(s) Oral daily  piperacillin/tazobactam IVPB.. 3.375 Gram(s) IV Intermittent every 8 hours    MEDICATIONS  (PRN):  acetaminophen   Tablet .. 650 milliGRAM(s) Oral every 4 hours PRN Temp greater or equal to 38.5C (101.3F)  benzonatate 100 milliGRAM(s) Oral three times a day PRN Cough  heparin   Injectable 6500 Unit(s) IV Push every 6 hours PRN For aPTT less than 40  heparin   Injectable 3000 Unit(s) IV Push every 6 hours PRN For aPTT between 40 - 57  melatonin 3 milliGRAM(s) Oral at bedtime PRN Insomnia  sodium chloride 0.65% Nasal 1 Spray(s) Both Nostrils three times a day PRN Nasal Dryness    CAPILLARY BLOOD GLUCOSE        I&O's Summary    08 Mar 2021 07:01  -  09 Mar 2021 07:00  --------------------------------------------------------  IN: 273 mL / OUT: 700 mL / NET: -427 mL        PHYSICAL EXAM:  Vital Signs Last 24 Hrs  T(C): 39.3 (09 Mar 2021 21:30), Max: 39.3 (09 Mar 2021 21:30)  T(F): 102.7 (09 Mar 2021 21:30), Max: 102.7 (09 Mar 2021 21:30)  HR: 129 (09 Mar 2021 21:30) (101 - 129)  BP: 151/65 (09 Mar 2021 21:30) (129/80 - 151/65)  BP(mean): --  RR: 22 (09 Mar 2021 21:30) (19 - 24)  SpO2: 92% (09 Mar 2021 21:30) (90% - 97%)  CONSTITUTIONAL: NAD, well-developed, well-groomed  ENMT: Moist oral mucosa, no pharyngeal injection or exudates; normal dentition  RESPIRATORY: Normal respiratory effort; lungs are clear to auscultation bilaterally  CARDIOVASCULAR: Regular rate and rhythm, normal S1 and S2, no murmur/rub/gallop; No lower extremity edema; Peripheral pulses are 2+ bilaterally  ABDOMEN: Nontender to palpation, normoactive bowel sounds, no rebound/guarding; No hepatosplenomegaly  PSYCH: A+O to person, place, and time; affect appropriate  NEUROLOGY: CN 2-12 are intact and symmetric; no gross sensory deficits   SKIN: No rashes; no palpable lesions    LABS:                        14.3   18.48 )-----------( 300      ( 09 Mar 2021 07:19 )             43.0     03-09    139  |  103  |  24<H>  ----------------------------<  131<H>  4.0   |  23  |  0.83    Ca    9.2      09 Mar 2021 07:19  Phos  3.2     03-08  Mg     2.4     03-09    TPro  6.7  /  Alb  2.6<L>  /  TBili  0.6  /  DBili  x   /  AST  42<H>  /  ALT  57<H>  /  AlkPhos  102  03-09    PT/INR - ( 08 Mar 2021 07:05 )   PT: 13.6 sec;   INR: 1.20 ratio         PTT - ( 09 Mar 2021 18:13 )  PTT:62.3 sec          Culture - Blood (collected 07 Mar 2021 14:31)  Source: .Blood Blood-Peripheral  Preliminary Report (08 Mar 2021 15:02):    No growth to date.    Culture - Blood (collected 07 Mar 2021 14:31)  Source: .Blood Blood-Peripheral  Preliminary Report (08 Mar 2021 15:02):    No growth to date.      COVID-19 PCR: Detected (28 Feb 2021 23:02)      RADIOLOGY & ADDITIONAL TESTS:  Imaging from Last 24 Hours:    Electrocardiogram/QTc Interval:    COORDINATION OF CARE:  Care Discussed with Consultants/Other Providers:   Medicine Progress Note    Patient is a 60y old  Male who presents with a chief complaint of COVID-19 Pneumonia (09 Mar 2021 12:35)      SUBJECTIVE / OVERNIGHT EVENTS: still SOB and on Bipap    ADDITIONAL REVIEW OF SYSTEMS:    MEDICATIONS  (STANDING):  ALBUTerol    90 MICROgram(s) HFA Inhaler 2 Puff(s) Inhalation every 4 hours  ascorbic acid 500 milliGRAM(s) Oral daily  cholecalciferol 2000 Unit(s) Oral daily  dexAMETHasone  Injectable 6 milliGRAM(s) IV Push every 24 hours  heparin  Infusion.  Unit(s)/Hr (15 mL/Hr) IV Continuous <Continuous>  multivitamin 1 Tablet(s) Oral daily  piperacillin/tazobactam IVPB.. 3.375 Gram(s) IV Intermittent every 8 hours    MEDICATIONS  (PRN):  acetaminophen   Tablet .. 650 milliGRAM(s) Oral every 4 hours PRN Temp greater or equal to 38.5C (101.3F)  benzonatate 100 milliGRAM(s) Oral three times a day PRN Cough  heparin   Injectable 6500 Unit(s) IV Push every 6 hours PRN For aPTT less than 40  heparin   Injectable 3000 Unit(s) IV Push every 6 hours PRN For aPTT between 40 - 57  melatonin 3 milliGRAM(s) Oral at bedtime PRN Insomnia  sodium chloride 0.65% Nasal 1 Spray(s) Both Nostrils three times a day PRN Nasal Dryness    CAPILLARY BLOOD GLUCOSE        I&O's Summary    08 Mar 2021 07:01  -  09 Mar 2021 07:00  --------------------------------------------------------  IN: 273 mL / OUT: 700 mL / NET: -427 mL        PHYSICAL EXAM:  Vital Signs Last 24 Hrs  T(C): 39.3 (09 Mar 2021 21:30), Max: 39.3 (09 Mar 2021 21:30)  T(F): 102.7 (09 Mar 2021 21:30), Max: 102.7 (09 Mar 2021 21:30)  HR: 129 (09 Mar 2021 21:30) (101 - 129)  BP: 151/65 (09 Mar 2021 21:30) (129/80 - 151/65)  BP(mean): --  RR: 22 (09 Mar 2021 21:30) (19 - 24)  SpO2: 92% (09 Mar 2021 21:30) (90% - 97%)  CONSTITUTIONAL: NAD, well-developed, well-groomed  ENMT: Moist oral mucosa, no pharyngeal injection or exudates; normal dentition  RESPIRATORY: Normal respiratory effort; lungs are clear to auscultation bilaterally  CARDIOVASCULAR: Regular rate and rhythm, normal S1 and S2, no murmur/rub/gallop; No lower extremity edema; Peripheral pulses are 2+ bilaterally  ABDOMEN: Nontender to palpation, normoactive bowel sounds, no rebound/guarding; No hepatosplenomegaly  PSYCH: A+O to person, place, and time; affect appropriate  NEUROLOGY: CN 2-12 are intact and symmetric; no gross sensory deficits   SKIN: No rashes; no palpable lesions    LABS:                        14.3   18.48 )-----------( 300      ( 09 Mar 2021 07:19 )             43.0     03-09    139  |  103  |  24<H>  ----------------------------<  131<H>  4.0   |  23  |  0.83    Ca    9.2      09 Mar 2021 07:19  Phos  3.2     03-08  Mg     2.4     03-09    TPro  6.7  /  Alb  2.6<L>  /  TBili  0.6  /  DBili  x   /  AST  42<H>  /  ALT  57<H>  /  AlkPhos  102  03-09    PT/INR - ( 08 Mar 2021 07:05 )   PT: 13.6 sec;   INR: 1.20 ratio         PTT - ( 09 Mar 2021 18:13 )  PTT:62.3 sec          Culture - Blood (collected 07 Mar 2021 14:31)  Source: .Blood Blood-Peripheral  Preliminary Report (08 Mar 2021 15:02):    No growth to date.    Culture - Blood (collected 07 Mar 2021 14:31)  Source: .Blood Blood-Peripheral  Preliminary Report (08 Mar 2021 15:02):    No growth to date.      COVID-19 PCR: Detected (28 Feb 2021 23:02)      RADIOLOGY & ADDITIONAL TESTS:  Imaging from Last 24 Hours:    Electrocardiogram/QTc Interval:    COORDINATION OF CARE:  Care Discussed with Consultants/Other Providers:

## 2021-03-09 NOTE — PROGRESS NOTE ADULT - SUBJECTIVE AND OBJECTIVE BOX
Date of Service: 03-09-21 @ 12:35    Patient is a 60y old  Male who presents with a chief complaint of COVID-19 Pneumonia (09 Mar 2021 12:02)      Any change in ROS:   Doing worse:   on bipap now at 100% bipap:  he had refused for intubation yesterday :  Today he looks al ittel better:       MEDICATIONS  (STANDING):  ALBUTerol    90 MICROgram(s) HFA Inhaler 2 Puff(s) Inhalation every 4 hours  ascorbic acid 500 milliGRAM(s) Oral daily  cholecalciferol 2000 Unit(s) Oral daily  dexAMETHasone  Injectable 6 milliGRAM(s) IV Push every 24 hours  heparin  Infusion.  Unit(s)/Hr (15 mL/Hr) IV Continuous <Continuous>  multivitamin 1 Tablet(s) Oral daily  piperacillin/tazobactam IVPB.. 3.375 Gram(s) IV Intermittent every 8 hours    MEDICATIONS  (PRN):  acetaminophen   Tablet .. 650 milliGRAM(s) Oral every 4 hours PRN Temp greater or equal to 38.5C (101.3F)  benzonatate 100 milliGRAM(s) Oral three times a day PRN Cough  heparin   Injectable 6500 Unit(s) IV Push every 6 hours PRN For aPTT less than 40  heparin   Injectable 3000 Unit(s) IV Push every 6 hours PRN For aPTT between 40 - 57  melatonin 3 milliGRAM(s) Oral at bedtime PRN Insomnia  sodium chloride 0.65% Nasal 1 Spray(s) Both Nostrils three times a day PRN Nasal Dryness    Vital Signs Last 24 Hrs  T(C): 37.1 (09 Mar 2021 06:38), Max: 37.6 (08 Mar 2021 15:31)  T(F): 98.7 (09 Mar 2021 06:38), Max: 99.7 (08 Mar 2021 15:31)  HR: 105 (09 Mar 2021 11:58) (101 - 118)  BP: 137/60 (09 Mar 2021 10:51) (124/80 - 145/87)  BP(mean): --  RR: 20 (09 Mar 2021 10:51) (19 - 24)  SpO2: 96% (09 Mar 2021 11:58) (90% - 100%)  Mode: NIV (Noninvasive Ventilation)  RR (machine): 14  TV (machine): 350  FiO2: 90  PEEP: 6  ITime: 1  PIP: 11    I&O's Summary    08 Mar 2021 07:01  -  09 Mar 2021 07:00  --------------------------------------------------------  IN: 273 mL / OUT: 700 mL / NET: -427 mL          Physical Exam:   GENERAL: NAD, well-groomed, well-developed  HEENT: JOAO/   Atraumatic, Normocephalic  ENMT: No tonsillar erythema, exudates, or enlargement; Moist mucous membranes, Good dentition, No lesions  NECK: Supple, No JVD, Normal thyroid  CHEST/LUNG: bibasilar crackels+  CVS: Regular rate and rhythm; No murmurs, rubs, or gallops  GI: : Soft, Nontender, Nondistended; Bowel sounds present  NERVOUS SYSTEM:  Alert & Oriented X3  EXTREMITIES: -edema  LYMPH: No lymphadenopathy noted  SKIN: No rashes or lesions  ENDOCRINOLOGY: No Thyromegaly  PSYCH: Appropriate    Labs:                              14.3   18.48 )-----------( 300      ( 09 Mar 2021 07:19 )             43.0                         14.8   21.86 )-----------( 330      ( 08 Mar 2021 23:24 )             46.0                         14.6   22.84 )-----------( 322      ( 08 Mar 2021 07:05 )             44.5                         14.2   18.00 )-----------( 358      ( 07 Mar 2021 07:34 )             42.8                         13.7   13.74 )-----------( 346      ( 06 Mar 2021 08:12 )             43.5     03-09    139  |  103  |  24<H>  ----------------------------<  131<H>  4.0   |  23  |  0.83  03-08    139  |  103  |  18  ----------------------------<  104<H>  3.8   |  22  |  0.88  03-07    138  |  101  |  18  ----------------------------<  135<H>  4.1   |  25  |  0.85  03-06    137  |  102  |  18  ----------------------------<  92  4.3   |  25  |  0.78    Ca    9.2      09 Mar 2021 07:19  Ca    9.4      08 Mar 2021 07:05  Phos  3.2     03-08  Mg     2.4     03-09  Mg     2.2     03-08    TPro  6.7  /  Alb  2.6<L>  /  TBili  0.6  /  DBili  x   /  AST  42<H>  /  ALT  57<H>  /  AlkPhos  102  03-09  TPro  6.9  /  Alb  2.9<L>  /  TBili  0.7  /  DBili  0.2  /  AST  62<H>  /  ALT  72<H>  /  AlkPhos  90  03-08  TPro  6.3  /  Alb  2.9<L>  /  TBili  0.7  /  DBili  0.2  /  AST  63<H>  /  ALT  81<H>  /  AlkPhos  74  03-06    CAPILLARY BLOOD GLUCOSE      POCT Blood Glucose.: 105 mg/dL (08 Mar 2021 14:00)      LIVER FUNCTIONS - ( 09 Mar 2021 07:19 )  Alb: 2.6 g/dL / Pro: 6.7 g/dL / ALK PHOS: 102 U/L / ALT: 57 U/L / AST: 42 U/L / GGT: x           PT/INR - ( 08 Mar 2021 07:05 )   PT: 13.6 sec;   INR: 1.20 ratio         PTT - ( 09 Mar 2021 11:08 )  PTT:106.0 sec    D-Dimer Assay, Quantitative: 1311 ng/mL DDU (03-09 @ 07:19)  D-Dimer Assay, Quantitative: 2003 ng/mL DDU (03-08 @ 07:05)  D-Dimer Assay, Quantitative: 550 ng/mL DDU (03-06 @ 08:12)  D-Dimer Assay, Quantitative: 371 ng/mL DDU (03-05 @ 07:27)  D-Dimer Assay, Quantitative: 213 ng/mL DDU (03-03 @ 07:28)  Procalcitonin, Serum: 0.35 ng/mL (03-09 @ 07:19)  Procalcitonin, Serum: 0.41 ng/mL (03-06 @ 08:12)        RECENT CULTURES:  03-07 @ 14:31 .Blood Blood-Peripheral   rad< from: Xray Chest 1 View- PORTABLE-Urgent (Xray Chest 1 View- PORTABLE-Urgent .) (03.09.21 @ 04:41) >  EXAM:  XR CHEST PORTABLE URGENT 1V        PROCEDURE DATE:  Mar  9 2021         INTERPRETATION:  HISTORY: COVID    TECHNIQUE: 2 portable views of the chest    COMPARISON: 3/7/2021    FINDINGS:  The cardiac silhouette is normal in size. There are bilateral diffuse, hazy airspace opacities, not significantly changed. There is no pleural effusion. No pneumothorax is seen. The hilar and mediastinal structures appear unremarkable. The osseous structures are intact.    IMPRESSION: Bilateral diffuse hazy airspace opacities, unchanged.              HERIBERTO DAVID MD; Attending Radiologist  This document has been electronically signed. Mar  9 2021  9:27AM    < end of copied text >  < from: Xray Chest 1 View- PORTABLE-Urgent (Xray Chest 1 View- PORTABLE-Urgent .) (03.07.21 @ 13:08) >  UDY: 3/7/21    PRIOR: 2/28/21    CLINICAL INDICATION: Hypoxia. SOB.    TECHNIQUE: portable chest.    FINDINGS/  IMPRESSION:  The heart is similar in size.  Progression of extensive patchy airspace opacities (left more than right).  No large pleural effusion. No pneumothorax.                ISIDRO DIAZ MD; Attending Radiologist  This document has been electronically signed. Mar  7 2021  3:41PM    < end of copied text >               No growth to date.          RESPIRATORY CULTURES:          Studies  Chest X-RAY  CT SCAN Chest   Venous Dopplers: LE:   CT Abdomen  Others

## 2021-03-09 NOTE — PROGRESS NOTE ADULT - ASSESSMENT
60 m with Brain tumor s/p resection in 2018, presented 2/28 with SOB and cough and was admitted for hypoxic respiratory failure due to COVID  initially afebrile with normal WBC  s/p remdesivir and on dexa since 3/1, now day 7  started to have fevers 3/5 and WBC increased to 18  procalcitonin was 0.10 and increased to 0.41  he denied new cough, no sputum and was comfortable on high flow     hypoxic respiratory failure due ot COVID pneumonia  new fever and leukocytosis, r/o bacterial infection, could also be reactive or steroid induced  procalcitonin also increased but still not significantly higher now 0.41  D-Dimer went to 2003  and pt was started on anticoagulation, no CTA done    * blood  cx negative, WBC slightly better to 18  * c/w zosyn for now, started 3/7, now day 3, will complete a short course, not convinced this is a bacterial pneumonia  * monitor the O2 sat and taper as tolerated  * s/p a course of remdesivir  * c/w dexa 6 qd  to complete a 10 day course tomorrow, started 3/1, now day 9    The above assessment and plan was discussed with the primary team    Anita Alexis MD  Pager [phone]  After 5pm and on weekends call [phone]

## 2021-03-10 NOTE — PROVIDER CONTACT NOTE (OTHER) - BACKGROUND
pt admitted for covid
COVID + pt on heparin gtt for elevated d-dimer
Covid +  Currently on APAP
Covid +  PMH: Brain CA
covid+; heparin drip for PE

## 2021-03-10 NOTE — PROGRESS NOTE ADULT - SUBJECTIVE AND OBJECTIVE BOX
ID coverage    Patient is a 60y old  Male who presents with a chief complaint of COVID-19 Pneumonia (09 Mar 2021 21:04)    Being followed by ID for COVID hypoxia    Interval history:on bipap  some cough   No acute events      ROS:  No SOB,CP  No N/V/D./abd pain  No other complaints      Antimicrobials:    piperacillin/tazobactam IVPB.. 3.375 Gram(s) IV Intermittent every 8 hours since 3/7    Other medications reviewed  MEDICATIONS  (STANDING):  ALBUTerol    90 MICROgram(s) HFA Inhaler 2 Puff(s) Inhalation every 4 hours  ascorbic acid 500 milliGRAM(s) Oral daily  cholecalciferol 2000 Unit(s) Oral daily  heparin  Infusion.  Unit(s)/Hr (15 mL/Hr) IV Continuous <Continuous>  multivitamin 1 Tablet(s) Oral daily  piperacillin/tazobactam IVPB.. 3.375 Gram(s) IV Intermittent every 8 hours      Vital Signs Last 24 Hrs  T(C): 36.6 (03-10-21 @ 10:30), Max: 39.3 (03-09-21 @ 21:30)  T(F): 97.9 (03-10-21 @ 10:30), Max: 102.7 (03-09-21 @ 21:30)  HR: 102 (03-10-21 @ 10:35) (94 - 129)  BP: 112/68 (03-10-21 @ 10:30) (112/68 - 151/65)  BP(mean): --  RR: 22 (03-10-21 @ 10:30) (18 - 22)  SpO2: 97% (03-10-21 @ 10:35) (90% - 100%)    Physical Exam:    on bipap    HEENT PERRLA EOMI    No oral exudate or erythema    Chest Good AE,basal crackles     CVS RRR S1 S2 WNl No murmur or rub or gallop    Abd soft BS normal No tenderness no masses    IV site no erythema tenderness or discharge    CNS AAO X 2 no focal    Lab Data:                          14.3   19.63 )-----------( 322      ( 10 Mar 2021 07:45 )             45.2     WBC Count: 19.63 (03-10-21 @ 07:45)  WBC Count: 18.48 (03-09-21 @ 07:19)  WBC Count: 21.86 (03-08-21 @ 23:24)  WBC Count: 22.84 (03-08-21 @ 07:05)  WBC Count: 18.00 (03-07-21 @ 07:34)  WBC Count: 13.74 (03-06-21 @ 08:12)  WBC Count: 11.40 (03-05-21 @ 07:27)  WBC Count: 10.39 (03-04-21 @ 07:52)    03-10    142  |  104  |  29<H>  ----------------------------<  124<H>  4.1   |  25  |  0.87    Ca    9.2      10 Mar 2021 07:43  Mg     2.5     03-10    TPro  6.5  /  Alb  2.8<L>  /  TBili  0.5  /  DBili  x   /  AST  52<H>  /  ALT  46<H>  /  AlkPhos  119  03-10        Culture - Blood (collected 07 Mar 2021 14:31)  Source: .Blood Blood-Peripheral  Preliminary Report (08 Mar 2021 15:02):    No growth to date.    Culture - Blood (collected 07 Mar 2021 14:31)  Source: .Blood Blood-Peripheral  Preliminary Report (08 Mar 2021 15:02):    No growth to date.    < from: Xray Chest 1 View- PORTABLE-Urgent (Xray Chest 1 View- PORTABLE-Urgent .) (03.09.21 @ 04:41) >  IMPRESSION: Bilateral diffuse hazy airspace opacities, unchanged.        < end of copied text >

## 2021-03-10 NOTE — PROGRESS NOTE ADULT - ASSESSMENT
This is a 61yo Male w/ PMHx of a Brain tumor s/p resection in 2018, who is presenting with worsening shortness of breath and non-productive cough for past week. Found to be COVID-19 Positive. Py hypoxic to 87% on RA CXR c/w COVID pneumonia. Admitted for further treatment of COVID pneumonia/hypoxemia.     Covid 19 Pneumonia: Hypoxic resp failure  Brain tumor:        3/3:      Covid 19 Pneumonia: Hypoxic resp failure: on covid rx: he is on 50% fio2: LFTS are mildly elevated: folow d dimer: currently OK  Brain tumor:  s/p resection:  DVT proparminyxlis  ruddy team    3/4:      Covid 19 Pneumonia: Hypoxic resp failure: on covid rx: he is on 50% fio2 today : increased fr om yesterday :looks better then yesterday : no overnight events Cont dexa"for a total of 10 dys: LFT as are improving and d dimer has mildly increased:   Brain tumor:  s/p resection:  DVT cole chaidez team : overall p rognosis is very giuarded"     3/7:      Covid 19 Pneumonia: Hypoxic resp failure: on covid rx: he is on100% fio2 today : remdesivir finished: on dexa: 7/10: pretty hypoxic: and has low grade fever: wbc is increasing: will start empiric zosyn: do chest xray : ID consult  Brain tumor:  s/p resection:  DVT propahyxlis  : overall p rognosis is very guarded stll :  RUDDY NP     3/8    Covid 19 Pneumonia: Hypoxic resp failure: on covid rx: he is on100% fio2 today : remdesivir finished: on dexa: 8/10: pretty hypoxic: and has low grade fever: wbc is increasing:  started empiric zosyn: do chest xray : with worsening: seen by ID:  His d dimer has increased significantly: start heparin  : call MICU with furterh deterioration as he might need intubation any time  Brain tumor:  s/p resection:  DVT propahyxlis  : overall prognosis is very guarded stll :  RUDDY CHAIDEZ NP     3/9:    Covid 19 Pneumonia: Hypoxic resp failure: on covid rx: he is on100% fio2 today : remdesivir finished: on dexa: 9/10: on zosyn: on bipap at 1005: try to wean down o2 today : his dexa may need to be extended as he isnot doing well; Yesterday his d dimer had increased 4 x : started on empiric heparin today d d iemr has decreased!  Brain tumor:  s/p resection:  DVT propahyxlis  overall prognosis is very guarded stll :  RUDDY Gray    3/10:    Covid 19 Pneumonia: he is not doign well: he is on AVAPS: on 1005 oxygen: very tenuous resp status;' cant be moved for cta: he is alerday on empiric full dose ac: try to wean off oxygen: he is alreadyon antibtiocs and is on zosyn ? upgrade to leonidas: would defer to primary ID team   Brain tumor:  s/p resection:  DVT prophylaxis  overall prognosis is very guarded still :  RUDDY Gray

## 2021-03-10 NOTE — PROGRESS NOTE ADULT - SUBJECTIVE AND OBJECTIVE BOX
Medicine Progress Note    Patient is a 60y old  Male who presents with a chief complaint of COVID-19 Pneumonia (10 Mar 2021 17:39)      SUBJECTIVE / OVERNIGHT EVENTS: on 100% fio2 , still SOB/ hypoxia     ADDITIONAL REVIEW OF SYSTEMS:    MEDICATIONS  (STANDING):  ALBUTerol    90 MICROgram(s) HFA Inhaler 2 Puff(s) Inhalation every 4 hours  ascorbic acid 500 milliGRAM(s) Oral daily  cholecalciferol 2000 Unit(s) Oral daily  dextrose 5% + sodium chloride 0.9%. 1000 milliLiter(s) (75 mL/Hr) IV Continuous <Continuous>  heparin  Infusion.  Unit(s)/Hr (15 mL/Hr) IV Continuous <Continuous>  multivitamin 1 Tablet(s) Oral daily  piperacillin/tazobactam IVPB.. 3.375 Gram(s) IV Intermittent every 8 hours    MEDICATIONS  (PRN):  acetaminophen   Tablet .. 650 milliGRAM(s) Oral every 4 hours PRN Temp greater or equal to 38.5C (101.3F)  benzonatate 100 milliGRAM(s) Oral three times a day PRN Cough  heparin   Injectable 6500 Unit(s) IV Push every 6 hours PRN For aPTT less than 40  heparin   Injectable 3000 Unit(s) IV Push every 6 hours PRN For aPTT between 40 - 57  melatonin 3 milliGRAM(s) Oral at bedtime PRN Insomnia  sodium chloride 0.65% Nasal 1 Spray(s) Both Nostrils three times a day PRN Nasal Dryness    CAPILLARY BLOOD GLUCOSE      POCT Blood Glucose.: 96 mg/dL (11 Mar 2021 01:06)  POCT Blood Glucose.: 104 mg/dL (10 Mar 2021 12:10)    I&O's Summary      PHYSICAL EXAM:  Vital Signs Last 24 Hrs  T(C): 36.6 (10 Mar 2021 21:59), Max: 37 (10 Mar 2021 18:45)  T(F): 97.8 (10 Mar 2021 21:59), Max: 98.6 (10 Mar 2021 18:45)  HR: 105 (10 Mar 2021 23:20) (94 - 115)  BP: 128/77 (10 Mar 2021 21:59) (112/68 - 147/80)  BP(mean): --  RR: 25 (10 Mar 2021 21:59) (18 - 25)  SpO2: 93% (10 Mar 2021 23:20) (93% - 100%)  CONSTITUTIONAL: NAD, well-developed, well-groomed  ENMT: Moist oral mucosa, no pharyngeal injection or exudates; normal dentition  RESPIRATORY: Normal respiratory effort; lungs are clear to auscultation bilaterally  CARDIOVASCULAR: Regular rate and rhythm, normal S1 and S2, no murmur/rub/gallop; No lower extremity edema; Peripheral pulses are 2+ bilaterally  ABDOMEN: Nontender to palpation, normoactive bowel sounds, no rebound/guarding; No hepatosplenomegaly  PSYCH: A+O to person, place, and time; affect appropriate  NEUROLOGY: CN 2-12 are intact and symmetric; no gross sensory deficits   SKIN: No rashes; no palpable lesions    LABS:                        14.3   19.63 )-----------( 322      ( 10 Mar 2021 07:45 )             45.2     03-10    142  |  104  |  29<H>  ----------------------------<  124<H>  4.1   |  25  |  0.87    Ca    9.2      10 Mar 2021 07:43  Mg     2.5     03-10    TPro  6.5  /  Alb  2.8<L>  /  TBili  0.5  /  DBili  x   /  AST  52<H>  /  ALT  46<H>  /  AlkPhos  119  03-10    PT/INR - ( 10 Mar 2021 10:56 )   PT: 13.9 sec;   INR: 1.23 ratio         PTT - ( 10 Mar 2021 10:56 )  PTT:84.2 sec          COVID-19 PCR: Detected (28 Feb 2021 23:02)      RADIOLOGY & ADDITIONAL TESTS:  Imaging from Last 24 Hours:    Electrocardiogram/QTc Interval:    COORDINATION OF CARE:  Care Discussed with Consultants/Other Providers:

## 2021-03-10 NOTE — PROGRESS NOTE ADULT - PROBLEM SELECTOR PLAN 1
still not doing well   -ac resp failure   -on Bipap  pul / cardio/ ID following  -completed remdisivir   -c/w dexa and lovenox full dose

## 2021-03-10 NOTE — PROVIDER CONTACT NOTE (OTHER) - REASON
heparin nomogram clarification
, patient is using accessory muscles while breathing, RR 24
increased temp
pt temp high rectal

## 2021-03-10 NOTE — PROGRESS NOTE ADULT - SUBJECTIVE AND OBJECTIVE BOX
DATE OF SERVICE: 03-10-21 @ 21:43    Patient is a 60y old  Male who presents with a chief complaint of COVID-19 Pneumonia (10 Mar 2021 12:53)      INTERVAL HISTORY: feels better, on BIPAP    REVIEW OF SYSTEMS:  CONSTITUTIONAL: No weakness  EYES/ENT: No visual changes;  No throat pain   NECK: No pain or stiffness  RESPIRATORY: No cough, wheezing; + shortness of breath  CARDIOVASCULAR: No chest pain or palpitations  GASTROINTESTINAL: No abdominal  pain. No nausea, vomiting, or hematemesis  GENITOURINARY: No dysuria, frequency or hematuria  NEUROLOGICAL: No stroke like symptoms  SKIN: No rashes      TELEMETRY Personally reviewed:  	  MEDICATIONS:        PHYSICAL EXAM:  T(C): 37 (03-10-21 @ 18:45), Max: 37.7 (03-09-21 @ 23:35)  HR: 115 (03-10-21 @ 19:20) (94 - 116)  BP: 147/80 (03-10-21 @ 18:45) (112/68 - 147/80)  RR: 21 (03-10-21 @ 18:45) (18 - 22)  SpO2: 95% (03-10-21 @ 19:20) (94% - 100%)  Wt(kg): --  I&O's Summary        Appearance: In no distress	  HEENT:    PERRL, EOMI	  Cardiovascular:  S1 S2, No JVD  Respiratory: Lungs clear to auscultation	  Gastrointestinal:  Soft, Non-tender, + BS	  Vascularature:  No edema of LE  Psychiatric: Appropriate affect   Neuro: no acute focal deficits                               14.3   19.63 )-----------( 322      ( 10 Mar 2021 07:45 )             45.2     03-10    142  |  104  |  29<H>  ----------------------------<  124<H>  4.1   |  25  |  0.87    Ca    9.2      10 Mar 2021 07:43  Mg     2.5     03-10    TPro  6.5  /  Alb  2.8<L>  /  TBili  0.5  /  DBili  x   /  AST  52<H>  /  ALT  46<H>  /  AlkPhos  119  03-10        Labs personally reviewed      This is a 59yo Male w/ PMHx of a Brain tumor s/p resection in 2018, who is presenting with worsening shortness of breath and non-productive cough for past week. Found to be COVID-19 Positive.Patient Sinus tachycardic in Emergency room.     1. Sinus Tachycardia 2/2 to hypoxia from COVID-19 Pneumonia vs dehydration- in ED patient was sating 87% on room air  - currently on high flow sating >90% but requiring increasing oxygen - heart rate well controlled in the 80's   - S/P IVF hydration  - trop negative  - D Dimer rising 2003 now- would need CTA r/o PE however given his high oxygen requirement likely not stable enough for test.   - Treating prophylactically for DVT/PE with heparin gtt  - can obtain TTE to eval heart function once acute issues resolve    2. Hypoxemia 2/2 Covid-19 pna  - remdesevir completed. Deca for 10 days.. initiated on 3/1   - IV zosyn on board  - s/p RRT for hypoxia 3/8 - now on avap      3. Hyponatremic- 134 on admission likely from dehydration   - nearly normalized s/p IVF hydration    4. Transaminitis  - elevated LFTs on admission, albumin slightly low however now nearly normalized, bilirubin wnl   - continue to monitor     5.  Fevers/Leukocytosis -  Plan: -on Zosyn, ID following pt  f/u Bcx          Jaswant Weiss DO [facility]  Cardiovascular Medicine  [address] [place] [address], [address]  Office: [phone]  Cell: [phone]

## 2021-03-10 NOTE — PROVIDER CONTACT NOTE (OTHER) - ASSESSMENT
TEmp 98.7 (axillary), , /87, RR24, Oxygen saturation 95 on APAP
aptt therapeutic x2 most recent value at 11 am = 84.2
Temp 99.6,  - , Oxygen saturation 95 on APAP
vital signs stable otherwise. patient side lying position for increased comfort breathing.
pt has rectal temp fo 100.7

## 2021-03-10 NOTE — PROVIDER CONTACT NOTE (OTHER) - SITUATION
patient rectal temperature increased to 102.7, patient tachycardic to the 130s. IV Tylenol given.
Pt has rectal temp of 100.7
pt on heparin gtt @ 15ml/hr
, patient is using accessory muscles while breathing

## 2021-03-10 NOTE — PROGRESS NOTE ADULT - ASSESSMENT
60 m with Brain tumor s/p resection in 2018, presented 2/28 with SOB and cough and was admitted for hypoxic respiratory failure due to COVID  initially afebrile with normal WBC  s/p remdesivir and on dexa since 3/1, now day 7  started to have fevers 3/5 and WBC increased to 18  procalcitonin was 0.10 and increased to 0.41  he denied new cough, no sputum and was comfortable on high flow     hypoxic respiratory failure due ot COVID pneumonia  new fever and leukocytosis, r/o bacterial infection, could also be reactive or steroid induced  procalcitonin also increased but still not significantly higher now 0.41  D-Dimer went to 2003  and pt was started on anticoagulation, no CTA done    * blood  cx negative, WBC slightly better to 18  * c/w zosyn for now, started 3/7, now day 4, will complete a short course, not convinced this is a bacterial pneumonia  * monitor the O2 sat and taper as tolerated  * s/p a course of remdesivir  * leucocytosis could be due to dexa  consider CTA if able to    Will tailor plan for ID issues  per course,results.Will defer to primary team on management of other issues.  Assessment, plan and recommendations as detailed above were discussed with the medical/primary  team.  Infectious Diseases Service will continue to follow-pls call if issues

## 2021-03-10 NOTE — PROVIDER CONTACT NOTE (OTHER) - ACTION/TREATMENT ORDERED:
[facility]
Team aware. ALISSA Walton seen patient at bedside. No further orders at this time. Will continue to monitor.
Team aware. No further orders at this time. Will continue to monitor.
recheck temperature later on, ice packs.
heparin nomogram q24hr

## 2021-03-11 NOTE — PROGRESS NOTE ADULT - PROBLEM SELECTOR PLAN 3
ac resp failure   -c/w Hiflo  -pul consult Dr Narayanan following  pt. is in resp failure , remain full code , can be intubated anytime   c/w chest PT and prone position as much as possible

## 2021-03-11 NOTE — CHART NOTE - NSCHARTNOTEFT_GEN_A_CORE
SUBJECTIVE:    CC: Hypoxia  HPI: Alerted by RN that Pt tachycardic, using accessory muscles, O2 Sat 83 on AVAPs. Pt assessed at bedside. Hx limited 2/2 NIV mask. He endorses that he feels "ok." and offers no acute complaints. Denies F/C, N/V, CP, SOB, palpitations, abd pain.    Discussed tenuous oxygen/respiratory status with patient including prognosis and educated on risks and benefits of intubation. Pt nods and gives thumbs up in affirmation of understanding and affirms that he would want to be intubated if deemed necessary by the medical staff.     Positives and pertinent negatives noted, all other system points negative.     OBJECTIVE:  Vital Signs Last 24 Hrs  T(C): 36.9 (03-09-21 @ 01:42), Max: 37.6 (03-08-21 @ 15:31)  T(F): 98.4 (03-09-21 @ 01:42), Max: 99.7 (03-08-21 @ 15:31)  HR: 112 (03-09-21 @ 03:21) (96 - 119)  BP: 143/84 (03-09-21 @ 01:42) (124/80 - 148/65)  RR: 20 (03-09-21 @ 03:21) (19 - 24)  SpO2: 97% (03-09-21 @ 03:21) (90% - 100%) on (O2)    Physical Exam:     General: initially mild respiratory distress, A&Ox4, WN/WD, Right lateral decubitus position  Resp: bibasilar crackles, otherwise CTA b/l, no wheezing, rhonchi, rales. slight abdominal accessory muscle use, no intercostal retractions, RR 40s.   Cardio: tachycardic, regular rhythm, nl S1/2, no murmurs, rubs, or gallops  Extremities: no edema, radial/DP pulses 2+ b/l, no acrocyanosis, Cap refill<3 sec  Psych: appropriate mood/affect, calm    Initially febrile to 101.3, After 1G Ofirmev and ice packs, Fever resolved, O2 Sat improved to mid 90s, and tachypnea improved to 20s. Pt now in NAD.     Reviewed all relevant lab results, tests, radiology studies, medications, telemetry, nursing assessments, and EKG.     ASSESSMENT:    60y Male admitted with complaints of Patient is a 60y old  Male who presents with a chief complaint of COVID-19 Pneumonia (08 Mar 2021 20:11)    Problem/Plan  1) Covid-19  - C/w AVAPs, goal O2>88%  - Desaturated while febrile, Vital signs improved once fever treated w/ Ice packs and 1g Acetaminophen IV.   - C/w continuous pulse Ox, Titrate O2 to SpO2 >88  - GOC discussed: Currently IS Amendable to Trial of intubation.     Case d/w RN and Pt, in agreement w/ assessment and plan.     Will continue to monitor. SUBJECTIVE:    CC: Hypoxia  HPI: Alerted by RN that Pt tachycardic, using accessory muscles, O2 Sat 83 on AVAPs. Pt assessed at bedside. Hx limited 2/2 NIV mask. He endorses that he feels "ok." and offers no acute complaints. Denies F/C, N/V, CP, SOB, palpitations, abd pain.    Discussed tenuous oxygen/respiratory status with patient including prognosis and educated on risks and benefits of intubation. Pt nods and gives thumbs up in affirmation of understanding and affirms that he would want to be intubated if deemed necessary by the medical staff.     Positives and pertinent negatives noted, all other system points negative.     OBJECTIVE:  Vital Signs Last 24 Hrs  T(C): 36.9 (03-09-21 @ 01:42), Max: 37.6 (03-08-21 @ 15:31)  T(F): 98.4 (03-09-21 @ 01:42), Max: 99.7 (03-08-21 @ 15:31)  HR: 112 (03-09-21 @ 03:21) (96 - 119)  BP: 143/84 (03-09-21 @ 01:42) (124/80 - 148/65)  RR: 20 (03-09-21 @ 03:21) (19 - 24)  SpO2: 97% (03-09-21 @ 03:21) (90% - 100%) on (O2)    Physical Exam:     General: initially mild respiratory distress, A&Ox4, WN/WD, Right lateral decubitus position  Resp: bibasilar crackles, otherwise CTA b/l, no wheezing, rhonchi, rales. slight abdominal accessory muscle use, no intercostal retractions, RR 40s.   Cardio: tachycardic, regular rhythm, nl S1/2, no murmurs, rubs, or gallops  Extremities: no edema, radial/DP pulses 2+ b/l, no acrocyanosis, Cap refill<3 sec  Psych: appropriate mood/affect, calm    Initially febrile to 101.3, After 1G Ofirmev and ice packs, Fever resolved, O2 Sat improved to mid 90s, and tachypnea improved to 20s. Pt now in NAD.     Reviewed all relevant lab results, tests, radiology studies, medications, telemetry, nursing assessments, and EKG.     ASSESSMENT:    60y Male admitted with complaints of Patient is a 60y old  Male who presents with a chief complaint of COVID-19 Pneumonia (08 Mar 2021 20:11)    Problem/Plan  1) Covid-19  - C/w AVAPs, goal O2>88%  - Desaturated while febrile, Vital signs improved once fever treated w/ Ice packs and 1g Acetaminophen IV.   - C/w continuous pulse Ox, Titrate O2 to SpO2 >88  - GOC discussed: Currently IS Amendable to Trial of intubation.     Case d/w RN and Pt, in agreement w/ assessment and plan.     Will continue to monitor.    Addendum: 6:00    RRT called for hypoxia to 83-85 after a coughing episode. Pt assessed at bedside w/ RRT, Pt still R lateral decubitus position, O2 Saturation returned to 93%. Afebrile now however still Tachypneic to 40s w/ some abdominal accessory muscle use. Will trial 0.5mg IV morphine for Tachypnea/WOB, additional 0.5mg morphine prn. SUBJECTIVE:    CC: Hypoxia  HPI: Alerted by RN that Pt tachycardic, using accessory muscles, O2 Sat 83 on AVAPs. Pt assessed at bedside. Hx limited 2/2 NIV mask. He endorses that he feels "ok." and offers no acute complaints. Denies F/C, N/V, CP, SOB, palpitations, abd pain.    Discussed tenuous oxygen/respiratory status with patient including prognosis and educated on risks and benefits of intubation. Pt nods and gives thumbs up in affirmation of understanding and affirms that he would want to be intubated if deemed necessary by the medical staff.     Positives and pertinent negatives noted, all other system points negative.     OBJECTIVE:  Vital Signs Last 24 Hrs  T(C): 36.9 (03-09-21 @ 01:42), Max: 37.6 (03-08-21 @ 15:31)  T(F): 98.4 (03-09-21 @ 01:42), Max: 99.7 (03-08-21 @ 15:31)  HR: 112 (03-09-21 @ 03:21) (96 - 119)  BP: 143/84 (03-09-21 @ 01:42) (124/80 - 148/65)  RR: 20 (03-09-21 @ 03:21) (19 - 24)  SpO2: 97% (03-09-21 @ 03:21) (90% - 100%) on (O2)    Physical Exam:     General: initially mild respiratory distress, A&Ox4, WN/WD, Right lateral decubitus position  Resp: bibasilar crackles, otherwise CTA b/l, no wheezing, rhonchi, rales. slight abdominal accessory muscle use, no intercostal retractions, RR 40s.   Cardio: tachycardic, regular rhythm, nl S1/2, no murmurs, rubs, or gallops  Extremities: no edema, radial/DP pulses 2+ b/l, no acrocyanosis, Cap refill<3 sec  Psych: appropriate mood/affect, calm    Initially febrile to 101.3, After 1G Ofirmev and ice packs, Fever resolved, O2 Sat improved to mid 90s, and tachypnea improved to 20s. Pt now in NAD.     Reviewed all relevant lab results, tests, radiology studies, medications, telemetry, nursing assessments, and EKG.     ASSESSMENT:    60y Male admitted with complaints of Patient is a 60y old  Male who presents with a chief complaint of COVID-19 Pneumonia (08 Mar 2021 20:11)    Problem/Plan  1) Covid-19  - C/w AVAPs, goal O2>88%  - Desaturated while febrile, Vital signs improved once fever treated w/ Ice packs and 1g Acetaminophen IV.   - C/w continuous pulse Ox, Titrate O2 to SpO2 >88  - GOC discussed: Currently IS Amendable to Trial of intubation.     Case d/w RN and Pt, in agreement w/ assessment and plan.     Will continue to monitor.    Addendum: 6:00    RRT called for hypoxia to 83-85 after a coughing episode. Pt assessed at bedside w/ RRT, Pt still R lateral decubitus position, O2 Saturation returned to 93%. Afebrile now however still Tachypneic to 40s w/ some abdominal accessory muscle use. A&Ox4 and able to speak in full sentences. Will trial 0.5mg IV morphine for Tachypnea/WOB, additional 0.5mg morphine prn.    Will f/u Pulm recs in am to assess for benefit of early intubation SUBJECTIVE:    CC: Hypoxia  HPI: Alerted by RN that Pt tachycardic, using accessory muscles, O2 Sat 83 on AVAPs. Pt assessed at bedside. Hx limited 2/2 NIV mask. He endorses that he feels "ok." and offers no acute complaints. Denies F/C, N/V, CP, SOB, palpitations, abd pain.    Discussed tenuous oxygen/respiratory status with patient including prognosis and educated on risks and benefits of intubation. Pt nods and gives thumbs up in affirmation of understanding and affirms that he would want to be intubated if deemed necessary by the medical staff.     Positives and pertinent negatives noted, all other system points negative.     OBJECTIVE:  Vital Signs Last 24 Hrs  T(C): 36.9 (03-09-21 @ 01:42), Max: 37.6 (03-08-21 @ 15:31)  T(F): 98.4 (03-09-21 @ 01:42), Max: 99.7 (03-08-21 @ 15:31)  HR: 112 (03-09-21 @ 03:21) (96 - 119)  BP: 143/84 (03-09-21 @ 01:42) (124/80 - 148/65)  RR: 20 (03-09-21 @ 03:21) (19 - 24)  SpO2: 97% (03-09-21 @ 03:21) (90% - 100%) on (O2)    Physical Exam:     General: initially mild respiratory distress, A&Ox4, WN/WD, Right lateral decubitus position  Resp: bibasilar crackles, otherwise CTA b/l, no wheezing, rhonchi, rales. slight abdominal accessory muscle use, no intercostal retractions, RR 40s.   Cardio: tachycardic, regular rhythm, nl S1/2, no murmurs, rubs, or gallops  Extremities: no edema, radial/DP pulses 2+ b/l, no acrocyanosis, Cap refill<3 sec  Psych: appropriate mood/affect, calm    Initially febrile to 101.3, After 1G Ofirmev and ice packs, Fever resolved, O2 Sat improved to mid 90s, and tachypnea improved to 20s. Pt now in NAD.     Reviewed all relevant lab results, tests, radiology studies, medications, telemetry, nursing assessments, and EKG.     ASSESSMENT:    60y Male admitted with complaints of Patient is a 60y old  Male who presents with a chief complaint of COVID-19 Pneumonia (08 Mar 2021 20:11)    Problem/Plan  1) Covid-19  - C/w AVAPs, goal O2>88%  - Desaturated while febrile, Vital signs improved once fever treated w/ Ice packs and 1g Acetaminophen IV.   - C/w continuous pulse Ox, Titrate O2 to SpO2 >88  - GOC discussed: Currently IS Amendable to Trial of intubation.     Case d/w RN and Pt, in agreement w/ assessment and plan.     Will continue to monitor.    Addendum: 6:00    RRT called for hypoxia to 83-85 after a coughing episode. Pt assessed at bedside w/ RRT, Pt still R lateral decubitus position, O2 Saturation returned to 93%. Afebrile now however still Tachypneic to 40s w/ some abdominal accessory muscle use. A&Ox4 and able to speak in full sentences. Will trial 0.5mg IV morphine for Tachypnea/WOB given opioid naive, additional 0.5mg morphine prn.    Will f/u Pulm recs in am to assess for benefit of early intubation

## 2021-03-11 NOTE — RAPID RESPONSE TEAM SUMMARY - NSSITUATIONBACKGROUNDRRT_GEN_ALL_CORE
This is a 59yo Male w/ PMHx of a Brain tumor s/p resection in 2018, who is presenting with worsening shortness of breath and non-productive cough for past week. Found to be COVID-19 Positive. Py hypoxic to 87% on RA CXR c/w COVID pneumonia. Admitted for further treatment of COVID pneumonia/hypoxemia. Also suspected to have PE, being anticoagulated with heparin gtt.     RRT called for hypoxia. Per staff, patient had a coughing fit and desaturated to 70s-80s. Upon arrival, patient on right side with AVAPS on, saturating 90-95%, but tachypneic to 30s and 40s. Patient talking in full sentences without issues, saying he feels the same as he always has.  This is a 59yo Male w/ PMHx of a Brain tumor s/p resection in 2018, who is presenting with worsening shortness of breath and non-productive cough for past week. Found to be COVID-19 Positive. Py hypoxic to 87% on RA CXR c/w COVID pneumonia. Admitted for further treatment of COVID pneumonia/hypoxemia. Also suspected to have PE, being anticoagulated with heparin gtt.     RRT called for hypoxia. Per staff, patient had a coughing fit and desaturated to 70s-80s. Upon arrival, patient on right side with AVAPS on, saturating 90-95%, but tachypneic to 30s and 40s. Patient talking in full sentences without issues, saying he feels the same as he always has. Will give morphine 0.5 mg to help with dyspnea. Primary team to follow up with pulmonary team.

## 2021-03-11 NOTE — PROGRESS NOTE ADULT - ASSESSMENT
60 m with Brain tumor s/p resection in 2018, presented 2/28 with SOB and cough and was admitted for hypoxic respiratory failure due to COVID  initially afebrile with normal WBC  s/p remdesivir and steroids  Fevers since 3/5  on empiric zosyn  Worsening resp status  now s/p RRT  on bipap  No growth on Cx  ? PE though on AC  ? resistant pathogen(less likely)  ? Fibrosis  ? Other occult pathology     Rec:  A) Resp failure  O2 supplementation  supplementa s needed  may need invasive ventilation    B) fevers  ? infection  Repeat Cx  Check nasal MRSA PCR  sputum cx if able to   will broaden coverage to vanco 750 Iv q 12 + merem 1 gm IV q 8  Check vanco trough and creatinine  If MRSA PCR or sputum CX no MRSA DC Vanco  AC per primary      C) COVID s/p RDV  s/p steroids  as above    Overall prognosis poor  Will tailor plan for ID issues  per course,results.Will defer to primary team on management of other issues.  Assessment, plan and recommendations as detailed above were discussed with the medical/primary  team.  ID will continue to follow-pls call if issues

## 2021-03-11 NOTE — PROGRESS NOTE ADULT - ASSESSMENT
This is a 59yo Male w/ PMHx of a Brain tumor s/p resection in 2018, who is presenting with worsening shortness of breath and non-productive cough for past week. Found to be COVID-19 Positive. Py hypoxic to 87% on RA CXR c/w COVID pneumonia. Admitted for further treatment of COVID pneumonia/hypoxemia.     Covid 19 Pneumonia: Hypoxic resp failure  Brain tumor:        3/3:      Covid 19 Pneumonia: Hypoxic resp failure: on covid rx: he is on 50% fio2: LFTS are mildly elevated: folow d dimer: currently OK  Brain tumor:  s/p resection:  DVT proparminyxlis  ruddy team    3/4:      Covid 19 Pneumonia: Hypoxic resp failure: on covid rx: he is on 50% fio2 today : increased fr om yesterday :looks better then yesterday : no overnight events Cont dexa"for a total of 10 dys: LFT as are improving and d dimer has mildly increased:   Brain tumor:  s/p resection:  DVT cole chaidez team : overall p rognosis is very giuarded"     3/7:      Covid 19 Pneumonia: Hypoxic resp failure: on covid rx: he is on100% fio2 today : remdesivir finished: on dexa: 7/10: pretty hypoxic: and has low grade fever: wbc is increasing: will start empiric zosyn: do chest xray : ID consult  Brain tumor:  s/p resection:  DVT propahyxlis  : overall p rognosis is very guarded stll :  RUDDY NP     3/8    Covid 19 Pneumonia: Hypoxic resp failure: on covid rx: he is on100% fio2 today : remdesivir finished: on dexa: 8/10: pretty hypoxic: and has low grade fever: wbc is increasing:  started empiric zosyn: do chest xray : with worsening: seen by ID:  His d dimer has increased significantly: start heparin  : call MICU with furterh deterioration as he might need intubation any time  Brain tumor:  s/p resection:  DVT propahyxlis  : overall prognosis is very guarded stll :  RUDDY CHAIDEZ NP     3/9:    Covid 19 Pneumonia: Hypoxic resp failure: on covid rx: he is on100% fio2 today : remdesivir finished: on dexa: 9/10: on zosyn: on bipap at 1005: try to wean down o2 today : his dexa may need to be extended as he isnot doing well; Yesterday his d dimer had increased 4 x : started on empiric heparin today d d iemr has decreased!  Brain tumor:  s/p resection:  DVT propahyxlis  overall prognosis is very guarded stll :  RUDDY Gray    3/10:    Covid 19 Pneumonia: he is not doign well: he is on AVAPS: on 1005 oxygen: very tenuous resp status;' cant be moved for cta: he is alerday on empiric full dose ac: try to wean off oxygen: he is alreadyon antibtiocs and is on zosyn ? upgrade to leonidas: would defer to primary ID team   Brain tumor:  s/p resection:  DVT prophylaxis  overall prognosis is very guarded still :  RUDDY Gray    3/11:  Covid 19 Pneumonia: not do ing well: on 1005 avpas: antibtiocs broadened: by ID: he wants to alycia ntuabted as a last resort: ruddy jane on floor: He might need intuabtion any time: Heis on full dose ac and his d dimer i s decreasing!  Brain tumor:  s/p resection:  DVT prophylaxis  overall prognosis is very guarded still :  RUDDY jane: [place]

## 2021-03-11 NOTE — PROGRESS NOTE ADULT - SUBJECTIVE AND OBJECTIVE BOX
Medicine Progress Note    Patient is a 60y old  Male who presents with a chief complaint of COVID-19 Pneumonia (11 Mar 2021 17:49)      SUBJECTIVE / OVERNIGHT EVENTS: not doing well , still on 100% o2   still SOB     ADDITIONAL REVIEW OF SYSTEMS:    MEDICATIONS  (STANDING):  ALBUTerol    90 MICROgram(s) HFA Inhaler 2 Puff(s) Inhalation every 4 hours  ascorbic acid 500 milliGRAM(s) Oral daily  cholecalciferol 2000 Unit(s) Oral daily  dextrose 5% + sodium chloride 0.9%. 1000 milliLiter(s) (75 mL/Hr) IV Continuous <Continuous>  heparin  Infusion.  Unit(s)/Hr (15 mL/Hr) IV Continuous <Continuous>  meropenem  IVPB 1000 milliGRAM(s) IV Intermittent every 8 hours  multivitamin 1 Tablet(s) Oral daily  vancomycin  IVPB 750 milliGRAM(s) IV Intermittent every 12 hours    MEDICATIONS  (PRN):  acetaminophen   Tablet .. 650 milliGRAM(s) Oral every 4 hours PRN Temp greater or equal to 38.5C (101.3F)  benzonatate 100 milliGRAM(s) Oral three times a day PRN Cough  heparin   Injectable 6500 Unit(s) IV Push every 6 hours PRN For aPTT less than 40  heparin   Injectable 3000 Unit(s) IV Push every 6 hours PRN For aPTT between 40 - 57  melatonin 3 milliGRAM(s) Oral at bedtime PRN Insomnia  sodium chloride 0.65% Nasal 1 Spray(s) Both Nostrils three times a day PRN Nasal Dryness    CAPILLARY BLOOD GLUCOSE      POCT Blood Glucose.: 100 mg/dL (11 Mar 2021 21:49)  POCT Blood Glucose.: 107 mg/dL (11 Mar 2021 17:49)  POCT Blood Glucose.: 144 mg/dL (11 Mar 2021 13:29)  POCT Blood Glucose.: 117 mg/dL (11 Mar 2021 06:01)  POCT Blood Glucose.: 96 mg/dL (11 Mar 2021 01:06)    I&O's Summary      PHYSICAL EXAM:  Vital Signs Last 24 Hrs  T(C): 38.2 (11 Mar 2021 21:10), Max: 38.5 (11 Mar 2021 02:15)  T(F): 100.7 (11 Mar 2021 21:10), Max: 101.3 (11 Mar 2021 02:15)  HR: 115 (11 Mar 2021 21:10) (107 - 137)  BP: 144/80 (11 Mar 2021 21:10) (100/83 - 144/80)  BP(mean): --  RR: 28 (11 Mar 2021 21:10) (24 - 33)  SpO2: 92% (11 Mar 2021 21:10) (84% - 96%)  CONSTITUTIONAL: NAD, well-developed, well-groomed  ENMT: Moist oral mucosa, no pharyngeal injection or exudates; normal dentition  RESPIRATORY: Normal respiratory effort; lungs are clear to auscultation bilaterally  CARDIOVASCULAR: Regular rate and rhythm, normal S1 and S2, no murmur/rub/gallop; No lower extremity edema; Peripheral pulses are 2+ bilaterally  ABDOMEN: Nontender to palpation, normoactive bowel sounds, no rebound/guarding; No hepatosplenomegaly  PSYCH: A+O to person, place, and time; affect appropriate  NEUROLOGY: CN 2-12 are intact and symmetric; no gross sensory deficits   SKIN: No rashes; no palpable lesions    LABS:                        14.1   19.90 )-----------( 336      ( 11 Mar 2021 07:46 )             45.5     03-11    144  |  104  |  30<H>  ----------------------------<  192<H>  3.7   |  25  |  0.91    Ca    9.1      11 Mar 2021 07:46  Phos  3.3     03-11  Mg     2.5     03-11    TPro  6.5  /  Alb  2.8<L>  /  TBili  0.5  /  DBili  x   /  AST  52<H>  /  ALT  46<H>  /  AlkPhos  119  03-10    PT/INR - ( 11 Mar 2021 07:46 )   PT: 13.4 sec;   INR: 1.17 ratio         PTT - ( 11 Mar 2021 07:46 )  PTT:61.2 sec          COVID-19 PCR: Detected (28 Feb 2021 23:02)      RADIOLOGY & ADDITIONAL TESTS:  Imaging from Last 24 Hours:    Electrocardiogram/QTc Interval:    COORDINATION OF CARE:  Care Discussed with Consultants/Other Providers:

## 2021-03-11 NOTE — CHART NOTE - NSCHARTNOTEFT_GEN_A_CORE
Medicine Subsequent Hospital Care Note- ACP  CC:  hypoxia   HPI/Subjective:  61 y/o Male, with a PmHx of a Brain tumor s/p resection in 2018, who p/w worsening SOB and non-productive cough x 1 week. Found to be COVID-19 Positive.  ROS:  Denies fever, chills, diaphoresis , malaise, night sweat, generalized weakness, SOB cough, sputum production, wheezing, hemoptysis, CP, ALVARES, orthopnea, PND, palpitations, diaphoresis, lightheadedness, dizziness, syncope, edema. nausea, vomiting, diarrhea, constipation, abdominal pain, melena, hematochezia, dysphagia, dysuria, frequency, urgency, hematuria, nocturia.  pt offers  no complaints.     -------------------------------------------------------------------------------------------------------------------------------------------------  Vital Signs:  Vital Signs Last 24 Hrs  T(C): 38.2 (03-11-21 @ 21:10), Max: 38.5 (03-11-21 @ 02:15)  T(F): 100.7 (03-11-21 @ 21:10), Max: 101.3 (03-11-21 @ 02:15)  HR: 115 (03-11-21 @ 21:10) (105 - 137)  BP: 144/80 (03-11-21 @ 21:10) (100/83 - 144/80)  RR: 28 (03-11-21 @ 21:10) (24 - 33)  SpO2: 92% (03-11-21 @ 21:10) (84% - 96%) on (O2)    Telemetry/Alarms:  General: WN/WD NAD  Neurology: Awake,  MORGAN x 4  Eyes: Scleras clear,    Respiratory: CTA B/L, No wheezing, rales, rhonchi. + Shallow breathing   CV: RRR,   Extremities: No edema, + peripheral pulses  Psych: Oriented x 3, normal affect    Relevant labs, radiology and Medications reviewed                        14.1   19.90 )-----------( 336      ( 11 Mar 2021 07:46 )             45.5     03-11    144  |  104  |  30<H>  ----------------------------<  192<H>  3.7   |  25  |  0.91    Ca    9.1      11 Mar 2021 07:46  Phos  3.3     03-11  Mg     2.5     03-11    TPro  6.5  /  Alb  2.8<L>  /  TBili  0.5  /  DBili  x   /  AST  52<H>  /  ALT  46<H>  /  AlkPhos  119  03-10    PT/INR - ( 11 Mar 2021 07:46 )   PT: 13.4 sec;   INR: 1.17 ratio         PTT - ( 11 Mar 2021 07:46 )  PTT:61.2 sec  MEDICATIONS  (STANDING):  ALBUTerol    90 MICROgram(s) HFA Inhaler 2 Puff(s) Inhalation every 4 hours  ascorbic acid 500 milliGRAM(s) Oral daily  cholecalciferol 2000 Unit(s) Oral daily  dextrose 5% + sodium chloride 0.9%. 1000 milliLiter(s) (75 mL/Hr) IV Continuous <Continuous>  heparin  Infusion.  Unit(s)/Hr (15 mL/Hr) IV Continuous <Continuous>  meropenem  IVPB 1000 milliGRAM(s) IV Intermittent every 8 hours  multivitamin 1 Tablet(s) Oral daily  vancomycin  IVPB 750 milliGRAM(s) IV Intermittent every 12 hours    MEDICATIONS  (PRN):  acetaminophen   Tablet .. 650 milliGRAM(s) Oral every 4 hours PRN Temp greater or equal to 38.5C (101.3F)  benzonatate 100 milliGRAM(s) Oral three times a day PRN Cough  heparin   Injectable 6500 Unit(s) IV Push every 6 hours PRN For aPTT less than 40  heparin   Injectable 3000 Unit(s) IV Push every 6 hours PRN For aPTT between 40 - 57  melatonin 3 milliGRAM(s) Oral at bedtime PRN Insomnia  sodium chloride 0.65% Nasal 1 Spray(s) Both Nostrils three times a day PRN Nasal Dryness    I&O's Summary    I reviewed the above lab results, tests, telemetry, and  EKG interpretation. .  Assessment  60y Male  w/ PAST MEDICAL & SURGICAL HISTORY:  Brain cancer  in remission.    Brain tumor  s/p ressection in 2018.    admitted with complaints of Patient is a 60y old  Male who presents with a chief complaint of COVID-19 Pneumonia (11 Mar 2021 17:49)  .now noted with 02 saturation mid 80's on bipap 100% 02.     PLAN  1)  chest PT  2)  encourage proned position. Pt remained on left side only  3) pt s/p steroids and remdesivir  4) will continue to monitor     Disposition: Full Code    [ x ]Mod complexity/risk ( Time> 250min)

## 2021-03-11 NOTE — DIETITIAN INITIAL EVALUATION ADULT. - OTHER INFO
Unable to conduct a face to face interview or nutrition-focused physical exam due to limited contact restrictions related to Pt's medical condition and isolation precautions. Collateral obtained from chart review.   No GI distress noted as per chart. Pt is currently NPO. Consider alternate means of nutrition.

## 2021-03-11 NOTE — PROGRESS NOTE ADULT - SUBJECTIVE AND OBJECTIVE BOX
Date of Service: 03-11-21 @ 12:19    Patient is a 60y old  Male who presents with a chief complaint of COVID-19 Pneumonia (11 Mar 2021 11:05)      Any change in ROS: Heis not doing well: no SOB : no cough : no chest apin though :  on 100% AVAPS    MEDICATIONS  (STANDING):  ALBUTerol    90 MICROgram(s) HFA Inhaler 2 Puff(s) Inhalation every 4 hours  ascorbic acid 500 milliGRAM(s) Oral daily  cholecalciferol 2000 Unit(s) Oral daily  dextrose 5% + sodium chloride 0.9%. 1000 milliLiter(s) (75 mL/Hr) IV Continuous <Continuous>  heparin  Infusion.  Unit(s)/Hr (15 mL/Hr) IV Continuous <Continuous>  meropenem  IVPB 1000 milliGRAM(s) IV Intermittent every 8 hours  multivitamin 1 Tablet(s) Oral daily  vancomycin  IVPB 750 milliGRAM(s) IV Intermittent every 12 hours    MEDICATIONS  (PRN):  acetaminophen   Tablet .. 650 milliGRAM(s) Oral every 4 hours PRN Temp greater or equal to 38.5C (101.3F)  benzonatate 100 milliGRAM(s) Oral three times a day PRN Cough  heparin   Injectable 6500 Unit(s) IV Push every 6 hours PRN For aPTT less than 40  heparin   Injectable 3000 Unit(s) IV Push every 6 hours PRN For aPTT between 40 - 57  melatonin 3 milliGRAM(s) Oral at bedtime PRN Insomnia  sodium chloride 0.65% Nasal 1 Spray(s) Both Nostrils three times a day PRN Nasal Dryness    Vital Signs Last 24 Hrs  T(C): 38.3 (11 Mar 2021 10:03), Max: 38.5 (11 Mar 2021 02:15)  T(F): 101 (11 Mar 2021 10:03), Max: 101.3 (11 Mar 2021 02:15)  HR: 111 (11 Mar 2021 10:03) (98 - 137)  BP: 137/68 (11 Mar 2021 10:03) (100/83 - 147/80)  BP(mean): --  RR: 30 (11 Mar 2021 10:03) (21 - 31)  SpO2: 92% (11 Mar 2021 10:03) (84% - 99%)  Mode: NIV (Noninvasive Ventilation)  RR (machine): 14  TV (machine): 400  FiO2: 100  PEEP: 10  ITime: 1  P-High: 25  P-Low: 15  PIP: 20    I&O's Summary        Physical Exam:   GENERAL: febrile: on AVPAS  HEENT: JOAO/   Atraumatic, Normocephalic  ENMT: No tonsillar erythema, exudates, or enlargement; Moist mucous membranes, Good dentition, No lesions  NECK: Supple, No JVD, Normal thyroid  CHEST/LUNG: Crackles bilaterally:   CVS: Regular rate and rhythm; No murmurs, rubs, or gallops  GI: : Soft, Nontender, Nondistended; Bowel sounds present  NERVOUS SYSTEM:  Alert & Oriented X3  EXTREMITIES:  2+ Peripheral Pulses, No clubbing, cyanosis, or edema  LYMPH: No lymphadenopathy noted  SKIN: No rashes or lesions  ENDOCRINOLOGY: No Thyromegaly  PSYCH: Appropriate    Labs:                              14.1   19.90 )-----------( 336      ( 11 Mar 2021 07:46 )             45.5                         14.3   19.63 )-----------( 322      ( 10 Mar 2021 07:45 )             45.2                         14.3   18.48 )-----------( 300      ( 09 Mar 2021 07:19 )             43.0                         14.8   21.86 )-----------( 330      ( 08 Mar 2021 23:24 )             46.0                         14.6   22.84 )-----------( 322      ( 08 Mar 2021 07:05 )             44.5     03-11    144  |  104  |  30<H>  ----------------------------<  192<H>  3.7   |  25  |  0.91  03-10    142  |  104  |  29<H>  ----------------------------<  124<H>  4.1   |  25  |  0.87  03-09    139  |  103  |  24<H>  ----------------------------<  131<H>  4.0   |  23  |  0.83  03-08    139  |  103  |  18  ----------------------------<  104<H>  3.8   |  22  |  0.88    Ca    9.1      11 Mar 2021 07:46  Ca    9.2      10 Mar 2021 07:43  Phos  3.3     03-11  Mg     2.5     03-11  Mg     2.5     03-10    TPro  6.5  /  Alb  2.8<L>  /  TBili  0.5  /  DBili  x   /  AST  52<H>  /  ALT  46<H>  /  AlkPhos  119  03-10  TPro  6.7  /  Alb  2.6<L>  /  TBili  0.6  /  DBili  x   /  AST  42<H>  /  ALT  57<H>  /  AlkPhos  102  03-09  TPro  6.9  /  Alb  2.9<L>  /  TBili  0.7  /  DBili  0.2  /  AST  62<H>  /  ALT  72<H>  /  AlkPhos  90  03-08    CAPILLARY BLOOD GLUCOSE      POCT Blood Glucose.: 117 mg/dL (11 Mar 2021 06:01)  POCT Blood Glucose.: 96 mg/dL (11 Mar 2021 01:06)      LIVER FUNCTIONS - ( 10 Mar 2021 07:43 )  Alb: 2.8 g/dL / Pro: 6.5 g/dL / ALK PHOS: 119 U/L / ALT: 46 U/L / AST: 52 U/L / GGT: x           PT/INR - ( 11 Mar 2021 07:46 )   PT: 13.4 sec;   INR: 1.17 ratio         PTT - ( 11 Mar 2021 07:46 )  PTT:61.2 sec    D-Dimer Assay, Quantitative: 909 ng/mL DDU (03-10 @ 07:45)  D-Dimer Assay, Quantitative: 1311 ng/mL DDU (03-09 @ 07:19)  D-Dimer Assay, Quantitative: 2003 ng/mL DDU (03-08 @ 07:05)  D-Dimer Assay, Quantitative: 550 ng/mL DDU (03-06 @ 08:12)  D-Dimer Assay, Quantitative: 371 ng/mL DDU (03-05 @ 07:27)  Procalcitonin, Serum: 0.35 ng/mL (03-09 @ 07:19)        RECENT CULTURES:  03-07 @ 14:31 .Blood Blood-Peripheral         r< from: Xray Chest 1 View- PORTABLE-Urgent (Xray Chest 1 View- PORTABLE-Urgent .) (03.09.21 @ 04:41) >    EXAM:  XR CHEST PORTABLE URGENT 1V        PROCEDURE DATE:  Mar  9 2021         INTERPRETATION:  HISTORY: COVID    TECHNIQUE: 2 portable views of the chest    COMPARISON: 3/7/2021    FINDINGS:  The cardiac silhouette is normal in size. There are bilateral diffuse, hazy airspace opacities, not significantly changed. There is no pleural effusion. No pneumothorax is seen. The hilar and mediastinal structures appear unremarkable. The osseous structures are intact.    IMPRESSION: Bilateral diffuse hazy airspace opacities, unchanged.              HERIBERTO DAVID MD; Attending Radiologist  This document has been electronically signed. Mar  9 2021  9:27AM    < end of copied text >         No growth to date.          RESPIRATORY CULTURES:          Studies  Chest X-RAY  CT SCAN Chest   Venous Dopplers: LE:   CT Abdomen  Others

## 2021-03-11 NOTE — PROGRESS NOTE ADULT - SUBJECTIVE AND OBJECTIVE BOX
ID Coverage    Patient is a 60y old  Male who presents with a chief complaint of COVID-19 Pneumonia (10 Mar 2021 20:17)    Being followed by ID for COVID, fever, resp failure     Interval history:still on bipap  Uncomfortable  some cough  minimal sputum  No CP     No acute events      ROS:    No N/V/D./abd pain  No other complaints      Antimicrobials:    piperacillin/tazobactam IVPB.. 3.375 Gram(s) IV Intermittent every 8 hours    Other medications reviewed  MEDICATIONS  (STANDING):  acetaminophen  IVPB .. 1000 milliGRAM(s) IV Intermittent once  ALBUTerol    90 MICROgram(s) HFA Inhaler 2 Puff(s) Inhalation every 4 hours  ascorbic acid 500 milliGRAM(s) Oral daily  cholecalciferol 2000 Unit(s) Oral daily  dextrose 5% + sodium chloride 0.9%. 1000 milliLiter(s) (75 mL/Hr) IV Continuous <Continuous>  heparin  Infusion.  Unit(s)/Hr (15 mL/Hr) IV Continuous <Continuous>  multivitamin 1 Tablet(s) Oral daily  piperacillin/tazobactam IVPB.. 3.375 Gram(s) IV Intermittent every 8 hours      Vital Signs Last 24 Hrs  T(C): 38.3 (03-11-21 @ 10:03), Max: 38.5 (03-11-21 @ 02:15)  T(F): 101 (03-11-21 @ 10:03), Max: 101.3 (03-11-21 @ 02:15)  HR: 111 (03-11-21 @ 10:03) (98 - 137)  BP: 137/68 (03-11-21 @ 10:03) (100/83 - 147/80)  BP(mean): --  RR: 30 (03-11-21 @ 10:03) (21 - 31)  SpO2: 92% (03-11-21 @ 10:03) (84% - 99%)    Physical Exam:      on bipap    HEENT PERRLA EOMI    No oral exudate or erythema    Chest Good AE,basal crackles     CVS RRR S1 S2 WNl No murmur or rub or gallop    Abd soft BS normal No tenderness no masses    IV site no erythema tenderness or discharge    CNS AAO X 2 no focal  Lab Data:                          14.1   19.90 )-----------( 336      ( 11 Mar 2021 07:46 )             45.5       03-11    144  |  104  |  30<H>  ----------------------------<  192<H>  3.7   |  25  |  0.91    Ca    9.1      11 Mar 2021 07:46  Phos  3.3     03-11  Mg     2.5     03-11    TPro  6.5  /  Alb  2.8<L>  /  TBili  0.5  /  DBili  x   /  AST  52<H>  /  ALT  46<H>  /  AlkPhos  119  03-10        Culture - Blood (collected 07 Mar 2021 14:31)  Source: .Blood Blood-Peripheral  Preliminary Report (08 Mar 2021 15:02):    No growth to date.    Culture - Blood (collected 07 Mar 2021 14:31)  Source: .Blood Blood-Peripheral  Preliminary Report (08 Mar 2021 15:02):    No growth to date.        < from: Xray Chest 1 View- PORTABLE-Urgent (Xray Chest 1 View- PORTABLE-Urgent .) (03.09.21 @ 04:41) >  IMPRESSION: Bilateral diffuse hazy airspace opacities, unchanged.          < end of copied text >

## 2021-03-11 NOTE — PROGRESS NOTE ADULT - SUBJECTIVE AND OBJECTIVE BOX
DATE OF SERVICE: 03-11-21 @ 22:50    Patient is a 60y old  Male who presents with a chief complaint of COVID-19 Pneumonia (11 Mar 2021 15:13)      INTERVAL HISTORY: RRT this AM for hypoxia, tachycardia      PHYSICAL EXAM:  T(C): 38.2 (03-11-21 @ 21:10), Max: 38.5 (03-11-21 @ 02:15)  HR: 115 (03-11-21 @ 21:10) (105 - 137)  BP: 144/80 (03-11-21 @ 21:10) (100/83 - 144/80)  RR: 28 (03-11-21 @ 21:10) (24 - 33)  SpO2: 92% (03-11-21 @ 21:10) (84% - 96%)  Wt(kg): --  I&O's Summary        PE: As per COVID guidelines                            14.1   19.90 )-----------( 336      ( 11 Mar 2021 07:46 )             45.5     03-11    144  |  104  |  30<H>  ----------------------------<  192<H>  3.7   |  25  |  0.91    Ca    9.1      11 Mar 2021 07:46  Phos  3.3     03-11  Mg     2.5     03-11    TPro  6.5  /  Alb  2.8<L>  /  TBili  0.5  /  DBili  x   /  AST  52<H>  /  ALT  46<H>  /  AlkPhos  119  03-10        Labs personally reviewed      This is a 59yo Male w/ PMHx of a Brain tumor s/p resection in 2018, who is presenting with worsening shortness of breath and non-productive cough for past week. Found to be COVID-19 Positive.Patient Sinus tachycardic in Emergency room.     1. Sinus Tachycardia 2/2 to worsening hypoxia from COVID-19 Pneumonia   - trop negative  - D Dimer continues rising - would need CTA r/o PE however given his high oxygen requirement likely not stable enough for test.   - Treating prophylactically for DVT/PE with heparin gtt  - can obtain TTE to eval heart function once acute issues resolve    2. Hypoxemia 2/2 Covid-19 pna  - remdesevir completed. Deca for 10 days.. initiated on 3/1   - IV zosyn on board  - s/p RRT for hypoxia 3/8 and again 3/11 - now on avap  - pt will likely need to be intubated       3. Hyponatremic- 134 on admission likely from dehydration   - nearly normalized s/p IVF hydration    4. Transaminitis  - elevated LFTs on admission, albumin slightly low however now nearly normalized, bilirubin wnl   - continue to monitor     5.  Fevers/Leukocytosis -  Plan: -on Zosyn, ID following pt  f/u Bcx      Pt is critically ill. Low threshold for MICU consult and intubation        Jaswant Weiss DO [facility]  Cardiovascular Medicine  [address] [place] [address], [address]  Office: [phone]  Cell: [phone]

## 2021-03-11 NOTE — PROGRESS NOTE ADULT - PROBLEM SELECTOR PLAN 1
still not doing well   -ac resp failure   -on Bipap / 100% fio2   pul / cardio/ ID following  -completed remdisivir   -c/w dexa and lovenox full dose

## 2021-03-12 NOTE — PROGRESS NOTE ADULT - ASSESSMENT
Aborto espontáneo completo  El examen que se le hizo hoy indica que collado embarazo terminó de manera repentina. Entendemos que esto es emocionalmente difícil. Es muy poco lo que podemos decir respecto de la forma en que se siente. Tyrell debe entender que los abortos espontáneos son comunes.  Alrededor de mannie o dos de cada dario embarazos terminan de vahe modo. Algunos terminan incluso antes de que usted sepa que está embarazada. [place] suele suceder por diferentes razones, y, por lo general, nunca se conocen las causas exactas. Es importante que sepa que no es collado culpa. No se debe a que usted haya hecho algo mal.  Tener relaciones sexuales o hacer actividad física no son cosas que provoquen un aborto espontáneo. Estas actividades suelen ser seguras a menos que sienta dolor o tenga sangrado, o que collado médico le diga que no lo bhaskar. Incluso las caídas menores no causan un aborto espontáneo. Los abortos espontáneos suceden cuando las cosas no se desarrollan jose francisco deberían haberlo hecho.     Parece que collado aborto espontáneo está completo. Todos los tejidos del embarazo victor salido de collado útero. Si partes del tejido del embarazo permanecen en el útero, probablemente tendrá más german y sangrado. El sangrado puede ser un manchado leve o similar a un período menstrual, tyrell no suele ser abundante. También puede que despida algo de tejido.     Cuando se haya recuperado, debería poder quedar embarazada otra vez. [place]rell, antes de intentarlo, hable con collado proveedor de atención médica.  Cuidados en la casa  Siga estos consejos para cuidar de usted en collado casa:  · Puede volver a romeo actividades normales si no tiene dolor o sangrado muy nakul.  · Puede que tenga algo de german y sangrado, tyrell no deberían ser muy nakul.  Hasta tanto el sangrado se detenga por completo y para evitar marcelino infección:  · No tenga relaciones sexuales hasta que collado proveedor de atención médica lo autorice.  · Use toallitas sanitarias en lugar de tampones.  · No  tome duchas vaginales.  Tener un aborto espontáneo puede ser muy difícil emocionalmente. Es común sentir tristeza o [place]. Puede ayudarle hablar sobre romeo sentimientos con romeo familiares y amigos, o con un consejero.  Visita de control  Programe marcelino marielena para emilie a collado proveedor de atención médica en marcelino o dos semanas para hacerse un chequeo.  Si los german y el sangrado regresan y continúan por más de unos pocos días, llame a collado proveedor de atención médica o regrese para hacerse un examen. Para evitar marcelino infección en el útero, es posible que collado proveedor deba sacar todo tejido que permanezca allí. También es posible que le den medicamentos que usted tomará en collado casa para ayudar a expulsar el irvin del tejido.  Nota: Si le hicieron marcelino ecografía, un radiólogo la evaluará. Le informarán de los nuevos hallazgos que puedan afectar collado atención médica.  Llame al 911  Llame al 911 si ocurre algo de lo siguiente:  · Tiene dolor elkin y sangrado muy abundante  · Se siente muy aturdida o se desmaya  · Ritmo cardíaco acelerado  · Dificultades para respirar  · Confusión o dificultades para despertarse  ¿Cuándo debe buscar atención médica?  Llame a collado proveedor de atención médica de inmediato si sucede cualquiera de las siguientes cosas:  · Sangrado abundante. [place] significa que empapa marcelino toallita sanitaria nueva por hora oscar danielle horas  · Sangrado que no se detiene después de 10 días  · Secreción vaginal con olor desagradable  · Fiebre de 100.4 °F (38.0 °C) o más, o según le haya indicado collado proveedor de atención médica  · Dolor en la parte baja del abdomen que va en aumento  · Debilidad o mareo  © [id number] [employer]. [address] [place] [address], [place] [address]. Todos los derechos reservados. Esta información no pretende sustituir la atención médica profesional. Sólo collado médico puede diagnosticar y tratar un problema de kayode.        Completed Spontaneous Miscarriage  Today's exams show your pregnancy has  ended suddenly. We understand that this is emotionally difficult. There is little we can say to change the way you feel. But understand that miscarriages are common.  About 1 or 2 out of every 10 pregnancies end this way. Some end even before you know you are pregnant. This happens for a number of reasons, and usually we never figure out why. It’s important you know that it is not your fault. It didn’t happen because you did anything wrong.  Having sex or exercising does not cause a miscarriage. These activities are usually safe unless you have pain or bleeding or your doctor tells you to stop. Even minor falls won’t cause a miscarriage. Miscarriages happen because things were not developing as they were supposed to.  It appears that your miscarriage is complete. All tissue from the pregnancy should have passed out of your uterus. If parts of the pregnancy tissue remains in the uterus, you will probably have more cramping and bleeding. The bleeding can be light spotting or like a period, but it is usually not heavy. You may also pass some tissue.  After you have recovered, you should still be able to get pregnant again. But before trying, talk with your health care provider.  Home care  Follow these tips to take of  yourself at home:  · You can go back to your normal activities if you don’t have heavy bleeding or pain.  · You may have some cramping and bleeding, but it shouldn’t be severe.  Until the bleeding stops completely and to prevent infection:  · Don’t have sex until your health care provider says it’s OK  · Use sanitary napkins instead of tampons.  · Don’t douche.  Having a miscarriage can be very difficult emotionally. It is natural to feel sadness or grief. It may help to talk about your feelings with family and friends, or with a counselor.  Follow-up care  Make an appointment to see your health care provider in 1 to 2 weeks for a checkup.  If cramping and bleeding return and continue for more than a few  This is a 59yo Male w/ PMHx of a Brain tumor s/p resection in 2018, who is presenting with worsening shortness of breath and non-productive cough for past week. Found to be COVID-19 Positive. Py hypoxic to 87% on RA CXR c/w COVID pneumonia. Admitted for further treatment of COVID pneumonia/hypoxemia.     Covid 19 Pneumonia: Hypoxic resp failure  Brain tumor:        3/3:      Covid 19 Pneumonia: Hypoxic resp failure: on covid rx: he is on 50% fio2: LFTS are mildly elevated: folow d dimer: currently OK  Brain tumor:  s/p resection:  DVT proparminyxlis  ruddy team    3/4:      Covid 19 Pneumonia: Hypoxic resp failure: on covid rx: he is on 50% fio2 today : increased fr om yesterday :looks better then yesterday : no overnight events Cont dexa"for a total of 10 dys: LFT as are improving and d dimer has mildly increased:   Brain tumor:  s/p resection:  DVT cole chaidez team : overall p rognosis is very giuarded"     3/7:      Covid 19 Pneumonia: Hypoxic resp failure: on covid rx: he is on100% fio2 today : remdesivir finished: on dexa: 7/10: pretty hypoxic: and has low grade fever: wbc is increasing: will start empiric zosyn: do chest xray : ID consult  Brain tumor:  s/p resection:  DVT propahyxlis  : overall p rognosis is very guarded stll :  RUDDY NP     3/8    Covid 19 Pneumonia: Hypoxic resp failure: on covid rx: he is on100% fio2 today : remdesivir finished: on dexa: 8/10: pretty hypoxic: and has low grade fever: wbc is increasing:  started empiric zosyn: do chest xray : with worsening: seen by ID:  His d dimer has increased significantly: start heparin  : call MICU with furterh deterioration as he might need intubation any time  Brain tumor:  s/p resection:  DVT propahyxlis  : overall prognosis is very guarded stll :  RUDDY CHAIDEZ NP     3/9:    Covid 19 Pneumonia: Hypoxic resp failure: on covid rx: he is on100% fio2 today : remdesivir finished: on dexa: 9/10: on zosyn: on bipap at 1005: try to wean down o2 today : his dexa may need to be extended as he isnot doing well; Yesterday his d dimer had increased 4 x : started on empiric heparin today d d iemr has decreased!  Brain tumor:  s/p resection:  DVT propahyxlis  overall prognosis is very guarded stll :  RUDDY Gray    3/10:    Covid 19 Pneumonia: he is not doign well: he is on AVAPS: on 1005 oxygen: very tenuous resp status;' cant be moved for cta: he is alerday on empiric full dose ac: try to wean off oxygen: he is alreadyon antibtiocs and is on zosyn ? upgrade to leonidas: would defer to primary ID team   Brain tumor:  s/p resection:  DVT prophylaxis  overall prognosis is very guarded still :  RUDDY Gray    3/11:  Covid 19 Pneumonia: not do ing well: on 1005 avpas: antibtiocs broadened: by ID: he wants to alycia ntuabted as a last resort: ruddy jane on floor: He might need intuabtion any time: Heis on full dose ac and his d dimer i s decreasing!  Brain tumor:  s/p resection:  DVT prophylaxis  overall prognosis is very guarded still :  RUDDY pa: [place]    3/12:    Covid 19 Pneumonia:i intubated finally today for rep failure with severe refractory hypoxemia: Now paralysed and sedated in MICU and on full vent support : lot of secretions on intubation on broad spectrum antibiotics WBC increased: follow cultures:   Brain tumor:  s/p resection:  DVT prophylaxis  Condition critical :   dw team         days, call your health care provider or return here for an exam. To prevent infection in the uterus, your provider might need to take out any tissue that remains. Or you may be given medication to take at home to help your body expel the rest of the tissue.  Note: If you had an ultrasound, a radiologist will review it. You will be told of any new findings that may affect your care.  Call 911  Call 911 if you have:  · Severe pain and very heavy bleeding  · Severe lightheadedness, passing out, or fainting  · Rapid heart rate  · Difficulty breathing  · Confusion or difficulty waking up  When to seek medical advice  Call your health care provider right away if any of these occur:  · Heavy bleeding. This means soaking 1 new pad an hour over 3 hours.  · Bleeding that doesn’t stop after 10 days  · Foul-smelling vaginal discharge  · Fever of 100.4°F (38°C) or higher, or as told by your health care provider  · Pain in your lower belly (abdomen) that gets worse  · Weakness or dizziness     © 9210-3664 [employer]. [address] [place] [address], [place] [address]. All rights reserved. This information is not intended as a substitute for professional medical care. Always follow your healthcare professional's instructions.

## 2021-03-12 NOTE — PROGRESS NOTE ADULT - SUBJECTIVE AND OBJECTIVE BOX
Date of Service: 03-12-21 @ 13:30    Patient is a 60y old  Male who presents with a chief complaint of COVID-19 Pneumonia (12 Mar 2021 12:48)      Any change in ROS: events noted: now intubated and sedated on full vent support:  Hew was not doing well for last few3 days and was on maximum support for resp failure    MEDICATIONS  (STANDING):  ALBUTerol    90 MICROgram(s) HFA Inhaler 2 Puff(s) Inhalation every 4 hours  ascorbic acid 500 milliGRAM(s) Oral daily  chlorhexidine 0.12% Liquid 15 milliLiter(s) Oral Mucosa every 12 hours  cholecalciferol 2000 Unit(s) Oral daily  cisatracurium Infusion 3 MICROgram(s)/kG/Min (14.8 mL/Hr) IV Continuous <Continuous>  cisatracurium Injectable 20 milliGRAM(s) IV Push once  dextrose 5% + sodium chloride 0.9%. 1000 milliLiter(s) (75 mL/Hr) IV Continuous <Continuous>  fentaNYL   Infusion..... 0.5 MICROgram(s)/kG/Hr (0.82 mL/Hr) IV Continuous <Continuous>  heparin  Infusion.  Unit(s)/Hr (15 mL/Hr) IV Continuous <Continuous>  meropenem  IVPB 1000 milliGRAM(s) IV Intermittent every 8 hours  multivitamin 1 Tablet(s) Oral daily  norepinephrine Infusion 0.05 MICROgram(s)/kG/Min (3.85 mL/Hr) IV Continuous <Continuous>  pantoprazole  Injectable 40 milliGRAM(s) IV Push daily  propofol Infusion 20 MICROgram(s)/kG/Min (9.85 mL/Hr) IV Continuous <Continuous>  vancomycin  IVPB 750 milliGRAM(s) IV Intermittent every 12 hours    MEDICATIONS  (PRN):  heparin   Injectable 6500 Unit(s) IV Push every 6 hours PRN For aPTT less than 40  heparin   Injectable 3000 Unit(s) IV Push every 6 hours PRN For aPTT between 40 - 57  melatonin 3 milliGRAM(s) Oral at bedtime PRN Insomnia    Vital Signs Last 24 Hrs  T(C): 36.8 (12 Mar 2021 11:00), Max: 38.3 (11 Mar 2021 19:26)  T(F): 98.2 (12 Mar 2021 11:00), Max: 100.9 (11 Mar 2021 19:26)  HR: 110 (12 Mar 2021 13:00) (106 - 121)  BP: 172/75 (12 Mar 2021 11:00) (127/76 - 172/75)  BP(mean): 103 (12 Mar 2021 11:00) (103 - 103)  RR: 28 (12 Mar 2021 13:00) (28 - 35)  SpO2: 95% (12 Mar 2021 13:00) (91% - 97%)  Mode: NIV (Noninvasive Ventilation)  RR (machine): 14  TV (machine): 400  FiO2: 100  PEEP: 10  ITime: 1  P-High: 25  P-Low: 15  PIP: 24    I&O's Summary    11 Mar 2021 07:01  -  12 Mar 2021 07:00  --------------------------------------------------------  IN: 990 mL / OUT: 200 mL / NET: 790 mL    12 Mar 2021 07:01  -  12 Mar 2021 13:30  --------------------------------------------------------  IN: 45 mL / OUT: 290 mL / NET: -245 mL          Physical Exam:   GENERAL: NAD, well-groomed, well-developed  HEENT: JOAO/   Atraumatic, Normocephalic  ENMT: No tonsillar erythema, exudates, or enlargement; Moist mucous membranes, Good dentition, No lesions  NECK: Supple, No JVD, Normal thyroid  CHEST/LUNG: Clear to auscultaion, ; No rales, rhonchi, wheezing, or rubs  CVS: Regular rate and rhythm; No murmurs, rubs, or gallops  GI: : Soft, Nontender, Nondistended; Bowel sounds present  NERVOUS SYSTEM:  Intubated and sedated and paralysed  EXTREMITIES: - edema  LYMPH: No lymphadenopathy noted  SKIN: No rashes or lesions  ENDOCRINOLOGY: No Thyromegaly  PSYCH: sedated    Labs:  ABG - ( 12 Mar 2021 12:14 )  pH, Arterial: 7.21  pH, Blood: x     /  pCO2: 74    /  pO2: 81    / HCO3: 23    / Base Excess: 1.0   /  SaO2: 90.8                                        14.0   32.87 )-----------( 408      ( 12 Mar 2021 12:14 )             44.4                         14.3   25.13 )-----------( 324      ( 12 Mar 2021 05:24 )             43.9                         14.1   19.90 )-----------( 336      ( 11 Mar 2021 07:46 )             45.5                         14.3   19.63 )-----------( 322      ( 10 Mar 2021 07:45 )             45.2                         14.3   18.48 )-----------( 300      ( 09 Mar 2021 07:19 )             43.0                         14.8   21.86 )-----------( 330      ( 08 Mar 2021 23:24 )             46.0     03-12    148<H>  |  110<H>  |  24<H>  ----------------------------<  146<H>  3.6   |  26  |  0.71  03-12    144  |  108<H>  |  25<H>  ----------------------------<  126<H>  3.5   |  22  |  0.80  03-11    144  |  104  |  30<H>  ----------------------------<  192<H>  3.7   |  25  |  0.91  03-10    142  |  104  |  29<H>  ----------------------------<  124<H>  4.1   |  25  |  0.87  03-09    139  |  103  |  24<H>  ----------------------------<  131<H>  4.0   |  23  |  0.83    Ca    8.8      12 Mar 2021 12:14  Ca    8.6      12 Mar 2021 05:24  Ca    9.1      11 Mar 2021 07:46  Phos  4.6     03-12  Phos  3.3     03-11  Mg     2.4     03-12  Mg     2.5     03-11    TPro  6.5  /  Alb  2.6<L>  /  TBili  0.5  /  DBili  x   /  AST  47<H>  /  ALT  37  /  AlkPhos  132<H>  03-12  TPro  6.2  /  Alb  2.3<L>  /  TBili  0.4  /  DBili  x   /  AST  48<H>  /  ALT  39  /  AlkPhos  120  03-12  TPro  6.5  /  Alb  2.8<L>  /  TBili  0.5  /  DBili  x   /  AST  52<H>  /  ALT  46<H>  /  AlkPhos  119  03-10  TPro  6.7  /  Alb  2.6<L>  /  TBili  0.6  /  DBili  x   /  AST  42<H>  /  ALT  57<H>  /  AlkPhos  102  03-09    CAPILLARY BLOOD GLUCOSE      POCT Blood Glucose.: 130 mg/dL (12 Mar 2021 09:52)  POCT Blood Glucose.: 98 mg/dL (12 Mar 2021 06:05)  POCT Blood Glucose.: 114 mg/dL (12 Mar 2021 01:16)  POCT Blood Glucose.: 100 mg/dL (11 Mar 2021 21:49)  POCT Blood Glucose.: 107 mg/dL (11 Mar 2021 17:49)      LIVER FUNCTIONS - ( 12 Mar 2021 12:14 )  Alb: 2.6 g/dL / Pro: 6.5 g/dL / ALK PHOS: 132 U/L / ALT: 37 U/L / AST: 47 U/L / GGT: x           PT/INR - ( 12 Mar 2021 12:14 )   PT: 14.5 sec;   INR: 1.28 ratio         PTT - ( 12 Mar 2021 12:14 )  PTT:33.7 sec    D-Dimer Assay, Quantitative: 1608 ng/mL DDU (03-12 @ 05:24)  D-Dimer Assay, Quantitative: 909 ng/mL DDU (03-10 @ 07:45)  D-Dimer Assay, Quantitative: 1311 ng/mL DDU (03-09 @ 07:19)  D-Dimer Assay, Quantitative: 2003 ng/mL DDU (03-08 @ 07:05)  D-Dimer Assay, Quantitative: 550 ng/mL DDU (03-06 @ 08:12)  Procalcitonin, Serum: 0.35 ng/mL (03-12 @ 05:24)  Procalcitonin, Serum: 0.35 ng/mL (03-09 @ 07:19)        RECENT CULTURES:  03-07 @ 14:31 .Blood Blood-Peripheral         r< from: Xray Chest 1 View- PORTABLE-Urgent (Xray Chest 1 View- PORTABLE-Urgent .) (03.12.21 @ 13:08) >    EXAM:  XR CHEST PORTABLE URGENT 1V        PROCEDURE DATE:  Mar 12 2021         INTERPRETATION:  INDICATION: Status post endotracheal tube, OGT, right IJ catheter    TECHNIQUE: Single portable view of the chest.    COMPARISON: 3/9/2021    FINDINGS: The cardiac silhouette is normal in size. There is an endotracheal tube the tip above the fina. There is a right IJ central venous catheter with tip overlying the superior vena cava. No pneumothorax is seen. No OGT is seen. There are bilateral diffuse airspace opacities, slightly worsened.    IMPRESSION: Endotracheal tube and right IJ central venous catheter in good position with no pneumothorax seen. An enteric feeding tube is not seen. Bilateral diffuse airspace opacities, slightly worsened.              HERIBERTO DAVID MD; Attending Radiologist  This document has been electronically signed. Mar 12 2021  1:27PM    < end of copied text >         No growth to date.          RESPIRATORY CULTURES:          Studies  Chest X-RAY  CT SCAN Chest   Venous Dopplers: LE:   CT Abdomen  Others

## 2021-03-12 NOTE — PROGRESS NOTE ADULT - ASSESSMENT
60 m with Brain tumor s/p resection in 2018, presented 2/28 with SOB and cough and was admitted for hypoxic respiratory failure due to COVID  initially afebrile with normal WBC  s/p remdesivir and steroids  Fevers since 3/5  was on zosyn  Abx broadened yesterday  now again s/p RRT  Intubated  In ICU     Rec:  A) Resp failure  ? from pna  ? COVID related fibrosis   check sputum Cx  check blood cx  continue vanco,merem  Monitor vanco trough and creatinine   Oxygenation, re per ICU team     B) fevers  ? infection  abx as above       C) COVID s/p RDV  s/p steroids  as above    Overall prognosis poor  Will tailor plan for ID issues  per course,results.Will defer to primary team on management of other issues.  Assessment, plan and recommendations as detailed above were discussed with the MICU team.  ID service  will cover patient over weekend.Please call ID fellow on call if issues or questions.

## 2021-03-12 NOTE — PROGRESS NOTE ADULT - SUBJECTIVE AND OBJECTIVE BOX
Patient is a 60y old  Male who presents with a chief complaint of COVID-19 Pneumonia (12 Mar 2021 13:30)      INTERVAL HISTORY:  intubated /sedated   	  MEDICATIONS:  norepinephrine Infusion 0.05 MICROgram(s)/kG/Min IV Continuous <Continuous>    PHYSICAL EXAM:  T(C): 36.8 (03-12-21 @ 11:00), Max: 38.3 (03-11-21 @ 19:26)  HR: 114 (03-12-21 @ 14:00) (106 - 121)  BP: 172/75 (03-12-21 @ 11:00) (127/76 - 172/75)  RR: 28 (03-12-21 @ 14:00) (28 - 35)  SpO2: 95% (03-12-21 @ 14:00) (92% - 97%)  Wt(kg): --  I&O's Summary    11 Mar 2021 07:01  -  12 Mar 2021 07:00  --------------------------------------------------------  IN: 990 mL / OUT: 200 mL / NET: 790 mL    12 Mar 2021 07:01  -  12 Mar 2021 15:20  --------------------------------------------------------  IN: 191 mL / OUT: 320 mL / NET: -129 mL    Appearance: In no distress	  HEENT:    PERRL, EOMI	  Cardiovascular:  S1 S2, No JVD  Respiratory: Lungs clear to auscultation	  Gastrointestinal:  Soft, Non-tender, + BS	  Vascularature:  No edema of LE  Psychiatric: Appropriate affect   Neuro: no acute focal deficits                         14.0   32.87 )-----------( 408      ( 12 Mar 2021 12:14 )             44.4     03-12    148<H>  |  110<H>  |  24<H>  ----------------------------<  146<H>  3.6   |  26  |  0.71    Ca    8.8      12 Mar 2021 12:14  Phos  4.6     03-12  Mg     2.4     03-12    TPro  6.5  /  Alb  2.6<L>  /  TBili  0.5  /  DBili  x   /  AST  47<H>  /  ALT  37  /  AlkPhos  132<H>  03-12  Labs personally reviewed    ASSESSMENT/PLAN: 	  This is a 59yo Male w/ PMHx of a Brain tumor s/p resection in 2018, who is presenting with worsening shortness of breath and non-productive cough for past week. Found to be COVID-19 Positive. Patient Sinus tachycardic in Emergency room.     1. Sinus Tachycardia 2/2 to worsening hypoxia from COVID-19 Pneumonia   - trop negative  - D Dimer continues rising - would need CTA r/o PE however given his high oxygen requirement likely not stable enough for test.   - Treating prophylactically for DVT/PE with heparin gtt  - can obtain TTE to eval heart function once acute issues resolve    2. Hypoxemia 2/2 Covid-19 pna  - remdesevir completed. Deca for 10 days.. initiated on 3/1   - IV zosyn on board  - s/p RRT- intubated/ sedated in ICU     3. Hyponatremic- 134 on admission likely from dehydration   - nearly normalized s/p IVF hydration    4. Transaminitis  - elevated LFTs on admission, albumin slightly low however now nearly normalized, bilirubin wnl   - continue to monitor     5.  Fevers/Leukocytosis -  Plan: -on Zosyn, ID following pt  f/u Bcx            Arpita Rodriguez ANP-C  Jaswant Weiss DO formerly [facility]  Cardiovascular Medicine  [address] [place] [address], [address]  Office: [phone]  Cell: [phone] Patient is a 60y old  Male who presents with a chief complaint of COVID-19 Pneumonia (12 Mar 2021 13:30)      INTERVAL HISTORY:  intubated /sedated   	  MEDICATIONS:  norepinephrine Infusion 0.05 MICROgram(s)/kG/Min IV Continuous <Continuous>    PHYSICAL EXAM:  T(C): 36.8 (03-12-21 @ 11:00), Max: 38.3 (03-11-21 @ 19:26)  HR: 114 (03-12-21 @ 14:00) (106 - 121)  BP: 172/75 (03-12-21 @ 11:00) (127/76 - 172/75)  RR: 28 (03-12-21 @ 14:00) (28 - 35)  SpO2: 95% (03-12-21 @ 14:00) (92% - 97%)  Wt(kg): --  I&O's Summary    11 Mar 2021 07:01  -  12 Mar 2021 07:00  --------------------------------------------------------  IN: 990 mL / OUT: 200 mL / NET: 790 mL    12 Mar 2021 07:01  -  12 Mar 2021 15:20  --------------------------------------------------------  IN: 191 mL / OUT: 320 mL / NET: -129 mL    Appearance: In no distress	  HEENT:    PERRL, EOMI	  Cardiovascular:  S1 S2, No JVD  Respiratory: Lungs clear to auscultation	  Gastrointestinal:  Soft, Non-tender, + BS	  Vascularature:  No edema of LE  Psychiatric: intubated, sedated  Neuro: no acute focal deficits                         14.0   32.87 )-----------( 408      ( 12 Mar 2021 12:14 )             44.4     03-12    148<H>  |  110<H>  |  24<H>  ----------------------------<  146<H>  3.6   |  26  |  0.71    Ca    8.8      12 Mar 2021 12:14  Phos  4.6     03-12  Mg     2.4     03-12    TPro  6.5  /  Alb  2.6<L>  /  TBili  0.5  /  DBili  x   /  AST  47<H>  /  ALT  37  /  AlkPhos  132<H>  03-12  Labs personally reviewed    ASSESSMENT/PLAN: 	  This is a 61yo Male w/ PMHx of a Brain tumor s/p resection in 2018, who is presenting with worsening shortness of breath and non-productive cough for past week. Found to be COVID-19 Positive. Patient Sinus tachycardic in Emergency room.     1. Sinus Tachycardia 2/2 to worsening hypoxia from COVID-19 Pneumonia   - trop negative  - D Dimer continues rising - would need CTA r/o PE however given his high oxygen requirement likely not stable enough for test.   - Treating prophylactically for DVT/PE with heparin gtt  - can obtain TTE to eval heart function once acute issues resolve    2. Hypoxemia 2/2 Covid-19 pna  - remdesevir completed. Deca for 10 days.. initiated on 3/1   - IV zosyn on board  - s/p RRT- intubated/ sedated in ICU     3. Hyponatremic- 134 on admission likely from dehydration   - nearly normalized s/p IVF hydration    4. Transaminitis  - elevated LFTs on admission, albumin slightly low however now nearly normalized, bilirubin wnl   - continue to monitor     5.  Fevers/Leukocytosis -  Plan: -on Zosyn, ID following pt  f/u Bcx      Thirty five minutes of critical care time spent of which more than fifty percent of the encounter was spent on counseling and/or coordinating care by the attending physician.      Arpita Rodriguez ANP-C  Jaswant Weiss DO [facility]  Cardiovascular Medicine  [address], [address]  Office: [phone]  Cell: [phone]

## 2021-03-12 NOTE — RAPID RESPONSE TEAM SUMMARY - NSADDTLFINDINGSRRT_GEN_ALL_CORE
RRT called for hypoxia and increased WOB. On arrival to RRT pt on maximal AVAPS support with sat 60% with good wave form. Family contacted and confirm pt full code and would want intubation. Anesthesia intubated with Etomidate 20 mg, succinylcholine 100mg. Following intubation pt started on prop 50 mcg/kg/min, and levophed. Rocuronium 50, Versed 4mg and Fentanyl 100 mcg given as patient awakening. Sat improved to mid 80s following intubation. Pt transported to [facility] ICU for further management  RRT called for hypoxia and increased WOB. On arrival to RRT pt on maximal AVAPS support with sat 60% with good wave form. Family contacted and confirm pt full code and would want intubation. Anesthesia intubated with Etomidate 20 mg, succinylcholine 100mg. Significant secretions noted in airway on intubation. Following intubation pt started on prop 50 mcg/kg/min, and levophed. Rocuronium 50, Versed 4mg and Fentanyl 100 mcg given as patient awakening. Sat improved to mid 80s following intubation. Pt transported to [facility] ICU for further management

## 2021-03-12 NOTE — PROGRESS NOTE ADULT - SUBJECTIVE AND OBJECTIVE BOX
ID Coverage    Patient is a 60y old  Male who presents with a chief complaint of COVID-19 Pneumonia (12 Mar 2021 11:30)    Being followed by ID for resp failure    Interval history:events noted  Intubated  In ICU  RIJ TLC inserted        ROS:  No ROS obtainable       Antimicrobials:    meropenem  IVPB 1000 milliGRAM(s) IV Intermittent every 8 hours  vancomycin  IVPB 750 milliGRAM(s) IV Intermittent every 12 hours    Other medications reviewed    Vital Signs Last 24 Hrs  T(C): 36.8 (03-12-21 @ 11:00), Max: 38.3 (03-11-21 @ 19:26)  T(F): 98.2 (03-12-21 @ 11:00), Max: 100.9 (03-11-21 @ 19:26)  HR: 115 (03-12-21 @ 11:00) (106 - 121)  BP: 172/75 (03-12-21 @ 11:00) (127/76 - 172/75)  BP(mean): 103 (03-12-21 @ 11:00) (103 - 103)  RR: 28 (03-12-21 @ 11:00) (28 - 35)  SpO2: 92% (03-12-21 @ 11:00) (91% - 97%)    Physical Exam:    ETT  RIJ TLC         Chest Good AE,basal crackles     CVS RRR S1 S2 WNl No murmur or rub or gallop    Abd soft BS normal No tenderness no masses    IV site no erythema tenderness or discharge    CNS sedated   Lab Data:                          14.0   32.87 )-----------( 408      ( 12 Mar 2021 12:14 )             44.4     WBC Count: 32.87 (03-12-21 @ 12:14)  WBC Count: 25.13 (03-12-21 @ 05:24)  WBC Count: 19.90 (03-11-21 @ 07:46)  WBC Count: 19.63 (03-10-21 @ 07:45)  WBC Count: 18.48 (03-09-21 @ 07:19)  WBC Count: 21.86 (03-08-21 @ 23:24)  WBC Count: 22.84 (03-08-21 @ 07:05)  WBC Count: 18.00 (03-07-21 @ 07:34)  WBC Count: 13.74 (03-06-21 @ 08:12)    03-12    144  |  108<H>  |  25<H>  ----------------------------<  126<H>  3.5   |  22  |  0.80    Ca    8.6      12 Mar 2021 05:24  Phos  3.3     03-11  Mg     2.5     03-11    TPro  6.2  /  Alb  2.3<L>  /  TBili  0.4  /  DBili  x   /  AST  48<H>  /  ALT  39  /  AlkPhos  120  03-12        Culture - Blood (collected 07 Mar 2021 14:31)  Source: .Blood Blood-Peripheral  Preliminary Report (08 Mar 2021 15:02):    No growth to date.    Culture - Blood (collected 07 Mar 2021 14:31)  Source: .Blood Blood-Peripheral  Preliminary Report (08 Mar 2021 15:02):    No growth to date.            < from: Xray Chest 1 View- PORTABLE-Urgent (Xray Chest 1 View- PORTABLE-Urgent .) (03.09.21 @ 04:41) >  IMPRESSION: Bilateral diffuse hazy airspace opacities, unchanged.              < end of copied text >

## 2021-03-12 NOTE — AIRWAY PLACEMENT NOTE ADULT - POST AIRWAY PLACEMENT ASSESSMENT:
Pulse ox improved/breath sounds bilateral/positive end tidal CO2 noted/CXR pending/chest excursion noted/skin color improved

## 2021-03-12 NOTE — PROGRESS NOTE ADULT - SUBJECTIVE AND OBJECTIVE BOX
Medicine Progress Note    Patient is a 60y old  Male who presents with a chief complaint of COVID-19 Pneumonia (12 Mar 2021 15:19)      SUBJECTIVE / OVERNIGHT EVENTS: s/p RRT , intubated on [facility] vent    ADDITIONAL REVIEW OF SYSTEMS:    MEDICATIONS  (STANDING):  ALBUTerol    90 MICROgram(s) HFA Inhaler 2 Puff(s) Inhalation every 4 hours  ascorbic acid 500 milliGRAM(s) Oral daily  chlorhexidine 0.12% Liquid 15 milliLiter(s) Oral Mucosa every 12 hours  cholecalciferol 2000 Unit(s) Oral daily  cisatracurium Infusion 3 MICROgram(s)/kG/Min (14.8 mL/Hr) IV Continuous <Continuous>  fentaNYL   Infusion..... 0.5 MICROgram(s)/kG/Hr (0.82 mL/Hr) IV Continuous <Continuous>  heparin  Infusion.  Unit(s)/Hr (15 mL/Hr) IV Continuous <Continuous>  meropenem  IVPB 1000 milliGRAM(s) IV Intermittent every 8 hours  multivitamin 1 Tablet(s) Oral daily  norepinephrine Infusion 0.05 MICROgram(s)/kG/Min (3.85 mL/Hr) IV Continuous <Continuous>  pantoprazole  Injectable 40 milliGRAM(s) IV Push daily  propofol Infusion 20 MICROgram(s)/kG/Min (9.85 mL/Hr) IV Continuous <Continuous>  vancomycin  IVPB 750 milliGRAM(s) IV Intermittent every 12 hours    MEDICATIONS  (PRN):  heparin   Injectable 6500 Unit(s) IV Push every 6 hours PRN For aPTT less than 40  heparin   Injectable 3000 Unit(s) IV Push every 6 hours PRN For aPTT between 40 - 57  melatonin 3 milliGRAM(s) Oral at bedtime PRN Insomnia    CAPILLARY BLOOD GLUCOSE      POCT Blood Glucose.: 130 mg/dL (12 Mar 2021 09:52)  POCT Blood Glucose.: 98 mg/dL (12 Mar 2021 06:05)    I&O's Summary    11 Mar 2021 07:01  -  12 Mar 2021 07:00  --------------------------------------------------------  IN: 990 mL / OUT: 200 mL / NET: 790 mL    12 Mar 2021 07:01  -  13 Mar 2021 02:10  --------------------------------------------------------  IN: 1062.2 mL / OUT: 745 mL / NET: 317.2 mL        PHYSICAL EXAM:  Vital Signs Last 24 Hrs  T(C): 37.2 (13 Mar 2021 00:00), Max: 37.3 (12 Mar 2021 05:07)  T(F): 99 (13 Mar 2021 00:00), Max: 99.2 (12 Mar 2021 05:07)  HR: 103 (13 Mar 2021 01:00) (84 - 121)  BP: 172/75 (12 Mar 2021 11:00) (134/74 - 172/75)  BP(mean): 103 (12 Mar 2021 11:00) (103 - 103)  RR: 30 (13 Mar 2021 01:00) (28 - 35)  SpO2: 100% (13 Mar 2021 01:00) (92% - 100%)  CONSTITUTIONAL: NAD, well-developed, well-groomed  ENMT: Moist oral mucosa, no pharyngeal injection or exudates; normal dentition  RESPIRATORY: Normal respiratory effort; lungs are clear to auscultation bilaterally  CARDIOVASCULAR: Regular rate and rhythm, normal S1 and S2, no murmur/rub/gallop; No lower extremity edema; Peripheral pulses are 2+ bilaterally  ABDOMEN: Nontender to palpation, normoactive bowel sounds, no rebound/guarding; No hepatosplenomegaly  PSYCH: A+O to person, place, and time; affect appropriate  NEUROLOGY: CN 2-12 are intact and symmetric; no gross sensory deficits   SKIN: No rashes; no palpable lesions    LABS:                        14.0   32.87 )-----------( 408      ( 12 Mar 2021 12:14 )             44.4     03-12    148<H>  |  110<H>  |  24<H>  ----------------------------<  146<H>  3.6   |  26  |  0.71    Ca    8.8      12 Mar 2021 12:14  Phos  4.6     03-12  Mg     2.4     03-12    TPro  6.5  /  Alb  2.6<L>  /  TBili  0.5  /  DBili  x   /  AST  47<H>  /  ALT  37  /  AlkPhos  132<H>  03-12    PT/INR - ( 12 Mar 2021 12:14 )   PT: 14.5 sec;   INR: 1.28 ratio         PTT - ( 12 Mar 2021 12:14 )  PTT:33.7 sec      Urinalysis Basic - ( 12 Mar 2021 12:14 )    Color: Yellow / Appearance: Slightly Turbid / S.040 / pH: x  Gluc: x / Ketone: Small  / Bili: Negative / Urobili: 3 mg/dL   Blood: x / Protein: 100 mg/dL / Nitrite: Negative   Leuk Esterase: Negative / RBC: 386 /HPF / WBC 17 /HPF   Sq Epi: x / Non Sq Epi: 8 /HPF / Bacteria: Negative        Culture - Nose (collected 11 Mar 2021 23:09)  Source: .Nose Nose  Preliminary Report (12 Mar 2021 21:47):    Culture in progress    Culture - Blood (collected 11 Mar 2021 16:28)  Source: .Blood Blood-Peripheral  Preliminary Report (12 Mar 2021 17:01):    No growth to date.    Culture - Blood (collected 11 Mar 2021 16:28)  Source: .Blood Blood-Peripheral  Preliminary Report (12 Mar 2021 17:01):    No growth to date.      COVID-19 PCR: Detected (2021 23:02)      RADIOLOGY & ADDITIONAL TESTS:  Imaging from Last 24 Hours:    Electrocardiogram/QTc Interval:    COORDINATION OF CARE:  Care Discussed with Consultants/Other Providers:

## 2021-03-12 NOTE — PROGRESS NOTE ADULT - ATTENDING COMMENTS
60 year old with covid respiratory failure.  Patient critically ill by virtue of needing ventilator support, intermittent use of vasopressors and frequent bedside reassessments and medication changes. currently high vent requirement including poor ventilation. await repeat gas. continue dex.  sedation. paralysis to improve vent mechanics.  prognosis guarded

## 2021-03-12 NOTE — CHART NOTE - NSCHARTNOTEFT_GEN_A_CORE
Notified by RN that patient received an additional dose of Vancomycin during the day 6 hours apart. One dose given at 13:00 and second dose given at 17:53. Patient is currently receiving 750mg IV Q12 for Bacteremia. Discussed case with pharmacy. Will obtain random Vancomycin level in am prior to next scheduled dose.  Will continue to monitor patient closely.    Bekah Renae PA-C  pager [phone]

## 2021-03-12 NOTE — RAPID RESPONSE TEAM SUMMARY - NSSITUATIONBACKGROUNDRRT_GEN_ALL_CORE
59yo Male w/ PMHx of a Brain tumor s/p resection in 2018, who is presented with worsening shortness of breath and non-productive cough for past week on 3/1. Found to be COVID-19 Positive. Py hypoxic to 87% on RA CXR c/w COVID pneumonia. Admitted for further treatment of COVID pneumonia/hypoxemia. Pt escalate to HFNC 3/2, BiPAP on 3/8, and AVAPS 3/10. 61yo Male w/ PMHx of a Brain tumor s/p resection in 2018, who is presented with worsening shortness of breath and non-productive cough for past week on 3/1. Found to be COVID-19 Positive. Py hypoxic to 87% on RA CXR c/w COVID pneumonia. Admitted for further treatment of COVID pneumonia/hypoxemia. Pt escalated to HFNC 3/2, BiPAP on 3/8, and AVAPS 3/10. 59yo Male w/ PMHx of a Brain tumor s/p resection in 2018, who is presented with worsening shortness of breath and non-productive cough for past week on 3/1. Found to be COVID-19 Positive. Pt hypoxic to 87% on RA CXR c/w COVID pneumonia. Admitted for further treatment of COVID pneumonia/hypoxemia. Pt escalated to HFNC on 3/2, BiPAP on 3/8, and AVAPS on 3/10.

## 2021-03-12 NOTE — PROGRESS NOTE ADULT - ASSESSMENT
ASSESSMENT:  60y Male w/ PMHx of a Brain tumor s/p resection in 2018 who presented 3/1/21 with worsening shortness of breath and non-productive cough x1 week, worsening hypoxia 2/2 COVID PNA requiring intubation 3/12 and transfer to [facility]    # Hypoxic respiratory failure 2/2 COVID  -intubated on [facility] vent   -MICU following     Hypotension   - Norepinephrine for MAP >65    malnutrition  - Will start TF when OGT confirmed  - GI ppx w/ protonix  - Senna/Miralax     elevated DDimer   - H/H stable  - Elevated d-dimer, continue heparin drip  - Check lower extremity duplex r/o DVT    PNA  - Increasing leukocytosis with elevated procalcitonin & fevers, concern for superimposed bacterial infection  -started on Iv abx       krista nelson MD

## 2021-03-12 NOTE — PROGRESS NOTE ADULT - SUBJECTIVE AND OBJECTIVE BOX
MICU Transfer/Accept Note  =====================================================  59yo Male w/ PMHx of a Brain tumor s/p resection in 2018 who presented 3/1/21 with worsening shortness of breath and non-productive cough x1 week. He initially saw his PCP, had a negative COVID swab and was started on an inhaler without improvement. He presented with increased SOB, lethargy and appetite. He tested positive for COVID on admission and was started on dexamethasone and remdesivir, he required 6L NC on admission for room air saturation of 87%. He remained hypoxemic and required HFNC. On 3/5 he developed fevers and his procalcitonin began to uptrend. He was started on empiric Zosyn for concern of superimposed bacterial PNA. Patient was a rapid response on 3/8 for hypoxia to 80s and  found to be disconnected from high flow NC. Placed on AVAPS. He was transitioned to BiPAP and continued to deteriorate with increased work of breathing and hypoxia. He was intubated on 3/12 and transferred to [facility].    Allergies: No Known Allergies    MEDICATIONS:   --------------------------------------------------------------------------------------  Neurologic Medications  fentaNYL   Infusion. 0.5 MICROgram(s)/kG/Hr IV Continuous <Continuous>  melatonin 3 milliGRAM(s) Oral at bedtime PRN Insomnia  propofol Infusion 20 MICROgram(s)/kG/Min IV Continuous <Continuous>    Respiratory Medications  ALBUTerol    90 MICROgram(s) HFA Inhaler 2 Puff(s) Inhalation every 4 hours    Cardiovascular Medications    Gastrointestinal Medications  ascorbic acid 500 milliGRAM(s) Oral daily  cholecalciferol 2000 Unit(s) Oral daily  dextrose 5% + sodium chloride 0.9%. 1000 milliLiter(s) IV Continuous <Continuous>  multivitamin 1 Tablet(s) Oral daily    Genitourinary Medications    Hematologic/Oncologic Medications  heparin   Injectable 6500 Unit(s) IV Push every 6 hours PRN For aPTT less than 40  heparin   Injectable 3000 Unit(s) IV Push every 6 hours PRN For aPTT between 40 - 57  heparin  Infusion.  Unit(s)/Hr IV Continuous <Continuous>    Antimicrobial/Immunologic Medications  meropenem  IVPB 1000 milliGRAM(s) IV Intermittent every 8 hours  vancomycin  IVPB 750 milliGRAM(s) IV Intermittent every 12 hours    Endocrine/Metabolic Medications    Topical/Other Medications  chlorhexidine 0.12% Liquid 15 milliLiter(s) Oral Mucosa every 12 hours    --------------------------------------------------------------------------------------    VITAL SIGNS, INS/OUTS (last 24 hours):  --------------------------------------------------------------------------------------  Vital Signs Last 24 Hrs  T(C): 36.8 (12 Mar 2021 11:00), Max: 38.3 (11 Mar 2021 19:26)  T(F): 98.2 (12 Mar 2021 11:00), Max: 100.9 (11 Mar 2021 19:26)  HR: 115 (12 Mar 2021 11:00) (106 - 121)  BP: 172/75 (12 Mar 2021 11:00) (127/76 - 172/75)  BP(mean): 103 (12 Mar 2021 11:00) (103 - 103)  RR: 28 (12 Mar 2021 11:00) (28 - 35)  SpO2: 92% (12 Mar 2021 11:00) (91% - 97%)  --------------------------------------------------------------------------------------    EXAM  NEUROLOGY  Exam: Sedated & intubated    HEENT  Exam: Normocephalic, atraumatic, ETT in place, OGT    RESPIRATORY  Exam: Lungs clear to auscultation, Normal expansion/effor  Mechanical Ventilation: 26/400/12/100%    CARDIOVASCULAR  Exam: S1, S2.  Tachycardic rate, regular rhythm     GI/NUTRITION  Exam: Abdomen soft, Non-tender, Non-distended.     VASCULAR  Exam: Extremities warm, pink, well-perfused.     SKIN  Exam: Good skin turgor, no skin breakdown    METABOLIC/FLUIDS/ELECTROLYTES  ascorbic acid 500 milliGRAM(s) Oral daily  cholecalciferol 2000 Unit(s) Oral daily  dextrose 5% + sodium chloride 0.9%. 1000 milliLiter(s) IV Continuous <Continuous>  multivitamin 1 Tablet(s) Oral daily    HEMATOLOGIC  [x] VTE Prophylaxis: heparin  Infusion.  Unit(s)/Hr IV Continuous <Continuous>    Transfusions:	[] PRBC	[] Platelets		[] FFP	[] Cryoprecipitate    INFECTIOUS DISEASE  Antimicrobials/Immunologic Medications:  meropenem  IVPB 1000 milliGRAM(s) IV Intermittent every 8 hours  vancomycin  IVPB 750 milliGRAM(s) IV Intermittent every 12 hour    Tubes/Lines/Drains   [x] Peripheral IV  [x] Central Venous Line     	[x] R	[] L	[x] IJ	[] Fem	[] SC	Date Placed: 3/12  [x] Arterial Line		[] R	[x] L	[] Fem	[x] Rad	[] Ax	Date Placed: 3/12  [] PICC		[] Midline		[] Mediport  [x] Urinary Catheter		Date Placed: 3/12  [x] Necessity of urinary, arterial, and venous catheters discussed    LABS  --------------------------------------------------------------------------------------  CBC (03-12 @ 05:24)                          14.3                     25.13<H>  )--------------(  324        94.7<H>% Neuts, 3.5<L>% Lymphs, ANC: 23.80<H>                          43.9    CBC (03-11 @ 07:46)                          14.1                     19.90<H>  )--------------(  336        --    % Neuts, --    % Lymphs, ANC: --                              45.5      BMP (03-12 @ 05:24)       144     |  108<H>  |  25<H> 			Ca++ --      Ca 8.6          ---------------------------------( 126<H>		Mg --           3.5     |  22      |  0.80  			Ph --      BMP (03-11 @ 07:46)       144     |  104     |  30<H> 			Ca++ --      Ca 9.1          ---------------------------------( 192<H>		Mg 2.5          3.7     |  25      |  0.91  			Ph 3.3       LFTs (03-12 @ 05:24)      TPro 6.2 / Alb 2.3<L> / TBili 0.4 / DBili -- / AST 48<H> / ALT 39 / AlkPhos 120    Coags (03-11 @ 07:46)  aPTT 61.2<H> / INR 1.17<H> / PT 13.4            -> .Blood Blood-Peripheral Culture (03-07 @ 14:31)     NG    NG    No growth to date.      --------------------------------------------------------------------------------------    OTHER LABORATORY:     IMAGING STUDIES:   CXR:

## 2021-03-12 NOTE — PROGRESS NOTE ADULT - ASSESSMENT
ASSESSMENT:  60y Male w/ PMHx of a Brain tumor s/p resection in 2018 who presented 3/1/21 with worsening shortness of breath and non-productive cough x1 week, worsening hypoxia 2/2 COVID PNA requiring intubation 3/12 and transfer to [facility]    PLAN:  Neurologic:   - Sedation with propofol and fentanyl   - Will wean as tolerated    Respiratory:   - Hypoxic respiratory failure 2/2 COVID  - F/u ABG  - Volume AC 26/400/12/100%      - Pplat 32, Ppeak 36    Cardiovascular:   - Normotensive off vasopressors  - Norepinephrine for MAP >65    Gastrointestinal/Nutrition:   - Will start TF when OGT confirmed  - GI ppx w/ protonix  - Senna/Miralax     Renal/Genitourinary:   - Monitor intake & output  - BUN/Cr stable  - Electrolytes normal  - Discontinue D5NS when diet started    Hematologic:   - H/H stable  - Elevated d-dimer, continue heparin drip  - Check lower extremity duplex r/o DVT    Infectious Disease:   - Increasing leukocytosis with elevated procalcitonin & fevers, concern for superimposed bacterial infection  - Meropenem / Vancomycin (3/11- )  - Repeat BCx, Sputum Cx, UA     Tubes/Lines/Drains:   - RIJ TLC  - Left radial arterial line  - Ariza  - ETT  - OGT    Endocrine:   - Check HgbA1C  - Blood glucose levels in target range  - Continue correctional scale insulin    Disposition: [facility] Tav[facility] Updated    --------------------------------------------------------------------------------------    Critical Care Diagnoses:

## 2021-03-13 NOTE — PROGRESS NOTE ADULT - SUBJECTIVE AND OBJECTIVE BOX
Patient is a 60y old  Male who presents with a chief complaint of COVID-19 Pneumonia (13 Mar 2021 07:49)      INTERVAL HISTORY:   intubated/ sedated    MEDICATIONS:  norepinephrine Infusion 0.05 MICROgram(s)/kG/Min IV Continuous <Continuous>    PHYSICAL EXAM:  T(C): 37.6 (03-13-21 @ 08:00), Max: 37.6 (03-13-21 @ 08:00)  HR: 105 (03-13-21 @ 10:00) (84 - 121)  BP: 172/75 (03-12-21 @ 11:00) (172/75 - 172/75)  RR: 30 (03-13-21 @ 10:00) (28 - 32)  SpO2: 99% (03-13-21 @ 10:00) (92% - 100%)  Wt(kg): --  I&O's Summary    12 Mar 2021 07:01  -  13 Mar 2021 07:00  --------------------------------------------------------  IN: 1774 mL / OUT: 940 mL / NET: 834 mL    13 Mar 2021 06:01  -  13 Mar 2021 10:07  --------------------------------------------------------  IN: 223 mL / OUT: 225 mL / NET: -2 mL                          12.3   25.97 )-----------( 326      ( 13 Mar 2021 02:10 )             39.7     03-13    142  |  107  |  26<H>  ----------------------------<  98  4.1   |  28  |  0.97    Ca    8.8      13 Mar 2021 02:10  Phos  3.6     03-13  Mg     2.4     03-13    TPro  6.5  /  Alb  2.6<L>  /  TBili  0.5  /  DBili  x   /  AST  47<H>  /  ALT  37  /  AlkPhos  132<H>  03-12  Labs personally reviewed    ASSESSMENT/PLAN: 	  This is a 61yo Male w/ PMHx of a Brain tumor s/p resection in 2018, who is presenting with worsening shortness of breath and non-productive cough for past week. Found to be COVID-19 Positive. Patient Sinus tachycardic in Emergency room.     1. Sinus Tachycardia 2/2 to worsening hypoxia from COVID-19 Pneumonia   - trop negative  - D Dimer continues rising - would need CTA r/o PE however given his high oxygen requirement likely not stable enough for test.   - Treating prophylactically for DVT/PE with heparin gtt  - can obtain TTE to eval heart function once acute issues resolve    2. Hypoxemia 2/2 Covid-19 pna  - remdesevir completed. Deca for 10 days.. initiated on 3/1   - IV zosyn on board  - s/p RRT- intubated/ sedated in ICU     3. Hyponatremic- 134 on admission likely from dehydration   - nearly normalized s/p IVF hydration    4. Transaminitis  - elevated LFTs on admission, albumin slightly low however now nearly normalized, bilirubin wnl   - continue to monitor     5.  Fevers/Leukocytosis -  Plan: on Vanco and IV meropenem - under ICU care  bcx 3/7 - negative   bcx 3/11 NGTVIANNEY JONES-DAJA Weiss DO [facility]  Cardiovascular Medicine  [address] [place] [address], [address]  Office: [phone]  Cell: [phone] Patient is a 60y old  Male who presents with a chief complaint of COVID-19 Pneumonia (13 Mar 2021 07:49)      INTERVAL HISTORY:   intubated/ sedated    MEDICATIONS:  norepinephrine Infusion 0.05 MICROgram(s)/kG/Min IV Continuous <Continuous>    PHYSICAL EXAM:  T(C): 37.6 (03-13-21 @ 08:00), Max: 37.6 (03-13-21 @ 08:00)  HR: 105 (03-13-21 @ 10:00) (84 - 121)  BP: 172/75 (03-12-21 @ 11:00) (172/75 - 172/75)  RR: 30 (03-13-21 @ 10:00) (28 - 32)  SpO2: 99% (03-13-21 @ 10:00) (92% - 100%)  Wt(kg): --  I&O's Summary    12 Mar 2021 07:01  -  13 Mar 2021 07:00  --------------------------------------------------------  IN: 1774 mL / OUT: 940 mL / NET: 834 mL    13 Mar 2021 06:01  -  13 Mar 2021 10:07  --------------------------------------------------------  IN: 223 mL / OUT: 225 mL / NET: -2 mL                          12.3   25.97 )-----------( 326      ( 13 Mar 2021 02:10 )             39.7     03-13    142  |  107  |  26<H>  ----------------------------<  98  4.1   |  28  |  0.97    Ca    8.8      13 Mar 2021 02:10  Phos  3.6     03-13  Mg     2.4     03-13    TPro  6.5  /  Alb  2.6<L>  /  TBili  0.5  /  DBili  x   /  AST  47<H>  /  ALT  37  /  AlkPhos  132<H>  03-12  Labs personally reviewed    ASSESSMENT/PLAN: 	  This is a 59yo Male w/ PMHx of a Brain tumor s/p resection in 2018, who is presenting with worsening shortness of breath and non-productive cough for past week. Found to be COVID-19 Positive. Patient Sinus tachycardic in Emergency room.     1. Sinus Tachycardia 2/2 to worsening hypoxia from COVID-19 Pneumonia   - trop negative  - D Dimer continues rising - would need CTA r/o PE however given his high oxygen requirement likely not stable enough for test.   - Treating prophylactically for DVT/PE with heparin gtt  - can obtain TTE to eval heart function once acute issues resolve    2. Hypoxemia 2/2 Covid-19 pna  - remdesevir completed. Deca for 10 days.. initiated on 3/1   - IV zosyn on board  - s/p RRT- intubated/ sedated in ICU     3. Hyponatremic- 134 on admission likely from dehydration   - nearly normalized s/p IVF hydration    4. Transaminitis  - elevated LFTs on admission, albumin slightly low however now nearly normalized, bilirubin wnl   - continue to monitor     5.  Fevers/Leukocytosis -  Plan: on Vanco and IV meropenem - under ICU care  bcx 3/7 - negative   bcx 3/11 NGTD    Thirty five minutes of critical day time spent on total encounter, of which more than fifty percent of the encounter was spent on counseling and/or coordinating care by the attending physician.    Arpita Weiss DO [facility]  Cardiovascular Medicine  [address], [address]  Office: [phone]  Cell: [phone]

## 2021-03-13 NOTE — PROGRESS NOTE ADULT - ASSESSMENT
ASSESSMENT:  60y Male w/ PMHx of a Brain tumor s/p resection in 2018 who presented 3/1/21 with worsening shortness of breath and non-productive cough x1 week, worsening hypoxia 2/2 COVID PNA requiring intubation 3/12 and transfer to [facility]    PLAN:  Neurologic:   - Sedation with propofol and fentanyl   - Chemically paralyzed with nimbex  - Wean as tolerated when supinated    Respiratory:   - Hypoxic & hypercarbic respiratory failure 2/2 COVID  - F/u ABG  - Volume AC 30/450/8/70%  - Continue albuterol  - Plan to supinate at 11am    Cardiovascular:   - Hypotensive, likely vasoplegic shock 2/2 sedation/paralysis  - Norepinephrine for MAP >65    Gastrointestinal/Nutrition:   - Jevity @ goal  - GI ppx w/ protonix  - Senna/Miralax     Renal/Genitourinary:   - Monitor intake & output  - BUN/Cr stable  - Electrolytes normal  - Net positive 1L/24hrs    Hematologic:   - H/H stable  - Elevated d-dimer, continue heparin drip  - Check lower extremity duplex r/o DVT    Infectious Disease:   - Afebrile x24hrs  - Stable leukocytosis with elevated procalcitonin, concern for superimposed bacterial infection  - Meropenem / Vancomycin (3/11- )  - BCx 3/7 & 3/11 NGTD  - UA negative    Tubes/Lines/Drains:   - RIJ TLC  - Left radial arterial line  - Ariza  - ETT  - OGT    Endocrine:   - HgbA1C 6.0  - Blood glucose levels in target range ()  - Continue correctional scale insulin    Disposition: MICU ASSESSMENT:  60y Male w/ PMHx of a Brain tumor s/p resection in 2018 who presented 3/1/21 with worsening shortness of breath and non-productive cough x1 week, worsening hypoxia 2/2 COVID PNA requiring intubation 3/12 and transfer to [facility]    PLAN:  Neurologic:   - Sedation with propofol and fentanyl   - Chemically paralyzed with nimbex  - Wean as tolerated when supinated    Respiratory:   - Hypoxic & hypercarbic respiratory failure 2/2 COVID  - F/u ABG  - Volume AC 30/450/8/70%  - Continue albuterol  - Plan to supinate at 11am    Cardiovascular:   - Hypotensive, likely vasoplegic shock 2/2 sedation/paralysis  - Norepinephrine for MAP >65    Gastrointestinal/Nutrition:   - Jevity @ goal  - GI ppx w/ protonix  - Senna/Miralax     Renal/Genitourinary:   - Monitor intake & output  - BUN/Cr stable  - Electrolytes normal  - Net positive 1L/24hrs    Hematologic:   - H/H stable  - Elevated d-dimer, continue heparin drip  - Check lower extremity duplex r/o DVT    Infectious Disease:   - Afebrile x24hrs  - Stable leukocytosis with elevated procalcitonin, low concern for superimposed bacterial infection  - Meropenem / Vancomycin (3/11- )  - BCx 3/7 & 3/11 NGTD  - UA negative  - If MRSA swab negative, will discontinue antibiotics    Tubes/Lines/Drains:   - RIJ TLC  - Left radial arterial line  - Ariza  - ETT  - OGT    Endocrine:   - HgbA1C 6.0  - Blood glucose levels in target range ()  - Continue correctional scale insulin    Disposition: MICU ASSESSMENT:  60y Male w/ PMHx of a Brain tumor s/p resection in 2018 who presented 3/1/21 with worsening shortness of breath and non-productive cough x1 week, worsening hypoxia 2/2 COVID PNA requiring intubation 3/12 and transfer to [facility]    PLAN:  Neurologic:   - Sedation with propofol and fentanyl   - Chemically paralyzed with nimbex  - Wean as tolerated when supinated    Respiratory:   - Hypoxic & hypercarbic respiratory failure 2/2 COVID  - F/u ABG  - Volume AC 30/450/8/70%  - Continue albuterol  - Plan to supinate at 11am    Cardiovascular:   - Hypotensive, likely vasoplegic shock 2/2 sedation/paralysis  - Norepinephrine for MAP >65  - Will start ASA    Gastrointestinal/Nutrition:   - Jevity @ goal  - GI ppx w/ protonix  - Senna/Miralax     Renal/Genitourinary:   - Monitor intake & output  - BUN/Cr stable  - Electrolytes normal  - Net positive 1L/24hrs    Hematologic:   - H/H stable  - Elevated d-dimer, previously on heparin drip  - Check lower extremity duplex r/o DVT  - Discontinue heparin drip, start VTE ppx    Infectious Disease:   - COVID: s/p remdesivir & dexamethasone  - Afebrile x24hrs  - Stable leukocytosis with elevated procalcitonin, low concern for superimposed bacterial infection  - Meropenem / Vancomycin (3/11- )  - BCx 3/7 & 3/11 NGTD  - UA negative  - If MRSA swab negative, will discontinue antibiotics  - Start 10mg dexamethasone BID x 3 days    Tubes/Lines/Drains:   - RIJ TLC  - Left radial arterial line  - Ariza  - ETT  - OGT    Endocrine:   - HgbA1C 6.0  - Blood glucose levels in target range ()  - Continue correctional scale insulin    Disposition: MICU

## 2021-03-13 NOTE — PROGRESS NOTE ADULT - SUBJECTIVE AND OBJECTIVE BOX
MICU Daily Progress Note  =====================================================  Interval/Overnight Events:    - Proned at 5pm  - FiO2 decreased to 90%    HPI: 61yo Male w/ PMHx of a Brain tumor s/p resection in 2018 who presented 3/1/21 with worsening shortness of breath and non-productive cough x1 week. He initially saw his PCP, had a negative COVID swab and was started on an inhaler without improvement. He presented with increased SOB, lethargy and appetite. He tested positive for COVID on admission and was started on dexamethasone and remdesivir, he required 6L NC on admission for room air saturation of 87%. He remained hypoxemic and required HFNC. On 3/5 he developed fevers and his procalcitonin began to uptrend. He was started on empiric Zosyn for concern of superimposed bacterial PNA. Patient was a rapid response on 3/8 for hypoxia to 80s and  found to be disconnected from high flow NC. Placed on AVAPS. He was transitioned to BiPAP and continued to deteriorate with increased work of breathing and hypoxia. He was intubated on 3/12 and transferred to Surg.    Allergies: No Known Allergies    MEDICATIONS:   --------------------------------------------------------------------------------------  Neurologic Medications  cisatracurium Infusion 3 MICROgram(s)/kG/Min IV Continuous <Continuous>  fentaNYL   Infusion..... 0.5 MICROgram(s)/kG/Hr IV Continuous <Continuous>  melatonin 3 milliGRAM(s) Oral at bedtime PRN Insomnia  propofol Infusion 20 MICROgram(s)/kG/Min IV Continuous <Continuous>    Respiratory Medications  ALBUTerol    90 MICROgram(s) HFA Inhaler 2 Puff(s) Inhalation every 4 hours    Cardiovascular Medications  norepinephrine Infusion 0.05 MICROgram(s)/kG/Min IV Continuous <Continuous>    Gastrointestinal Medications  ascorbic acid 500 milliGRAM(s) Oral daily  cholecalciferol 2000 Unit(s) Oral daily  multivitamin 1 Tablet(s) Oral daily  pantoprazole  Injectable 40 milliGRAM(s) IV Push daily    Genitourinary Medications    Hematologic/Oncologic Medications  heparin   Injectable 6500 Unit(s) IV Push every 6 hours PRN For aPTT less than 40  heparin   Injectable 3000 Unit(s) IV Push every 6 hours PRN For aPTT between 40 - 57  heparin  Infusion.  Unit(s)/Hr IV Continuous <Continuous>    Antimicrobial/Immunologic Medications  meropenem  IVPB 1000 milliGRAM(s) IV Intermittent every 8 hours  vancomycin  IVPB 750 milliGRAM(s) IV Intermittent every 12 hours    Endocrine/Metabolic Medications    Topical/Other Medications  chlorhexidine 0.12% Liquid 15 milliLiter(s) Oral Mucosa every 12 hours    --------------------------------------------------------------------------------------  VITAL SIGNS, INS/OUTS (last 24 hours):  --------------------------------------------------------------------------------------  ICU Vital Signs Last 24 Hrs  T(C): 37.4 (13 Mar 2021 04:00), Max: 37.4 (13 Mar 2021 04:00)  T(F): 99.3 (13 Mar 2021 04:00), Max: 99.3 (13 Mar 2021 04:00)  HR: 99 (13 Mar 2021 07:29) (84 - 121)  BP: 172/75 (12 Mar 2021 11:00) (172/75 - 172/75)  BP(mean): 103 (12 Mar 2021 11:00) (103 - 103)  ABP: 93/51 (13 Mar 2021 05:00) (92/50 - 146/65)  ABP(mean): 67 (13 Mar 2021 04:00) (67 - 88)  RR: 30 (13 Mar 2021 05:00) (28 - 32)  SpO2: 100% (13 Mar 2021 07:29) (92% - 100%)  --------------------------------------------------------------------------------------    EXAM  NEUROLOGY  Exam: Intubated and sedated    HEENT  Exam: Normocephalic, NGT/ETT in place    RESPIRATORY  Exam: Lungs clear to auscultation, Normal expansion  Mechanical Ventilation: Mode: AC/ CMV (Assist Control/ Continuous Mandatory Ventilation), RR (machine): 30, TV (machine): 480, FiO2: 90, PEEP: 12, ITime: 0.87, MAP: 22, PIP: 43    CARDIOVASCULAR  Exam: S1, S2.  Regular rate and rhythm    GI/NUTRITION  Exam: Patient prone, unable to examine    VASCULAR  Exam: Extremities warm, pink, well-perfused.     SKIN  Exam: Good skin turgor, no skin breakdown.     METABOLIC/FLUIDS/ELECTROLYTES  ascorbic acid 500 milliGRAM(s) Oral daily  cholecalciferol 2000 Unit(s) Oral daily  multivitamin 1 Tablet(s) Oral daily    HEMATOLOGIC  [x] VTE Prophylaxis: heparin  Infusion.  Unit(s)/Hr IV Continuous <Continuous>    Transfusions:	[] PRBC	[] Platelets		[] FFP	[] Cryoprecipitate    INFECTIOUS DISEASE  Antimicrobials/Immunologic Medications:  meropenem  IVPB 1000 milliGRAM(s) IV Intermittent every 8 hours  vancomycin  IVPB 750 milliGRAM(s) IV Intermittent every 12 hours    Tubes/Lines/Drains    [x] Peripheral IV  [x] Central Venous Line     	[x] R	[] L	[x] IJ	[] Fem	[] SC	Date Placed: 3/12  [x] Arterial Line		[] R	[x] L	[] Fem	[x] Rad	[] Ax	Date Placed: 3/12  [] PICC		[] Midline		[] Mediport  [x] Urinary Catheter		Date Placed: 3/12  [x] Necessity of urinary, arterial, and venous catheters discussed    LABS  --------------------------------------------------------------------------------------  CBC ( @ 02:10)                          12.3<L>                   25.97<H>  )--------------(  326        --    % Neuts, --    % Lymphs, ANC: --                              39.7    CBC ( @ 12:14)                          14.0                     32.87<H>  )--------------(  408<H>     --    % Neuts, --    % Lymphs, ANC: --                              44.4      BMP ( @ 02:10)       142     |  107     |  26<H> 			Ca++ --      Ca 8.8          ---------------------------------( 98    		Mg 2.4          4.1     |  28      |  0.97  			Ph 3.6     BMP ( 12:14)       148<H>  |  110<H>  |  24<H> 			Ca++ --      Ca 8.8          ---------------------------------( 146<H>		Mg 2.4          3.6     |  26      |  0.71  			Ph 4.6<H>    LFTs ( @ 12:14)      TPro 6.5 / Alb 2.6<L> / TBili 0.5 / DBili -- / AST 47<H> / ALT 37 / AlkPhos 132<H>  LFTs ( @ 05:24)      TPro 6.2 / Alb 2.3<L> / TBili 0.4 / DBili -- / AST 48<H> / ALT 39 / AlkPhos 120    Coags ( @ 02:10)  aPTT 82.4<H> / INR -- / PT --  Coags ( @ 12:14)  aPTT 33.7 / INR 1.28<H> / PT 14.5<H>      ABG ( @ 02:10)     7.30<L> / 61<H> / 158<H> / 26 / 3.3<H> / 98.8%     Lactate:    ABG ( @ 18:26)     7.28<L> / 63<H> / 109<H> / 25 / 2.8<H> / 97.2%     Lactate:        Urinalysis ( @ 12:14):     Color: Yellow / Appearance: Slightly Turbid<!> / S.040<H> / pH: 6.0 / Gluc: Trace<!> / Ketones: Small<!> / Bili: Negative / Urobili: 3 mg/dL<!> / Protein :100 mg/dL<!> / Nitrites: Negative / Leuk.Est: Negative / RBC: 386<H> / WBC: 17<H> / Sq Epi:  / Non Sq Epi: 8<H> / Bacteria Negative       -> .Nose Nose Culture ( @ 23:09)     NG    NG    Culture in progress    -> .Blood Blood-Peripheral Culture ( @ 16:28)     NG    NG    No growth to date.    -> .Blood Blood-Peripheral Culture ( @ 13:01)     NG    NG    No Growth Final  --------------------------------------------------------------------------------------    OTHER LABORATORY:     IMAGING STUDIES:   CXR:

## 2021-03-13 NOTE — PROGRESS NOTE ADULT - SUBJECTIVE AND OBJECTIVE BOX
Medicine Progress Note    Patient is a 60y old  Male who presents with a chief complaint of COVID-19 Pneumonia (13 Mar 2021 12:58)      SUBJECTIVE / OVERNIGHT EVENTS:    ADDITIONAL REVIEW OF SYSTEMS:    MEDICATIONS  (STANDING):  ALBUTerol    90 MICROgram(s) HFA Inhaler 2 Puff(s) Inhalation every 4 hours  ascorbic acid 500 milliGRAM(s) Oral daily  aspirin  chewable 81 milliGRAM(s) Oral daily  buDESOnide    Inhalation Suspension 0.5 milliGRAM(s) Inhalation two times a day  chlorhexidine 0.12% Liquid 15 milliLiter(s) Oral Mucosa every 12 hours  cholecalciferol 2000 Unit(s) Oral daily  cisatracurium Infusion 3 MICROgram(s)/kG/Min (14.8 mL/Hr) IV Continuous <Continuous>  dexAMETHasone  IVPB 10 milliGRAM(s) IV Intermittent two times a day  enoxaparin Injectable 40 milliGRAM(s) SubCutaneous daily  fenofibrate Tablet 48 milliGRAM(s) Oral daily  fentaNYL   Infusion..... 0.5 MICROgram(s)/kG/Hr (0.82 mL/Hr) IV Continuous <Continuous>  multivitamin 1 Tablet(s) Oral daily  norepinephrine Infusion 0.05 MICROgram(s)/kG/Min (3.85 mL/Hr) IV Continuous <Continuous>  pantoprazole  Injectable 40 milliGRAM(s) IV Push daily  polyethylene glycol 3350 17 Gram(s) Oral daily  propofol Infusion 20 MICROgram(s)/kG/Min (9.85 mL/Hr) IV Continuous <Continuous>  senna Syrup 10 milliLiter(s) Oral at bedtime    MEDICATIONS  (PRN):  melatonin 3 milliGRAM(s) Oral at bedtime PRN Insomnia    CAPILLARY BLOOD GLUCOSE        I&O's Summary    12 Mar 2021 07:01  -  13 Mar 2021 07:00  --------------------------------------------------------  IN: 1774 mL / OUT: 940 mL / NET: 834 mL    13 Mar 2021 06:01  -  13 Mar 2021 18:40  --------------------------------------------------------  IN: 2181 mL / OUT: 675 mL / NET: 1506 mL        PHYSICAL EXAM:  Vital Signs Last 24 Hrs  T(C): 37.6 (13 Mar 2021 16:00), Max: 37.9 (13 Mar 2021 12:00)  T(F): 99.7 (13 Mar 2021 16:00), Max: 100.2 (13 Mar 2021 12:00)  HR: 109 (13 Mar 2021 18:00) (84 - 118)  BP: --  BP(mean): --  RR: 30 (13 Mar 2021 18:00) (30 - 30)  SpO2: 95% (13 Mar 2021 18:00) (93% - 100%)  CONSTITUTIONAL: NAD, well-developed, well-groomed  ENMT: Moist oral mucosa, no pharyngeal injection or exudates; normal dentition  RESPIRATORY: Normal respiratory effort; lungs are clear to auscultation bilaterally  CARDIOVASCULAR: Regular rate and rhythm, normal S1 and S2, no murmur/rub/gallop; No lower extremity edema; Peripheral pulses are 2+ bilaterally  ABDOMEN: Nontender to palpation, normoactive bowel sounds, no rebound/guarding; No hepatosplenomegaly  PSYCH: A+O to person, place, and time; affect appropriate  NEUROLOGY: CN 2-12 are intact and symmetric; no gross sensory deficits   SKIN: No rashes; no palpable lesions    LABS:                        12.3   25.97 )-----------( 326      ( 13 Mar 2021 02:10 )             39.7     03-13    142  |  107  |  26<H>  ----------------------------<  98  4.1   |  28  |  0.97    Ca    8.8      13 Mar 2021 02:10  Phos  3.6     03-13  Mg     2.4     03-13    TPro  6.5  /  Alb  2.6<L>  /  TBili  0.5  /  DBili  x   /  AST  47<H>  /  ALT  37  /  AlkPhos  132<H>  03-12    PT/INR - ( 12 Mar 2021 12:14 )   PT: 14.5 sec;   INR: 1.28 ratio         PTT - ( 13 Mar 2021 09:00 )  PTT:115.4 sec      Urinalysis Basic - ( 12 Mar 2021 12:14 )    Color: Yellow / Appearance: Slightly Turbid / S.040 / pH: x  Gluc: x / Ketone: Small  / Bili: Negative / Urobili: 3 mg/dL   Blood: x / Protein: 100 mg/dL / Nitrite: Negative   Leuk Esterase: Negative / RBC: 386 /HPF / WBC 17 /HPF   Sq Epi: x / Non Sq Epi: 8 /HPF / Bacteria: Negative        Culture - Blood (collected 12 Mar 2021 16:24)  Source: .Blood Blood-Arterial  Preliminary Report (13 Mar 2021 17:01):    No growth to date.    Culture - Blood (collected 12 Mar 2021 16:24)  Source: .Blood Blood-Venous  Preliminary Report (13 Mar 2021 17:01):    No growth to date.    Culture - Nose (collected 11 Mar 2021 20:24)  Source: .Nose Nose  Final Report (13 Mar 2021 17:11):    No MRSA isolated    No Staph Aureus (MSSA) isolated "This can represent normal nasal    carriage.  PCR is more sensitive for identifying MRSA/MSSA carriage"    Culture - Blood (collected 11 Mar 2021 16:28)  Source: .Blood Blood-Peripheral  Preliminary Report (12 Mar 2021 17:01):    No growth to date.    Culture - Blood (collected 11 Mar 2021 16:28)  Source: .Blood Blood-Peripheral  Preliminary Report (12 Mar 2021 17:01):    No growth to date.      COVID-19 PCR: Detected (2021 23:02)      RADIOLOGY & ADDITIONAL TESTS:  Imaging from Last 24 Hours:    Electrocardiogram/QTc Interval:    COORDINATION OF CARE:  Care Discussed with Consultants/Other Providers:   Medicine Progress Note    Patient is a 60y old  Male who presents with a chief complaint of COVID-19 Pneumonia (13 Mar 2021 12:58)      SUBJECTIVE / OVERNIGHT EVENTS: remain intubated on mech vent and pressors     ADDITIONAL REVIEW OF SYSTEMS:    MEDICATIONS  (STANDING):  ALBUTerol    90 MICROgram(s) HFA Inhaler 2 Puff(s) Inhalation every 4 hours  ascorbic acid 500 milliGRAM(s) Oral daily  aspirin  chewable 81 milliGRAM(s) Oral daily  buDESOnide    Inhalation Suspension 0.5 milliGRAM(s) Inhalation two times a day  chlorhexidine 0.12% Liquid 15 milliLiter(s) Oral Mucosa every 12 hours  cholecalciferol 2000 Unit(s) Oral daily  cisatracurium Infusion 3 MICROgram(s)/kG/Min (14.8 mL/Hr) IV Continuous <Continuous>  dexAMETHasone  IVPB 10 milliGRAM(s) IV Intermittent two times a day  enoxaparin Injectable 40 milliGRAM(s) SubCutaneous daily  fenofibrate Tablet 48 milliGRAM(s) Oral daily  fentaNYL   Infusion..... 0.5 MICROgram(s)/kG/Hr (0.82 mL/Hr) IV Continuous <Continuous>  multivitamin 1 Tablet(s) Oral daily  norepinephrine Infusion 0.05 MICROgram(s)/kG/Min (3.85 mL/Hr) IV Continuous <Continuous>  pantoprazole  Injectable 40 milliGRAM(s) IV Push daily  polyethylene glycol 3350 17 Gram(s) Oral daily  propofol Infusion 20 MICROgram(s)/kG/Min (9.85 mL/Hr) IV Continuous <Continuous>  senna Syrup 10 milliLiter(s) Oral at bedtime    MEDICATIONS  (PRN):  melatonin 3 milliGRAM(s) Oral at bedtime PRN Insomnia    CAPILLARY BLOOD GLUCOSE        I&O's Summary    12 Mar 2021 07:01  -  13 Mar 2021 07:00  --------------------------------------------------------  IN: 1774 mL / OUT: 940 mL / NET: 834 mL    13 Mar 2021 06:01  -  13 Mar 2021 18:40  --------------------------------------------------------  IN: 2181 mL / OUT: 675 mL / NET: 1506 mL        PHYSICAL EXAM:  Vital Signs Last 24 Hrs  T(C): 37.6 (13 Mar 2021 16:00), Max: 37.9 (13 Mar 2021 12:00)  T(F): 99.7 (13 Mar 2021 16:00), Max: 100.2 (13 Mar 2021 12:00)  HR: 109 (13 Mar 2021 18:00) (84 - 118)  BP: --  BP(mean): --  RR: 30 (13 Mar 2021 18:00) (30 - 30)  SpO2: 95% (13 Mar 2021 18:00) (93% - 100%)  CONSTITUTIONAL: NAD, well-developed, well-groomed  ENMT: Moist oral mucosa, no pharyngeal injection or exudates; normal dentition  RESPIRATORY: Normal respiratory effort; lungs are clear to auscultation bilaterally  CARDIOVASCULAR: Regular rate and rhythm, normal S1 and S2, no murmur/rub/gallop; No lower extremity edema; Peripheral pulses are 2+ bilaterally  ABDOMEN: Nontender to palpation, normoactive bowel sounds, no rebound/guarding; No hepatosplenomegaly  PSYCH: A+O to person, place, and time; affect appropriate  NEUROLOGY: CN 2-12 are intact and symmetric; no gross sensory deficits   SKIN: No rashes; no palpable lesions    LABS:                        12.3   25.97 )-----------( 326      ( 13 Mar 2021 02:10 )             39.7     03-13    142  |  107  |  26<H>  ----------------------------<  98  4.1   |  28  |  0.97    Ca    8.8      13 Mar 2021 02:10  Phos  3.6     03-13  Mg     2.4     03-13    TPro  6.5  /  Alb  2.6<L>  /  TBili  0.5  /  DBili  x   /  AST  47<H>  /  ALT  37  /  AlkPhos  132<H>  03-12    PT/INR - ( 12 Mar 2021 12:14 )   PT: 14.5 sec;   INR: 1.28 ratio         PTT - ( 13 Mar 2021 09:00 )  PTT:115.4 sec      Urinalysis Basic - ( 12 Mar 2021 12:14 )    Color: Yellow / Appearance: Slightly Turbid / S.040 / pH: x  Gluc: x / Ketone: Small  / Bili: Negative / Urobili: 3 mg/dL   Blood: x / Protein: 100 mg/dL / Nitrite: Negative   Leuk Esterase: Negative / RBC: 386 /HPF / WBC 17 /HPF   Sq Epi: x / Non Sq Epi: 8 /HPF / Bacteria: Negative        Culture - Blood (collected 12 Mar 2021 16:24)  Source: .Blood Blood-Arterial  Preliminary Report (13 Mar 2021 17:01):    No growth to date.    Culture - Blood (collected 12 Mar 2021 16:24)  Source: .Blood Blood-Venous  Preliminary Report (13 Mar 2021 17:01):    No growth to date.    Culture - Nose (collected 11 Mar 2021 20:24)  Source: .Nose Nose  Final Report (13 Mar 2021 17:11):    No MRSA isolated    No Staph Aureus (MSSA) isolated "This can represent normal nasal    carriage.  PCR is more sensitive for identifying MRSA/MSSA carriage"    Culture - Blood (collected 11 Mar 2021 16:28)  Source: .Blood Blood-Peripheral  Preliminary Report (12 Mar 2021 17:01):    No growth to date.    Culture - Blood (collected 11 Mar 2021 16:28)  Source: .Blood Blood-Peripheral  Preliminary Report (12 Mar 2021 17:01):    No growth to date.      COVID-19 PCR: Detected (2021 23:02)      RADIOLOGY & ADDITIONAL TESTS:  Imaging from Last 24 Hours:    Electrocardiogram/QTc Interval:    COORDINATION OF CARE:  Care Discussed with Consultants/Other Providers:

## 2021-03-13 NOTE — PROGRESS NOTE ADULT - SUBJECTIVE AND OBJECTIVE BOX
ID coverage    Patient is a 60y old  Male who presents with a chief complaint of COVID-19 Pneumonia (13 Mar 2021 10:07)    Being followed by ID for resp failure,COVId    Interval history:on vent  proned  on pressors  sedated   some sceretions         ROS:  No ROS obtainable       Antimicrobials:    meropenem  IVPB 1000 milliGRAM(s) IV Intermittent every 8 hours  vancomycin  IVPB 750 milliGRAM(s) IV Intermittent every 12 hours    Other medications reviewed    Vital Signs Last 24 Hrs  T(C): 37.9 (03-13-21 @ 12:00), Max: 37.9 (03-13-21 @ 12:00)  T(F): 100.2 (03-13-21 @ 12:00), Max: 100.2 (03-13-21 @ 12:00)  HR: 110 (03-13-21 @ 12:00) (84 - 121)  BP: --  BP(mean): --  RR: 30 (03-13-21 @ 12:00) (28 - 30)  SpO2: 93% (03-13-21 @ 12:00) (92% - 100%)    Physical Exam:    ETT  RIJ TLC         Chest Good AE,basal crackles     CVS RRR S1 S2 WNl No murmur or rub or gallop    Abd soft BS normal No tenderness no masses    IV site no erythema tenderness or discharge    CNS sedated   Lab Data:                          12.3   25.97 )-----------( 326      ( 13 Mar 2021 02:10 )             39.7     WBC Count: 25.97 (03-13-21 @ 02:10)  WBC Count: 32.87 (03-12-21 @ 12:14)  WBC Count: 25.13 (03-12-21 @ 05:24)  WBC Count: 19.90 (03-11-21 @ 07:46)  WBC Count: 19.63 (03-10-21 @ 07:45)  WBC Count: 18.48 (03-09-21 @ 07:19)  WBC Count: 21.86 (03-08-21 @ 23:24)  WBC Count: 22.84 (03-08-21 @ 07:05)  WBC Count: 18.00 (03-07-21 @ 07:34)    03-13    142  |  107  |  26<H>  ----------------------------<  98  4.1   |  28  |  0.97    Ca    8.8      13 Mar 2021 02:10  Phos  3.6     03-13  Mg     2.4     03-13    TPro  6.5  /  Alb  2.6<L>  /  TBili  0.5  /  DBili  x   /  AST  47<H>  /  ALT  37  /  AlkPhos  132<H>  03-12        Culture - Nose (collected 11 Mar 2021 23:09)  Source: .Nose Nose  Preliminary Report (12 Mar 2021 21:47):    Culture in progress    Culture - Blood (collected 11 Mar 2021 16:28)  Source: .Blood Blood-Peripheral  Preliminary Report (12 Mar 2021 17:01):    No growth to date.    Culture - Blood (collected 11 Mar 2021 16:28)  Source: .Blood Blood-Peripheral  Preliminary Report (12 Mar 2021 17:01):    No growth to date.            < from: Xray Chest 1 View- PORTABLE-Urgent (Xray Chest 1 View- PORTABLE-Urgent .) (03.12.21 @ 15:37) >    IMPRESSION:    NG tube into the stomach. Stable diffuse bilateral airspace disease    < end of copied text >

## 2021-03-13 NOTE — PROGRESS NOTE ADULT - ATTENDING COMMENTS
Dr. Steen (Attending Physician)  N - Sedated and paralyzed  P - COVID ARDS  C - low dose NE gtt, will start low dose aspirin  GI - TFs inc to goal now that supine   - +800 yesterday  H - hep gtt for elevated d-dimer, duplex u/s of LEs done  ID - COVID ARDS, on vanc/leonidas but pct low, blood cultures from 3/11 with ngtd,  E - CRP elevated will start high dose dexamethasone

## 2021-03-13 NOTE — PROGRESS NOTE ADULT - ASSESSMENT
ASSESSMENT:  60y Male w/ PMHx of a Brain tumor s/p resection in 2018 who presented 3/1/21 with worsening shortness of breath and non-productive cough x1 week, worsening hypoxia 2/2 COVID PNA requiring intubation 3/12 and transfer to [facility]    # Hypoxic respiratory failure 2/2 COVID  -intubated on [facility] vent   -MICU following     Hypotension   - Norepinephrine for MAP >65    malnutrition  - Will start TF when OGT confirmed  - GI ppx w/ protonix  - Senna/Miralax     elevated DDimer   - H/H stable  - Elevated d-dimer, continue heparin drip  - Check lower extremity duplex r/o DVT    PNA  - Increasing leukocytosis with elevated procalcitonin & fevers, concern for superimposed bacterial infection  -started on Iv abx       krista nelson MD

## 2021-03-14 NOTE — PROGRESS NOTE ADULT - SUBJECTIVE AND OBJECTIVE BOX
Medicine Progress Note    Patient is a 60y old  Male who presents with a chief complaint of COVID-19 Pneumonia (14 Mar 2021 11:01)      SUBJECTIVE / OVERNIGHT EVENTS: remain Intubated     ADDITIONAL REVIEW OF SYSTEMS:    MEDICATIONS  (STANDING):  ALBUTerol    90 MICROgram(s) HFA Inhaler 2 Puff(s) Inhalation every 4 hours  ascorbic acid 500 milliGRAM(s) Oral daily  aspirin  chewable 81 milliGRAM(s) Oral daily  buDESOnide    Inhalation Suspension 0.5 milliGRAM(s) Inhalation two times a day  chlorhexidine 0.12% Liquid 15 milliLiter(s) Oral Mucosa every 12 hours  cholecalciferol 2000 Unit(s) Oral daily  cisatracurium Infusion 3 MICROgram(s)/kG/Min (14.8 mL/Hr) IV Continuous <Continuous>  dexAMETHasone  IVPB 10 milliGRAM(s) IV Intermittent two times a day  dextrose 40% Gel 15 Gram(s) Oral once  dextrose 5%. 1000 milliLiter(s) (50 mL/Hr) IV Continuous <Continuous>  dextrose 5%. 1000 milliLiter(s) (100 mL/Hr) IV Continuous <Continuous>  dextrose 50% Injectable 25 Gram(s) IV Push once  dextrose 50% Injectable 12.5 Gram(s) IV Push once  dextrose 50% Injectable 25 Gram(s) IV Push once  diazepam  Injectable 5 milliGRAM(s) IV Push every 6 hours  enoxaparin Injectable 40 milliGRAM(s) SubCutaneous daily  fenofibrate Tablet 48 milliGRAM(s) Oral daily  fentaNYL   Infusion..... 0.5 MICROgram(s)/kG/Hr (0.82 mL/Hr) IV Continuous <Continuous>  glucagon  Injectable 1 milliGRAM(s) IntraMuscular once  insulin lispro (ADMELOG) corrective regimen sliding scale   SubCutaneous every 6 hours  multivitamin 1 Tablet(s) Oral daily  norepinephrine Infusion 0.05 MICROgram(s)/kG/Min (3.85 mL/Hr) IV Continuous <Continuous>  pantoprazole  Injectable 40 milliGRAM(s) IV Push daily  polyethylene glycol 3350 17 Gram(s) Oral daily  propofol Infusion 20 MICROgram(s)/kG/Min (9.85 mL/Hr) IV Continuous <Continuous>  senna Syrup 10 milliLiter(s) Oral at bedtime    MEDICATIONS  (PRN):    CAPILLARY BLOOD GLUCOSE        I&O's Summary    13 Mar 2021 06:01  -  14 Mar 2021 07:00  --------------------------------------------------------  IN: 2709 mL / OUT: 955 mL / NET: 1754 mL    14 Mar 2021 07:01  -  14 Mar 2021 15:59  --------------------------------------------------------  IN: 333 mL / OUT: 480 mL / NET: -147 mL        PHYSICAL EXAM:  Vital Signs Last 24 Hrs  T(C): 35.6 (14 Mar 2021 12:00), Max: 37.6 (13 Mar 2021 16:00)  T(F): 96.1 (14 Mar 2021 12:00), Max: 99.7 (13 Mar 2021 16:00)  HR: 88 (14 Mar 2021 15:12) (74 - 116)  BP: --  BP(mean): --  RR: 32 (14 Mar 2021 15:00) (30 - 32)  SpO2: 93% (14 Mar 2021 15:12) (93% - 100%)  CONSTITUTIONAL: NAD, well-developed, well-groomed  ENMT: Moist oral mucosa, no pharyngeal injection or exudates; normal dentition  RESPIRATORY: Normal respiratory effort; lungs are clear to auscultation bilaterally  CARDIOVASCULAR: Regular rate and rhythm, normal S1 and S2, no murmur/rub/gallop; No lower extremity edema; Peripheral pulses are 2+ bilaterally  ABDOMEN: Nontender to palpation, normoactive bowel sounds, no rebound/guarding; No hepatosplenomegaly  PSYCH: A+O to person, place, and time; affect appropriate  NEUROLOGY: CN 2-12 are intact and symmetric; no gross sensory deficits   SKIN: No rashes; no palpable lesions    LABS:                        12.0   22.16 )-----------( 347      ( 14 Mar 2021 08:47 )             38.3     03-14    140  |  103  |  32<H>  ----------------------------<  136<H>  5.0   |  28  |  1.53<H>    Ca    9.4      14 Mar 2021 05:00  Phos  5.7     03-14  Mg     2.6     03-14      PT/INR - ( 14 Mar 2021 05:00 )   PT: 11.3 sec;   INR: 0.99 ratio         PTT - ( 13 Mar 2021 09:00 )  PTT:115.4 sec          Culture - Sputum (collected 14 Mar 2021 12:18)  Source: .Sputum Sputum  Gram Stain (14 Mar 2021 14:05):    Rare polymorphonuclear leukocytes per low power field    No organisms seen per oil power field    Culture - Blood (collected 12 Mar 2021 16:24)  Source: .Blood Blood-Arterial  Preliminary Report (13 Mar 2021 17:01):    No growth to date.    Culture - Blood (collected 12 Mar 2021 16:24)  Source: .Blood Blood-Venous  Preliminary Report (13 Mar 2021 17:01):    No growth to date.    Culture - Nose (collected 11 Mar 2021 20:24)  Source: .Nose Nose  Final Report (13 Mar 2021 17:11):    No MRSA isolated    No Staph Aureus (MSSA) isolated "This can represent normal nasal    carriage.  PCR is more sensitive for identifying MRSA/MSSA carriage"    Culture - Blood (collected 11 Mar 2021 16:28)  Source: .Blood Blood-Peripheral  Preliminary Report (12 Mar 2021 17:01):    No growth to date.    Culture - Blood (collected 11 Mar 2021 16:28)  Source: .Blood Blood-Peripheral  Preliminary Report (12 Mar 2021 17:01):    No growth to date.      COVID-19 PCR: Detected (28 Feb 2021 23:02)      RADIOLOGY & ADDITIONAL TESTS:  Imaging from Last 24 Hours:    Electrocardiogram/QTc Interval:    COORDINATION OF CARE:  Care Discussed with Consultants/Other Providers:

## 2021-03-14 NOTE — PROGRESS NOTE ADULT - ASSESSMENT
ASSESSMENT:  60y Male w/ PMHx of a Brain tumor s/p resection in 2018 who presented 3/1/21 with worsening shortness of breath and non-productive cough x1 week, worsening hypoxia 2/2 COVID PNA requiring intubation 3/12 and transfer to [facility]    # Hypoxic respiratory failure 2/2 COVID  -intubated on [facility] vent   -MICU following     Hypotension   - Norepinephrine for MAP >65    malnutrition  - TF   - GI ppx w/ protonix  - Senna/Miralax     elevated DDimer   - H/H stable  - Elevated d-dimer, continue heparin drip  - Check lower extremity duplex r/o DVT    PNA  - Increasing leukocytosis with elevated procalcitonin & fevers, concern for superimposed bacterial infection  -started on Iv abx     Hx of brin tumor s/p resection   -stable     poor prognosis remain full code     krista nelson MD

## 2021-03-14 NOTE — PROGRESS NOTE ADULT - ATTENDING COMMENTS
Dr. Steen (Attending Physician)  N - Sedated, paralyzed, will start valium standing  P - Covid ARDS proned overnight, hypercarbic and hypoxic lungs fibrotic with driving pressure 23  C - Shock hypovolemic yesterday, improved with fluid bolus overnight  GI - start TFs at goal since supined   - LUDIVINA, improved urine output with fluid bolus yesterday  H - d-dimer improved duplex negative on ppx dvt  ID - Follow up sputum sample stopped abx, will send pct tomorrow  E - giving dexamethasone 10 mg bid x 3 days for elevated crp, will send crp tomorrow

## 2021-03-14 NOTE — PROGRESS NOTE ADULT - SUBJECTIVE AND OBJECTIVE BOX
JANETH RAO  MRN-[id number]  Patient is a 60y old  Male who presents with a chief complaint of COVID-19 Pneumonia (14 Mar 2021 11:01)    HPI:  This is a 59yo Male w/ PMHx of a Brain tumor s/p resection in 2018, who is presenting with worsening shortness of breath and non-productive cough for past week. Pt reports he went to his PMD for his symptoms, had a negative COVID swab, was instructed to take an inhaler; however, it has given him little relief.  His SOB was getting worse which prompted him to come to ED. He has increased lethargy and ALVARES. Decreased appetite. He denies having fevers at home, no sick contacts, no recent travel, no loss of taste/smell. Pt denies having chest pain, palpitations, HA, dizziness, visual changes, abdominal pain, N/V/D/C, dysuria, hematuria, melena or hematochezia.     ED Course: EK bpm, Sinus tachycardia, qtc: 419. CXR: c/w COVID PNA. COVID-19 PCR: Positive. Initially SPO2 87% on RA. Now SPO2 95% on 6L NC. s/p Decadron and NS IVF in ED.  (01 Mar 2021 05:15)      REVIEW OF SYSTEMS:  deferred, intubated    Physical Exam:  Vital Signs Last 24 Hrs  T(C): 35.6 (14 Mar 2021 12:00), Max: 37.8 (13 Mar 2021 15:45)  T(F): 96.1 (14 Mar 2021 12:00), Max: 100 (13 Mar 2021 15:45)  HR: 81 (14 Mar 2021 14:00) (74 - 118)  BP: --  BP(mean): --  RR: 32 (14 Mar 2021 14:00) (30 - 32)  SpO2: 98% (14 Mar 2021 14:00) (94% - 100%)    Gen:  intubated, sedated/paralyzed  RES : course breath sounds B/L    CVS: Regular rate and rhythm. S1,S2  Abd: Soft, non-distended. Bowel sounds present.  Ext: pitting edema B/L LE    ============================I/O===========================   I&O's Detail    13 Mar 2021 06:01  -  14 Mar 2021 07:00  --------------------------------------------------------  IN:    Cisatracurium: 345 mL    FentaNYL: 116 mL    Heparin Infusion: 84 mL    IV PiggyBack: 50 mL    Lactated Ringers Bolus: 1500 mL    Norepinephrine: 200 mL    Propofol: 414 mL  Total IN: 2709 mL    OUT:    Indwelling Catheter - Urethral (mL): 955 mL  Total OUT: 955 mL    Total NET: 1754 mL      14 Mar 2021 07:01  -  14 Mar 2021 14:45  --------------------------------------------------------  IN:    Cisatracurium: 75 mL    FentaNYL: 25 mL    Norepinephrine: 29 mL    Propofol: 80 mL  Total IN: 209 mL    OUT:    Indwelling Catheter - Urethral (mL): 380 mL  Total OUT: 380 mL    Total NET: -171 mL        ============================ LABS =========================                        12.0   22.16 )-----------( 347      ( 14 Mar 2021 08:47 )             38.3     03-14    140  |  103  |  32<H>  ----------------------------<  136<H>  5.0   |  28  |  1.53<H>    Ca    9.4      14 Mar 2021 05:00  Phos  5.7     03-14  Mg     2.6     03-14        PT/INR - ( 14 Mar 2021 05:00 )   PT: 11.3 sec;   INR: 0.99 ratio         PTT - ( 13 Mar 2021 09:00 )  PTT:115.4 sec  ABG - ( 14 Mar 2021 12:43 )  pH, Arterial: 7.30  pH, Blood: x     /  pCO2: 61    /  pO2: 65    / HCO3: 26    / Base Excess: 3.3   /  SaO2: 91.7                ======================Micro/Rad/Cardio=================  Culture: Reviewed   CXR: Reviewed  Echo:Reviewed  ======================================================  PAST MEDICAL & SURGICAL HISTORY:  Brain cancer  in remission.    Brain tumor  s/p ressection in 2018.      ====================ASSESMENT ==============  COVID  ARDS  Hypoxic Respiratory Failure  LUDIVINA  vasoplegia   ====================== NEUROLOGY=====================  continue sedation/paralysis   decrease fentanyl, added valium standing   continue propofol for now, monitor triglyceride level     ==================== RESPIRATORY======================  open lung ventilation   proned yesterday, supine this AM   will re-prone this evening, still with bad P/F ratio   monitor ABGs  ====================CARDIOVASCULAR==================  vasoplegic likely 2/2 sedation regimen   continue levo for MAP >65    ===================HEMATOLOGIC/ONC ===================  VTE PPX with lovenox SQ    ===================== RENAL =========================  LUDIVINA  but was volume resuscitated into this am  making urine  continue to trend Bun/Cr     ==================== GASTROINTESTINAL===================  continue goal rate feeds while supine, trickle rate in prone position   gi ppx with protonix  bowel regimen     =======================    ENDOCRINE  =====================  decadron 20QD for 3 extra days given elevated CRP   ISS Q6 for blood glucose control on continuous tube feeds    ========================INFECTIOUS DISEASE================  WBC trending down   off abx for now  f/u sputum culture     I spent 30 minutes of critical care on this patient

## 2021-03-14 NOTE — PROGRESS NOTE ADULT - SUBJECTIVE AND OBJECTIVE BOX
DATE OF SERVICE: 03-14-21      Patient is a 60y old  Male who presents with a chief complaint of COVID-19 Pneumonia (14 Mar 2021 15:59)      INTERVAL HISTORY: remains intubated/sedated    TELEMETRY Personally reviewed: no events    ROS unable to be obtained 2/2 sedation  	  MEDICATIONS:  norepinephrine Infusion 0.05 MICROgram(s)/kG/Min IV Continuous <Continuous>        PHYSICAL EXAM:  T(C): 35.9 (03-14-21 @ 20:00), Max: 36 (03-14-21 @ 00:17)  HR: 96 (03-14-21 @ 22:24) (74 - 112)  BP: --  RR: 34 (03-14-21 @ 20:00) (30 - 34)  SpO2: 100% (03-14-21 @ 22:24) (91% - 100%)  Wt(kg): --  I&O's Summary    13 Mar 2021 06:01  -  14 Mar 2021 07:00  --------------------------------------------------------  IN: 2709 mL / OUT: 955 mL / NET: 1754 mL    14 Mar 2021 07:01  -  14 Mar 2021 23:06  --------------------------------------------------------  IN: 517 mL / OUT: 630 mL / NET: -113 mL          Appearance: In no distress	  HEENT:    PERRL,    Cardiovascular:  S1 S2, No JVD  Respiratory: Lungs clear to auscultation	  Gastrointestinal:  Soft, Non-tender, + BS	  Vascularature:  No edema of LE  Psychiatric: sedated  Neuro: no acute focal deficits                               12.0   22.16 )-----------( 347      ( 14 Mar 2021 08:47 )             38.3     03-14    140  |  103  |  32<H>  ----------------------------<  136<H>  5.0   |  28  |  1.53<H>    Ca    9.4      14 Mar 2021 05:00  Phos  5.7     03-14  Mg     2.6     03-14          Labs personally reviewed      ASSESSMENT/PLAN: 	  This is a 59yo Male w/ PMHx of a Brain tumor s/p resection in 2018, who is presenting with worsening shortness of breath and non-productive cough for past week. Found to be COVID-19 Positive. Patient Sinus tachycardic in Emergency room.     1. Sinus Tachycardia 2/2 to worsening hypoxia from COVID-19 Pneumonia   - trop negative  - D Dimer continues rising - would need CTA r/o PE however given his high oxygen requirement likely not stable enough for test.   - (-) for DVT, changes to ppx dose   - can obtain TTE to eval heart function once acute issues resolve    2. Hypoxemia 2/2 Covid-19 pna  - remdesevir completed. Deca for 10 days.. initiated on 3/1   - IV zosyn on board  - s/p RRT- intubated/ sedated in ICU     3. Hyponatremic- 134 on admission likely from dehydration   - nearly normalized s/p IVF hydration    4. Transaminitis  - elevated LFTs on admission, albumin slightly low however now nearly normalized, bilirubin wnl   - continue to monitor     5.  Fevers/Leukocytosis -  Plan: on Vanco and IV meropenem - under ICU care  bcx 3/7 - negative   bcx 3/11 NGTD    Thirty five minutes of critical day time spent on total encounter, of which more than fifty percent of the encounter was spent on counseling and/or coordinating care by the attending physician.          Jaswant Weiss DO [facility]  Cardiovascular Medicine  [address] [place] [address], [address]  Office: [phone]  Cell: [phone]

## 2021-03-15 NOTE — PROGRESS NOTE ADULT - SUBJECTIVE AND OBJECTIVE BOX
Patient is a 60y old  Male who presents with a chief complaint of COVID-19 Pneumonia (15 Mar 2021 12:00)        	  MEDICATIONS:  norepinephrine Infusion 0.05 MICROgram(s)/kG/Min IV Continuous <Continuous>    PHYSICAL EXAM:  T(C): 36.6 (03-15-21 @ 15:30), Max: 37.1 (03-15-21 @ 07:45)  HR: 115 (03-15-21 @ 15:41) (84 - 122)  BP: --  RR: 34 (03-15-21 @ 15:30) (34 - 34)  SpO2: 93% (03-15-21 @ 15:41) (91% - 100%)  Wt(kg): --  I&O's Summary    14 Mar 2021 07:01  -  15 Mar 2021 07:00  --------------------------------------------------------  IN: 1246.7 mL / OUT: 1180 mL / NET: 66.7 mL    15 Mar 2021 07:01  -  15 Mar 2021 16:01  --------------------------------------------------------  IN: 802.1 mL / OUT: 1010 mL / NET: -207.9 mL                        11.2   18.74 )-----------( 331      ( 15 Mar 2021 02:51 )             36.7     03-15    142  |  105  |  41<H>  ----------------------------<  131<H>  5.1   |  31  |  1.67<H>    Ca    9.5      15 Mar 2021 02:51  Phos  5.7     03-14  Mg     2.9     03-15  Labs personally reviewed    ASSESSMENT/PLAN: 	  This is a 61yo Male w/ PMHx of a Brain tumor s/p resection in 2018, who is presenting with worsening shortness of breath and non-productive cough for past week. Found to be COVID-19 Positive. Patient Sinus tachycardic in Emergency room.     1. Sinus Tachycardia 2/2 to worsening hypoxia from COVID-19 Pneumonia   - trop negative  - D Dimer continues rising - would need CTA r/o PE however given his high oxygen requirement likely not stable enough for test.   - (-) for DVT, changes to ppx dose   - can obtain TTE to eval heart function once acute issues resolve    2. Hypoxemia 2/2 Covid-19 pna  - remdesevir completed. Deca for 10 days.. initiated on 3/1   - IV zosyn on board  - s/p RRT- intubated/ sedated in ICU     3. Hyponatremic- 134 on admission likely from dehydration   - nearly normalized s/p IVF hydration    4. Transaminitis  - elevated LFTs on admission, albumin slightly low however now nearly normalized, bilirubin wnl   - continue to monitor     5.  Fevers/Leukocytosis -  Plan: on Vanco and IV meropenem - under ICU care  bcx 3/7 - negative   bcx 3/11 NGTVIANNEY Rodriguez ANP-C  Jaswant Weiss DO [facility]  Cardiovascular Medicine  [address] [place] [address], [address]  Office: [phone]  Cell: [phone] Patient is a 60y old  Male who presents with a chief complaint of COVID-19 Pneumonia (15 Mar 2021 12:00)    Interval Hx: remains intubated sedated in the MICU    	  MEDICATIONS:  norepinephrine Infusion 0.05 MICROgram(s)/kG/Min IV Continuous <Continuous>    PHYSICAL EXAM:  T(C): 36.6 (03-15-21 @ 15:30), Max: 37.1 (03-15-21 @ 07:45)  HR: 115 (03-15-21 @ 15:41) (84 - 122)  BP: --  RR: 34 (03-15-21 @ 15:30) (34 - 34)  SpO2: 93% (03-15-21 @ 15:41) (91% - 100%)  Wt(kg): --  I&O's Summary    14 Mar 2021 07:01  -  15 Mar 2021 07:00  --------------------------------------------------------  IN: 1246.7 mL / OUT: 1180 mL / NET: 66.7 mL    15 Mar 2021 07:01  -  15 Mar 2021 16:01  --------------------------------------------------------  IN: 802.1 mL / OUT: 1010 mL / NET: -207.9 mL                            11.2   18.74 )-----------( 331      ( 15 Mar 2021 02:51 )             36.7     03-15    142  |  105  |  41<H>  ----------------------------<  131<H>  5.1   |  31  |  1.67<H>    Ca    9.5      15 Mar 2021 02:51  Phos  5.7     03-14  Mg     2.9     03-15    Labs personally reviewed        ASSESSMENT/PLAN: 	  This is a 59yo Male w/ PMHx of a Brain tumor s/p resection in 2018, who is presenting with worsening shortness of breath and non-productive cough for past week. Found to be COVID-19 Positive. Patient Sinus tachycardic in Emergency room.     1. Sinus Tachycardia 2/2 to worsening hypoxia from COVID-19 Pneumonia   - trop negative  - D Dimer continues rising - would need CTA r/o PE however given his high oxygen requirement likely not stable enough for test.   - (-) for DVT, changes to ppx dose   - can obtain TTE to eval heart function once acute issues resolve    2. Hypoxemia 2/2 Covid-19 pna  - remdesevir completed. Deca for 10 days.. initiated on 3/1   - IV zosyn on board  - s/p RRT- intubated/ sedated in ICU     3. Hyponatremic- 134 on admission likely from dehydration   - nearly normalized s/p IVF hydration    4. Transaminitis  - elevated LFTs on admission, albumin slightly low however now nearly normalized, bilirubin wnl   - continue to monitor     5.  Fevers/Leukocytosis -  Plan: on Vanco and IV meropenem - under ICU care  bcx 3/7 - negative   bcx 3/11 NGTD    Twenty minutes of critical care time spent by the attg physician of which more than fifty percent was spent coordinating/counseling care      Arpita Weiss DO [facility]  Cardiovascular Medicine  [address] [place] [address], [address]  Office: [phone]  Cell: [phone]

## 2021-03-15 NOTE — PROGRESS NOTE ADULT - ASSESSMENT
ASSESSMENT:  60y Male w/ PMHx of a Brain tumor s/p resection in 2018 who presented 3/1/21 with worsening shortness of breath and non-productive cough x1 week, worsening hypoxia 2/2 COVID PNA requiring intubation 3/12 and transfer to [facility]    # Hypoxic respiratory failure 2/2 COVID  -intubated on [facility] vent   -MICU following     Hypotension   - Norepinephrine for MAP >65    malnutrition  - TF   - GI ppx w/ protonix  - Senna/Miralax     elevated DDimer   - H/H stable  - Elevated d-dimer, continue heparin drip  - Check lower extremity duplex r/o DVT    PNA  - Increasing leukocytosis with elevated procalcitonin & fevers, concern for superimposed bacterial infection  -started on Iv abx     Hx of brin tumor s/p resection   -stable     poor prognosis remain full code     krista nelson MD

## 2021-03-15 NOTE — PROGRESS NOTE ADULT - SUBJECTIVE AND OBJECTIVE BOX
SUBJECTIVE/OBSERVATIONS: 59yo Male w/ PMHx of a Brain tumor s/p resection in 2018 who presented 3/1/21 with worsening shortness of breath and non-productive cough x1 week, intubated on 3/12 and transferred to the MICU that same day. Patient is found intubated and lying in bed, in NAD.  OVERNIGHT EVENTS: Patient is s/p prone at 7pm; no other events noted overnight.    Vital Signs Last 24 Hrs  T(C): 36.8 (15 Mar 2021 10:30), Max: 37.1 (15 Mar 2021 07:45)  T(F): 98.2 (15 Mar 2021 10:30), Max: 98.8 (15 Mar 2021 07:45)  HR: 118 (15 Mar 2021 10:30) (74 - 122)  BP: --  BP(mean): --  RR: 34 (15 Mar 2021 10:30) (30 - 34)  SpO2: 95% (15 Mar 2021 10:30) (91% - 100%)  I&O's Summary    14 Mar 2021 07:01  -  15 Mar 2021 07:00  --------------------------------------------------------  IN: 1246.7 mL / OUT: 1180 mL / NET: 66.7 mL    15 Mar 2021 07:01  -  15 Mar 2021 10:45  --------------------------------------------------------  IN: 287.6 mL / OUT: 325 mL / NET: -37.4 mL        MEDICATIONS:  aspirin  chewable 81 milliGRAM(s) Oral daily  heparin   Injectable 5000 Unit(s) SubCutaneous every 12 hours  norepinephrine Infusion 0.05 MICROgram(s)/kG/Min IV Continuous <Continuous>      ALBUTerol    90 MICROgram(s) HFA Inhaler 2 Puff(s) Inhalation every 4 hours  buDESOnide    Inhalation Suspension 0.5 milliGRAM(s) Inhalation two times a day    cisatracurium Infusion 3 MICROgram(s)/kG/Min IV Continuous <Continuous>  diazepam  Injectable 5 milliGRAM(s) IV Push every 6 hours  fentaNYL   Infusion..... 0.5 MICROgram(s)/kG/Hr IV Continuous <Continuous>  ketamine Infusion. 0.5 mG/kG/Hr IV Continuous <Continuous>  propofol Infusion 20 MICROgram(s)/kG/Min IV Continuous <Continuous>    pantoprazole  Injectable 40 milliGRAM(s) IV Push daily  polyethylene glycol 3350 17 Gram(s) Oral daily  senna Syrup 10 milliLiter(s) Oral at bedtime    dexAMETHasone  IVPB 10 milliGRAM(s) IV Intermittent once  dextrose 40% Gel 15 Gram(s) Oral once  dextrose 50% Injectable 25 Gram(s) IV Push once  dextrose 50% Injectable 12.5 Gram(s) IV Push once  dextrose 50% Injectable 25 Gram(s) IV Push once  fenofibrate Tablet 48 milliGRAM(s) Oral daily  glucagon  Injectable 1 milliGRAM(s) IntraMuscular once  insulin lispro (ADMELOG) corrective regimen sliding scale   SubCutaneous every 6 hours    ascorbic acid 500 milliGRAM(s) Oral daily  chlorhexidine 0.12% Liquid 15 milliLiter(s) Oral Mucosa every 12 hours  cholecalciferol 2000 Unit(s) Oral daily  dextrose 5%. 1000 milliLiter(s) IV Continuous <Continuous>  dextrose 5%. 1000 milliLiter(s) IV Continuous <Continuous>  multivitamin 1 Tablet(s) Oral daily    PHYSICAL EXAM: Patient found proned in bed, intubated, in NAD.    LABS:	 	    CBC Full  -  ( 15 Mar 2021 02:51 )  WBC Count : 18.74 K/uL  Hemoglobin : 11.2 g/dL  Hematocrit : 36.7 %  Platelet Count - Automated : 331 K/uL  Mean Cell Volume : 90.0 fL  Mean Cell Hemoglobin : 27.5 pg  Mean Cell Hemoglobin Concentration : 30.5 gm/dL  Auto Neutrophil # : x  Auto Lymphocyte # : x  Auto Monocyte # : x  Auto Eosinophil # : x  Auto Basophil # : x  Auto Neutrophil % : x  Auto Lymphocyte % : x  Auto Monocyte % : x  Auto Eosinophil % : x  Auto Basophil % : x    03-15    142  |  105  |  41<H>  ----------------------------<  131<H>  5.1   |  31  |  1.67<H>  03-14    140  |  103  |  32<H>  ----------------------------<  136<H>  5.0   |  28  |  1.53<H>    Ca    9.5      15 Mar 2021 02:51  Ca    9.4      14 Mar 2021 05:00  Phos  5.7     03-14  Mg     2.9     03-15  Mg     2.6     03-14

## 2021-03-15 NOTE — CONSULT NOTE ADULT - ASSESSMENT
60M PMHx Brain tumor s/p resection in 2018 (remission) - admitted for acute hypoxic respiratory failure 2/2 COVID PNA s/p intubation on 3/12/21.  Nephrology consulted for LUDIVINA.        Pt with non-oliguric LUDIVINA likely 2/2 hemodynamically mediated ATN in the setting of hypotension, covid19 pneumonia, septic shock.  On admission sCr 1.0 on 2/28 with sCr stable throughout hospitalization between 0.7-0.9 until after intubation wherein sCr alee from 0.97 to 1.53 on 3/14 then 1.67 on 3/15/21.  UA showed protein/blood/rbc/wbc.  Intermittent hypotension episodes 3/13-3/15 with BP 90s/50s required vasopressor support.  Currently patient is intubated/sedated w/ FIO2 90% (down from 100%), off vasopressor support, acidotic primary respiratory component (pH 7.26, pCO2 72, bicarb 27) and non-oliguric not on diuretic.      Recommendations:      Pt with acidosis, primary respiratory acidosis in the setting of COVID19 pneumonia.   60M PMHx Brain tumor s/p resection in 2018 (remission) - admitted for acute hypoxic respiratory failure 2/2 COVID PNA s/p intubation on 3/12/21.  Nephrology consulted for LUDIVINA.          # LUDIVINA  Pt with non-oliguric LUDIVINA likely 2/2 hemodynamically mediated ATN in the setting of hypotension, covid19 pneumonia, septic shock.  On admission sCr 1.0 on 2/28 with sCr stable throughout hospitalization between 0.7-0.9 until after intubation wherein sCr alee from 0.97 to 1.53 on 3/14 then 1.67 on 3/15/21.  UA showed protein/blood/rbc/wbc.  Intermittent hypotension episodes 3/13-3/15 with BP 90s/50s required vasopressor support.  Currently patient is intubated/sedated w/ FIO2 90% (down from 100%), off vasopressor support, acidotic primary respiratory component (pH 7.26, pCO2 72, bicarb 27) and non-oliguric not on diuretic.      Recommendations:  - Currently no absolute/urgent indication for dialysis, recommend diuretic challenge bumex IV 4mg x 1 then assess UOP, if response diuretic then can start bumex drip at 2mg/hr for now, titrate as needed.  Will consent family for dialysis in case kidney failure worsens, refractory hyperkalemia/acidosis, etc  - BP optimization/antibiotic/blood transfusion per ICU team  - monitor BMP, strict I/O, avoid nephrotoxic agents (NSAIDs, PPI, contrast), renally dose medications per GFR.      # Acidosis  Pt with acidosis, primary respiratory acidosis in the setting of COVID19 pneumonia.  Last blood gas 7.26/pCO2 72/bicarb 27, decreasing FIO2 from 100 to 90% today  - Ventilation optimization per ICU team, monitor blood gas/serum bicarb      Anna Loera  Nephrology Fellow  Pager [facility]: [phone] / in-hospital pager# [phone]  Pager [facility]: [phone]   (After 5 pm or on weekends please page the on-call fellow)     60M PMHx Brain tumor s/p resection in 2018 (remission) - admitted for acute hypoxic respiratory failure 2/2 COVID PNA s/p intubation on 3/12/21.  Nephrology consulted for LUDIVINA.        # LUDIVINA  Pt with non-oliguric LUDIVINA likely 2/2 hemodynamically mediated ATN in the setting of hypotension, covid19 pneumonia, septic shock.  On admission sCr 1.0 on 2/28 with sCr stable throughout hospitalization between 0.7-0.9 until after intubation wherein sCr alee from 0.97 to 1.53 on 3/14 then 1.67 on 3/15/21.  UA showed protein/blood/rbc/wbc.  Intermittent hypotension episodes 3/13-3/15 with BP 90s/50s required vasopressor support.  Currently patient is intubated/sedated w/ FIO2 90% (down from 100%), off vasopressor support, acidotic primary respiratory component (pH 7.26, pCO2 72, bicarb 27) and non-oliguric not on diuretic.      Recommendations:  - Currently no absolute/urgent indication for dialysis, recommend diuretic challenge bumex IV 4mg x 1 then assess UOP, if response diuretic then can start bumex drip at 2mg/hr for now, titrate as needed.  Will consent family for dialysis in case kidney failure worsens, refractory hyperkalemia/acidosis, etc  - BP optimization/antibiotic/blood transfusion per ICU team  - monitor BMP, strict I/O, avoid nephrotoxic agents (NSAIDs, PPI, contrast), renally dose medications per GFR.      # Acidosis  Pt with acidosis, primary respiratory acidosis in the setting of COVID19 pneumonia.  Last blood gas 7.26/pCO2 72/bicarb 27, decreasing FIO2 from 100 to 90% today  - Ventilation optimization per ICU team, diuretic as outline above.  If acidosis worsens then will need to consider dialysis monitor blood gas/serum bicarb      Anna Loera  Nephrology Fellow  Pager [facility]: [phone] / in-hospital pager# [phone]  Pager [facility]: [phone]   (After 5 pm or on weekends please page the on-call fellow)

## 2021-03-15 NOTE — PROGRESS NOTE ADULT - ASSESSMENT
This is a 61yo Male w/ PMHx of a Brain tumor s/p resection in 2018, who is presenting with worsening shortness of breath and non-productive cough for past week. Found to be COVID-19 Positive. Py hypoxic to 87% on RA CXR c/w COVID pneumonia. Admitted for further treatment of COVID pneumonia/hypoxemia.     Covid 19 Pneumonia: Hypoxic resp failure  Brain tumor:        3/3:      Covid 19 Pneumonia: Hypoxic resp failure: on covid rx: he is on 50% fio2: LFTS are mildly elevated: folow d dimer: currently OK  Brain tumor:  s/p resection:  DVT proparminyxlis  ruddy team    3/4:      Covid 19 Pneumonia: Hypoxic resp failure: on covid rx: he is on 50% fio2 today : increased fr om yesterday :looks better then yesterday : no overnight events Cont dexa"for a total of 10 dys: LFT as are improving and d dimer has mildly increased:   Brain tumor:  s/p resection:  DVT [facility] team : overall p rognosis is very giuarded"     3/7:      Covid 19 Pneumonia: Hypoxic resp failure: on covid rx: he is on100% fio2 today : remdesivir finished: on dexa: 7/10: pretty hypoxic: and has low grade fever: wbc is increasing: will start empiric zosyn: do chest xray : ID consult  Brain tumor:  s/p resection:  DVT propahyxlis  : overall p rognosis is very guarded stll :  RUDDY NP     3/8    Covid 19 Pneumonia: Hypoxic resp failure: on covid rx: he is on100% fio2 today : remdesivir finished: on dexa: 8/10: pretty hypoxic: and has low grade fever: wbc is increasing:  started empiric zosyn: do chest xray : with worsening: seen by ID:  His d dimer has increased significantly: start heparin  : call MICU with furterh deterioration as he might need intubation any time  Brain tumor:  s/p resection:  DVT propahyxlis  : overall prognosis is very guarded stll :  RUDDY FOX NP     3/9:    Covid 19 Pneumonia: Hypoxic resp failure: on covid rx: he is on100% fio2 today : remdesivir finished: on dexa: 9/10: on zosyn: on bipap at 1005: try to wean down o2 today : his dexa may need to be extended as he isnot doing well; Yesterday his d dimer had increased 4 x : started on empiric heparin today d d iemr has decreased!  Brain tumor:  s/p resection:  DVT propahyxlis  overall prognosis is very guarded stll :  RUDDY Gray    3/10:    Covid 19 Pneumonia: he is not doign well: he is on AVAPS: on 1005 oxygen: very tenuous resp status;' cant be moved for cta: he is alerday on empiric full dose ac: try to wean off oxygen: he is alreadyon antibtiocs and is on zosyn ? upgrade to leonidas: would defer to primary ID team   Brain tumor:  s/p resection:  DVT prophylaxis  overall prognosis is very guarded still :  RUDDY Gray    3/11:  Covid 19 Pneumonia: not do ing well: on 1005 avpas: antibtiocs broadened: by ID: he wants to alycia ntuabted as a last resort: ruddy jane on floor: He might need intuabtion any time: Heis on full dose ac and his d dimer i s decreasing!  Brain tumor:  s/p resection:  DVT prophylaxis  overall prognosis is very guarded still :  RUDDY pa: [place]    3/12:    Covid 19 Pneumonia:i intubated finally today for rep failure with severe refractory hypoxemia: Now paralysed and sedated in MICU and on full vent support : lot of secretions on intubation on broad spectrum antibiotics WBC increased: follow cultures:   Brain tumor:  s/p resection:  DVT prophylaxis  Condition critical :   dw team    3/15:    Covid 19 Pneumonia: intubated finally today for rep failure with severe refractory hypoxemia: on 805 fio2: proned today : all the antibiotics have been finished  antibiotics WBC increased: follow cultures:   Renal functions : high : defer to [facility] MICU team   Brain tumor:  s/p resection:  DVT prophylaxis  Condition critical :   dw team

## 2021-03-15 NOTE — CONSULT NOTE ADULT - SUBJECTIVE AND OBJECTIVE BOX
[facility] DIVISION OF KIDNEY DISEASES AND HYPERTENSION   -- INITIAL CONSULT NOTE --  Anna Loera, Nephrology Fellow     NS Pager: [phone] / KELLY Pager: [phone]  After 5pm or on weekend, please page the on-call fellow)  --------------------------------------------------------------------------------  HPI: 60M PMHx Brain tumor s/p resection in 2018 (remission) who presented to ED on 3/1/21 for worsening shortness of breath and non-productive cough x1 week - admitted for acute hypoxic respiratory failure 2/2 COVID LAURA with hospital course complicated by worsening hypoxia requiring intubation on 3/12/21.    Nephrology consulted for LUDIVINA.  Upon review of [facility]/[place], no prior labs noted.  On admission serum creatinine 1.0 on 2/28, stable during hospitalization between 0.7-0.9 until after intubation wherein sCr alee from 0.97 to 1.53 on 3/14 then 1.67 on 3/15/21.  Urinalysis showed protein/blood/rbc/wbc.  Patient noted to have intermittent hypotensive episodes from on 3/13-3/15 with BP 90s/50s required vasopressor support.  Currently patient is intubated/sedated w/ FIO2 90% (down from 100%), off vasopressor support, acidotic primary respiratory component (pH 7.26, pCO2 72, bicarb 27) and non-oliguric not on diuretic.      PAST HISTORY  --------------------------------------------------------------------------------  PAST MEDICAL & SURGICAL HISTORY:  Brain cancer in remission.  Brain tumor resection in 2018.    FAMILY HISTORY:  FH: type 2 diabetes    PAST SOCIAL HISTORY:  ALLERGIES & MEDICATIONS  --------------------------------------------------------------------------------  Allergies  No Known Allergies    Intolerances    Standing Inpatient Medications  ALBUTerol    90 MICROgram(s) HFA Inhaler 2 Puff(s) Inhalation every 4 hours  ascorbic acid 500 milliGRAM(s) Oral daily  aspirin  chewable 81 milliGRAM(s) Oral daily  buDESOnide    Inhalation Suspension 0.5 milliGRAM(s) Inhalation two times a day  chlorhexidine 0.12% Liquid 15 milliLiter(s) Oral Mucosa every 12 hours  cholecalciferol 2000 Unit(s) Oral daily  cisatracurium Infusion 3 MICROgram(s)/kG/Min IV Continuous <Continuous>  dexAMETHasone  IVPB 10 milliGRAM(s) IV Intermittent once  dextrose 40% Gel 15 Gram(s) Oral once  dextrose 5%. 1000 milliLiter(s) IV Continuous <Continuous>  dextrose 5%. 1000 milliLiter(s) IV Continuous <Continuous>  dextrose 50% Injectable 25 Gram(s) IV Push once  dextrose 50% Injectable 12.5 Gram(s) IV Push once  dextrose 50% Injectable 25 Gram(s) IV Push once  diazepam  Injectable 5 milliGRAM(s) IV Push every 6 hours  fenofibrate Tablet 48 milliGRAM(s) Oral daily  fentaNYL   Infusion..... 0.5 MICROgram(s)/kG/Hr IV Continuous <Continuous>  glucagon  Injectable 1 milliGRAM(s) IntraMuscular once  heparin   Injectable 5000 Unit(s) SubCutaneous every 12 hours  insulin lispro (ADMELOG) corrective regimen sliding scale   SubCutaneous every 6 hours  ketamine Infusion. 0.5 mG/kG/Hr IV Continuous <Continuous>  multivitamin 1 Tablet(s) Oral daily  norepinephrine Infusion 0.05 MICROgram(s)/kG/Min IV Continuous <Continuous>  pantoprazole  Injectable 40 milliGRAM(s) IV Push daily  polyethylene glycol 3350 17 Gram(s) Oral daily  propofol Infusion 20 MICROgram(s)/kG/Min IV Continuous <Continuous>  senna Syrup 10 milliLiter(s) Oral at bedtime    PRN Inpatient Medications    REVIEW OF SYSTEMS  unable to obtain d/t intubated/sedated    VITALS/PHYSICAL EXAM  --------------------------------------------------------------------------------  T(C): 36.8 (03-15-21 @ 10:30), Max: 37.1 (03-15-21 @ 07:45)  HR: 118 (03-15-21 @ 10:30) (74 - 122)  BP: --  RR: 34 (03-15-21 @ 10:30) (32 - 34)  SpO2: 95% (03-15-21 @ 10:30) (91% - 100%)  Wt(kg): --    03-14-21 @ 07:01  -  03-15-21 @ 07:00  --------------------------------------------------------  IN: 1246.7 mL / OUT: 1180 mL / NET: 66.7 mL    03-15-21 @ 07:01  -  03-15-21 @ 11:13  --------------------------------------------------------  IN: 287.6 mL / OUT: 325 mL / NET: -37.4 mL    Physical Exam:      LABS/STUDIES  --------------------------------------------------------------------------------  ABG - ( 15 Mar 2021 02:51 )  pH, Arterial: 7.26  pH, Blood: x     /  pCO2: 72    /  pO2: 124   / HCO3: 27    / Base Excess: 4.8   /  SaO2: 98.4                  11.2   18.74 >-----------<  331      [03-15-21 @ 02:51]              36.7     142  |  105  |  41  ----------------------------<  131      [03-15-21 @ 02:51]  5.1   |  31  |  1.67        Ca     9.5     [03-15-21 @ 02:51]      Mg     2.9     [03-15-21 @ 02:51]      Phos  5.7     [03-14-21 @ 05:00]    PT/INR: PT 11.4 , INR 1.00       [03-15-21 @ 02:51]    Creatinine Trend:  SCr 1.67 [03-15 @ 02:51]  SCr 1.53 [03-14 @ 05:00]  SCr 0.97 [03-13 @ 02:10]  SCr 0.71 [03-12 @ 12:14]  SCr 0.80 [03-12 @ 05:24]    Urinalysis - [03-12-21 @ 12:14]      Color Yellow / Appearance Slightly Turbid / SG 1.040 / pH 6.0      Gluc Trace / Ketone Small  / Bili Negative / Urobili 3 mg/dL       Blood Large / Protein 100 mg/dL / Leuk Est Negative / Nitrite Negative       / WBC 17 / Hyaline 27 / Gran  / Sq Epi  / Non Sq Epi 8 / Bacteria Negative      Ferritin 696      [03-12-21 @ 05:24]  TSH 0.18      [03-01-21 @ 08:07]  Lipid: chol --, , HDL --, LDL --      [03-15-21 @ 02:51]    HCV 0.12, Nonreact      [03-02-21 @ 13:44]     [facility] DIVISION OF KIDNEY DISEASES AND HYPERTENSION   -- INITIAL CONSULT NOTE --  Anna Loera, Nephrology Fellow     NS Pager: [phone] / KELLY Pager: [phone]  After 5pm or on weekend, please page the on-call fellow)  --------------------------------------------------------------------------------  HPI: 60M PMHx Brain tumor s/p resection in 2018 (remission) who presented to ED on 3/1/21 for worsening shortness of breath and non-productive cough x1 week - admitted for acute hypoxic respiratory failure 2/2 COVID LAURA with hospital course complicated by worsening hypoxia requiring intubation on 3/12/21.    Nephrology consulted for LUDIVINA.  Upon review of [facility]/[place], no prior labs noted.  On admission serum creatinine 1.0 on 2/28, stable during hospitalization between 0.7-0.9 until after intubation wherein sCr alee from 0.97 to 1.53 on 3/14 then 1.67 on 3/15/21.  Urinalysis showed protein/blood/rbc/wbc.  Patient noted to have intermittent hypotensive episodes from on 3/13-3/15 with BP 90s/50s required vasopressor support.  Currently patient is intubated/sedated w/ FIO2 90% (down from 100%), off vasopressor support, acidotic primary respiratory component (pH 7.26, pCO2 72, bicarb 27) and non-oliguric not on diuretic.      PAST HISTORY  --------------------------------------------------------------------------------  PAST MEDICAL & SURGICAL HISTORY:  Brain cancer in remission.  Brain tumor resection in 2018.    FAMILY HISTORY:  FH: type 2 diabetes    PAST SOCIAL HISTORY:  ALLERGIES & MEDICATIONS  --------------------------------------------------------------------------------  Allergies  No Known Allergies    Intolerances    Standing Inpatient Medications  ALBUTerol    90 MICROgram(s) HFA Inhaler 2 Puff(s) Inhalation every 4 hours  ascorbic acid 500 milliGRAM(s) Oral daily  aspirin  chewable 81 milliGRAM(s) Oral daily  buDESOnide    Inhalation Suspension 0.5 milliGRAM(s) Inhalation two times a day  chlorhexidine 0.12% Liquid 15 milliLiter(s) Oral Mucosa every 12 hours  cholecalciferol 2000 Unit(s) Oral daily  cisatracurium Infusion 3 MICROgram(s)/kG/Min IV Continuous <Continuous>  dexAMETHasone  IVPB 10 milliGRAM(s) IV Intermittent once  dextrose 40% Gel 15 Gram(s) Oral once  dextrose 5%. 1000 milliLiter(s) IV Continuous <Continuous>  dextrose 5%. 1000 milliLiter(s) IV Continuous <Continuous>  dextrose 50% Injectable 25 Gram(s) IV Push once  dextrose 50% Injectable 12.5 Gram(s) IV Push once  dextrose 50% Injectable 25 Gram(s) IV Push once  diazepam  Injectable 5 milliGRAM(s) IV Push every 6 hours  fenofibrate Tablet 48 milliGRAM(s) Oral daily  fentaNYL   Infusion..... 0.5 MICROgram(s)/kG/Hr IV Continuous <Continuous>  glucagon  Injectable 1 milliGRAM(s) IntraMuscular once  heparin   Injectable 5000 Unit(s) SubCutaneous every 12 hours  insulin lispro (ADMELOG) corrective regimen sliding scale   SubCutaneous every 6 hours  ketamine Infusion. 0.5 mG/kG/Hr IV Continuous <Continuous>  multivitamin 1 Tablet(s) Oral daily  norepinephrine Infusion 0.05 MICROgram(s)/kG/Min IV Continuous <Continuous>  pantoprazole  Injectable 40 milliGRAM(s) IV Push daily  polyethylene glycol 3350 17 Gram(s) Oral daily  propofol Infusion 20 MICROgram(s)/kG/Min IV Continuous <Continuous>  senna Syrup 10 milliLiter(s) Oral at bedtime    PRN Inpatient Medications    REVIEW OF SYSTEMS  unable to obtain d/t intubated/sedated    VITALS/PHYSICAL EXAM  --------------------------------------------------------------------------------  T(C): 36.8 (03-15-21 @ 10:30), Max: 37.1 (03-15-21 @ 07:45)  HR: 118 (03-15-21 @ 10:30) (74 - 122)  BP: --  RR: 34 (03-15-21 @ 10:30) (32 - 34)  SpO2: 95% (03-15-21 @ 10:30) (91% - 100%)  Wt(kg): --    03-14-21 @ 07:01  -  03-15-21 @ 07:00  --------------------------------------------------------  IN: 1246.7 mL / OUT: 1180 mL / NET: 66.7 mL    03-15-21 @ 07:01  -  03-15-21 @ 11:13  --------------------------------------------------------  IN: 287.6 mL / OUT: 325 mL / NET: -37.4 mL    Physical Exam:  	Gen: intubated  	HEENT: intubated, +ETT  	Pulm: mechanical breath sounds  	CV: non tachycardic   	GI: soft  	: noel catheter in place  	MSK: no edema in lower extremity              Neuro: sedated    LABS/STUDIES  --------------------------------------------------------------------------------  ABG - ( 15 Mar 2021 02:51 )  pH, Arterial: 7.26  pH, Blood: x     /  pCO2: 72    /  pO2: 124   / HCO3: 27    / Base Excess: 4.8   /  SaO2: 98.4                  11.2   18.74 >-----------<  331      [03-15-21 @ 02:51]              36.7     142  |  105  |  41  ----------------------------<  131      [03-15-21 @ 02:51]  5.1   |  31  |  1.67        Ca     9.5     [03-15-21 @ 02:51]      Mg     2.9     [03-15-21 @ 02:51]      Phos  5.7     [03-14-21 @ 05:00]    PT/INR: PT 11.4 , INR 1.00       [03-15-21 @ 02:51]    Creatinine Trend:  SCr 1.67 [03-15 @ 02:51]  SCr 1.53 [03-14 @ 05:00]  SCr 0.97 [03-13 @ 02:10]  SCr 0.71 [03-12 @ 12:14]  SCr 0.80 [03-12 @ 05:24]    Urinalysis - [03-12-21 @ 12:14]      Color Yellow / Appearance Slightly Turbid / SG 1.040 / pH 6.0      Gluc Trace / Ketone Small  / Bili Negative / Urobili 3 mg/dL       Blood Large / Protein 100 mg/dL / Leuk Est Negative / Nitrite Negative       / WBC 17 / Hyaline 27 / Gran  / Sq Epi  / Non Sq Epi 8 / Bacteria Negative      Ferritin 696      [03-12-21 @ 05:24]  TSH 0.18      [03-01-21 @ 08:07]  Lipid: chol --, , HDL --, LDL --      [03-15-21 @ 02:51]    HCV 0.12, Nonreact      [03-02-21 @ 13:44]

## 2021-03-15 NOTE — PROGRESS NOTE ADULT - ASSESSMENT
60 m with Brain tumor s/p resection in 2018, presented 2/28 with SOB and cough and was admitted for hypoxic respiratory failure due to COVID  initially afebrile with normal WBC  s/p remdesivir and on dexa since 3/1, now day 7  started to have fevers 3/5 and WBC increased to 18  procalcitonin was 0.10 and increased to 0.41  he denied new cough, no sputum and was comfortable on high flow     hypoxic respiratory failure due ot COVID pneumonia  new fever and leukocytosis, r/o bacterial infection, could also be reactive or steroid induced  procalcitonin also increased but still not significantly higher now 0.41  D-Dimer went to 2003  and pt was started on anticoagulation, no CTA done  was intubated 3/12, now prone    * blood  cx negative, WBC slightly better to 18  * s/p zosyn 3/-3/11 and then swtiched to vanco and leonidas 3/11-1/13 but all blood and sputum cx negative, now off antibiotics, afebrile and WBC improving  * monitor off antibiotics  * s/p a course of remdesivir  * s/p dexa 6 qd 3/1-3/10 but was started on dexa 10 qd on 3/13  * monitor WBC/diff  * if new fever or signs of infection will need pan culture  * respiratory failure management as per pulmonay  * will sign off, please call with questions        Anita Alexis MD  Pager [phone]  After 5pm and on weekends call [phone]

## 2021-03-15 NOTE — PROGRESS NOTE ADULT - ATTENDING COMMENTS
60M h/o brain tumor s/p resection in 2018 admitted 3/1 with hypoxic respiratory failure 2/2 COVID PNA requiring intubation 3/12     Neuro: sedated fentanyl, ketamine, versed; paralyzed with nimbex   Pulm: Covid ARDS proned overnight, supined at 11AM with repeat ABG pending  CV: - vasoplegic shock state 2/2 sedation on NE, goal MAP > 65  GI:  trickle enteral feeds while prone, increase when supine; GI ppx  Renal: worsening sCr, and decreasing UOP - renal consulted, rec bumex challenge  Heme: d-dimer improved duplex negative on ppx dvt  ID: cultures negative, leukocytosis downtrending - monitor off abx  Endo: started on dexamethasone 10 mg bid x 3 days yesterday for elevated crp; continue ISS coverage

## 2021-03-15 NOTE — PROGRESS NOTE ADULT - SUBJECTIVE AND OBJECTIVE BOX
Follow Up:  COVID, leukocytosis    Interval History: pt prone and in MICU, acidotic    ROS:    Unobtainable because: intubated        Allergies  No Known Allergies        ANTIMICROBIALS:      OTHER MEDS:  ALBUTerol    90 MICROgram(s) HFA Inhaler 2 Puff(s) Inhalation every 4 hours  ascorbic acid 500 milliGRAM(s) Oral daily  aspirin  chewable 81 milliGRAM(s) Oral daily  buDESOnide    Inhalation Suspension 0.5 milliGRAM(s) Inhalation two times a day  chlorhexidine 0.12% Liquid 15 milliLiter(s) Oral Mucosa every 12 hours  cholecalciferol 2000 Unit(s) Oral daily  cisatracurium Infusion 3 MICROgram(s)/kG/Min IV Continuous <Continuous>  dexAMETHasone  IVPB 10 milliGRAM(s) IV Intermittent once  dextrose 40% Gel 15 Gram(s) Oral once  dextrose 5%. 1000 milliLiter(s) IV Continuous <Continuous>  dextrose 5%. 1000 milliLiter(s) IV Continuous <Continuous>  dextrose 50% Injectable 25 Gram(s) IV Push once  dextrose 50% Injectable 12.5 Gram(s) IV Push once  dextrose 50% Injectable 25 Gram(s) IV Push once  diazepam  Injectable 5 milliGRAM(s) IV Push every 6 hours  fenofibrate Tablet 48 milliGRAM(s) Oral daily  fentaNYL   Infusion..... 0.5 MICROgram(s)/kG/Hr IV Continuous <Continuous>  glucagon  Injectable 1 milliGRAM(s) IntraMuscular once  heparin   Injectable 5000 Unit(s) SubCutaneous every 12 hours  insulin lispro (ADMELOG) corrective regimen sliding scale   SubCutaneous every 6 hours  ketamine Infusion. 0.5 mG/kG/Hr IV Continuous <Continuous>  multivitamin 1 Tablet(s) Oral daily  norepinephrine Infusion 0.05 MICROgram(s)/kG/Min IV Continuous <Continuous>  pantoprazole  Injectable 40 milliGRAM(s) IV Push daily  polyethylene glycol 3350 17 Gram(s) Oral daily  propofol Infusion 20 MICROgram(s)/kG/Min IV Continuous <Continuous>  senna Syrup 10 milliLiter(s) Oral at bedtime      Vital Signs Last 24 Hrs  T(C): 36.6 (15 Mar 2021 15:30), Max: 37.1 (15 Mar 2021 07:45)  T(F): 97.9 (15 Mar 2021 15:30), Max: 98.8 (15 Mar 2021 07:45)  HR: 115 (15 Mar 2021 15:41) (84 - 122)  BP: --  BP(mean): --  RR: 34 (15 Mar 2021 15:30) (34 - 34)  SpO2: 93% (15 Mar 2021 15:41) (91% - 100%)    Physical Exam:  General:    prone, intubated  ENT:    NG tube  Cardio:     regular S1, S2  Respiratory:   ET on vent  abd:     BS +  :   noel  Musculoskeletal:   no joint swelling  vascular: RIJ CVC, R axillary A-line  Skin:    no rash                        11.2   18.74 )-----------( 331      ( 15 Mar 2021 02:51 )             36.7       03-15    142  |  105  |  41<H>  ----------------------------<  131<H>  5.1   |  31  |  1.67<H>    Ca    9.5      15 Mar 2021 02:51  Phos  5.7     03-14  Mg     2.9     03-15            MICROBIOLOGY:  v  .Sputum Sputum  03-14-21   No growth  --    Rare polymorphonuclear leukocytes per low power field  No organisms seen per oil power field      .Blood Blood-Venous  03-12-21   No growth to date.  --  --      .Nose Nose  03-11-21   No MRSA isolated  No Staph Aureus (MSSA) isolated "This can represent normal nasal  carriage.  PCR is more sensitive for identifying MRSA/MSSA carriage"  --  --      .Blood Blood-Peripheral  03-11-21   No growth to date.  --  --      .Blood Blood-Peripheral  03-07-21   No Growth Final  --  --                RADIOLOGY:  Images independently visualized and reviewed personally, findings as below  < from: Xray Chest 1 View- PORTABLE-Urgent (Xray Chest 1 View- PORTABLE-Urgent .) (03.12.21 @ 15:37) >  FINDINGS:    NG tube into the stomach. Endotracheal tube and right internal jugular line in place unchanged. Heart is normal in size. Diffuse bilateral airspace disease. Apices are unremarkable. Degenerative changes.    IMPRESSION:    NG tube into the stomach. Stable diffuse bilateral airspace disease      < end of copied text >

## 2021-03-15 NOTE — CONSULT NOTE ADULT - ATTENDING COMMENTS
Pt care and plan discussed and reviewed with NP. Plan as outlined above edited by me to reflect our discussion.
pt seen and examined as above, Pt with covid pneumonia, on bilevel  for acute hypoxemic respiratory failure, RR 35  o2 sat 94 on 90%  14/10.  cxr reviewed worsening b/l lower field opacities, elevated  ddimer 2000 from 500. pt on full anticoagulation.  PE rhonchi  b/l heart s1s2 abdomen nontender ext no edema, labs as above    -59 yo male with covid pneumonia with hypoxemic respiratory failure,  pt requiring bilevel , pt currently is alert and oriented x3 and does   not want to get intubated.  suggest monitor o2 sat closely,  follow up ddimer, ferritin, abg, cxr.  pt does not require icu level care at this time.
non-oliguric ATN with respiratory failure. he is with hypoxic with hypercapnia on mechanical ventilation support .  start diuresis as above.  based on response and serial ABG may need to start CRRT to optimize fluid removal, acid base status  Dose medications for eGFR

## 2021-03-15 NOTE — PROGRESS NOTE ADULT - SUBJECTIVE AND OBJECTIVE BOX
Patient is a 60y old  Male who presents with a chief complaint of COVID-19 Pneumonia (15 Mar 2021 16:42)      INTERVAL HPI/OVERNIGHT EVENTS:  T(C): 36.6 (03-15-21 @ 23:00), Max: 37.1 (03-15-21 @ 07:45)  HR: 105 (03-15-21 @ 23:00) (89 - 122)  BP: --  RR: 32 (03-15-21 @ 23:00) (32 - 34)  SpO2: 95% (03-15-21 @ 23:00) (91% - 100%)  Wt(kg): --  I&O's Summary    14 Mar 2021 07:01  -  15 Mar 2021 07:00  --------------------------------------------------------  IN: 1246.7 mL / OUT: 1180 mL / NET: 66.7 mL    15 Mar 2021 07:01  -  15 Mar 2021 23:52  --------------------------------------------------------  IN: 1653.9 mL / OUT: 2185 mL / NET: -531.1 mL        PAST MEDICAL & SURGICAL HISTORY:  Brain cancer  in remission.    Brain tumor  s/p ressection in 2018.        SOCIAL HISTORY  Alcohol:  Tobacco:  Illicit substance use:    FAMILY HISTORY:    REVIEW OF SYSTEMS:  CONSTITUTIONAL: No fever, weight loss, or fatigue  EYES: No eye pain, visual disturbances, or discharge  ENMT:  No difficulty hearing, tinnitus, vertigo; No sinus or throat pain  NECK: No pain or stiffness  RESPIRATORY: No cough, wheezing, chills or hemoptysis; No shortness of breath  CARDIOVASCULAR: No chest pain, palpitations, dizziness, or leg swelling  GASTROINTESTINAL: No abdominal or epigastric pain. No nausea, vomiting, or hematemesis; No diarrhea or constipation. No melena or hematochezia.  GENITOURINARY: No dysuria, frequency, hematuria, or incontinence  NEUROLOGICAL: No headaches, memory loss, loss of strength, numbness, or tremors  SKIN: No itching, burning, rashes, or lesions   LYMPH NODES: No enlarged glands  ENDOCRINE: No heat or cold intolerance; No hair loss  MUSCULOSKELETAL: No joint pain or swelling; No muscle, back, or extremity pain  PSYCHIATRIC: No depression, anxiety, mood swings, or difficulty sleeping  HEME/LYMPH: No easy bruising, or bleeding gums  ALLERY AND IMMUNOLOGIC: No hives or eczema    RADIOLOGY & ADDITIONAL TESTS:    Imaging Personally Reviewed:  [ ] YES  [ ] NO    Consultant(s) Notes Reviewed:  [ ] YES  [ ] NO    PHYSICAL EXAM:  GENERAL: NAD, well-groomed, well-developed  HEAD:  Atraumatic, Normocephalic  EYES: EOMI, PERRLA, conjunctiva and sclera clear  ENMT: No tonsillar erythema, exudates, or enlargement; Moist mucous membranes, Good dentition, No lesions  NECK: Supple, No JVD, Normal thyroid  NERVOUS SYSTEM:  Alert & Oriented X3, Good concentration; Motor Strength 5/5 B/L upper and lower extremities; DTRs 2+ intact and symmetric  CHEST/LUNG: Clear to percussion bilaterally; No rales, rhonchi, wheezing, or rubs  HEART: Regular rate and rhythm; No murmurs, rubs, or gallops  ABDOMEN: Soft, Nontender, Nondistended; Bowel sounds present  EXTREMITIES:  2+ Peripheral Pulses, No clubbing, cyanosis, or edema  LYMPH: No lymphadenopathy noted  SKIN: No rashes or lesions    LABS:                        11.2   18.74 )-----------( 331      ( 15 Mar 2021 02:51 )             36.7     03-15    142  |  105  |  41<H>  ----------------------------<  131<H>  5.1   |  31  |  1.67<H>    Ca    9.5      15 Mar 2021 02:51  Phos  5.7     03-14  Mg     2.9     03-15      PT/INR - ( 15 Mar 2021 02:51 )   PT: 11.4 sec;   INR: 1.00 ratio             CAPILLARY BLOOD GLUCOSE      POCT Blood Glucose.: 143 mg/dL (15 Mar 2021 17:20)  POCT Blood Glucose.: 114 mg/dL (15 Mar 2021 11:32)  POCT Blood Glucose.: 107 mg/dL (15 Mar 2021 05:19)    ABG - ( 15 Mar 2021 19:10 )  pH, Arterial: 7.17  pH, Blood: x     /  pCO2: 94    /  pO2: 76    / HCO3: 26    / Base Excess: 4.7   /  SaO2: 93.5                    MEDICATIONS  (STANDING):  ALBUTerol    90 MICROgram(s) HFA Inhaler 2 Puff(s) Inhalation every 4 hours  ascorbic acid 500 milliGRAM(s) Oral daily  aspirin  chewable 81 milliGRAM(s) Oral daily  buDESOnide    Inhalation Suspension 0.5 milliGRAM(s) Inhalation two times a day  buMETAnide Infusion 2 mG/Hr (10 mL/Hr) IV Continuous <Continuous>  chlorhexidine 0.12% Liquid 15 milliLiter(s) Oral Mucosa every 12 hours  cholecalciferol 2000 Unit(s) Oral daily  cisatracurium Infusion 3 MICROgram(s)/kG/Min (14.8 mL/Hr) IV Continuous <Continuous>  dextrose 40% Gel 15 Gram(s) Oral once  dextrose 5%. 1000 milliLiter(s) (50 mL/Hr) IV Continuous <Continuous>  dextrose 5%. 1000 milliLiter(s) (100 mL/Hr) IV Continuous <Continuous>  dextrose 50% Injectable 25 Gram(s) IV Push once  dextrose 50% Injectable 12.5 Gram(s) IV Push once  dextrose 50% Injectable 25 Gram(s) IV Push once  diazepam  Injectable 5 milliGRAM(s) IV Push every 6 hours  fenofibrate Tablet 48 milliGRAM(s) Oral daily  fentaNYL   Infusion..... 0.5 MICROgram(s)/kG/Hr (0.82 mL/Hr) IV Continuous <Continuous>  glucagon  Injectable 1 milliGRAM(s) IntraMuscular once  heparin   Injectable 5000 Unit(s) SubCutaneous every 12 hours  insulin lispro (ADMELOG) corrective regimen sliding scale   SubCutaneous every 6 hours  ketamine Infusion. 0.5 mG/kG/Hr (4.11 mL/Hr) IV Continuous <Continuous>  multivitamin 1 Tablet(s) Oral daily  norepinephrine Infusion 0.05 MICROgram(s)/kG/Min (3.85 mL/Hr) IV Continuous <Continuous>  pantoprazole  Injectable 40 milliGRAM(s) IV Push daily  polyethylene glycol 3350 17 Gram(s) Oral daily  propofol Infusion 20 MICROgram(s)/kG/Min (9.85 mL/Hr) IV Continuous <Continuous>  senna Syrup 10 milliLiter(s) Oral at bedtime    MEDICATIONS  (PRN):      Care Discussed with Consultants/Other Providers [ ] YES  [ ] NO Patient is a 60y old  Male who presents with a chief complaint of COVID-19 Pneumonia (15 Mar 2021 16:42)      INTERVAL HPI/OVERNIGHT EVENTS: remain intubated on mech vent   T(C): 36.6 (03-15-21 @ 23:00), Max: 37.1 (03-15-21 @ 07:45)  HR: 105 (03-15-21 @ 23:00) (89 - 122)  BP: --  RR: 32 (03-15-21 @ 23:00) (32 - 34)  SpO2: 95% (03-15-21 @ 23:00) (91% - 100%)  Wt(kg): --  I&O's Summary    14 Mar 2021 07:01  -  15 Mar 2021 07:00  --------------------------------------------------------  IN: 1246.7 mL / OUT: 1180 mL / NET: 66.7 mL    15 Mar 2021 07:01  -  15 Mar 2021 23:52  --------------------------------------------------------  IN: 1653.9 mL / OUT: 2185 mL / NET: -531.1 mL        PAST MEDICAL & SURGICAL HISTORY:  Brain cancer  in remission.    Brain tumor  s/p ressection in 2018.        SOCIAL HISTORY  Alcohol:  Tobacco:  Illicit substance use:    FAMILY HISTORY:    REVIEW OF SYSTEMS:  CONSTITUTIONAL: No fever, weight loss, or fatigue  EYES: No eye pain, visual disturbances, or discharge  ENMT:  No difficulty hearing, tinnitus, vertigo; No sinus or throat pain  NECK: No pain or stiffness  RESPIRATORY: No cough, wheezing, chills or hemoptysis; No shortness of breath  CARDIOVASCULAR: No chest pain, palpitations, dizziness, or leg swelling  GASTROINTESTINAL: No abdominal or epigastric pain. No nausea, vomiting, or hematemesis; No diarrhea or constipation. No melena or hematochezia.  GENITOURINARY: No dysuria, frequency, hematuria, or incontinence  NEUROLOGICAL: No headaches, memory loss, loss of strength, numbness, or tremors  SKIN: No itching, burning, rashes, or lesions   LYMPH NODES: No enlarged glands  ENDOCRINE: No heat or cold intolerance; No hair loss  MUSCULOSKELETAL: No joint pain or swelling; No muscle, back, or extremity pain  PSYCHIATRIC: No depression, anxiety, mood swings, or difficulty sleeping  HEME/LYMPH: No easy bruising, or bleeding gums  ALLERY AND IMMUNOLOGIC: No hives or eczema    RADIOLOGY & ADDITIONAL TESTS:    Imaging Personally Reviewed:  [ ] YES  [ ] NO    Consultant(s) Notes Reviewed:  [ ] YES  [ ] NO    PHYSICAL EXAM:  GENERAL: NAD, well-groomed, well-developed  HEAD:  Atraumatic, Normocephalic  EYES: EOMI, PERRLA, conjunctiva and sclera clear  ENMT: No tonsillar erythema, exudates, or enlargement; Moist mucous membranes, Good dentition, No lesions  NECK: Supple, No JVD, Normal thyroid  NERVOUS SYSTEM:  Alert & Oriented X3, Good concentration; Motor Strength 5/5 B/L upper and lower extremities; DTRs 2+ intact and symmetric  CHEST/LUNG: Clear to percussion bilaterally; No rales, rhonchi, wheezing, or rubs  HEART: Regular rate and rhythm; No murmurs, rubs, or gallops  ABDOMEN: Soft, Nontender, Nondistended; Bowel sounds present  EXTREMITIES:  2+ Peripheral Pulses, No clubbing, cyanosis, or edema  LYMPH: No lymphadenopathy noted  SKIN: No rashes or lesions    LABS:                        11.2   18.74 )-----------( 331      ( 15 Mar 2021 02:51 )             36.7     03-15    142  |  105  |  41<H>  ----------------------------<  131<H>  5.1   |  31  |  1.67<H>    Ca    9.5      15 Mar 2021 02:51  Phos  5.7     03-14  Mg     2.9     03-15      PT/INR - ( 15 Mar 2021 02:51 )   PT: 11.4 sec;   INR: 1.00 ratio             CAPILLARY BLOOD GLUCOSE      POCT Blood Glucose.: 143 mg/dL (15 Mar 2021 17:20)  POCT Blood Glucose.: 114 mg/dL (15 Mar 2021 11:32)  POCT Blood Glucose.: 107 mg/dL (15 Mar 2021 05:19)    ABG - ( 15 Mar 2021 19:10 )  pH, Arterial: 7.17  pH, Blood: x     /  pCO2: 94    /  pO2: 76    / HCO3: 26    / Base Excess: 4.7   /  SaO2: 93.5                    MEDICATIONS  (STANDING):  ALBUTerol    90 MICROgram(s) HFA Inhaler 2 Puff(s) Inhalation every 4 hours  ascorbic acid 500 milliGRAM(s) Oral daily  aspirin  chewable 81 milliGRAM(s) Oral daily  buDESOnide    Inhalation Suspension 0.5 milliGRAM(s) Inhalation two times a day  buMETAnide Infusion 2 mG/Hr (10 mL/Hr) IV Continuous <Continuous>  chlorhexidine 0.12% Liquid 15 milliLiter(s) Oral Mucosa every 12 hours  cholecalciferol 2000 Unit(s) Oral daily  cisatracurium Infusion 3 MICROgram(s)/kG/Min (14.8 mL/Hr) IV Continuous <Continuous>  dextrose 40% Gel 15 Gram(s) Oral once  dextrose 5%. 1000 milliLiter(s) (50 mL/Hr) IV Continuous <Continuous>  dextrose 5%. 1000 milliLiter(s) (100 mL/Hr) IV Continuous <Continuous>  dextrose 50% Injectable 25 Gram(s) IV Push once  dextrose 50% Injectable 12.5 Gram(s) IV Push once  dextrose 50% Injectable 25 Gram(s) IV Push once  diazepam  Injectable 5 milliGRAM(s) IV Push every 6 hours  fenofibrate Tablet 48 milliGRAM(s) Oral daily  fentaNYL   Infusion..... 0.5 MICROgram(s)/kG/Hr (0.82 mL/Hr) IV Continuous <Continuous>  glucagon  Injectable 1 milliGRAM(s) IntraMuscular once  heparin   Injectable 5000 Unit(s) SubCutaneous every 12 hours  insulin lispro (ADMELOG) corrective regimen sliding scale   SubCutaneous every 6 hours  ketamine Infusion. 0.5 mG/kG/Hr (4.11 mL/Hr) IV Continuous <Continuous>  multivitamin 1 Tablet(s) Oral daily  norepinephrine Infusion 0.05 MICROgram(s)/kG/Min (3.85 mL/Hr) IV Continuous <Continuous>  pantoprazole  Injectable 40 milliGRAM(s) IV Push daily  polyethylene glycol 3350 17 Gram(s) Oral daily  propofol Infusion 20 MICROgram(s)/kG/Min (9.85 mL/Hr) IV Continuous <Continuous>  senna Syrup 10 milliLiter(s) Oral at bedtime    MEDICATIONS  (PRN):      Care Discussed with Consultants/Other Providers [ ] YES  [ ] NO

## 2021-03-15 NOTE — PROGRESS NOTE ADULT - SUBJECTIVE AND OBJECTIVE BOX
Date of Service: 03-15-21 @ 12:00    Patient is a 60y old  Male who presents with a chief complaint of COVID-19 Pneumonia (15 Mar 2021 11:12)      Any change in ROS: events noted: eventually he had to be intubated and transferred to MICU : not doing well:  He is proned and sedated and paralysed:     MEDICATIONS  (STANDING):  ALBUTerol    90 MICROgram(s) HFA Inhaler 2 Puff(s) Inhalation every 4 hours  ascorbic acid 500 milliGRAM(s) Oral daily  aspirin  chewable 81 milliGRAM(s) Oral daily  buDESOnide    Inhalation Suspension 0.5 milliGRAM(s) Inhalation two times a day  chlorhexidine 0.12% Liquid 15 milliLiter(s) Oral Mucosa every 12 hours  cholecalciferol 2000 Unit(s) Oral daily  cisatracurium Infusion 3 MICROgram(s)/kG/Min (14.8 mL/Hr) IV Continuous <Continuous>  dexAMETHasone  IVPB 10 milliGRAM(s) IV Intermittent once  dextrose 40% Gel 15 Gram(s) Oral once  dextrose 5%. 1000 milliLiter(s) (50 mL/Hr) IV Continuous <Continuous>  dextrose 5%. 1000 milliLiter(s) (100 mL/Hr) IV Continuous <Continuous>  dextrose 50% Injectable 25 Gram(s) IV Push once  dextrose 50% Injectable 12.5 Gram(s) IV Push once  dextrose 50% Injectable 25 Gram(s) IV Push once  diazepam  Injectable 5 milliGRAM(s) IV Push every 6 hours  fenofibrate Tablet 48 milliGRAM(s) Oral daily  fentaNYL   Infusion..... 0.5 MICROgram(s)/kG/Hr (0.82 mL/Hr) IV Continuous <Continuous>  glucagon  Injectable 1 milliGRAM(s) IntraMuscular once  heparin   Injectable 5000 Unit(s) SubCutaneous every 12 hours  insulin lispro (ADMELOG) corrective regimen sliding scale   SubCutaneous every 6 hours  ketamine Infusion. 0.5 mG/kG/Hr (4.11 mL/Hr) IV Continuous <Continuous>  multivitamin 1 Tablet(s) Oral daily  norepinephrine Infusion 0.05 MICROgram(s)/kG/Min (3.85 mL/Hr) IV Continuous <Continuous>  pantoprazole  Injectable 40 milliGRAM(s) IV Push daily  polyethylene glycol 3350 17 Gram(s) Oral daily  propofol Infusion 20 MICROgram(s)/kG/Min (9.85 mL/Hr) IV Continuous <Continuous>  senna Syrup 10 milliLiter(s) Oral at bedtime    MEDICATIONS  (PRN):    Vital Signs Last 24 Hrs  T(C): 36.8 (15 Mar 2021 10:30), Max: 37.1 (15 Mar 2021 07:45)  T(F): 98.2 (15 Mar 2021 10:30), Max: 98.8 (15 Mar 2021 07:45)  HR: 118 (15 Mar 2021 10:30) (74 - 122)  BP: --  BP(mean): --  RR: 34 (15 Mar 2021 10:30) (32 - 34)  SpO2: 95% (15 Mar 2021 10:30) (91% - 100%)  Mode: AC/ CMV (Assist Control/ Continuous Mandatory Ventilation)  RR (machine): 34  TV (machine): 350  FiO2: 90  PEEP: 9  ITime: 0.62  MAP: 17  PIP: 37    I&O's Summary    14 Mar 2021 07:01  -  15 Mar 2021 07:00  --------------------------------------------------------  IN: 1246.7 mL / OUT: 1180 mL / NET: 66.7 mL    15 Mar 2021 07:01  -  15 Mar 2021 12:00  --------------------------------------------------------  IN: 287.6 mL / OUT: 325 mL / NET: -37.4 mL          Physical Exam:   GENERAL: NAD, well-groomed, well-developed  HEENT: JOAO/   Atraumatic, Normocephalic  ENMT: No tonsillar erythema, exudates, or enlargement; Moist mucous membranes, Good dentition, No lesions  NECK: Supple, No JVD, Normal thyroid  CHEST/LUNG: Clear to auscultaion  CVS: Regular rate and rhythm; No murmurs, rubs, or gallops  GI: : Soft, Nontender, Nondistended; Bowel sounds present  NERVOUS SYSTEM:  proned and intuabted   EXTREMITIES:  2+ Peripheral Pulses, No clubbing, cyanosis, or edema  LYMPH: No lymphadenopathy noted  SKIN: No rashes or lesions  ENDOCRINOLOGY: No Thyromegaly  PSYCH: sedated and proned!    Labs:  ABG - ( 15 Mar 2021 02:51 )  pH, Arterial: 7.26  pH, Blood: x     /  pCO2: 72    /  pO2: 124   / HCO3: 27    / Base Excess: 4.8   /  SaO2: 98.4      < from: Xray Chest 1 View- PORTABLE-Urgent (Xray Chest 1 View- PORTABLE-Urgent .) (03.12.21 @ 15:37) >  EXAM:  XR CHEST PORTABLE URGENT 1V        PROCEDURE DATE:  Mar 12 2021         INTERPRETATION:  HISTORY: NG tube placement    TECHNIQUE: Single frontal view of the chest with comparison to study of earlier in the day    FINDINGS:    NG tube into the stomach. Endotracheal tube and right internal jugular line in place unchanged. Heart is normal in size. Diffuse bilateral airspace disease. Apices are unremarkable. Degenerative changes.    IMPRESSION:    NG tube into the stomach. Stable diffuse bilateral airspace disease              BRITT TORO MD; Attending Radiologist  This document has been electronically signed. Mar 12 2021  4:55PM    < end of copied text >                                    11.2   18.74 )-----------( 331      ( 15 Mar 2021 02:51 )             36.7                         12.0   22.16 )-----------( 347      ( 14 Mar 2021 08:47 )             38.3                         12.3   25.97 )-----------( 326      ( 13 Mar 2021 02:10 )             39.7                         14.0   32.87 )-----------( 408      ( 12 Mar 2021 12:14 )             44.4                         14.3   25.13 )-----------( 324      ( 12 Mar 2021 05:24 )             43.9     03-15    142  |  105  |  41<H>  ----------------------------<  131<H>  5.1   |  31  |  1.67<H>  03-14    140  |  103  |  32<H>  ----------------------------<  136<H>  5.0   |  28  |  1.53<H>  03-13    142  |  107  |  26<H>  ----------------------------<  98  4.1   |  28  |  0.97  03-12    148<H>  |  110<H>  |  24<H>  ----------------------------<  146<H>  3.6   |  26  |  0.71  03-12    144  |  108<H>  |  25<H>  ----------------------------<  126<H>  3.5   |  22  |  0.80    Ca    9.5      15 Mar 2021 02:51  Ca    9.4      14 Mar 2021 05:00  Phos  5.7     03-14  Mg     2.9     03-15  Mg     2.6     03-14    TPro  6.5  /  Alb  2.6<L>  /  TBili  0.5  /  DBili  x   /  AST  47<H>  /  ALT  37  /  AlkPhos  132<H>  03-12  TPro  6.2  /  Alb  2.3<L>  /  TBili  0.4  /  DBili  x   /  AST  48<H>  /  ALT  39  /  AlkPhos  120  03-12    CAPILLARY BLOOD GLUCOSE      POCT Blood Glucose.: 114 mg/dL (15 Mar 2021 11:32)  POCT Blood Glucose.: 107 mg/dL (15 Mar 2021 05:19)  POCT Blood Glucose.: 122 mg/dL (14 Mar 2021 17:44)        PT/INR - ( 15 Mar 2021 02:51 )   PT: 11.4 sec;   INR: 1.00 ratio             D-Dimer Assay, Quantitative: 1608 ng/mL DDU (03-12 @ 05:24)  D-Dimer Assay, Quantitative: 909 ng/mL DDU (03-10 @ 07:45)  D-Dimer Assay, Quantitative: 1311 ng/mL DDU (03-09 @ 07:19)  Procalcitonin, Serum: 0.40 ng/mL (03-13 @ 02:10)  Procalcitonin, Serum: 0.35 ng/mL (03-12 @ 05:24)        RECENT CULTURES:  03-14 @ 12:18 .Sputum Sputum       Rare polymorphonuclear leukocytes per low power field  No organisms seen per oil power field             03-12 @ 16:24 .Blood Blood-Venous                No growth to date.    03-11 @ 20:24 .Nose Nose                No MRSA isolated  No Staph Aureus (MSSA) isolated "This can represent normal nasal  carriage.  PCR is more sensitive for identifying MRSA/MSSA carriage"    03-11 @ 16:28 .Blood Blood-Peripheral                No growth to date.          RESPIRATORY CULTURES:          Studies  Chest X-RAY  CT SCAN Chest   Venous Dopplers: LE:   CT Abdomen  Others

## 2021-03-15 NOTE — PROGRESS NOTE ADULT - ASSESSMENT
60y Male w/ PMHx of a Brain tumor s/p resection in 2018 who presented 3/1/21 with worsening shortness of breath and non-productive cough x1 week, worsening hypoxia 2/2 COVID PNA requiring intubation 3/12 and transfer to MICU.    Neurologic:   - Sedation with propofol, fentanyl, ketamine, and versed (IVP)  - Chemically paralyzed with nimbex  - Wean as tolerated when supinated    Respiratory:   - Hypoxic & hypercarbic respiratory failure 2/2 COVID  - VC/AC 34/350/9/80%  - Patient is currently proned since 7pm; plan to supinate at 11a  - F/u ABG s/p supination  - Continue albuterol    Cardiovascular:   - Hypotensive, likely vasoplegic shock 2/2 sedation/paralysis  - Norepinephrine for MAP >65  - Continue ASA    Gastrointestinal/Nutrition:   - Jevity @ goal  - GI ppx w/ protonix  - Senna/Miralax     Renal/Genitourinary:   - Cr increased 1.67 and BUN 41  - Net positive 67mL/24hrs  - Will consult nephro   - Continue to monitor I&Os    Hematologic:   - H/H stable  - Elevated d-dimer, previously on heparin drip, now d/c'd  - Check lower extremity duplex r/o DVT  - Discontinued lovenox d/t worsening renal function  - Prophylaxis Hep SubQ    Infectious Disease:   - COVID: s/p remdesivir  - Dexamethasone taper to end tomorrow; today 10mg BID, tomorrow 10mg QD  - Afebrile x24hrs  - Improved leukocytosis with elevated procalcitonin, low concern for superimposed bacterial infection  - s/p Zosyn (3/07 - 3/11)  - s/p Meropenem / Vancomycin (3/11 - 3/13)  - BCx 3/7 & 3/11 NGTD  - UA negative  - If MRSA swab negative, will discontinue antibiotics    Endocrine:   - HgbA1C 6.0  - Blood glucose levels in target range ()  - Continue correctional scale insulin    Tubes/Lines/Drains:   - RIJ TLC  - Left radial arterial line  - Ariza  - ETT  - OGT    Disposition: RCU when medically stable

## 2021-03-16 NOTE — PROGRESS NOTE ADULT - SUBJECTIVE AND OBJECTIVE BOX
SUBJECTIVE/OBSERVATIONS: 61yo Male w/ PMHx of a Brain tumor s/p resection in 2018 who presented 3/1/21 with worsening shortness of breath and non-productive cough x1 week, intubated on 3/12 and transferred to [facility].     OVERNIGHT EVENTS: Patient is s/p prone at 7pm but emergently supinated around 630am this morning due to low TV in which noted ET tubed slightly dislodged and was re-advanced by overnight team and now is in proper position (see event notes). Post ET advancement, ABG looks slightly improved. Overnight, bumex was also dced due to net neg 2L with soft BPs.     Medications  ALBUTerol    90 MICROgram(s) HFA Inhaler 2 Puff(s) Inhalation every 4 hours  ascorbic acid 500 milliGRAM(s) Oral daily  aspirin  chewable 81 milliGRAM(s) Oral daily  buDESOnide    Inhalation Suspension 0.5 milliGRAM(s) Inhalation two times a day  chlorhexidine 0.12% Liquid 15 milliLiter(s) Oral Mucosa every 12 hours  cholecalciferol 2000 Unit(s) Oral daily  cisatracurium Infusion 3 MICROgram(s)/kG/Min IV Continuous <Continuous>  dextrose 40% Gel 15 Gram(s) Oral once  dextrose 5%. 1000 milliLiter(s) IV Continuous <Continuous>  dextrose 5%. 1000 milliLiter(s) IV Continuous <Continuous>  dextrose 50% Injectable 25 Gram(s) IV Push once  dextrose 50% Injectable 12.5 Gram(s) IV Push once  dextrose 50% Injectable 25 Gram(s) IV Push once  diazepam  Injectable 5 milliGRAM(s) IV Push every 6 hours  fenofibrate Tablet 48 milliGRAM(s) Oral daily  fentaNYL   Infusion..... 0.5 MICROgram(s)/kG/Hr IV Continuous <Continuous>  glucagon  Injectable 1 milliGRAM(s) IntraMuscular once  heparin   Injectable 5000 Unit(s) SubCutaneous every 12 hours  insulin lispro (ADMELOG) corrective regimen sliding scale   SubCutaneous every 6 hours  ketamine Infusion. 0.5 mG/kG/Hr IV Continuous <Continuous>  multivitamin 1 Tablet(s) Oral daily  norepinephrine Infusion 0.05 MICROgram(s)/kG/Min IV Continuous <Continuous>  pantoprazole  Injectable 40 milliGRAM(s) IV Push daily  polyethylene glycol 3350 17 Gram(s) Oral daily  propofol Infusion 20 MICROgram(s)/kG/Min IV Continuous <Continuous>  senna Syrup 10 milliLiter(s) Oral at bedtime      ICU Vitals  T(C): 36.7 (21 @ 10:00), Max: 36.9 (21 @ 01:00)  HR: 106 (21 @ 10:00) (89 - 121)  BP: --  RR: 34 (21 @ 10:00) (32 - 34)  SpO2: 92% (21 @ 10:00) (90% - 98%)    Capillary blood glucose  POCT Blood Glucose.: 122 mg/dL (21 @ 05:16)  POCT Blood Glucose.: 110 mg/dL (03-15-21 @ 23:58)  POCT Blood Glucose.: 143 mg/dL (03-15-21 @ 17:20)  POCT Blood Glucose.: 114 mg/dL (03-15-21 @ 11:32)    PE:  Intubated, NAD  Regular rate  Abd soft  Nonedematous   Sedated and paralyzed     I&O    03-15-21 @ 07:01  -  21 @ 07:00  --------------------------------------------------------  IN: 2269 mL / OUT: 4155 mL / NET: -1886 mL    21 @ 07:01  -  21 @ 10:46  --------------------------------------------------------  IN: 349.3 mL / OUT: 290 mL / NET: 59.3 mL        Labs  CBC ( @ 01:12)                          10.8<L>                   16.94<H>  )--------------(  321        --    % Neuts, --    % Lymphs, ANC: --                              35.3<L>  CBC (03-15 @ 02:51)                          11.2<L>                   18.74<H>  )--------------(  331        --    % Neuts, --    % Lymphs, ANC: --                              36.7<L>    BMP ( @ 01:12)       140     |  102     |  52<H> 			Ca++ --      Ca 9.3          ---------------------------------( 121<H>		Mg 2.7<H>       4.8     |  33<H>   |  2.10<H>			Ph 4.6<H>  BMP (03-15 @ 02:51)       142     |  105     |  41<H> 			Ca++ --      Ca 9.5          ---------------------------------( 131<H>		Mg 2.9<H>       5.1     |  31      |  1.67<H>			Ph --          Coags ( @ 01:12)  aPTT 26.6<L> / INR 0.95 / PT 10.9  Coags (03-15 @ 02:51)  aPTT -- / INR 1.00 / PT 11.4      ABG ( @ 09:24)     7.31<L> / 72<HH> / 80<L> / 31<H> / 9.0<H> / 96.0%     Lactate:    ABG ( @ 06:34)     7.28<L> / 79<HH> / 74<L> / 31<H> / 9.0<H> / 94.1<L>%     Lactate:        Urinalysis ( @ 12:14):     Color: Yellow / Appearance: Slightly Turbid<!> / S.040<H> / pH: 6.0 / Gluc: Trace<!> / Ketones: Small<!> / Bili: Negative / Urobili: 3 mg/dL<!> / Protein :100 mg/dL<!> / Nitrites: Negative / Leuk.Est: Negative / RBC: 386<H> / WBC: 17<H> / Sq Epi:  / Non Sq Epi: 8<H> / Bacteria Negative       -> .Sputum Sputum Culture ( @ 12:18)       Rare polymorphonuclear leukocytes per low power field  No organisms seen per oil power field    NG    No growth    -> .Blood Blood-Venous Culture ( @ 16:24)     NG    NG    No growth to date.    -> .Nose Nose Culture ( @ 20:24)     NG    NG    No MRSA isolated  No Staph Aureus (MSSA) isolated "This can represent normal nasal  carriage.  PCR is more sensitive for identifying MRSA/MSSA carriage"    -> .Blood Blood-Peripheral Culture ( @ 16:28)     NG    NG    No growth to date.    -> .Blood Blood-Peripheral Culture ( @ 13:01)     NG    NG    No Growth Final

## 2021-03-16 NOTE — PROGRESS NOTE ADULT - SUBJECTIVE AND OBJECTIVE BOX
DATE OF SERVICE: 03-16-21      Patient is a 60y old  Male who presents with a chief complaint of COVID-19 Pneumonia (16 Mar 2021 17:23)      INTERVAL HISTORY:  Patient is s/p prone at 7pm but emergently supinated around 630am this morning due to low TV in which noted ET tubed slightly dislodged and was re-advanced by overnight team and now is in proper position (see event notes). Post ET advancement, ABG looks slightly improved. Overnight, bumex was also dced due to net neg 2L with soft BPs.        MEDICATIONS:  norepinephrine Infusion 0.05 MICROgram(s)/kG/Min IV Continuous <Continuous>        PHYSICAL EXAM:  T(C): 36.3 (03-16-21 @ 22:00), Max: 37.1 (03-16-21 @ 16:15)  HR: 103 (03-16-21 @ 22:00) (89 - 115)  BP: --  RR: 34 (03-16-21 @ 22:00) (32 - 34)  SpO2: 93% (03-16-21 @ 22:00) (90% - 98%)  Wt(kg): --  I&O's Summary    15 Mar 2021 07:01  -  16 Mar 2021 07:00  --------------------------------------------------------  IN: 2269 mL / OUT: 4155 mL / NET: -1886 mL    16 Mar 2021 07:01  -  16 Mar 2021 22:31  --------------------------------------------------------  IN: 1224.8 mL / OUT: 1195 mL / NET: 29.8 mL                              10.8   16.94 )-----------( 321      ( 16 Mar 2021 01:12 )             35.3     03-16    140  |  102  |  52<H>  ----------------------------<  121<H>  4.8   |  33<H>  |  2.10<H>    Ca    9.3      16 Mar 2021 01:12  Phos  4.6     03-16  Mg     2.7     03-16          Labs personally reviewed      ASSESSMENT/PLAN: 	  This is a 61yo Male w/ PMHx of a Brain tumor s/p resection in 2018, who is presenting with worsening shortness of breath and non-productive cough for past week. Found to be COVID-19 Positive. Patient Sinus tachycardic in Emergency room.     1. Sinus Tachycardia 2/2 to worsening hypoxia from COVID-19 Pneumonia   - trop negative  - D Dimer continues rising - would need CTA r/o PE however given his high oxygen requirement likely not stable enough for test.   - (-) for DVT, changed to ppx dose   - can obtain TTE to eval heart function once acute issues resolve    2. Hypotension - likely 2/2 sedation and paralytics  - NE to keep MAP >56    3. Hypoxemia 2/2 Covid-19 pna  - remdesevir completed. Deca for 10 days.. initiated on 3/1   - completed decadron taper  - s/p RRT- intubated/ sedated in ICU   - remains intubated and sedated    4. Hyponatremic- 134 on admission likely from dehydration   -   normalized     5. Transaminitis  - elevated LFTs on admission, albumin slightly low however now nearly normalized, bilirubin wnl   - continue to monitor     6.  Fevers/Leukocytosis -  Plan: s/p Vanco and IV meropenem - under ICU care  bcx 3/7 - negative   bcx 3/11 NGTD  Observe off Abx    7. LUDIVINA - s/p Bumex gtt, now holding further diuresis  - HD if worsening acidodis           Jaswant Weiss DO [facility]  Cardiovascular Medicine  [address], [address]  Office: [phone]  Cell: [phone]

## 2021-03-16 NOTE — CHART NOTE - NSCHARTNOTEFT_GEN_A_CORE
Overnight the patient remained stable in the proned position.  On AM rounds he was losing tidal volumes and this became worse when air was added to the cuff of the ET tube.  He was turned into the supine position and the ET tube was visualized with the glide scope.  The cuff was protruding through the vocal cords.  The cuff was deflated and the tube was advanced; the cuff was reinflated and tidal volumes immediately improved.  The tube was secured at 25.5 cm at the lip.  He remained stable throughout the procedure but saturation decreased to 88% after supination; FiO2 was increased to achieve oxygen saturation > 92%.  CXR is done; ET tube appears in adequate position.

## 2021-03-16 NOTE — PROGRESS NOTE ADULT - ASSESSMENT
60M PMHx Brain tumor s/p resection in 2018 (remission) - admitted for acute hypoxic respiratory failure 2/2 COVID PNA s/p intubation on 3/12/21.  Nephrology consulted for LUDIVINA.

## 2021-03-16 NOTE — PROGRESS NOTE ADULT - SUBJECTIVE AND OBJECTIVE BOX
Medicine Progress Note    Patient is a 60y old  Male who presents with a chief complaint of COVID-19 Pneumonia (16 Mar 2021 13:40)      SUBJECTIVE / OVERNIGHT EVENTS: remain intubated and on mech vent    ADDITIONAL REVIEW OF SYSTEMS:    MEDICATIONS  (STANDING):  ALBUTerol    90 MICROgram(s) HFA Inhaler 2 Puff(s) Inhalation every 4 hours  ascorbic acid 500 milliGRAM(s) Oral daily  aspirin  chewable 81 milliGRAM(s) Oral daily  budesonide 160 MICROgram(s)/formoterol 4.5 MICROgram(s) Inhaler 2 Puff(s) Inhalation two times a day  chlorhexidine 0.12% Liquid 15 milliLiter(s) Oral Mucosa every 12 hours  cholecalciferol 2000 Unit(s) Oral daily  cisatracurium Infusion 3 MICROgram(s)/kG/Min (14.8 mL/Hr) IV Continuous <Continuous>  dextrose 40% Gel 15 Gram(s) Oral once  dextrose 5%. 1000 milliLiter(s) (50 mL/Hr) IV Continuous <Continuous>  dextrose 5%. 1000 milliLiter(s) (100 mL/Hr) IV Continuous <Continuous>  dextrose 50% Injectable 25 Gram(s) IV Push once  dextrose 50% Injectable 12.5 Gram(s) IV Push once  dextrose 50% Injectable 25 Gram(s) IV Push once  diazepam  Injectable 5 milliGRAM(s) IV Push every 6 hours  fenofibrate Tablet 48 milliGRAM(s) Oral daily  fentaNYL   Infusion..... 0.5 MICROgram(s)/kG/Hr (0.82 mL/Hr) IV Continuous <Continuous>  glucagon  Injectable 1 milliGRAM(s) IntraMuscular once  heparin   Injectable 5000 Unit(s) SubCutaneous every 8 hours  insulin lispro (ADMELOG) corrective regimen sliding scale   SubCutaneous every 6 hours  ketamine Infusion. 0.5 mG/kG/Hr (4.11 mL/Hr) IV Continuous <Continuous>  multivitamin 1 Tablet(s) Oral daily  norepinephrine Infusion 0.05 MICROgram(s)/kG/Min (3.85 mL/Hr) IV Continuous <Continuous>  pantoprazole  Injectable 40 milliGRAM(s) IV Push daily  polyethylene glycol 3350 17 Gram(s) Oral daily  propofol Infusion 20 MICROgram(s)/kG/Min (9.85 mL/Hr) IV Continuous <Continuous>  senna Syrup 10 milliLiter(s) Oral at bedtime    MEDICATIONS  (PRN):    CAPILLARY BLOOD GLUCOSE      POCT Blood Glucose.: 102 mg/dL (16 Mar 2021 17:13)  POCT Blood Glucose.: 93 mg/dL (16 Mar 2021 11:28)  POCT Blood Glucose.: 122 mg/dL (16 Mar 2021 05:16)  POCT Blood Glucose.: 110 mg/dL (15 Mar 2021 23:58)    I&O's Summary    15 Mar 2021 07:01  -  16 Mar 2021 07:00  --------------------------------------------------------  IN: 2269 mL / OUT: 4155 mL / NET: -1886 mL    16 Mar 2021 07:01  -  16 Mar 2021 17:23  --------------------------------------------------------  IN: 878 mL / OUT: 815 mL / NET: 63 mL        PHYSICAL EXAM:  Vital Signs Last 24 Hrs  T(C): 37 (16 Mar 2021 17:12), Max: 37.1 (16 Mar 2021 16:15)  T(F): 98.6 (16 Mar 2021 17:12), Max: 98.8 (16 Mar 2021 16:15)  HR: 102 (16 Mar 2021 17:12) (89 - 115)  BP: --  BP(mean): --  RR: 34 (16 Mar 2021 17:12) (32 - 34)  SpO2: 97% (16 Mar 2021 17:12) (90% - 98%)  CONSTITUTIONAL: NAD, well-developed, well-groomed  ENMT: Moist oral mucosa, no pharyngeal injection or exudates; normal dentition  RESPIRATORY: Normal respiratory effort; lungs are clear to auscultation bilaterally  CARDIOVASCULAR: Regular rate and rhythm, normal S1 and S2, no murmur/rub/gallop; No lower extremity edema; Peripheral pulses are 2+ bilaterally  ABDOMEN: Nontender to palpation, normoactive bowel sounds, no rebound/guarding; No hepatosplenomegaly  PSYCH: A+O to person, place, and time; affect appropriate  NEUROLOGY: CN 2-12 are intact and symmetric; no gross sensory deficits   SKIN: No rashes; no palpable lesions    LABS:                        10.8   16.94 )-----------( 321      ( 16 Mar 2021 01:12 )             35.3     03-16    140  |  102  |  52<H>  ----------------------------<  121<H>  4.8   |  33<H>  |  2.10<H>    Ca    9.3      16 Mar 2021 01:12  Phos  4.6     03-16  Mg     2.7     03-16      PT/INR - ( 16 Mar 2021 01:12 )   PT: 10.9 sec;   INR: 0.95 ratio         PTT - ( 16 Mar 2021 01:12 )  PTT:26.6 sec          Culture - Sputum (collected 14 Mar 2021 10:49)  Source: .Sputum Sputum  Gram Stain (14 Mar 2021 14:05):    Rare polymorphonuclear leukocytes per low power field    No organisms seen per oil power field  Final Report (16 Mar 2021 11:33):    No growth at 48 hours      COVID-19 PCR: Detected (28 Feb 2021 23:02)      RADIOLOGY & ADDITIONAL TESTS:  Imaging from Last 24 Hours:    Electrocardiogram/QTc Interval:    COORDINATION OF CARE:  Care Discussed with Consultants/Other Providers:

## 2021-03-16 NOTE — PROGRESS NOTE ADULT - ASSESSMENT
ASSESSMENT:  60y Male w/ PMHx of a Brain tumor s/p resection in 2018 who presented 3/1/21 with worsening shortness of breath and non-productive cough x1 week, worsening hypoxia 2/2 COVID PNA requiring intubation 3/12 and transfer to [facility]    # Hypoxic respiratory failure 2/2 COVID  -intubated on [facility] vent   -MICU following     Hypotension   - Norepinephrine for MAP >65    malnutrition  - TF   - GI ppx w/ protonix  - Senna/Miralax     PNA  - Increasing leukocytosis with elevated procalcitonin & fevers, concern for superimposed bacterial infection  -started on Iv abx     Hx of brin tumor s/p resection   -stable     poor prognosis remain full code     krista nelson MD

## 2021-03-16 NOTE — PROGRESS NOTE ADULT - SUBJECTIVE AND OBJECTIVE BOX
[facility] DIVISION OF KIDNEY DISEASES AND HYPERTENSION   -- FOLLOW UP NOTE --   Anna Loera  Nephrology Fellow  Pager NS: [phone]   /  Pager KELLY: [phone]  (after 5pm or weekend please page the on-call fellow)  ======================================================  24 hour events/subjective:  - overnight cuff adjusted, vitals afebrile, no hypotensive episode MAP>65, total UOP 4.1L (net neg 1.8L) in the past 24hr given bumex IV yesterday  - patient seen and examined at bedside this morning intubated/sedated FIO2 80% prone position on vasopressors  - vitals/lab/medications reviewed    PAST HISTORY  --------------------------------------------------------------------------------  No significant changes to PMH, PSH, FHx, SHx, unless otherwise noted    ALLERGIES & MEDICATIONS  --------------------------------------------------------------------------------  Allergies  No Known Allergies    Intolerances    Standing Inpatient Medications  ALBUTerol    90 MICROgram(s) HFA Inhaler 2 Puff(s) Inhalation every 4 hours  ascorbic acid 500 milliGRAM(s) Oral daily  aspirin  chewable 81 milliGRAM(s) Oral daily  buDESOnide    Inhalation Suspension 0.5 milliGRAM(s) Inhalation two times a day  buMETAnide Infusion 1 mG/Hr IV Continuous <Continuous>  chlorhexidine 0.12% Liquid 15 milliLiter(s) Oral Mucosa every 12 hours  cholecalciferol 2000 Unit(s) Oral daily  cisatracurium Infusion 3 MICROgram(s)/kG/Min IV Continuous <Continuous>  dexAMETHasone  IVPB 10 milliGRAM(s) IV Intermittent once  dextrose 40% Gel 15 Gram(s) Oral once  dextrose 5%. 1000 milliLiter(s) IV Continuous <Continuous>  dextrose 5%. 1000 milliLiter(s) IV Continuous <Continuous>  dextrose 50% Injectable 25 Gram(s) IV Push once  dextrose 50% Injectable 12.5 Gram(s) IV Push once  dextrose 50% Injectable 25 Gram(s) IV Push once  diazepam  Injectable 5 milliGRAM(s) IV Push every 6 hours  fenofibrate Tablet 48 milliGRAM(s) Oral daily  fentaNYL   Infusion..... 0.5 MICROgram(s)/kG/Hr IV Continuous <Continuous>  glucagon  Injectable 1 milliGRAM(s) IntraMuscular once  heparin   Injectable 5000 Unit(s) SubCutaneous every 12 hours  insulin lispro (ADMELOG) corrective regimen sliding scale   SubCutaneous every 6 hours  ketamine Infusion. 0.5 mG/kG/Hr IV Continuous <Continuous>  multivitamin 1 Tablet(s) Oral daily  norepinephrine Infusion 0.05 MICROgram(s)/kG/Min IV Continuous <Continuous>  pantoprazole  Injectable 40 milliGRAM(s) IV Push daily  polyethylene glycol 3350 17 Gram(s) Oral daily  propofol Infusion 20 MICROgram(s)/kG/Min IV Continuous <Continuous>  senna Syrup 10 milliLiter(s) Oral at bedtime    PRN Inpatient Medications      REVIEW OF SYSTEMS  unable to obtain d/t intubated/sedated    VITALS/PHYSICAL EXAM  --------------------------------------------------------------------------------  T(C): 36.3 (03-16-21 @ 07:00), Max: 37.1 (03-15-21 @ 07:45)  HR: 104 (03-16-21 @ 07:00) (89 - 122)  BP: --  RR: 34 (03-16-21 @ 07:00) (32 - 34)  SpO2: 92% (03-16-21 @ 07:00) (90% - 98%)  Wt(kg): --    03-15-21 @ 07:01  -  03-16-21 @ 07:00  --------------------------------------------------------  IN: 2269 mL / OUT: 4155 mL / NET: -1886 mL    Physical Exam:  	Gen: intubated  	HEENT: intubated, +ETT  	Pulm: mechanical breath sounds  	CV: + tachycardic   	GI: supine  	: noel catheter in place  	MSK: no edema in lower extremity              Neuro: sedated    LABS/STUDIES  --------------------------------------------------------------------------------              10.8   16.94 >-----------<  321      [03-16-21 @ 01:12]              35.3     140  |  102  |  52  ----------------------------<  121      [03-16-21 @ 01:12]  4.8   |  33  |  2.10        Ca     9.3     [03-16-21 @ 01:12]      Mg     2.7     [03-16-21 @ 01:12]      Phos  4.6     [03-16-21 @ 01:12]    PT/INR: PT 10.9 , INR 0.95       [03-16-21 @ 01:12]  PTT: 26.6       [03-16-21 @ 01:12]    Creatinine Trend:  SCr 2.10 [03-16 @ 01:12]  SCr 1.67 [03-15 @ 02:51]  SCr 1.53 [03-14 @ 05:00]  SCr 0.97 [03-13 @ 02:10]  SCr 0.71 [03-12 @ 12:14]    Urinalysis - [03-12-21 @ 12:14]      Color Yellow / Appearance Slightly Turbid / SG 1.040 / pH 6.0      Gluc Trace / Ketone Small  / Bili Negative / Urobili 3 mg/dL       Blood Large / Protein 100 mg/dL / Leuk Est Negative / Nitrite Negative       / WBC 17 / Hyaline 27 / Gran  / Sq Epi  / Non Sq Epi 8 / Bacteria Negative      Ferritin 696      [03-12-21 @ 05:24]  TSH 0.18      [03-01-21 @ 08:07]  Lipid: chol --, , HDL --, LDL --      [03-16-21 @ 01:12]    HCV 0.12, Nonreact      [03-02-21 @ 13:44]     [facility] DIVISION OF KIDNEY DISEASES AND HYPERTENSION   -- FOLLOW UP NOTE --   Anna Loera  Nephrology Fellow  Pager NS: [phone]   /  Pager KELLY: [phone]  (after 5pm or weekend please page the on-call fellow)  ======================================================  24 hour events/subjective:  - overnight cuff adjusted, vitals afebrile, no hypotensive episode MAP>65, total UOP 4.1L (net neg 1.8L) in the past 24hr given bumex IV yesterday then drip.  Bumex drip stopped due to elevated HR and UOP 300cc/hr  - patient seen and examined at bedside this morning intubated/sedated FIO2 80% prone position on vasopressors  - vitals/lab/medications reviewed    PAST HISTORY  --------------------------------------------------------------------------------  No significant changes to PMH, PSH, FHx, SHx, unless otherwise noted    ALLERGIES & MEDICATIONS  --------------------------------------------------------------------------------  Allergies  No Known Allergies    Intolerances    Standing Inpatient Medications  ALBUTerol    90 MICROgram(s) HFA Inhaler 2 Puff(s) Inhalation every 4 hours  ascorbic acid 500 milliGRAM(s) Oral daily  aspirin  chewable 81 milliGRAM(s) Oral daily  buDESOnide    Inhalation Suspension 0.5 milliGRAM(s) Inhalation two times a day  buMETAnide Infusion 1 mG/Hr IV Continuous <Continuous>  chlorhexidine 0.12% Liquid 15 milliLiter(s) Oral Mucosa every 12 hours  cholecalciferol 2000 Unit(s) Oral daily  cisatracurium Infusion 3 MICROgram(s)/kG/Min IV Continuous <Continuous>  dexAMETHasone  IVPB 10 milliGRAM(s) IV Intermittent once  dextrose 40% Gel 15 Gram(s) Oral once  dextrose 5%. 1000 milliLiter(s) IV Continuous <Continuous>  dextrose 5%. 1000 milliLiter(s) IV Continuous <Continuous>  dextrose 50% Injectable 25 Gram(s) IV Push once  dextrose 50% Injectable 12.5 Gram(s) IV Push once  dextrose 50% Injectable 25 Gram(s) IV Push once  diazepam  Injectable 5 milliGRAM(s) IV Push every 6 hours  fenofibrate Tablet 48 milliGRAM(s) Oral daily  fentaNYL   Infusion..... 0.5 MICROgram(s)/kG/Hr IV Continuous <Continuous>  glucagon  Injectable 1 milliGRAM(s) IntraMuscular once  heparin   Injectable 5000 Unit(s) SubCutaneous every 12 hours  insulin lispro (ADMELOG) corrective regimen sliding scale   SubCutaneous every 6 hours  ketamine Infusion. 0.5 mG/kG/Hr IV Continuous <Continuous>  multivitamin 1 Tablet(s) Oral daily  norepinephrine Infusion 0.05 MICROgram(s)/kG/Min IV Continuous <Continuous>  pantoprazole  Injectable 40 milliGRAM(s) IV Push daily  polyethylene glycol 3350 17 Gram(s) Oral daily  propofol Infusion 20 MICROgram(s)/kG/Min IV Continuous <Continuous>  senna Syrup 10 milliLiter(s) Oral at bedtime    PRN Inpatient Medications      REVIEW OF SYSTEMS  unable to obtain d/t intubated/sedated    VITALS/PHYSICAL EXAM  --------------------------------------------------------------------------------  T(C): 36.3 (03-16-21 @ 07:00), Max: 37.1 (03-15-21 @ 07:45)  HR: 104 (03-16-21 @ 07:00) (89 - 122)  BP: --  RR: 34 (03-16-21 @ 07:00) (32 - 34)  SpO2: 92% (03-16-21 @ 07:00) (90% - 98%)  Wt(kg): --    03-15-21 @ 07:01  -  03-16-21 @ 07:00  --------------------------------------------------------  IN: 2269 mL / OUT: 4155 mL / NET: -1886 mL    Physical Exam:  	Gen: intubated, periorbital edema  	HEENT: intubated, +ETT  	Pulm: mechanical breath sounds  	CV: + tachycardic   	GI: soft  	: noel catheter in place  	MSK: no edema in lower extremity              Neuro: sedated    LABS/STUDIES  --------------------------------------------------------------------------------              10.8   16.94 >-----------<  321      [03-16-21 @ 01:12]              35.3     140  |  102  |  52  ----------------------------<  121      [03-16-21 @ 01:12]  4.8   |  33  |  2.10        Ca     9.3     [03-16-21 @ 01:12]      Mg     2.7     [03-16-21 @ 01:12]      Phos  4.6     [03-16-21 @ 01:12]    PT/INR: PT 10.9 , INR 0.95       [03-16-21 @ 01:12]  PTT: 26.6       [03-16-21 @ 01:12]    Creatinine Trend:  SCr 2.10 [03-16 @ 01:12]  SCr 1.67 [03-15 @ 02:51]  SCr 1.53 [03-14 @ 05:00]  SCr 0.97 [03-13 @ 02:10]  SCr 0.71 [03-12 @ 12:14]    Urinalysis - [03-12-21 @ 12:14]      Color Yellow / Appearance Slightly Turbid / SG 1.040 / pH 6.0      Gluc Trace / Ketone Small  / Bili Negative / Urobili 3 mg/dL       Blood Large / Protein 100 mg/dL / Leuk Est Negative / Nitrite Negative       / WBC 17 / Hyaline 27 / Gran  / Sq Epi  / Non Sq Epi 8 / Bacteria Negative      Ferritin 696      [03-12-21 @ 05:24]  TSH 0.18      [03-01-21 @ 08:07]  Lipid: chol --, , HDL --, LDL --      [03-16-21 @ 01:12]    HCV 0.12, Nonreact      [03-02-21 @ 13:44]

## 2021-03-16 NOTE — PROGRESS NOTE ADULT - ATTENDING COMMENTS
60M h/o brain tumor s/p resection in 2018 admitted 3/1 with hypoxic respiratory failure 2/2 COVID PNA requiring intubation 3/12     Neuro: sedated fentanyl, ketamine, versed; paralyzed with nimbex   Pulm: Covid ARDS proned overnight, supined early at 6AM due to lost volume, ET tube malpositioned, repositioned and getting full volumes - repeat ABG 7.31/72/80, will plan to prone once more today, likely little utility to continue if ABGs remain grossly unchanged in either position   CV: - vasoplegic shock state 2/2 sedation on NE, goal MAP > 65  GI:  trickle enteral feeds while prone, increase when supine; GI ppx  Renal: responded well to bumex IVP and gtt, net negative 1.9L previous 24hrs, can dc and just continue as needed bumex, trend sCr (uptrend though likely in setting of aggressive diuresis)  Heme: dvt ppx with hsq  ID: cultures negative, leukocytosis continues to downtrend - monitor off abx  Endo: started on dexamethasone 10 mg bid x 3 days yesterday for elevated crp, wean to daily today; continue ISS coverage

## 2021-03-16 NOTE — PROGRESS NOTE ADULT - PROBLEM SELECTOR PLAN 2
Pt with acidosis, primary respiratory acidosis in the setting of COVID19 pneumonia/ARDS.  Last blood gas 7.31/pCO2 69/bicarb 31, decreasing FIO2 from 90 to 80% today  - Ventilation optimization per ICU team, diuretic as outline above.  If acidosis worsens then will need to consider dialysis monitor blood gas/serum bicarb    Anna Loera  Nephrology Fellow  [facility] In-hospital pager# [phone]  Pager NS: [phone]   (After 5 pm or on weekends please page the on-call fellow)

## 2021-03-16 NOTE — PROGRESS NOTE ADULT - SUBJECTIVE AND OBJECTIVE BOX
Date of Service: 03-16-21 @ 13:40    Patient is a 60y old  Male who presents with a chief complaint of COVID-19 Pneumonia (16 Mar 2021 10:35)      Any change in ROS: not doing well: intubated and sedated and is requiring very high conc of oxygen     MEDICATIONS  (STANDING):  ALBUTerol    90 MICROgram(s) HFA Inhaler 2 Puff(s) Inhalation every 4 hours  ascorbic acid 500 milliGRAM(s) Oral daily  aspirin  chewable 81 milliGRAM(s) Oral daily  budesonide 160 MICROgram(s)/formoterol 4.5 MICROgram(s) Inhaler 2 Puff(s) Inhalation two times a day  chlorhexidine 0.12% Liquid 15 milliLiter(s) Oral Mucosa every 12 hours  cholecalciferol 2000 Unit(s) Oral daily  cisatracurium Infusion 3 MICROgram(s)/kG/Min (14.8 mL/Hr) IV Continuous <Continuous>  dextrose 40% Gel 15 Gram(s) Oral once  dextrose 5%. 1000 milliLiter(s) (50 mL/Hr) IV Continuous <Continuous>  dextrose 5%. 1000 milliLiter(s) (100 mL/Hr) IV Continuous <Continuous>  dextrose 50% Injectable 25 Gram(s) IV Push once  dextrose 50% Injectable 12.5 Gram(s) IV Push once  dextrose 50% Injectable 25 Gram(s) IV Push once  diazepam  Injectable 5 milliGRAM(s) IV Push every 6 hours  fenofibrate Tablet 48 milliGRAM(s) Oral daily  fentaNYL   Infusion..... 0.5 MICROgram(s)/kG/Hr (0.82 mL/Hr) IV Continuous <Continuous>  glucagon  Injectable 1 milliGRAM(s) IntraMuscular once  heparin   Injectable 5000 Unit(s) SubCutaneous every 8 hours  insulin lispro (ADMELOG) corrective regimen sliding scale   SubCutaneous every 6 hours  ketamine Infusion. 0.5 mG/kG/Hr (4.11 mL/Hr) IV Continuous <Continuous>  multivitamin 1 Tablet(s) Oral daily  norepinephrine Infusion 0.05 MICROgram(s)/kG/Min (3.85 mL/Hr) IV Continuous <Continuous>  pantoprazole  Injectable 40 milliGRAM(s) IV Push daily  polyethylene glycol 3350 17 Gram(s) Oral daily  propofol Infusion 20 MICROgram(s)/kG/Min (9.85 mL/Hr) IV Continuous <Continuous>  senna Syrup 10 milliLiter(s) Oral at bedtime    MEDICATIONS  (PRN):    Vital Signs Last 24 Hrs  T(C): 36.7 (16 Mar 2021 12:23), Max: 36.9 (16 Mar 2021 01:00)  T(F): 98.1 (16 Mar 2021 12:23), Max: 98.4 (16 Mar 2021 01:00)  HR: 107 (16 Mar 2021 12:23) (89 - 121)  BP: --  BP(mean): --  RR: 34 (16 Mar 2021 12:23) (32 - 34)  SpO2: 93% (16 Mar 2021 12:23) (90% - 98%)  Mode: AC/ CMV (Assist Control/ Continuous Mandatory Ventilation)  RR (machine): 34  TV (machine): 400  FiO2: 90  PEEP: 9  ITime: 0.63  MAP: 17  PIP: 36    I&O's Summary    15 Mar 2021 07:01  -  16 Mar 2021 07:00  --------------------------------------------------------  IN: 2269 mL / OUT: 4155 mL / NET: -1886 mL    16 Mar 2021 07:01  -  16 Mar 2021 13:40  --------------------------------------------------------  IN: 573.8 mL / OUT: 365 mL / NET: 208.8 mL          Physical Exam:   GENERAL: NAD, well-groomed, well-developed  HEENT: JOAO/   Atraumatic, Normocephalic  ENMT: No tonsillar erythema, exudates, or enlargement; Moist mucous membranes, Good dentition, No lesions  NECK: Supple, No JVD, Normal thyroid  CHEST/LUNG: Clear to auscultaion  CVS: Regular rate and rhythm; No murmurs, rubs, or gallops  GI: : Soft, Nontender, Nondistended; Bowel sounds present  NERVOUS SYSTEM:  SUPINE: SEDATED : INTUBATED   EXTREMITIES:  -edema  LYMPH: No lymphadenopathy noted  SKIN: No rashes or lesions  ENDOCRINOLOGY: No Thyromegaly  PSYCH: sedated    Labs:  ABG - ( 16 Mar 2021 09:24 )  pH, Arterial: 7.31  pH, Blood: x     /  pCO2: 72    /  pO2: 80    / HCO3: 31    / Base Excess: 9.0   /  SaO2: 96.0                                        10.8   16.94 )-----------( 321      ( 16 Mar 2021 01:12 )             35.3                         11.2   18.74 )-----------( 331      ( 15 Mar 2021 02:51 )             36.7                         12.0   22.16 )-----------( 347      ( 14 Mar 2021 08:47 )             38.3                         12.3   25.97 )-----------( 326      ( 13 Mar 2021 02:10 )             39.7     03-16    140  |  102  |  52<H>  ----------------------------<  121<H>  4.8   |  33<H>  |  2.10<H>  03-15    142  |  105  |  41<H>  ----------------------------<  131<H>  5.1   |  31  |  1.67<H>  03-14    140  |  103  |  32<H>  ----------------------------<  136<H>  5.0   |  28  |  1.53<H>  03-13    142  |  107  |  26<H>  ----------------------------<  98  4.1   |  28  |  0.97    Ca    9.3      16 Mar 2021 01:12  Ca    9.5      15 Mar 2021 02:51  Phos  4.6     03-16  Mg     2.7     03-16  Mg     2.9     03-15      CAPILLARY BLOOD GLUCOSE      POCT Blood Glucose.: 93 mg/dL (16 Mar 2021 11:28)  POCT Blood Glucose.: 122 mg/dL (16 Mar 2021 05:16)  POCT Blood Glucose.: 110 mg/dL (15 Mar 2021 23:58)  POCT Blood Glucose.: 143 mg/dL (15 Mar 2021 17:20)        PT/INR - ( 16 Mar 2021 01:12 )   PT: 10.9 sec;   INR: 0.95 ratio         PTT - ( 16 Mar 2021 01:12 )  PTT:26.6 sec    D-Dimer Assay, Quantitative: 1608 ng/mL DDU (03-12 @ 05:24)  D-Dimer Assay, Quantitative: 909 ng/mL DDU (03-10 @ 07:45)  Procalcitonin, Serum: 0.34 ng/mL (03-16 @ 06:34)  Procalcitonin, Serum: 0.34 ng/mL (03-16 @ 01:12)  Procalcitonin, Serum: 0.40 ng/mL (03-13 @ 02:10)        RECENT CULTURES:  03-14 @ 10:49 .Sputum Sputum       Rare polymorphonuclear leukocytes per low power field  No organisms seen per oil power field           No growth at 48 hours    03-12 @ 16:24 .Blood Blood-Venous                No growth to date.    03-11 @ 20:24 .Nose Nose         < from: Xray Chest 1 View- PORTABLE-Urgent (Xray Chest 1 View- PORTABLE-Urgent .) (03.12.21 @ 15:37) >  EST PORTABLE URGENT 1V        PROCEDURE DATE:  Mar 12 2021         INTERPRETATION:  HISTORY: NG tube placement    TECHNIQUE: Single frontal view of the chest with comparison to study of earlier in the day    FINDINGS:    NG tube into the stomach. Endotracheal tube and right internal jugular line in place unchanged. Heart is normal in size. Diffuse bilateral airspace disease. Apices are unremarkable. Degenerative changes.    IMPRESSION:    NG tube into the stomach. Stable diffuse bilateral airspace disease              BRITT TORO MD; Attending Radiologist  This document has been electronically signed. Mar 12 2021  4:55PM    < end of copied text >         No MRSA isolated  No Staph Aureus (MSSA) isolated "This can represent normal nasal  carriage.  PCR is more sensitive for identifying MRSA/MSSA carriage"    03-11 @ 16:28 .Blood Blood-Peripheral                No growth to date.          RESPIRATORY CULTURES:          Studies  Chest X-RAY  CT SCAN Chest   Venous Dopplers: LE:   CT Abdomen  Others

## 2021-03-16 NOTE — PROGRESS NOTE ADULT - PROBLEM SELECTOR PLAN 1
Pt with non-oliguric LUDIVINA likely 2/2 hemodynamically mediated ATN in the setting of shock, covid19 pneumonia/ARDS.  On admission sCr 1.0 on 2/28, rise sCr after intubation from 0.97 to 1.53 on 3/14 then 1.67 on 3/15/21.  UA showed protein/blood/rbc/wbc.  Currently patient is intubated/sedated w/ FIO2 80% (down from 90%), not requiring vasopressor support, non-oliguric with last SCr 1.6 to 2.1      Recommendations:  - Currently no absolute/urgent indication for dialysis, increase bumex drip 2mg/hr (goal net negative 1-2L), titrate as needed.   - BP optimization/antibiotic/blood transfusion per ICU team  - monitor BMP, strict I/O, avoid nephrotoxic agents (NSAIDs, PPI, contrast), renally dose medications per GFR. Monitor electrolytes while on diuretic Pt with non-oliguric LUDIVINA likely 2/2 hemodynamically mediated ATN in the setting of shock, covid19 pneumonia/ARDS.  On admission sCr 1.0 on 2/28, rise sCr after intubation from 0.97 to 1.53 on 3/14 then 1.67 on 3/15/21.  UA showed protein/blood/rbc/wbc.  Currently patient is intubated/sedated w/ FIO2 80% (down from 90%), not requiring vasopressor support, non-oliguric with last SCr 1.6 to 2.1      Recommendations:  - Currently no absolute/urgent indication for dialysis, increase bumex drip 2mg/hr (goal net negative 1-2L), titrate as needed.   - BP optimization/antibiotic/blood transfusion per ICU team  - monitor BMP, strict I/O, avoid nephrotoxic agents (NSAIDs, PPI, contrast), renally dose medications per GFR. Monitor electrolytes while on diuretic. Consider discontinue/decreasing dose vitamin C (risk oxalate nephropathy) Pt with non-oliguric LUDIVINA likely 2/2 hemodynamically mediated ATN in the setting of shock, covid19 pneumonia/ARDS.  On admission sCr 1.0 on 2/28, rise sCr after intubation from 0.97 to 1.53 on 3/14 then 1.67 on 3/15/21.  UA showed protein/blood/rbc/wbc.  Currently patient is intubated/sedated w/ FIO2 80% (down from 90%), not requiring vasopressor support, non-oliguric with last SCr 1.6 to 2.1        - Currently no absolute/urgent indication for dialysis,   - recommend bumex IV 2mg BID for now given high FIO2 (goal net negative 1-2L), titrate as needed.   - BP optimization/antibiotic/blood transfusion per ICU team  - monitor BMP, strict I/O, avoid nephrotoxic agents (NSAIDs, PPI, contrast), renally dose medications per GFR. Monitor electrolytes while on diuretic. Consider discontinue/decreasing dose vitamin C (risk oxalate nephropathy)

## 2021-03-16 NOTE — PROGRESS NOTE ADULT - ASSESSMENT
This is a 61yo Male w/ PMHx of a Brain tumor s/p resection in 2018, who is presenting with worsening shortness of breath and non-productive cough for past week. Found to be COVID-19 Positive. Py hypoxic to 87% on RA CXR c/w COVID pneumonia. Admitted for further treatment of COVID pneumonia/hypoxemia.     Covid 19 Pneumonia: Hypoxic resp failure  Brain tumor:        3/3:      Covid 19 Pneumonia: Hypoxic resp failure: on covid rx: he is on 50% fio2: LFTS are mildly elevated: folow d dimer: currently OK  Brain tumor:  s/p resection:  DVT proparminyxlis  ruddy team    3/4:      Covid 19 Pneumonia: Hypoxic resp failure: on covid rx: he is on 50% fio2 today : increased fr om yesterday :looks better then yesterday : no overnight events Cont dexa"for a total of 10 dys: LFT as are improving and d dimer has mildly increased:   Brain tumor:  s/p resection:  DVT cole chaidez team : overall p rognosis is very giuarded"     3/7:      Covid 19 Pneumonia: Hypoxic resp failure: on covid rx: he is on100% fio2 today : remdesivir finished: on dexa: 7/10: pretty hypoxic: and has low grade fever: wbc is increasing: will start empiric zosyn: do chest xray : ID consult  Brain tumor:  s/p resection:  DVT propahyxlis  : overall p rognosis is very guarded stll :  RUDDY NP     3/8    Covid 19 Pneumonia: Hypoxic resp failure: on covid rx: he is on100% fio2 today : remdesivir finished: on dexa: 8/10: pretty hypoxic: and has low grade fever: wbc is increasing:  started empiric zosyn: do chest xray : with worsening: seen by ID:  His d dimer has increased significantly: start heparin  : call MICU with furterh deterioration as he might need intubation any time  Brain tumor:  s/p resection:  DVT propahyxlis  : overall prognosis is very guarded stll :  RUDDY CHAIDEZ NP     3/9:    Covid 19 Pneumonia: Hypoxic resp failure: on covid rx: he is on100% fio2 today : remdesivir finished: on dexa: 9/10: on zosyn: on bipap at 1005: try to wean down o2 today : his dexa may need to be extended as he isnot doing well; Yesterday his d dimer had increased 4 x : started on empiric heparin today d d iemr has decreased!  Brain tumor:  s/p resection:  DVT propahyxlis  overall prognosis is very guarded stll :  RUDDY Gray    3/10:    Covid 19 Pneumonia: he is not doign well: he is on AVAPS: on 1005 oxygen: very tenuous resp status;' cant be moved for cta: he is alerday on empiric full dose ac: try to wean off oxygen: he is alreadyon antibtiocs and is on zosyn ? upgrade to leonidas: would defer to primary ID team   Brain tumor:  s/p resection:  DVT prophylaxis  overall prognosis is very guarded still :  RUDDY Gray    3/11:  Covid 19 Pneumonia: not do ing well: on 1005 avpas: antibtiocs broadened: by ID: he wants to alycia ntuabted as a last resort: ruddy jane on floor: He might need intuabtion any time: Heis on full dose ac and his d dimer i s decreasing!  Brain tumor:  s/p resection:  DVT prophylaxis  overall prognosis is very guarded still :  RUDDY pa: [place]    3/12:    Covid 19 Pneumonia:i intubated finally today for rep failure with severe refractory hypoxemia: Now paralysed and sedated in MICU and on full vent support : lot of secretions on intubation on broad spectrum antibiotics WBC increased: follow cultures:   Brain tumor:  s/p resection:  DVT prophylaxis  Condition critical :   dw team    3/15:    Covid 19 Pneumonia: intubated finally today for rep failure with severe refractory hypoxemia: on 805 fio2: proned today : all the antibiotics have been finished  antibiotics WBC increased: follow cultures:   Renal functions : high : defer to [facility] MICU team   Brain tumor:  s/p resection:  DVT prophylaxis  Condition critical :   dw team    3/16:    Covid 19 Pneumonia: intubated finally today for rep failure with severe refractory hypoxemia: on 90% fio2: supine today with peep of 9  : all the antibiotics have been finished  antibiotics WBC increased: follow cultures: Heis acidotic, hypercarbic as wellas hypoxic:   Renal functions : high : defer to primary MICU team   Brain tumor:  s/p resection:  DVT prophylaxis  Condition critical :   dw team

## 2021-03-16 NOTE — PROGRESS NOTE ADULT - ASSESSMENT
60y Male w/ PMHx of a Brain tumor s/p resection in 2018 who presented 3/1/21 with worsening shortness of breath and non-productive cough x1 week, worsening hypoxia 2/2 COVID PNA requiring intubation 3/12 and transfer to [facility].     Neurologic:   - Sedation with fentanyl, ketamine, and versed (IVP) with plans on weaning propofol due to hyperTG  - Chemically paralyzed with nimbex  - Continue sedation/paralytics today with goal to re-prone     Respiratory:   - Hypoxic & hypercarbic respiratory failure 2/2 COVID  - VC/AC 34/400/9/90%  - Patient is currently supinated at ~7am. Plan to re-prone at 3pm.   - Continue albuterol    Cardiovascular:   - Hypotensive, likely vasoplegic shock 2/2 sedation/paralysis  - Norepinephrine for MAP >65 PRN (currently off)  - Continue ASA    Gastrointestinal/Nutrition:   - Jevity @ goal  - GI ppx w/ protonix  - Senna/Miralax     Renal/Genitourinary:   - Cr increasing, making UO  - Net negative 1.9L/24h  - Hold bumex gtt for now, monitor UO without bumex and restart PRN   - Nephro following, appreciate recs     Hematologic:   - H/H stable  - Elevated d-dimer, previously on heparin drip, now d/c'd  - BLE DVT study 3/13 negative, noted superficial saphenous vein thrombosis on LLE  - Discontinued lovenox d/t worsening renal function  - Prophylaxis Hep SubQ 5k BID     Infectious Disease:   - COVID: s/p remdesivir  - Dexamethasone taper completed today   - Improved leukocytosis with elevated procalcitonin, low concern for superimposed bacterial infection  - s/p Zosyn (3/07 - 3/11) and s/p Meropenem / Vancomycin (3/11 - 3/13)  - BCx 3/11 & 3/12 NGTD, sputum cx 3/14 NGTD   - Obs off abx     Endocrine:   - HgbA1C 6.0  - Blood glucose levels in target range ()  - Continue correctional scale insulin    Tubes/Lines/Drains:   - RIJ TLC  - Left radial arterial line  - Ariza  - ETT  - OGT    Disposition: RCU when medically stable

## 2021-03-16 NOTE — CHART NOTE - NSCHARTNOTEFT_GEN_A_CORE
Dialysis Consent   Thoroughly reviewed risks and benefits of RRT/HD with patient's spouse (Daysi Guerra) via telephone conversation who agrees to dialytic therapy if needed. All questions answered.

## 2021-03-16 NOTE — CHART NOTE - NSCHARTNOTEFT_GEN_A_CORE
Nutrition Follow-Up Note   Reason for follow-up: Critical care length of stay follow- up. Medical record reviewed.    Information obtained from extensive chart review. Unable to conduct face-to-face interview due to current contact/isolation precautions in setting of COVID-19.    Clinical Course history: Patient with medical history of  Brain tumor s/p resection in 2018 who presented 3/1/21 with worsening shortness of breath and non-productive cough x1 week, worsening hypoxia 2/2 COVID PNA requiring intubation 3/12 and transfer to [facility]. Sedated on Ketamine, Fentanyl and Propofol. Plan to wean Propofol 2/2 hypertriglyceridemia. Paralysis with Nimbex. Vasopressor support with Levo. Being proned daily. Per EMR, supine at ~7am today with plan to prone @ 3pm.    Propofol 9.9 mL/hr  =  provides 261 additional Kcal if sedation continues at this rate x24 hours.    Diet Order: Diet, NPO with Tube Feed:   Tube Feeding Modality: Nasogastric  Jevity 1.2 Raymond (JEVITY1.2RTH)  Total Volume for 24 Hours (mL): 1176  Continuous  Starting Tube Feed Rate {mL per Hour}: 25  Increase Tube Feed Rate by (mL): 25     Every 4 hours  Until Goal Tube Feed Rate (mL per Hour): 49  Tube Feed Duration (in Hours): 24  Tube Feed Start Time: 15:00 (03-12-21 @ 13:44)    CURRENT EN ORDER PROVIDES:  Total Volume: 1176mL/day  Energy: 1411Kcal/day  Protein: 54g protein/day  Free Water: 949mL/day    Nutrition Related Information: - Patient s/p intubation 3/12. Per I/O feeds started on 3/14.   - I/O tube feeds: 3/14-3/15: 110mL, 3/15-3/16: 380mL. Feeds running at trickle rate when prone (normally prone for >12hours). Therefore, patient meeting <50% nutrition needs > 2 days.  - Current tube feeding regimen providing suboptimal protein. Would add protein modulars to regimen to meet protein needs.  - Nephrology following - noted Scr increasing (2.1<-1.67<-1.53). On Bumex gtt. Potassium WDL, Phos slightly elevated (phos 4.6). Will monitor.   - Would consider transitioning to renal formula (Nepro) given Potassium and phos trending high-normal while patient receiving minimal amount of tube feeding. Nepro is also more concentrated so less volume will be needed.  - Glucose levels WDL - ordered for SSI.   - On Vitamin C, Vitamin D and multivitamin - consider d/c Vitamin C 2/2 renal function.   - Per critical care note today - plan to wean propofol 2/2 Triglycerides 429. Therefore, patient eventually will no longer be receiving additional energy from infusion (lipid emulsion).     GI: - Last recorded BM 3/12.   - Bowel regimen: Senna, Miralax. Consider more aggressive regimen for constipation.  - No vomiting or abdominal distention noted.    Anthropometric Measurements:   Height (cm): 180.3 (03-01-21 @ 21:00)  Weight (kg): no new weight to assess, 82.1 (3/01)  BMI (kg/m2): 25.3 (3/01)  IBW: 78.2kg +/-10%  Appearance: Unable to conduct nutrition-focused physical exam at this time due to limited contact in setting of isolation precautions related to COVID-19.    Medications: MEDICATIONS  (STANDING):  ascorbic acid 500 milliGRAM(s) Oral daily  cholecalciferol 2000 Unit(s) Oral daily  cisatracurium Infusion 3 MICROgram(s)/kG/Min (14.8 mL/Hr) IV Continuous <Continuous>  diazepam  Injectable 5 milliGRAM(s) IV Push every 6 hours  fenofibrate Tablet 48 milliGRAM(s) Oral daily  fentaNYL   Infusion..... 0.5 MICROgram(s)/kG/Hr (0.82 mL/Hr) IV Continuous <Continuous>  heparin   Injectable 5000 Unit(s) SubCutaneous every 12 hours  insulin lispro (ADMELOG) corrective regimen sliding scale   SubCutaneous every 6 hours  ketamine Infusion. 0.5 mG/kG/Hr (4.11 mL/Hr) IV Continuous <Continuous>  multivitamin 1 Tablet(s) Oral daily  norepinephrine Infusion 0.05 MICROgram(s)/kG/Min (3.85 mL/Hr) IV pantoprazole  Injectable 40 milliGRAM(s) IV Push daily  polyethylene glycol 3350 17 Gram(s) Oral daily  propofol Infusion 20 MICROgram(s)/kG/Min (9.85 mL/Hr) IV Continuous <Continuous>  senna Syrup 10 milliLiter(s) Oral at bedtime    Labs: 03-16 @ 01:12: Sodium 140, Potassium 4.8, Calcium 9.3, Magnesium 2.7<H>, Phosphorus 4.6<H>, BUN 52<H>, Creatinine 2.10<H>, Glucose 121<H>, Alk Phos --, ALT/SGPT --, AST/SGOT --, Albumin --, Prealbumin --, Total Bilirubin --, Hemoglobin 10.8<L>, Hematocrit 35.3<L>, Ferritin --, C-Reactive Protein --, Creatine Kinase <<27>    Triglycerides, Serum: 429 mg/dL (03-16-21 @ 01:12)  Triglycerides, Serum: 381 mg/dL (03-15-21 @ 02:51)  Triglycerides, Serum: 239 mg/dL (03-13-21 @ 02:10)  Triglycerides, Serum: 88 mg/dL (03-01-21 @ 08:07)    POCT Blood Glucose.: 93 mg/dL (03-16-21 @ 11:28)  POCT Blood Glucose.: 122 mg/dL (03-16-21 @ 05:16)  POCT Blood Glucose.: 110 mg/dL (03-15-21 @ 23:58)  POCT Blood Glucose.: 143 mg/dL (03-15-21 @ 17:20)    Skin: No pressure injury/DTI noted in flowsheets.  Edema: 2+ face, periorbital    Estimated Nutrition Needs: calculated using admit weight 82.1kg (3/01)  Estimated Energy Needs (15-20Kcal/kg): 1231-1642Kcal/day  Estimated Protein Needs (1.3-1.5g/kg): 106-123g protein/day    Previous Nutrition Diagnosis:  Inadequate Oral Intake  Diagnosis is not applicable    New Nutrition Diagnosis:     Nutrition Interventions/Recommendations:   1. Recommend Nepro @ 10mL/hr and advance 10mL q4hr to goal rate of 35mL/hr x24 hours + No Carb Prosource 3x daily [provides 840mL total volume, 1512Kcal, 113g protein, 724mL free water]. With propofol (~261Kcal), pt will receive 1773Kcal (~22Kcal/kg admit weight and 113g (~1.4g/kg admit weight).  2. Additional free water per physician discretion.   3. Continue multivitamin and Vitamin D supplementation. Consider d/c Vitamin C.  4. Bowel regimen as clinically indicated.  5. Trend glucose, triglycerides when on propofol and electrolytes.   6. Obtain weekly weight.      Monitoring and Evaluation:   Monitor nutrition provision, tolerance to diet, weights, labs, skin integrity and GI function.   RD remains available, please consult as needed. Will follow up per protocol.  Nga Church, MS, RD, CDN, [facility] Pager #[phone] Nutrition Follow-Up Note   Reason for follow-up: Critical care length of stay follow- up. Medical record reviewed.    Information obtained from extensive chart review. Unable to conduct face-to-face interview due to current contact/isolation precautions in setting of COVID-19.    Clinical Course history: Patient with medical history of  Brain tumor s/p resection in 2018 who presented 3/1/21 with worsening shortness of breath and non-productive cough x1 week, worsening hypoxia 2/2 COVID PNA requiring intubation 3/12 and transfer to [facility]. Sedated on Ketamine, Fentanyl and Propofol. Plan to wean Propofol 2/2 hypertriglyceridemia. Paralysis with Nimbex. Vasopressor support with Levo. Being proned daily. Per EMR, supine at ~7am today with plan to prone @ 3pm.    Propofol 9.9 mL/hr  =  provides 261 additional Kcal if sedation continues at this rate x24 hours.    Diet Order: Diet, NPO with Tube Feed:   Tube Feeding Modality: Nasogastric  Jevity 1.2 Raymond (JEVITY1.2RTH)  Total Volume for 24 Hours (mL): 1176  Continuous  Starting Tube Feed Rate {mL per Hour}: 25  Increase Tube Feed Rate by (mL): 25     Every 4 hours  Until Goal Tube Feed Rate (mL per Hour): 49  Tube Feed Duration (in Hours): 24  Tube Feed Start Time: 15:00 (03-12-21 @ 13:44)    CURRENT EN ORDER PROVIDES:  Total Volume: 1176mL/day  Energy: 1411Kcal/day  Protein: 54g protein/day  Free Water: 949mL/day    Nutrition Related Information: - Patient s/p intubation 3/12. Per I/O feeds started on 3/14.   - I/O tube feeds: 3/14-3/15: 110mL, 3/15-3/16: 380mL. Feeds running at trickle rate when prone (normally prone for >12hours). Therefore, patient meeting <50% nutrition needs > 2 days.  - Current tube feeding regimen providing suboptimal protein. Would add protein modulars to regimen to meet protein needs.  - Nephrology following - noted Scr increasing (2.1<-1.67<-1.53). On Bumex gtt. Potassium WDL, Phos slightly elevated (phos 4.6). Will monitor.   - Would consider transitioning to renal formula (Nepro) given Potassium and phos trending high-normal while patient receiving minimal amount of tube feeding. Nepro is also more concentrated so less volume will be needed.  - Glucose levels WDL - ordered for SSI.   - On Vitamin C, Vitamin D and multivitamin - consider d/c Vitamin C 2/2 renal function.   - Per critical care note today - plan to wean propofol 2/2 Triglycerides 429. Therefore, patient eventually will no longer be receiving additional energy from infusion (lipid emulsion).   ****Please consider continuing feeds (at previously tolerated rate) while patient prone in order for patient to meet nutrition needs. Can hold feeds one hour prior to proning and then resume when prone. Keep HOB elevated 10-15 degrees. If there are signs of intolerance, consider lowering to trickle rate or consider promotility agent as medically appropriate. **Several retrospective and small prospective trials have shown EN during prone positioning is not associated with increased risk of gastrointestinal or pulmonary complications, thus we recommend the patient requiring prone positioning receive enteral nutrition.     GI: - Last recorded BM 3/12.   - Bowel regimen: Senna, Miralax. Consider more aggressive regimen for constipation.  - No vomiting or abdominal distention noted.    Anthropometric Measurements:   Height (cm): 180.3 (03-01-21 @ 21:00)  Weight (kg): no new weight to assess, 82.1 (3/01)  BMI (kg/m2): 25.3 (3/01)  IBW: 78.2kg +/-10%  Appearance: Unable to conduct nutrition-focused physical exam at this time due to limited contact in setting of isolation precautions related to COVID-19.    Medications: MEDICATIONS  (STANDING):  ascorbic acid 500 milliGRAM(s) Oral daily  cholecalciferol 2000 Unit(s) Oral daily  cisatracurium Infusion 3 MICROgram(s)/kG/Min (14.8 mL/Hr) IV Continuous <Continuous>  diazepam  Injectable 5 milliGRAM(s) IV Push every 6 hours  fenofibrate Tablet 48 milliGRAM(s) Oral daily  fentaNYL   Infusion..... 0.5 MICROgram(s)/kG/Hr (0.82 mL/Hr) IV Continuous <Continuous>  heparin   Injectable 5000 Unit(s) SubCutaneous every 12 hours  insulin lispro (ADMELOG) corrective regimen sliding scale   SubCutaneous every 6 hours  ketamine Infusion. 0.5 mG/kG/Hr (4.11 mL/Hr) IV Continuous <Continuous>  multivitamin 1 Tablet(s) Oral daily  norepinephrine Infusion 0.05 MICROgram(s)/kG/Min (3.85 mL/Hr) IV pantoprazole  Injectable 40 milliGRAM(s) IV Push daily  polyethylene glycol 3350 17 Gram(s) Oral daily  propofol Infusion 20 MICROgram(s)/kG/Min (9.85 mL/Hr) IV Continuous <Continuous>  senna Syrup 10 milliLiter(s) Oral at bedtime    Labs: 03-16 @ 01:12: Sodium 140, Potassium 4.8, Calcium 9.3, Magnesium 2.7<H>, Phosphorus 4.6<H>, BUN 52<H>, Creatinine 2.10<H>, Glucose 121<H>, Alk Phos --, ALT/SGPT --, AST/SGOT --, Albumin --, Prealbumin --, Total Bilirubin --, Hemoglobin 10.8<L>, Hematocrit 35.3<L>, Ferritin --, C-Reactive Protein --, Creatine Kinase <<27>    Triglycerides, Serum: 429 mg/dL (03-16-21 @ 01:12)  Triglycerides, Serum: 381 mg/dL (03-15-21 @ 02:51)  Triglycerides, Serum: 239 mg/dL (03-13-21 @ 02:10)  Triglycerides, Serum: 88 mg/dL (03-01-21 @ 08:07)    POCT Blood Glucose.: 93 mg/dL (03-16-21 @ 11:28)  POCT Blood Glucose.: 122 mg/dL (03-16-21 @ 05:16)  POCT Blood Glucose.: 110 mg/dL (03-15-21 @ 23:58)  POCT Blood Glucose.: 143 mg/dL (03-15-21 @ 17:20)    Skin: No pressure injury/DTI noted in flowsheets.  Edema: 2+ face, periorbital    Estimated Nutrition Needs: calculated using admit weight 82.1kg (3/01)  Estimated Energy Needs (15-20Kcal/kg): 1231-1642Kcal/day  Estimated Protein Needs (1.3-1.5g/kg): 106-123g protein/day    Previous Nutrition Diagnosis:  Inadequate Oral Intake  Diagnosis is not applicable    New Nutrition Diagnosis:     Nutrition Interventions/Recommendations:   1. Recommend Nepro @ 10mL/hr and advance 10mL q4hr to goal rate of 35mL/hr x24 hours + No Carb Prosource 3x daily [provides 840mL total volume, 1512Kcal, 113g protein, 724mL free water]. With propofol (~261Kcal), pt will receive 1773Kcal (~22Kcal/kg admit weight and 113g (~1.4g/kg admit weight).  2. Additional free water per physician discretion.   3. Continue multivitamin and Vitamin D supplementation. Consider d/c Vitamin C.  4. Bowel regimen as clinically indicated.  5. Trend glucose, triglycerides when on propofol and electrolytes.   6. Obtain weekly weight.      Monitoring and Evaluation:   Monitor nutrition provision, tolerance to diet, weights, labs, skin integrity and GI function.   RD remains available, please consult as needed. Will follow up per protocol.  Nga Church, MS, RD, CDN, [facility] Pager #[phone] Nutrition Follow-Up Note   Reason for follow-up: Critical care length of stay follow- up. Medical record reviewed.    Information obtained from extensive chart review. Unable to conduct face-to-face interview due to current contact/isolation precautions in setting of COVID-19.    Clinical Course history: Patient with medical history of  Brain tumor s/p resection in 2018 who presented 3/1/21 with worsening shortness of breath and non-productive cough x1 week, worsening hypoxia 2/2 COVID PNA requiring intubation 3/12 and transfer to [facility]. Sedated on Ketamine, Fentanyl and Propofol. Plan to wean Propofol 2/2 hypertriglyceridemia. Paralysis with Nimbex. Vasopressor support with Levo. Being proned daily. Per EMR, supine at ~7am today with plan to prone @ 3pm.    Propofol 9.9 mL/hr  =  provides 261 additional Kcal if sedation continues at this rate x24 hours.    Diet Order: Diet, NPO with Tube Feed:   Tube Feeding Modality: Nasogastric  Jevity 1.2 Raymond (JEVITY1.2RTH)  Total Volume for 24 Hours (mL): 1176  Continuous  Starting Tube Feed Rate {mL per Hour}: 25  Increase Tube Feed Rate by (mL): 25     Every 4 hours  Until Goal Tube Feed Rate (mL per Hour): 49  Tube Feed Duration (in Hours): 24  Tube Feed Start Time: 15:00 (03-12-21 @ 13:44)    CURRENT EN ORDER PROVIDES:  Total Volume: 1176mL/day  Energy: 1411Kcal/day  Protein: 54g protein/day  Free Water: 949mL/day    Nutrition Related Information: - Patient s/p intubation 3/12. Per I/O feeds started on 3/14.   - I/O tube feeds: 3/14-3/15: 110mL, 3/15-3/16: 380mL. Feeds running at trickle rate when prone (normally prone for >12hours). Therefore, patient meeting <75% nutrition needs > 2 days.  - Current tube feeding regimen providing suboptimal protein. Would add protein modulars to regimen to meet protein needs.  - Nephrology following - noted Scr increasing (2.1<-1.67<-1.53). On Bumex gtt. Potassium WDL, Phos slightly elevated (phos 4.6). Will monitor.   - Would consider transitioning to renal formula (Nepro) given Potassium and phos trending high-normal while patient receiving minimal amount of tube feeding. Nepro is also more concentrated so less volume will be needed.  - Glucose levels WDL - ordered for SSI.   - On Vitamin C, Vitamin D and multivitamin - consider d/c Vitamin C 2/2 renal function.   - Per critical care note today - plan to wean propofol 2/2 Triglycerides 429. Therefore, patient eventually will no longer be receiving additional energy from infusion (lipid emulsion).   ****Please consider continuing feeds (at previously tolerated rate) while patient prone in order for patient to meet nutrition needs. Can hold feeds one hour prior to proning and then resume when prone. Keep HOB elevated 10-15 degrees. If there are signs of intolerance, consider lowering to trickle rate or consider promotility agent as medically appropriate. **Several retrospective and small prospective trials have shown EN during prone positioning is not associated with increased risk of gastrointestinal or pulmonary complications, thus we recommend the patient requiring prone positioning receive enteral nutrition.     GI: - Last recorded BM 3/12.   - Bowel regimen: Senna, Miralax. Consider more aggressive regimen for constipation.  - No vomiting or abdominal distention noted.    Anthropometric Measurements:   Height (cm): 180.3 (03-01-21 @ 21:00)  Weight (kg): no new weight to assess, 82.1 (3/01)  BMI (kg/m2): 25.3 (3/01)  IBW: 78.2kg +/-10%  Appearance: Unable to conduct nutrition-focused physical exam at this time due to limited contact in setting of isolation precautions related to COVID-19.    Medications: MEDICATIONS  (STANDING):  ascorbic acid 500 milliGRAM(s) Oral daily  cholecalciferol 2000 Unit(s) Oral daily  cisatracurium Infusion 3 MICROgram(s)/kG/Min (14.8 mL/Hr) IV Continuous <Continuous>  diazepam  Injectable 5 milliGRAM(s) IV Push every 6 hours  fenofibrate Tablet 48 milliGRAM(s) Oral daily  fentaNYL   Infusion..... 0.5 MICROgram(s)/kG/Hr (0.82 mL/Hr) IV Continuous <Continuous>  heparin   Injectable 5000 Unit(s) SubCutaneous every 12 hours  insulin lispro (ADMELOG) corrective regimen sliding scale   SubCutaneous every 6 hours  ketamine Infusion. 0.5 mG/kG/Hr (4.11 mL/Hr) IV Continuous <Continuous>  multivitamin 1 Tablet(s) Oral daily  norepinephrine Infusion 0.05 MICROgram(s)/kG/Min (3.85 mL/Hr) IV pantoprazole  Injectable 40 milliGRAM(s) IV Push daily  polyethylene glycol 3350 17 Gram(s) Oral daily  propofol Infusion 20 MICROgram(s)/kG/Min (9.85 mL/Hr) IV Continuous <Continuous>  senna Syrup 10 milliLiter(s) Oral at bedtime    Labs: 03-16 @ 01:12: Sodium 140, Potassium 4.8, Calcium 9.3, Magnesium 2.7<H>, Phosphorus 4.6<H>, BUN 52<H>, Creatinine 2.10<H>, Glucose 121<H>, Alk Phos --, ALT/SGPT --, AST/SGOT --, Albumin --, Prealbumin --, Total Bilirubin --, Hemoglobin 10.8<L>, Hematocrit 35.3<L>, Ferritin --, C-Reactive Protein --, Creatine Kinase <<27>    Triglycerides, Serum: 429 mg/dL (03-16-21 @ 01:12)  Triglycerides, Serum: 381 mg/dL (03-15-21 @ 02:51)  Triglycerides, Serum: 239 mg/dL (03-13-21 @ 02:10)  Triglycerides, Serum: 88 mg/dL (03-01-21 @ 08:07)    POCT Blood Glucose.: 93 mg/dL (03-16-21 @ 11:28)  POCT Blood Glucose.: 122 mg/dL (03-16-21 @ 05:16)  POCT Blood Glucose.: 110 mg/dL (03-15-21 @ 23:58)  POCT Blood Glucose.: 143 mg/dL (03-15-21 @ 17:20)    Skin: No pressure injury/DTI noted in flowsheets.  Edema: 2+ face, periorbital    Estimated Nutrition Needs: calculated using admit weight 82.1kg (3/01)  Estimated Energy Needs (15-20Kcal/kg): 1231-1642Kcal/day  Estimated Protein Needs (1.3-1.5g/kg): 106-123g protein/day    Previous Nutrition Diagnosis:  Inadequate Oral Intake  Diagnosis is not applicable    New Nutrition Diagnosis: Inadequate protein intake related to clinical condition As evidenced by patient meeting <75% estimated protein needs >2 days.    Nutrition Interventions/Recommendations:   1. Recommend Nepro @ 10mL/hr and advance 10mL q4hr to goal rate of 35mL/hr x24 hours + No Carb Prosource 3x daily [provides 840mL total volume, 1512Kcal, 113g protein, 724mL free water]. With propofol (~261Kcal), pt will receive 1773Kcal (~22Kcal/kg admit weight and 113g (~1.4g/kg admit weight).  2. Additional free water per physician discretion.   3. Continue multivitamin and Vitamin D supplementation. Consider d/c Vitamin C.  4. Bowel regimen as clinically indicated.  5. Trend glucose, triglycerides when on propofol and electrolytes.   6. Obtain weekly weight.      Monitoring and Evaluation:   Monitor nutrition provision, tolerance to diet, weights, labs, skin integrity and GI function.   RD remains available, please consult as needed. Will follow up per protocol.  Nga Church, MS, RD, CDN, [facility] Pager #[phone]

## 2021-03-17 NOTE — PROGRESS NOTE ADULT - SUBJECTIVE AND OBJECTIVE BOX
INTERVAL HPI/OVERNIGHT EVENTS: Patient is s/p prone at 4pm and supinated at 9am. ABG 7.33-7.2. Pt had increased auto PEEP last night with plateau; PEEP lowered from 9->7.  BP stable overnight. Not currently on pressors, Lovenox changed to Heparin 2/2 bump in Cr. Propofol d/c 2/2 rise in triglycerides     SUBJECTIVE: 59yo Male w/ PMHx of a Brain tumor s/p resection in 2018 who presented 3/1/21 with worsening shortness of breath and non-productive cough x1 week, intubated on 3/12 and transferred to [facility].  OBJECTIVE:    VITAL SIGNS:  ICU Vital Signs Last 24 Hrs  T(C): 37.4 (17 Mar 2021 10:20), Max: 37.6 (17 Mar 2021 07:00)  T(F): 99.3 (17 Mar 2021 10:20), Max: 99.7 (17 Mar 2021 07:00)  HR: 119 (17 Mar 2021 10:20) (102 - 127)  BP: --  BP(mean): --  ABP: 121/71 (17 Mar 2021 10:20) (99/61 - 168/78)  ABP(mean): 89 (17 Mar 2021 10:20) (68 - 118)  RR: 34 (17 Mar 2021 10:20) (34 - 34)  SpO2: 90% (17 Mar 2021 10:20) (87% - 97%)    Mode: AC/ CMV (Assist Control/ Continuous Mandatory Ventilation)  RR (machine): 34; TV (machine): 400; FiO2: 90; PEEP: 7; ITime: 0.68, MAP: 16, PIP: 36    03-16 @ 07:01 - 03-17 @ 07:00  --------------------------------------------------------  IN: 1893.9 mL / OUT: 1770 mL / NET: 123.9 mL    03-17 @ 07:01  - 03-17 @ 10:34  --------------------------------------------------------  IN: 151.2 mL / OUT: 150 mL / NET: 1.2 mL      CAPILLARY BLOOD GLUCOSE: POCT Blood Glucose.: 105 mg/dL (17 Mar 2021 05:38)      PHYSICAL EXAM:    General: NAD  HEENT: NC/AT; PERRL, clear conjunctiva; perioral edema 2/2 prone position   Neck: supple  Respiratory: CTA b/l  Cardiovascular: +S1/S2; RRR  Abdomen: soft, NT/ND; +BS x4  Extremities: WWP, 2+ peripheral pulses b/l; no LE edema  Skin: normal color and turgor; no rash  Neurological:    MEDICATIONS:  MEDICATIONS  (STANDING):  ALBUTerol    90 MICROgram(s) HFA Inhaler 2 Puff(s) Inhalation every 4 hours  ascorbic acid 500 milliGRAM(s) Oral daily  aspirin  chewable 81 milliGRAM(s) Oral daily  budesonide 160 MICROgram(s)/formoterol 4.5 MICROgram(s) Inhaler 2 Puff(s) Inhalation two times a day  buMETAnide Injectable 2 milliGRAM(s) IV Push two times a day  chlorhexidine 0.12% Liquid 15 milliLiter(s) Oral Mucosa every 12 hours  cholecalciferol 2000 Unit(s) Oral daily  cisatracurium Infusion 3 MICROgram(s)/kG/Min (14.8 mL/Hr) IV Continuous <Continuous>  dextrose 40% Gel 15 Gram(s) Oral once  dextrose 5%. 1000 milliLiter(s) (50 mL/Hr) IV Continuous <Continuous>  dextrose 5%. 1000 milliLiter(s) (100 mL/Hr) IV Continuous <Continuous>  dextrose 50% Injectable 25 Gram(s) IV Push once  dextrose 50% Injectable 12.5 Gram(s) IV Push once  dextrose 50% Injectable 25 Gram(s) IV Push once  diazepam  Injectable 5 milliGRAM(s) IV Push every 6 hours  fenofibrate Tablet 48 milliGRAM(s) Oral daily  fentaNYL   Infusion..... 0.5 MICROgram(s)/kG/Hr (0.82 mL/Hr) IV Continuous <Continuous>  glucagon  Injectable 1 milliGRAM(s) IntraMuscular once  heparin   Injectable 5000 Unit(s) SubCutaneous every 8 hours  insulin lispro (ADMELOG) corrective regimen sliding scale   SubCutaneous every 6 hours  ketamine Infusion. 0.5 mG/kG/Hr (4.11 mL/Hr) IV Continuous <Continuous>  midazolam Infusion 0.02 mG/kG/Hr (1.64 mL/Hr) IV Continuous <Continuous>  multivitamin 1 Tablet(s) Oral daily  norepinephrine Infusion 0.05 MICROgram(s)/kG/Min (3.85 mL/Hr) IV Continuous <Continuous>  pantoprazole  Injectable 40 milliGRAM(s) IV Push daily  polyethylene glycol 3350 17 Gram(s) Oral daily  propofol Infusion 20 MICROgram(s)/kG/Min (9.85 mL/Hr) IV Continuous <Continuous>  senna Syrup 10 milliLiter(s) Oral at bedtime    MEDICATIONS  (PRN):      ALLERGIES:  Allergies    No Known Allergies    Intolerances        LABS:                        11.1   16.20 )-----------( 346      ( 17 Mar 2021 00:49 )             35.9     Hemoglobin: 11.1 g/dL (03-17 @ 00:49)  Hemoglobin: 10.8 g/dL (03-16 @ 01:12)  Hemoglobin: 11.2 g/dL (03-15 @ 02:51)  Hemoglobin: 12.0 g/dL (03-14 @ 08:47)  Hemoglobin: 12.3 g/dL (03-13 @ 02:10)    CBC Full  -  ( 17 Mar 2021 00:49 )  WBC Count : 16.20 K/uL  RBC Count : 4.05 M/uL  Hemoglobin : 11.1 g/dL  Hematocrit : 35.9 %  Platelet Count - Automated : 346 K/uL  Mean Cell Volume : 88.6 fL  Mean Cell Hemoglobin : 27.4 pg  Mean Cell Hemoglobin Concentration : 30.9 gm/dL  Auto Neutrophil # : x  Auto Lymphocyte # : x  Auto Monocyte # : x  Auto Eosinophil # : x  Auto Basophil # : x  Auto Neutrophil % : x  Auto Lymphocyte % : x  Auto Monocyte % : x  Auto Eosinophil % : x  Auto Basophil % : x    03-17    139  |  99  |  60<H>  ----------------------------<  98  4.4   |  32<H>  |  2.04<H>    Ca    9.0      17 Mar 2021 00:49  Phos  4.1     03-17  Mg     2.6     03-17      Creatinine Trend: 2.04<--, 2.10<--, 1.67<--, 1.53<--, 0.97<--, 0.71<--    PT/INR - ( 17 Mar 2021 00:49 )   PT: 11.3 sec;   INR: 0.99 ratio         PTT - ( 16 Mar 2021 01:12 )  PTT:26.6 sec    hs Troponin:    ABG - ( 17 Mar 2021 00:49 )  pH, Arterial: 7.32  pH, Blood: x     /  pCO2: 70    /  pO2: 63    / HCO3: 31    / Base Excess: 8.7   /  SaO2: 91.3                00:49 - ABG - pH: 7.32  |  pCO2: 70    |  pO2: 63    | Lactate:       | BE: 8.7    18:05 - ABG - pH: 7.33  |  pCO2: 67    |  pO2: 101   | Lactate:       | BE: 8.3    15:11 - ABG - pH: 7.32  |  pCO2: 70    |  pO2: 70    | Lactate:       | BE: 8.6          CSF:                      EKG:   MICROBIOLOGY:    Culture - Sputum (collected 14 Mar 2021 10:49)  Source: .Sputum Sputum  Gram Stain (14 Mar 2021 14:05):    Rare polymorphonuclear leukocytes per low power field    No organisms seen per oil power field  Final Report (16 Mar 2021 11:33):    No growth at 48 hours      IMAGING:      Labs, imaging, EKG personally reviewed    RADIOLOGY & ADDITIONAL TESTS: Reviewed. INTERVAL HPI/OVERNIGHT EVENTS: Patient is s/p prone at 4pm and supinated at 9am. ABG 7.33-7.2. Pt had increased auto PEEP last night with plateau; PEEP lowered from 9->7.  BP stable overnight. Not currently on pressors, Lovenox changed to Heparin 2/2 bump in Cr. Propofol d/c 2/2 rise in triglycerides     SUBJECTIVE: 59yo Male w/ PMHx of a Brain tumor s/p resection in 2018 who presented 3/1/21 with worsening shortness of breath and non-productive cough x1 week, intubated on 3/12 and transferred to [facility].  OBJECTIVE:    VITAL SIGNS:  ICU Vital Signs Last 24 Hrs  T(C): 37.4 (17 Mar 2021 10:20), Max: 37.6 (17 Mar 2021 07:00)  T(F): 99.3 (17 Mar 2021 10:20), Max: 99.7 (17 Mar 2021 07:00)  HR: 119 (17 Mar 2021 10:20) (102 - 127)  BP: --  BP(mean): --  ABP: 121/71 (17 Mar 2021 10:20) (99/61 - 168/78)  ABP(mean): 89 (17 Mar 2021 10:20) (68 - 118)  RR: 34 (17 Mar 2021 10:20) (34 - 34)  SpO2: 90% (17 Mar 2021 10:20) (87% - 97%)    Mode: AC/ CMV (Assist Control/ Continuous Mandatory Ventilation)  RR (machine): 34; TV (machine): 400; FiO2: 90; PEEP: 7; ITime: 0.68, MAP: 16, PIP: 36    03-16 @ 07:01 - 03-17 @ 07:00  --------------------------------------------------------  IN: 1893.9 mL / OUT: 1770 mL / NET: 123.9 mL    03-17 @ 07:01  - 03-17 @ 10:34  --------------------------------------------------------  IN: 151.2 mL / OUT: 150 mL / NET: 1.2 mL      CAPILLARY BLOOD GLUCOSE: POCT Blood Glucose.: 105 mg/dL (17 Mar 2021 05:38)      PHYSICAL EXAM:    General: NAD  HEENT: NC/AT; PERRL, clear conjunctiva; perioral edema 2/2 prone position   Neck: supple  Respiratory: CTA b/l  Cardiovascular: +S1/S2; RRR  Abdomen: soft, NT/ND; +BS x4  Extremities: WWP, 2+ peripheral pulses b/l; no LE edema  Skin: normal color and turgor; no rash  Neurological:    MEDICATIONS:  MEDICATIONS  (STANDING):  ALBUTerol    90 MICROgram(s) HFA Inhaler 2 Puff(s) Inhalation every 4 hours  ascorbic acid 500 milliGRAM(s) Oral daily  aspirin  chewable 81 milliGRAM(s) Oral daily  budesonide 160 MICROgram(s)/formoterol 4.5 MICROgram(s) Inhaler 2 Puff(s) Inhalation two times a day  buMETAnide Injectable 2 milliGRAM(s) IV Push two times a day  chlorhexidine 0.12% Liquid 15 milliLiter(s) Oral Mucosa every 12 hours  cholecalciferol 2000 Unit(s) Oral daily  cisatracurium Infusion 3 MICROgram(s)/kG/Min (14.8 mL/Hr) IV Continuous <Continuous>  dextrose 40% Gel 15 Gram(s) Oral once  dextrose 5%. 1000 milliLiter(s) (50 mL/Hr) IV Continuous <Continuous>  dextrose 5%. 1000 milliLiter(s) (100 mL/Hr) IV Continuous <Continuous>  dextrose 50% Injectable 25 Gram(s) IV Push once  dextrose 50% Injectable 12.5 Gram(s) IV Push once  dextrose 50% Injectable 25 Gram(s) IV Push once  diazepam  Injectable 5 milliGRAM(s) IV Push every 6 hours  fenofibrate Tablet 48 milliGRAM(s) Oral daily  fentaNYL   Infusion..... 0.5 MICROgram(s)/kG/Hr (0.82 mL/Hr) IV Continuous <Continuous>  glucagon  Injectable 1 milliGRAM(s) IntraMuscular once  heparin   Injectable 5000 Unit(s) SubCutaneous every 8 hours  insulin lispro (ADMELOG) corrective regimen sliding scale   SubCutaneous every 6 hours  ketamine Infusion. 0.5 mG/kG/Hr (4.11 mL/Hr) IV Continuous <Continuous>  midazolam Infusion 0.02 mG/kG/Hr (1.64 mL/Hr) IV Continuous <Continuous>  multivitamin 1 Tablet(s) Oral daily  norepinephrine Infusion 0.05 MICROgram(s)/kG/Min (3.85 mL/Hr) IV Continuous <Continuous>  pantoprazole  Injectable 40 milliGRAM(s) IV Push daily  polyethylene glycol 3350 17 Gram(s) Oral daily  propofol Infusion 20 MICROgram(s)/kG/Min (9.85 mL/Hr) IV Continuous <Continuous>  senna Syrup 10 milliLiter(s) Oral at bedtime    MEDICATIONS  (PRN):      ALLERGIES:  Allergies    No Known Allergies    Intolerances        LABS:                        11.1   16.20 )-----------( 346      ( 17 Mar 2021 00:49 )             35.9     Hemoglobin: 11.1 g/dL (03-17 @ 00:49)  Hemoglobin: 10.8 g/dL (03-16 @ 01:12)  Hemoglobin: 11.2 g/dL (03-15 @ 02:51)  Hemoglobin: 12.0 g/dL (03-14 @ 08:47)  Hemoglobin: 12.3 g/dL (03-13 @ 02:10)    CBC Full  -  ( 17 Mar 2021 00:49 )  WBC Count : 16.20 K/uL  RBC Count : 4.05 M/uL  Hemoglobin : 11.1 g/dL  Hematocrit : 35.9 %  Platelet Count - Automated : 346 K/uL  Mean Cell Volume : 88.6 fL  Mean Cell Hemoglobin : 27.4 pg  Mean Cell Hemoglobin Concentration : 30.9 gm/dL  Auto Neutrophil # : x  Auto Lymphocyte # : x  Auto Monocyte # : x  Auto Eosinophil # : x  Auto Basophil # : x  Auto Neutrophil % : x  Auto Lymphocyte % : x  Auto Monocyte % : x  Auto Eosinophil % : x  Auto Basophil % : x    03-17    139  |  99  |  60<H>  ----------------------------<  98  4.4   |  32<H>  |  2.04<H>    Ca    9.0      17 Mar 2021 00:49  Phos  4.1     03-17  Mg     2.6     03-17      Creatinine Trend: 2.04<--, 2.10<--, 1.67<--, 1.53<--, 0.97<--, 0.71<--    PT/INR - ( 17 Mar 2021 00:49 )   PT: 11.3 sec;   INR: 0.99 ratio         PTT - ( 16 Mar 2021 01:12 )  PTT:26.6 sec    hs Troponin:    ABG - ( 17 Mar 2021 00:49 )  pH, Arterial: 7.32  pH, Blood: x     /  pCO2: 70    /  pO2: 63    / HCO3: 31    / Base Excess: 8.7   /  SaO2: 91.3  Repeat ABG:   ABG - ( 17 Mar 2021 11:07 )  pH, Arterial: 7.37  pH, Blood: x     /  pCO2: 61    /  pO2: 74    / HCO3: 31    / Base Excess: 8.9   /  SaO2: 94.9                            00:49 - ABG - pH: 7.32  |  pCO2: 70    |  pO2: 63    | Lactate:       | BE: 8.7    18:05 - ABG - pH: 7.33  |  pCO2: 67    |  pO2: 101   | Lactate:       | BE: 8.3    15:11 - ABG - pH: 7.32  |  pCO2: 70    |  pO2: 70    | Lactate:       | BE: 8.6          CSF:                      EKG:   MICROBIOLOGY:    Culture - Sputum (collected 14 Mar 2021 10:49)  Source: .Sputum Sputum  Gram Stain (14 Mar 2021 14:05):    Rare polymorphonuclear leukocytes per low power field    No organisms seen per oil power field  Final Report (16 Mar 2021 11:33):    No growth at 48 hours      IMAGING:      Labs, imaging, EKG personally reviewed    RADIOLOGY & ADDITIONAL TESTS: Reviewed.

## 2021-03-17 NOTE — PROGRESS NOTE ADULT - PROBLEM SELECTOR PLAN 2
Pt with acidosis, primary respiratory acidosis in the setting of COVID19 pneumonia/ARDS.   - Ventilation optimization per ICU team, diuretic as outline above.  If acidosis worsens then will need to consider dialysis (dialysis consent obtained).  Monitor blood gas/serum bicarb    Anna oLera  Nephrology Fellow  [facility] In-hospital pager# [phone]  Pager NS: [phone]   (After 5 pm or on weekends please page the on-call fellow)

## 2021-03-17 NOTE — PROGRESS NOTE ADULT - ATTENDING COMMENTS
Gong: I have seen and examined the patient face to face, have reviewed and addended the HPI, PE and a/p as necessary.     61 yo M with brain tumor, s/p resection in 2018 a/w COVID 19 Pneumonia s/p intubation on 3/12 and now ARDS requiring prone positioning.    Neuro: sedated with fentanyl ketamine and versed, paralyzed on nimbex; off propofol 2/2 elevated Triglycerides  CV: Vasoplegic shock 2/2 sedation on levophed; maintain MAP>65  Pulm: COVID Pneumonia with ARDS - spupined at 6am 2/2 lost volume; ETT repositioned; will repeat ABG to reassess; currently may not benefit from proning given no change with ABGs when proned.  GI: C/w tube feeds; Protonix  Renal/Metabolic: LUDIVINA - avoid nephrotoxic agents; trend creatinine; will attempt diuresis with bumex BID; monitor I and Os  ID: Cx negative; c/w monitoring off antibiotics  Heme: DVT with HSQ  Endo: ISS coverage; dexamethasone for elevated crp.    Dispo: Remains critically ill with severe ARDS  2/2 COVID pneumonia.

## 2021-03-17 NOTE — PROGRESS NOTE ADULT - ASSESSMENT
ASSESSMENT:  60y Male w/ PMHx of a Brain tumor s/p resection in 2018 who presented 3/1/21 with worsening shortness of breath and non-productive cough x1 week, worsening hypoxia 2/2 COVID PNA requiring intubation 3/12 and transfer to [facility]    # Hypoxic respiratory failure 2/2 COVID  -intubated on [facility] vent   -MICU following     Hypotension   - Norepinephrine for MAP >65    malnutrition  - TF   - GI ppx w/ protonix  - Senna/Miralax     PNA  - Increasing leukocytosis with elevated procalcitonin & fevers, concern for superimposed bacterial infection  -started on Iv abx     Hx of brin tumor s/p resection   -stable     poor prognosis remain full code     krista nelson MD

## 2021-03-17 NOTE — PROGRESS NOTE ADULT - SUBJECTIVE AND OBJECTIVE BOX
Date of Service: 03-17-21 @ 09:59    Patient is a 60y old  Male who presents with a chief complaint of COVID-19 Pneumonia (17 Mar 2021 07:34)      Any change in ROS: Doing very poorly: intubated and is requiring very high oxygen conc:       MEDICATIONS  (STANDING):  ALBUTerol    90 MICROgram(s) HFA Inhaler 2 Puff(s) Inhalation every 4 hours  ascorbic acid 500 milliGRAM(s) Oral daily  aspirin  chewable 81 milliGRAM(s) Oral daily  budesonide 160 MICROgram(s)/formoterol 4.5 MICROgram(s) Inhaler 2 Puff(s) Inhalation two times a day  chlorhexidine 0.12% Liquid 15 milliLiter(s) Oral Mucosa every 12 hours  cholecalciferol 2000 Unit(s) Oral daily  cisatracurium Infusion 3 MICROgram(s)/kG/Min (14.8 mL/Hr) IV Continuous <Continuous>  dextrose 40% Gel 15 Gram(s) Oral once  dextrose 5%. 1000 milliLiter(s) (50 mL/Hr) IV Continuous <Continuous>  dextrose 5%. 1000 milliLiter(s) (100 mL/Hr) IV Continuous <Continuous>  dextrose 50% Injectable 25 Gram(s) IV Push once  dextrose 50% Injectable 12.5 Gram(s) IV Push once  dextrose 50% Injectable 25 Gram(s) IV Push once  diazepam  Injectable 5 milliGRAM(s) IV Push every 6 hours  fenofibrate Tablet 48 milliGRAM(s) Oral daily  fentaNYL   Infusion..... 0.5 MICROgram(s)/kG/Hr (0.82 mL/Hr) IV Continuous <Continuous>  glucagon  Injectable 1 milliGRAM(s) IntraMuscular once  heparin   Injectable 5000 Unit(s) SubCutaneous every 8 hours  insulin lispro (ADMELOG) corrective regimen sliding scale   SubCutaneous every 6 hours  ketamine Infusion. 0.5 mG/kG/Hr (4.11 mL/Hr) IV Continuous <Continuous>  midazolam Infusion 0.02 mG/kG/Hr (1.64 mL/Hr) IV Continuous <Continuous>  multivitamin 1 Tablet(s) Oral daily  norepinephrine Infusion 0.05 MICROgram(s)/kG/Min (3.85 mL/Hr) IV Continuous <Continuous>  pantoprazole  Injectable 40 milliGRAM(s) IV Push daily  polyethylene glycol 3350 17 Gram(s) Oral daily  propofol Infusion 20 MICROgram(s)/kG/Min (9.85 mL/Hr) IV Continuous <Continuous>  senna Syrup 10 milliLiter(s) Oral at bedtime    MEDICATIONS  (PRN):    Vital Signs Last 24 Hrs  T(C): 37.5 (17 Mar 2021 09:28), Max: 37.6 (17 Mar 2021 07:00)  T(F): 99.5 (17 Mar 2021 09:28), Max: 99.7 (17 Mar 2021 07:00)  HR: 120 (17 Mar 2021 09:28) (102 - 127)  BP: --  BP(mean): --  RR: 34 (17 Mar 2021 09:28) (34 - 34)  SpO2: 91% (17 Mar 2021 09:28) (87% - 97%)  Mode: AC/ CMV (Assist Control/ Continuous Mandatory Ventilation)  RR (machine): 34  TV (machine): 400  FiO2: 90  PEEP: 7  ITime: 0.68  MAP: 16  PIP: 36    I&O's Summary    16 Mar 2021 07:01  -  17 Mar 2021 07:00  --------------------------------------------------------  IN: 1893.9 mL / OUT: 1770 mL / NET: 123.9 mL    17 Mar 2021 07:01  -  17 Mar 2021 09:59  --------------------------------------------------------  IN: 151.2 mL / OUT: 150 mL / NET: 1.2 mL          Physical Exam:   GENERAL: NAD, well-groomed, well-developed  HEENT: JOAO/   Atraumatic, Normocephalic  ENMT: No tonsillar erythema, exudates, or enlargement; Moist mucous membranes, Good dentition, No lesions  NECK: Supple, No JVD, Normal thyroid  CHEST/LUNG: Clear to auscultaion  CVS: Regular rate and rhythm; No murmurs, rubs, or gallops  GI: : Soft, Nontender, Nondistended; Bowel sounds present  NERVOUS SYSTEM:  sedated and intubated:   EXTREMITIES:  - edema  LYMPH: No lymphadenopathy noted  SKIN: No rashes or lesions  ENDOCRINOLOGY: No Thyromegaly  PSYCH: sedated and intuabted    Labs:  ABG - ( 17 Mar 2021 00:49 )  pH, Arterial: 7.32  pH, Blood: x     /  pCO2: 70    /  pO2: 63    / HCO3: 31    / Base Excess: 8.7   /  SaO2: 91.3                                        11.1   16.20 )-----------( 346      ( 17 Mar 2021 00:49 )             35.9                         10.8   16.94 )-----------( 321      ( 16 Mar 2021 01:12 )             35.3                         11.2   18.74 )-----------( 331      ( 15 Mar 2021 02:51 )             36.7                         12.0   22.16 )-----------( 347      ( 14 Mar 2021 08:47 )             38.3     03-17    139  |  99  |  60<H>  ----------------------------<  98  4.4   |  32<H>  |  2.04<H>  03-16    140  |  102  |  52<H>  ----------------------------<  121<H>  4.8   |  33<H>  |  2.10<H>  03-15    142  |  105  |  41<H>  ----------------------------<  131<H>  5.1   |  31  |  1.67<H>  03-14    140  |  103  |  32<H>  ----------------------------<  136<H>  5.0   |  28  |  1.53<H>    Ca    9.0      17 Mar 2021 00:49  Ca    9.3      16 Mar 2021 01:12  Phos  4.1     03-17  Phos  4.6     03-16  Mg     2.6     03-17  Mg     2.7     03-16      CAPILLARY BLOOD GLUCOSE      POCT Blood Glucose.: 105 mg/dL (17 Mar 2021 05:38)  POCT Blood Glucose.: 94 mg/dL (16 Mar 2021 23:42)  POCT Blood Glucose.: 102 mg/dL (16 Mar 2021 17:13)  POCT Blood Glucose.: 93 mg/dL (16 Mar 2021 11:28)        PT/INR - ( 17 Mar 2021 00:49 )   PT: 11.3 sec;   INR: 0.99 ratio         PTT - ( 16 Mar 2021 01:12 )  PTT:26.6 sec    D-Dimer Assay, Quantitative: 1608 ng/mL DDU (03-12 @ 05:24)  Procalcitonin, Serum: 0.34 ng/mL (03-16 @ 06:34)  Procalcitonin, Serum: 0.34 ng/mL (03-16 @ 01:12)        RECENT CULTURES:  03-14 @ 10:49 .Sputum Sputum       Rare polymorphonuclear leukocytes per low power field  No organisms seen per oil power field           No growth at 48 hours    03-12 @ 16:24 .Blood Blood-Venous       r< from: Xray Chest 1 View- PORTABLE-Urgent (Xray Chest 1 View- PORTABLE-Urgent .) (03.12.21 @ 15:37) >  EXAM:  XR CHEST PORTABLE URGENT 1V        PROCEDURE DATE:  Mar 12 2021         INTERPRETATION:  HISTORY: NG tube placement    TECHNIQUE: Single frontal view of the chest with comparison to study of earlier in the day    FINDINGS:    NG tube into the stomach. Endotracheal tube and right internal jugular line in place unchanged. Heart is normal in size. Diffuse bilateral airspace disease. Apices are unremarkable. Degenerative changes.    IMPRESSION:    NG tube into the stomach. Stable diffuse bilateral airspace disease              BRITT TORO MD; Attending Radiologist  This document has been electronically signed. Mar 12 2021  4:55PM    < end of copied text >  < from: Xray Chest 1 View- PORTABLE-Urgent (Xray Chest 1 View- PORTABLE-Urgent .) (03.12.21 @ 13:08) >  EXAM:  XR CHEST PORTABLE URGENT 1V        PROCEDURE DATE:  Mar 12 2021         INTERPRETATION:  INDICATION: Status post endotracheal tube, OGT, right IJ catheter    TECHNIQUE: Single portable view of the chest.    COMPARISON: 3/9/2021    FINDINGS: The cardiac silhouette is normal in size. There is an endotracheal tube the tip above the fina. There is a right IJ central venous catheter with tip overlying the superior vena cava. No pneumothorax is seen. No OGT is seen. There are bilateral diffuse airspace opacities, slightly worsened.    IMPRESSION: Endotracheal tube and right IJ central venous catheter in good position with no pneumothorax seen. An enteric feeding tube is not seen. Bilateral diffuse airspace opacities, slightly worsened.              HERIBERTO DAVID MD; Attending Radiologist  This document has been electronically signed. Mar 12 2021  1:27PM    < end of copied text >           No growth to date.    03-11 @ 20:24 .Nose Nose                No MRSA isolated  No Staph Aureus (MSSA) isolated "This can represent normal nasal  carriage.  PCR is more sensitive for identifying MRSA/MSSA carriage"    03-11 @ 14:58 .Blood Blood-Peripheral                No Growth Final          RESPIRATORY CULTURES:          Studies  Chest X-RAY  CT SCAN Chest   Venous Dopplers: LE:   CT Abdomen  Others

## 2021-03-17 NOTE — PROGRESS NOTE ADULT - ASSESSMENT
This is a 61yo Male w/ PMHx of a Brain tumor s/p resection in 2018, who is presenting with worsening shortness of breath and non-productive cough for past week. Found to be COVID-19 Positive. Py hypoxic to 87% on RA CXR c/w COVID pneumonia. Admitted for further treatment of COVID pneumonia/hypoxemia.     Covid 19 Pneumonia: Hypoxic resp failure  Brain tumor:        3/3:      Covid 19 Pneumonia: Hypoxic resp failure: on covid rx: he is on 50% fio2: LFTS are mildly elevated: folow d dimer: currently OK  Brain tumor:  s/p resection:  DVT proparminyxlis  ruddy team    3/4:      Covid 19 Pneumonia: Hypoxic resp failure: on covid rx: he is on 50% fio2 today : increased fr om yesterday :looks better then yesterday : no overnight events Cont dexa"for a total of 10 dys: LFT as are improving and d dimer has mildly increased:   Brain tumor:  s/p resection:  DVT cole chaidez team : overall p rognosis is very giuarded"     3/7:      Covid 19 Pneumonia: Hypoxic resp failure: on covid rx: he is on100% fio2 today : remdesivir finished: on dexa: 7/10: pretty hypoxic: and has low grade fever: wbc is increasing: will start empiric zosyn: do chest xray : ID consult  Brain tumor:  s/p resection:  DVT propahyxlis  : overall p rognosis is very guarded stll :  RUDDY NP     3/8    Covid 19 Pneumonia: Hypoxic resp failure: on covid rx: he is on100% fio2 today : remdesivir finished: on dexa: 8/10: pretty hypoxic: and has low grade fever: wbc is increasing:  started empiric zosyn: do chest xray : with worsening: seen by ID:  His d dimer has increased significantly: start heparin  : call MICU with furterh deterioration as he might need intubation any time  Brain tumor:  s/p resection:  DVT propahyxlis  : overall prognosis is very guarded stll :  RUDDY CHAIDEZ NP     3/9:    Covid 19 Pneumonia: Hypoxic resp failure: on covid rx: he is on100% fio2 today : remdesivir finished: on dexa: 9/10: on zosyn: on bipap at 1005: try to wean down o2 today : his dexa may need to be extended as he isnot doing well; Yesterday his d dimer had increased 4 x : started on empiric heparin today d d iemr has decreased!  Brain tumor:  s/p resection:  DVT propahyxlis  overall prognosis is very guarded stll :  RUDDY Gray    3/10:    Covid 19 Pneumonia: he is not doign well: he is on AVAPS: on 1005 oxygen: very tenuous resp status;' cant be moved for cta: he is alerday on empiric full dose ac: try to wean off oxygen: he is alreadyon antibtiocs and is on zosyn ? upgrade to leonidas: would defer to primary ID team   Brain tumor:  s/p resection:  DVT prophylaxis  overall prognosis is very guarded still :  RUDDY Gray    3/11:  Covid 19 Pneumonia: not do ing well: on 1005 avpas: antibtiocs broadened: by ID: he wants to alycia ntuabted as a last resort: ruddy jane on floor: He might need intuabtion any time: Heis on full dose ac and his d dimer i s decreasing!  Brain tumor:  s/p resection:  DVT prophylaxis  overall prognosis is very guarded still :  RUDDY pa: [place]    3/12:    Covid 19 Pneumonia:i intubated finally today for rep failure with severe refractory hypoxemia: Now paralysed and sedated in MICU and on full vent support : lot of secretions on intubation on broad spectrum antibiotics WBC increased: follow cultures:   Brain tumor:  s/p resection:  DVT prophylaxis  Condition critical :   dw team    3/15:    Covid 19 Pneumonia: intubated finally today for rep failure with severe refractory hypoxemia: on 805 fio2: proned today : all the antibiotics have been finished  antibiotics WBC increased: follow cultures:   Renal functions : high : defer to [facility] MICU team   Brain tumor:  s/p resection:  DVT prophylaxis  Condition critical :   dw team    3/16:    Covid 19 Pneumonia: intubated finally today for rep failure with severe refractory hypoxemia: on 90% fio2: supine today with peep of 9  : all the antibiotics have been finished  antibiotics WBC increased: follow cultures: Heis acidotic, hypercarbic as wellas hypoxic:   Renal functions : high : defer to primary MICU team   Brain tumor:  s/p resection:  DVT prophylaxis  Condition critical :   dw team      3/17:      Covid 19 Pneumonia: intubated and pretty hypoxic: hypercarbic as well as acidotic: sedated and intubated along with paralyzing agent:   Renal functions : high : secondary to ATN because of shock and covid 19 pneumonia: renal following  Brain tumor:  s/p resection:  DVT prophylaxis  Condition critical :   dw team

## 2021-03-17 NOTE — PROGRESS NOTE ADULT - SUBJECTIVE AND OBJECTIVE BOX
DATE OF SERVICE: 03-17-21 @ 22:38    Patient is a 60y old  Male who presents with a chief complaint of COVID-19 Pneumonia (17 Mar 2021 10:33)      INTERVAL HISTORY: Patient is s/p prone at 4pm and supinated at 9am. ABG 7.33-7.2. Pt had increased auto PEEP last night with plateau; PEEP lowered from 9->7.  BP stable overnight. Not currently on pressors, Lovenox changed to Heparin 2/2 bump in Cr. Propofol d/c 2/2 rise in triglycerides   	  ROS unable to obtain as pt sedated and intubated    MEDICATIONS:  buMETAnide Injectable 2 milliGRAM(s) IV Push two times a day  norepinephrine Infusion 0.05 MICROgram(s)/kG/Min IV Continuous <Continuous>        PHYSICAL EXAM:  T(C): 37.7 (03-17-21 @ 21:00), Max: 38 (03-17-21 @ 18:01)  HR: 124 (03-17-21 @ 21:14) (103 - 127)  BP: --  RR: 34 (03-17-21 @ 21:00) (34 - 34)  SpO2: 88% (03-17-21 @ 21:14) (86% - 94%)  Wt(kg): --  I&O's Summary    16 Mar 2021 07:01  -  17 Mar 2021 07:00  --------------------------------------------------------  IN: 1893.9 mL / OUT: 1770 mL / NET: 123.9 mL    17 Mar 2021 07:01  -  17 Mar 2021 22:38  --------------------------------------------------------  IN: 1229.1 mL / OUT: 545 mL / NET: 684.1 mL                                  11.1   16.20 )-----------( 346      ( 17 Mar 2021 00:49 )             35.9     03-17    139  |  99  |  60<H>  ----------------------------<  98  4.4   |  32<H>  |  2.04<H>    Ca    9.0      17 Mar 2021 00:49  Phos  4.1     03-17  Mg     2.6     03-17          Labs personally reviewed      	  ASSESSMENT/PLAN: 	  This is a 59yo Male w/ PMHx of a Brain tumor s/p resection in 2018, who is presenting with worsening shortness of breath and non-productive cough for past week. Found to be COVID-19 Positive. Patient Sinus tachycardic in Emergency room.     1. Sinus Tachycardia 2/2 to worsening hypoxia from COVID-19 Pneumonia   - trop negative  - D Dimer very elevated   - (-) for DVT, changed to ppx dose by ICU   - can obtain TTE to eval heart function once acute issues resolve    2. Hypotension - likely 2/2 sedation and paralytics  - NE to keep MAP >56    3. Hypoxemia 2/2 Covid-19 pna  - remdesevir completed. Deca for 10 days completed  - completed decadron taper  - s/p RRT- intubated/ sedated in ICU   - remains intubated and sedated    4. Hyponatremic- 134 on admission likely from dehydration   -   normalized     5. Transaminitis  - elevated LFTs on admission, albumin slightly low however now nearly normalized, bilirubin wnl   - continue to monitor     6.  Fevers/Leukocytosis -  Plan: s/p Vanco and IV meropenem - under ICU care  bcx 3/7 - negative   bcx 3/11 NGTD  Observe off Abx    7. LUDIVINA - s/p Bumex gtt, Bumex 2mg IV BID to keep euvolemic given ARDS  - HD if worsening acidosis          Jaswant Weiss DO [facility]  Cardiovascular Medicine  [address], [address]  Office: [phone]  Cell: [phone]

## 2021-03-17 NOTE — PROGRESS NOTE ADULT - SUBJECTIVE AND OBJECTIVE BOX
Medicine Progress Note    Patient is a 60y old  Male who presents with a chief complaint of COVID-19 Pneumonia (17 Mar 2021 16:33)      SUBJECTIVE / OVERNIGHT EVENTS: remain intubated     ADDITIONAL REVIEW OF SYSTEMS:    MEDICATIONS  (STANDING):  ALBUTerol    90 MICROgram(s) HFA Inhaler 2 Puff(s) Inhalation every 4 hours  ascorbic acid 500 milliGRAM(s) Oral daily  aspirin  chewable 81 milliGRAM(s) Oral daily  budesonide 160 MICROgram(s)/formoterol 4.5 MICROgram(s) Inhaler 2 Puff(s) Inhalation two times a day  buMETAnide Injectable 2 milliGRAM(s) IV Push two times a day  chlorhexidine 0.12% Liquid 15 milliLiter(s) Oral Mucosa every 12 hours  cholecalciferol 2000 Unit(s) Oral daily  cisatracurium Infusion 3 MICROgram(s)/kG/Min (14.8 mL/Hr) IV Continuous <Continuous>  dextrose 40% Gel 15 Gram(s) Oral once  dextrose 5%. 1000 milliLiter(s) (100 mL/Hr) IV Continuous <Continuous>  dextrose 5%. 1000 milliLiter(s) (50 mL/Hr) IV Continuous <Continuous>  dextrose 50% Injectable 25 Gram(s) IV Push once  dextrose 50% Injectable 12.5 Gram(s) IV Push once  dextrose 50% Injectable 25 Gram(s) IV Push once  diazepam  Injectable 5 milliGRAM(s) IV Push every 6 hours  fenofibrate Tablet 48 milliGRAM(s) Oral daily  fentaNYL   Infusion..... 0.5 MICROgram(s)/kG/Hr (0.82 mL/Hr) IV Continuous <Continuous>  glucagon  Injectable 1 milliGRAM(s) IntraMuscular once  heparin   Injectable 5000 Unit(s) SubCutaneous every 8 hours  insulin lispro (ADMELOG) corrective regimen sliding scale   SubCutaneous every 6 hours  ketamine Infusion. 0.5 mG/kG/Hr (4.11 mL/Hr) IV Continuous <Continuous>  midazolam Infusion 0.02 mG/kG/Hr (1.64 mL/Hr) IV Continuous <Continuous>  multivitamin 1 Tablet(s) Oral daily  norepinephrine Infusion 0.05 MICROgram(s)/kG/Min (3.85 mL/Hr) IV Continuous <Continuous>  pantoprazole  Injectable 40 milliGRAM(s) IV Push daily  polyethylene glycol 3350 17 Gram(s) Oral daily  propofol Infusion 20 MICROgram(s)/kG/Min (9.85 mL/Hr) IV Continuous <Continuous>  senna Syrup 10 milliLiter(s) Oral at bedtime    MEDICATIONS  (PRN):    CAPILLARY BLOOD GLUCOSE      POCT Blood Glucose.: 94 mg/dL (17 Mar 2021 17:12)  POCT Blood Glucose.: 85 mg/dL (17 Mar 2021 11:29)  POCT Blood Glucose.: 105 mg/dL (17 Mar 2021 05:38)  POCT Blood Glucose.: 94 mg/dL (16 Mar 2021 23:42)    I&O's Summary    16 Mar 2021 07:01  -  17 Mar 2021 07:00  --------------------------------------------------------  IN: 1893.9 mL / OUT: 1770 mL / NET: 123.9 mL    17 Mar 2021 07:01  -  17 Mar 2021 23:32  --------------------------------------------------------  IN: 1229.1 mL / OUT: 545 mL / NET: 684.1 mL        PHYSICAL EXAM:  Vital Signs Last 24 Hrs  T(C): 37.8 (17 Mar 2021 23:00), Max: 38 (17 Mar 2021 18:01)  T(F): 100 (17 Mar 2021 23:00), Max: 100.4 (17 Mar 2021 18:01)  HR: 125 (17 Mar 2021 23:00) (104 - 127)  BP: --  BP(mean): --  RR: 34 (17 Mar 2021 23:00) (34 - 34)  SpO2: 88% (17 Mar 2021 23:00) (86% - 94%)  CONSTITUTIONAL: NAD, well-developed, well-groomed  ENMT: Moist oral mucosa, no pharyngeal injection or exudates; normal dentition  RESPIRATORY: Normal respiratory effort; lungs are clear to auscultation bilaterally  CARDIOVASCULAR: Regular rate and rhythm, normal S1 and S2, no murmur/rub/gallop; No lower extremity edema; Peripheral pulses are 2+ bilaterally  ABDOMEN: Nontender to palpation, normoactive bowel sounds, no rebound/guarding; No hepatosplenomegaly  PSYCH: A+O to person, place, and time; affect appropriate  NEUROLOGY: CN 2-12 are intact and symmetric; no gross sensory deficits   SKIN: No rashes; no palpable lesions    LABS:                        11.1   16.20 )-----------( 346      ( 17 Mar 2021 00:49 )             35.9     03-17    139  |  99  |  60<H>  ----------------------------<  98  4.4   |  32<H>  |  2.04<H>    Ca    9.0      17 Mar 2021 00:49  Phos  4.1     03-17  Mg     2.6     03-17      PT/INR - ( 17 Mar 2021 00:49 )   PT: 11.3 sec;   INR: 0.99 ratio         PTT - ( 16 Mar 2021 01:12 )  PTT:26.6 sec          COVID-19 PCR: Detected (28 Feb 2021 23:02)      RADIOLOGY & ADDITIONAL TESTS:  Imaging from Last 24 Hours:    Electrocardiogram/QTc Interval:    COORDINATION OF CARE:  Care Discussed with Consultants/Other Providers:

## 2021-03-17 NOTE — PROGRESS NOTE ADULT - SUBJECTIVE AND OBJECTIVE BOX
[facility] DIVISION OF KIDNEY DISEASES AND HYPERTENSION   -- FOLLOW UP NOTE --   Anan Loera  Nephrology Fellow  Pager [facility]: [phone]   /  Pager [facility]: [phone]  (after 5pm or weekend please page the on-call fellow)  ======================================================  24 hour events/subjective:  - overnight no events reported, vitals afebrile no hypotensive episode on vasopressor, total UOP noel 1.7L (Net +123cc) in the past 24hr  - patient seen and examined at bedside this morning intubated/sedated FIO2 90% on levophed  - vitals/lab/medications reviewed, noted for stable sCr 2.0    PAST HISTORY  --------------------------------------------------------------------------------  No significant changes to PMH, PSH, FHx, SHx, unless otherwise noted    ALLERGIES & MEDICATIONS  --------------------------------------------------------------------------------  Allergies  No Known Allergies    Intolerances    Standing Inpatient Medications  ALBUTerol    90 MICROgram(s) HFA Inhaler 2 Puff(s) Inhalation every 4 hours  ascorbic acid 500 milliGRAM(s) Oral daily  aspirin  chewable 81 milliGRAM(s) Oral daily  budesonide 160 MICROgram(s)/formoterol 4.5 MICROgram(s) Inhaler 2 Puff(s) Inhalation two times a day  chlorhexidine 0.12% Liquid 15 milliLiter(s) Oral Mucosa every 12 hours  cholecalciferol 2000 Unit(s) Oral daily  cisatracurium Infusion 3 MICROgram(s)/kG/Min IV Continuous <Continuous>  dextrose 40% Gel 15 Gram(s) Oral once  dextrose 5%. 1000 milliLiter(s) IV Continuous <Continuous>  dextrose 5%. 1000 milliLiter(s) IV Continuous <Continuous>  dextrose 50% Injectable 25 Gram(s) IV Push once  dextrose 50% Injectable 12.5 Gram(s) IV Push once  dextrose 50% Injectable 25 Gram(s) IV Push once  diazepam  Injectable 5 milliGRAM(s) IV Push every 6 hours  fenofibrate Tablet 48 milliGRAM(s) Oral daily  fentaNYL   Infusion..... 0.5 MICROgram(s)/kG/Hr IV Continuous <Continuous>  glucagon  Injectable 1 milliGRAM(s) IntraMuscular once  heparin   Injectable 5000 Unit(s) SubCutaneous every 8 hours  insulin lispro (ADMELOG) corrective regimen sliding scale   SubCutaneous every 6 hours  ketamine Infusion. 0.5 mG/kG/Hr IV Continuous <Continuous>  midazolam Infusion 0.02 mG/kG/Hr IV Continuous <Continuous>  multivitamin 1 Tablet(s) Oral daily  norepinephrine Infusion 0.05 MICROgram(s)/kG/Min IV Continuous <Continuous>  pantoprazole  Injectable 40 milliGRAM(s) IV Push daily  polyethylene glycol 3350 17 Gram(s) Oral daily  propofol Infusion 20 MICROgram(s)/kG/Min IV Continuous <Continuous>  senna Syrup 10 milliLiter(s) Oral at bedtime    PRN Inpatient Medications    REVIEW OF SYSTEMS  unable to obtain d/t intubated/sedated    VITALS/PHYSICAL EXAM  --------------------------------------------------------------------------------  T(C): 37.6 (03-17-21 @ 07:00), Max: 37.6 (03-17-21 @ 07:00)  HR: 126 (03-17-21 @ 07:11) (102 - 127)  BP: --  RR: 34 (03-17-21 @ 07:00) (34 - 34)  SpO2: 94% (03-17-21 @ 07:11) (87% - 97%)  Wt(kg): --    03-16-21 @ 07:01  -  03-17-21 @ 07:00  --------------------------------------------------------  IN: 1893.9 mL / OUT: 1770 mL / NET: 123.9 mL    Physical Exam:  	Gen: intubated, periorbital edema  	HEENT: intubated, +ETT  	Pulm: mechanical breath sounds  	CV: + tachycardic   	GI: soft  	: noel catheter in place  	MSK: no edema in lower extremity              Neuro: sedated    LABS/STUDIES  --------------------------------------------------------------------------------              11.1   16.20 >-----------<  346      [03-17-21 @ 00:49]              35.9     139  |  99  |  60  ----------------------------<  98      [03-17-21 @ 00:49]  4.4   |  32  |  2.04        Ca     9.0     [03-17-21 @ 00:49]      Mg     2.6     [03-17-21 @ 00:49]      Phos  4.1     [03-17-21 @ 00:49]    PT/INR: PT 11.3 , INR 0.99       [03-17-21 @ 00:49]  PTT: 26.6       [03-16-21 @ 01:12]    Creatinine Trend:  SCr 2.04 [03-17 @ 00:49]  SCr 2.10 [03-16 @ 01:12]  SCr 1.67 [03-15 @ 02:51]  SCr 1.53 [03-14 @ 05:00]  SCr 0.97 [03-13 @ 02:10]    Urinalysis - [03-12-21 @ 12:14]      Color Yellow / Appearance Slightly Turbid / SG 1.040 / pH 6.0      Gluc Trace / Ketone Small  / Bili Negative / Urobili 3 mg/dL       Blood Large / Protein 100 mg/dL / Leuk Est Negative / Nitrite Negative       / WBC 17 / Hyaline 27 / Gran  / Sq Epi  / Non Sq Epi 8 / Bacteria Negative      Ferritin 696      [03-12-21 @ 05:24]  TSH 0.18      [03-01-21 @ 08:07]  Lipid: chol --, , HDL --, LDL --      [03-17-21 @ 00:49]    HCV 0.12, Nonreact      [03-02-21 @ 13:44]     [facility] DIVISION OF KIDNEY DISEASES AND HYPERTENSION   -- FOLLOW UP NOTE --   Anna Loera  Nephrology Fellow  Pager [facility]: [phone]   /  Pager [facility]: [phone]  (after 5pm or weekend please page the on-call fellow)  ======================================================  24 hour events/subjective:  - overnight no events reported, vitals afebrile no hypotensive episode on vasopressor, total UOP noel 1.7L (Net +123cc) in the past 24hr  - patient seen and examined at bedside this morning intubated/sedated FIO2 90% on levophed  - vitals/lab/medications reviewed, noted for stable sCr 2.0    PAST HISTORY  --------------------------------------------------------------------------------  No significant changes to PMH, PSH, FHx, SHx, unless otherwise noted    ALLERGIES & MEDICATIONS  --------------------------------------------------------------------------------  Allergies  No Known Allergies    Intolerances    Standing Inpatient Medications  ALBUTerol    90 MICROgram(s) HFA Inhaler 2 Puff(s) Inhalation every 4 hours  ascorbic acid 500 milliGRAM(s) Oral daily  aspirin  chewable 81 milliGRAM(s) Oral daily  budesonide 160 MICROgram(s)/formoterol 4.5 MICROgram(s) Inhaler 2 Puff(s) Inhalation two times a day  chlorhexidine 0.12% Liquid 15 milliLiter(s) Oral Mucosa every 12 hours  cholecalciferol 2000 Unit(s) Oral daily  cisatracurium Infusion 3 MICROgram(s)/kG/Min IV Continuous <Continuous>  dextrose 40% Gel 15 Gram(s) Oral once  dextrose 5%. 1000 milliLiter(s) IV Continuous <Continuous>  dextrose 5%. 1000 milliLiter(s) IV Continuous <Continuous>  dextrose 50% Injectable 25 Gram(s) IV Push once  dextrose 50% Injectable 12.5 Gram(s) IV Push once  dextrose 50% Injectable 25 Gram(s) IV Push once  diazepam  Injectable 5 milliGRAM(s) IV Push every 6 hours  fenofibrate Tablet 48 milliGRAM(s) Oral daily  fentaNYL   Infusion..... 0.5 MICROgram(s)/kG/Hr IV Continuous <Continuous>  glucagon  Injectable 1 milliGRAM(s) IntraMuscular once  heparin   Injectable 5000 Unit(s) SubCutaneous every 8 hours  insulin lispro (ADMELOG) corrective regimen sliding scale   SubCutaneous every 6 hours  ketamine Infusion. 0.5 mG/kG/Hr IV Continuous <Continuous>  midazolam Infusion 0.02 mG/kG/Hr IV Continuous <Continuous>  multivitamin 1 Tablet(s) Oral daily  norepinephrine Infusion 0.05 MICROgram(s)/kG/Min IV Continuous <Continuous>  pantoprazole  Injectable 40 milliGRAM(s) IV Push daily  polyethylene glycol 3350 17 Gram(s) Oral daily  propofol Infusion 20 MICROgram(s)/kG/Min IV Continuous <Continuous>  senna Syrup 10 milliLiter(s) Oral at bedtime    PRN Inpatient Medications    REVIEW OF SYSTEMS  unable to obtain d/t intubated/sedated    VITALS/PHYSICAL EXAM  --------------------------------------------------------------------------------  T(C): 37.6 (03-17-21 @ 07:00), Max: 37.6 (03-17-21 @ 07:00)  HR: 126 (03-17-21 @ 07:11) (102 - 127)  BP: --  RR: 34 (03-17-21 @ 07:00) (34 - 34)  SpO2: 94% (03-17-21 @ 07:11) (87% - 97%)  Wt(kg): --    03-16-21 @ 07:01  -  03-17-21 @ 07:00  --------------------------------------------------------  IN: 1893.9 mL / OUT: 1770 mL / NET: 123.9 mL    Physical Exam:  	Gen: intubated, proned  	HEENT: intubated, +ETT  	Pulm: mechanical breath sounds  	CV: + tachycardic   	GI: proned  	: noel catheter in place  	MSK: no edema in lower extremity              Neuro: sedated    LABS/STUDIES  --------------------------------------------------------------------------------              11.1   16.20 >-----------<  346      [03-17-21 @ 00:49]              35.9     139  |  99  |  60  ----------------------------<  98      [03-17-21 @ 00:49]  4.4   |  32  |  2.04        Ca     9.0     [03-17-21 @ 00:49]      Mg     2.6     [03-17-21 @ 00:49]      Phos  4.1     [03-17-21 @ 00:49]    PT/INR: PT 11.3 , INR 0.99       [03-17-21 @ 00:49]  PTT: 26.6       [03-16-21 @ 01:12]    Creatinine Trend:  SCr 2.04 [03-17 @ 00:49]  SCr 2.10 [03-16 @ 01:12]  SCr 1.67 [03-15 @ 02:51]  SCr 1.53 [03-14 @ 05:00]  SCr 0.97 [03-13 @ 02:10]    Urinalysis - [03-12-21 @ 12:14]      Color Yellow / Appearance Slightly Turbid / SG 1.040 / pH 6.0      Gluc Trace / Ketone Small  / Bili Negative / Urobili 3 mg/dL       Blood Large / Protein 100 mg/dL / Leuk Est Negative / Nitrite Negative       / WBC 17 / Hyaline 27 / Gran  / Sq Epi  / Non Sq Epi 8 / Bacteria Negative      Ferritin 696      [03-12-21 @ 05:24]  TSH 0.18      [03-01-21 @ 08:07]  Lipid: chol --, , HDL --, LDL --      [03-17-21 @ 00:49]    HCV 0.12, Nonreact      [03-02-21 @ 13:44]

## 2021-03-17 NOTE — PROGRESS NOTE ADULT - PROBLEM SELECTOR PLAN 1
Pt with non-oliguric LUDIVINA likely 2/2 hemodynamically mediated ATN in the setting of shock, covid19 pneumonia/ARDS.  On admission sCr 1.0 on 2/28, rise sCr after intubation from 0.97 to 1.53 on 3/14 then 1.67 on 3/15/21.  UA showed protein/blood/rbc/wbc.  Currently patient is intubated/sedated, on vasopressor support, non-oliguric on bumex with last SCr 2.0 stable       - Currently no absolute/urgent indication for dialysis, monitor for renal recovery  - continue bumex IV 2mg BID for now given high FIO2 (goal net negative 1-2L), titrate as needed.   - BP optimization/antibiotic/blood transfusion per ICU team  - monitor BMP, strict I/O, avoid nephrotoxic agents (NSAIDs, PPI, contrast), renally dose medications per GFR. Monitor electrolytes while on diuretic. Consider discontinue/decreasing dose vitamin C (risk oxalate nephropathy) Pt with non-oliguric LUDIVINA likely 2/2 hemodynamically mediated ATN in the setting of shock, covid19 pneumonia/ARDS.  On admission sCr 1.0 on 2/28, rise sCr after intubation from 0.97 to 1.53 on 3/14 then 1.67 on 3/15/21.  UA showed protein/blood/rbc/wbc.  Currently patient is intubated/sedated, on vasopressor support, non-oliguric on bumex with last SCr 2.0 stable       - Currently no absolute/urgent indication for dialysis, monitor for renal recovery  - continue bumex IV 2mg BID for now given high FIO2 (goal net negative 1-2L), titrate as needed. If UOP doesn't improve would consider bumex drip 1mg/hr (titrate up as needed)  - BP optimization/antibiotic/blood transfusion per ICU team  - monitor BMP, strict I/O, avoid nephrotoxic agents (NSAIDs, PPI, contrast), renally dose medications per GFR. Monitor electrolytes while on diuretic. Consider discontinue/decreasing dose vitamin C (risk oxalate nephropathy)

## 2021-03-17 NOTE — PROGRESS NOTE ADULT - ASSESSMENT
Assessment and Plan:   · Assessment	  60y Male w/ PMHx of a Brain tumor s/p resection in 2018 who presented 3/1/21 with worsening shortness of breath and non-productive cough x1 week, worsening hypoxia 2/2 COVID PNA requiring intubation 3/12 and transfer to [facility].     Neurologic:   - Sedation with fentanyl, ketamine, and versed (IVP). Weaned off propofol due to hyper triglycerides   - Chemically paralyzed with nimbex  - Continue sedation/paralytics today with goal to re-prone     Respiratory:   - Hypoxic & hypercarbic respiratory failure 2/2 COVID  - VC/AC 34/400/7/90%  - Patient is currently supinated at ~10am. Plan to re-prone    - Continue albuterol    Cardiovascular:   - Hypotensive, likely vasoplegic shock 2/2 sedation/paralysis  - Norepinephrine for MAP >65 PRN (currently off) MAP 92  - Continue ASA    Gastrointestinal/Nutrition:   - Jevity @ goal 10cc/hr  - GI ppx w/ protonix  - Senna/Miralax     Renal/Genitourinary:   - Cr increasing, making UO Cr 2.1->2.04  - Net negative 1893/1770; net +123; 1.7L  - Nephro following, appreciate recs; Bumex 2mg IVP bid for now, monitor UO      Hematologic:   - H/H stable  - Elevated d-dimer, previously on heparin drip, now d/c'd  - BLE DVT study 3/13 negative, noted superficial saphenous vein thrombosis on LLE  - Discontinued lovenox d/t worsening renal function  - Prophylaxis Hep SubQ 5k BID     Infectious Disease:   - COVID: s/p remdesivir and Dex   - Improved leukocytosis with elevated procalcitonin, low concern for superimposed bacterial infection  - s/p Zosyn (3/07 - 3/11) and s/p Meropenem / Vancomycin (3/11 - 3/13)  - BCx 3/11 & 3/12 NGTD, sputum cx 3/14 NGTD   - Observe off abx     Endocrine:   - HgbA1C 6.0  - Blood glucose levels in target range ()  - Continue correctional scale insulin    Tubes/Lines/Drains:   - RIJ TLC  - Left radial arterial line  - Ariza  - ETT  - OGT    Disposition: RCU when medically stable     Assessment and Plan:   · Assessment	  60y Male w/ PMHx of a Brain tumor s/p resection in 2018 who presented 3/1/21 with worsening shortness of breath and non-productive cough x1 week, worsening hypoxia 2/2 COVID PNA requiring intubation 3/12 and transfer to [facility].     Neurologic:   - Sedation with fentanyl, ketamine, and versed (IVP). Weaned off propofol due to hyper triglycerides   - Chemically paralyzed with nimbex  - Continue sedation/paralytics today with goal to re-prone     Respiratory:   - Hypoxic & hypercarbic respiratory failure 2/2 COVID  - VC/AC 34/400/7/90%  - Patient is currently supinated at ~10am. Plan to re-prone    - Continue albuterol    Cardiovascular:   - Hypotensive, likely vasoplegic shock 2/2 sedation/paralysis  - Norepinephrine for MAP >65 PRN (currently off) MAP 92  - Continue ASA    Gastrointestinal/Nutrition:   - Jevity @ goal 10cc/hr  - GI ppx w/ protonix  - Senna/Miralax     Renal/Genitourinary:   - Cr increasing, making UO Cr 2.1->2.04  - Net negative 1893/1770; net +123; 1.7L  - Nephro following, appreciate recs; Bumex 2mg IVP bid for now, monitor UO    - Spouse (Daysi Guerra) via telephone conversation agrees to dialytic therapy if needed a/p HD note     Hematologic:   - H/H stable  - Elevated d-dimer, previously on heparin drip, now d/c'd  - BLE DVT study 3/13 negative, noted superficial saphenous vein thrombosis on LLE  - Discontinued lovenox d/t worsening renal function  - Prophylaxis Hep SubQ 5k BID     Infectious Disease:   - COVID: s/p remdesivir and Dex   - Improved leukocytosis with elevated procalcitonin, low concern for superimposed bacterial infection  - s/p Zosyn (3/07 - 3/11) and s/p Meropenem / Vancomycin (3/11 - 3/13)  - BCx 3/11 & 3/12 NGTD, sputum cx 3/14 NGTD   - Observe off abx     Endocrine:   - HgbA1C 6.0  - Blood glucose levels in target range ()  - Continue correctional scale insulin    Tubes/Lines/Drains:   - RIJ TLC  - Left radial arterial line  - Ariza  - ETT  - OGT    Disposition: RCU when medically stable     Assessment and Plan:   · Assessment	  60y Male w/ PMHx of a Brain tumor s/p resection in 2018 who presented 3/1/21 with worsening shortness of breath and non-productive cough x1 week, worsening hypoxia 2/2 COVID PNA requiring intubation 3/12 and transfer to [facility].     Neurologic:   - Sedation with fentanyl, ketamine, and versed (IVP). Weaned off propofol due to hyper triglycerides   - Chemically paralyzed with nimbex  - Continue sedation/paralytics today with goal to re-prone     Respiratory:   - Hypoxic & hypercarbic respiratory failure 2/2 COVID  - VC/AC 34/400/7/90%  - Patient is currently supinated at ~10am. Plan to re-prone    - Continue albuterol  - Repeat ABG improved     Cardiovascular:   - Hypotensive, likely vasoplegic shock 2/2 sedation/paralysis  - Norepinephrine for MAP >65 PRN (currently off) MAP 92  - Continue ASA    Gastrointestinal/Nutrition:   - Jevity @ goal 10cc/hr  - GI ppx w/ protonix  - Senna/Miralax     Renal/Genitourinary:   - Cr increasing, making UO Cr 2.1->2.04  - Net negative 1893/1770; net +123; 1.7L  - Nephro following, appreciate recs; Bumex 2mg IVP bid for now, monitor UO    - Spouse (Daysi Guerra) via telephone conversation agrees to dialytic therapy if needed a/p HD note     Hematologic:   - H/H stable  - Elevated d-dimer, previously on heparin drip, now d/c'd  - BLE DVT study 3/13 negative, noted superficial saphenous vein thrombosis on LLE  - Discontinued lovenox d/t worsening renal function  - Prophylaxis Hep SubQ 5k BID     Infectious Disease:   - COVID: s/p remdesivir and Dex   - Improved leukocytosis with elevated procalcitonin, low concern for superimposed bacterial infection  - s/p Zosyn (3/07 - 3/11) and s/p Meropenem / Vancomycin (3/11 - 3/13)  - BCx 3/11 & 3/12 NGTD, sputum cx 3/14 NGTD   - Observe off abx     Endocrine:   - HgbA1C 6.0  - Blood glucose levels in target range ()  - Continue correctional scale insulin    Tubes/Lines/Drains:   - RIJ TLC  - Left radial arterial line  - Ariza  - ETT  - OGT    Disposition: RCU when medically stable

## 2021-03-18 NOTE — PROGRESS NOTE ADULT - SUBJECTIVE AND OBJECTIVE BOX
[facility] Division of Kidney Diseases & Hypertension  FOLLOW UP NOTE  [phone]--------------------------------------------------------------------------------  24 hour events/subjective:    - overnight no events reported, vitals afebrile no hypotensive episode on vasopressor support  - Started on Bumex 2mg IV BID.  total UOP noel 1.3L (Net -1.2) in the past 24hr  - patient seen and examined at bedside this morning intubated/sedated FIO2 90% + 7 on levophed  - vitals/lab/medications reviewed, noted for stable sCr 2.16    PAST HISTORY  --------------------------------------------------------------------------------  No significant changes to PMH, PSH, FHx, SHx, unless otherwise noted    ALLERGIES & MEDICATIONS  --------------------------------------------------------------------------------  Allergies    No Known Allergies    Intolerances      Standing Inpatient Medications  ALBUTerol    90 MICROgram(s) HFA Inhaler 2 Puff(s) Inhalation every 4 hours  ascorbic acid 500 milliGRAM(s) Oral daily  aspirin  chewable 81 milliGRAM(s) Oral daily  budesonide 160 MICROgram(s)/formoterol 4.5 MICROgram(s) Inhaler 2 Puff(s) Inhalation two times a day  buMETAnide Injectable 2 milliGRAM(s) IV Push two times a day  chlorhexidine 0.12% Liquid 15 milliLiter(s) Oral Mucosa every 12 hours  cholecalciferol 2000 Unit(s) Oral daily  cisatracurium Infusion 3 MICROgram(s)/kG/Min IV Continuous <Continuous>  dextrose 40% Gel 15 Gram(s) Oral once  dextrose 5%. 1000 milliLiter(s) IV Continuous <Continuous>  dextrose 5%. 1000 milliLiter(s) IV Continuous <Continuous>  dextrose 50% Injectable 25 Gram(s) IV Push once  dextrose 50% Injectable 12.5 Gram(s) IV Push once  dextrose 50% Injectable 25 Gram(s) IV Push once  diazepam  Injectable 5 milliGRAM(s) IV Push every 6 hours  fenofibrate Tablet 48 milliGRAM(s) Oral daily  fentaNYL   Infusion..... 0.5 MICROgram(s)/kG/Hr IV Continuous <Continuous>  glucagon  Injectable 1 milliGRAM(s) IntraMuscular once  heparin   Injectable 5000 Unit(s) SubCutaneous every 8 hours  insulin lispro (ADMELOG) corrective regimen sliding scale   SubCutaneous every 6 hours  ketamine Infusion. 0.5 mG/kG/Hr IV Continuous <Continuous>  midazolam Infusion 0.02 mG/kG/Hr IV Continuous <Continuous>  multivitamin 1 Tablet(s) Oral daily  norepinephrine Infusion 0.05 MICROgram(s)/kG/Min IV Continuous <Continuous>  pantoprazole  Injectable 40 milliGRAM(s) IV Push daily  polyethylene glycol 3350 17 Gram(s) Oral daily  propofol Infusion 20 MICROgram(s)/kG/Min IV Continuous <Continuous>  senna Syrup 10 milliLiter(s) Oral at bedtime    PRN Inpatient Medications      REVIEW OF SYSTEMS  --------------------------------------------------------------------------------  Unable to assess due to clinical condition    VITALS/PHYSICAL EXAM  --------------------------------------------------------------------------------  T(C): 38 (03-18-21 @ 08:00), Max: 38 (03-17-21 @ 18:01)  HR: 126 (03-18-21 @ 08:28) (119 - 128)  BP: --  RR: 34 (03-18-21 @ 08:00) (33 - 35)  SpO2: 90% (03-18-21 @ 08:28) (86% - 92%)  Wt(kg): --      03-17-21 @ 07:01  -  03-18-21 @ 07:00  --------------------------------------------------------  IN: 1866.7 mL / OUT: 1145 mL / NET: 721.7 mL    03-18-21 @ 07:01  -  03-18-21 @ 08:52  --------------------------------------------------------  IN: 179.4 mL / OUT: 1300 mL / NET: -1120.6 mL    Physical Exam:  	Gen: intubated  	HEENT: intubated, +ETT  	Pulm: mechanical breath sounds  	CV: + tachycardic   	GI: proned  	: noel catheter in place  	MSK: no edema in lower extremity              Neuro: sedated      LABS/STUDIES  --------------------------------------------------------------------------------              11.1   17.08 >-----------<  354      [03-18-21 @ 03:49]              35.7     143  |  103  |  69  ----------------------------<  93      [03-18-21 @ 03:49]  4.9   |  31  |  2.16        Ca     9.0     [03-18-21 @ 03:49]      Mg     2.8     [03-18-21 @ 03:49]      Phos  4.0     [03-18-21 @ 03:49]    Creatinine Trend:  SCr 2.16 [03-18 @ 03:49]  SCr 2.04 [03-17 @ 00:49]  SCr 2.10 [03-16 @ 01:12]  SCr 1.67 [03-15 @ 02:51]  SCr 1.53 [03-14 @ 05:00]    Urinalysis - [03-12-21 @ 12:14]      Color Yellow / Appearance Slightly Turbid / SG 1.040 / pH 6.0      Gluc Trace / Ketone Small  / Bili Negative / Urobili 3 mg/dL       Blood Large / Protein 100 mg/dL / Leuk Est Negative / Nitrite Negative       / WBC 17 / Hyaline 27 / Gran  / Sq Epi  / Non Sq Epi 8 / Bacteria Negative

## 2021-03-18 NOTE — PROGRESS NOTE ADULT - SUBJECTIVE AND OBJECTIVE BOX
Medicine Progress Note    Patient is a 60y old  Male who presents with a chief complaint of COVID-19 Pneumonia (18 Mar 2021 15:55)      SUBJECTIVE / OVERNIGHT EVENTS:    ADDITIONAL REVIEW OF SYSTEMS:    MEDICATIONS  (STANDING):  ALBUTerol    90 MICROgram(s) HFA Inhaler 2 Puff(s) Inhalation every 4 hours  ascorbic acid 500 milliGRAM(s) Oral daily  aspirin  chewable 81 milliGRAM(s) Oral daily  budesonide 160 MICROgram(s)/formoterol 4.5 MICROgram(s) Inhaler 2 Puff(s) Inhalation two times a day  buMETAnide Injectable 2 milliGRAM(s) IV Push two times a day  chlorhexidine 0.12% Liquid 15 milliLiter(s) Oral Mucosa every 12 hours  cholecalciferol 2000 Unit(s) Oral daily  cisatracurium Infusion 3 MICROgram(s)/kG/Min (14.8 mL/Hr) IV Continuous <Continuous>  dextrose 40% Gel 15 Gram(s) Oral once  dextrose 5%. 1000 milliLiter(s) (50 mL/Hr) IV Continuous <Continuous>  dextrose 5%. 1000 milliLiter(s) (100 mL/Hr) IV Continuous <Continuous>  dextrose 50% Injectable 25 Gram(s) IV Push once  dextrose 50% Injectable 12.5 Gram(s) IV Push once  dextrose 50% Injectable 25 Gram(s) IV Push once  diazepam    Tablet 10 milliGRAM(s) Oral every 8 hours  fenofibrate Tablet 48 milliGRAM(s) Oral daily  fentaNYL   Infusion..... 0.5 MICROgram(s)/kG/Hr (0.82 mL/Hr) IV Continuous <Continuous>  glucagon  Injectable 1 milliGRAM(s) IntraMuscular once  heparin   Injectable 5000 Unit(s) SubCutaneous every 8 hours  insulin lispro (ADMELOG) corrective regimen sliding scale   SubCutaneous every 6 hours  ketamine Infusion. 0.5 mG/kG/Hr (4.11 mL/Hr) IV Continuous <Continuous>  midazolam Infusion 0.02 mG/kG/Hr (1.64 mL/Hr) IV Continuous <Continuous>  multivitamin 1 Tablet(s) Oral daily  norepinephrine Infusion 0.05 MICROgram(s)/kG/Min (3.85 mL/Hr) IV Continuous <Continuous>  pantoprazole  Injectable 40 milliGRAM(s) IV Push daily  polyethylene glycol 3350 17 Gram(s) Oral daily  propofol Infusion 20 MICROgram(s)/kG/Min (9.85 mL/Hr) IV Continuous <Continuous>  senna Syrup 10 milliLiter(s) Oral at bedtime    MEDICATIONS  (PRN):    CAPILLARY BLOOD GLUCOSE      POCT Blood Glucose.: 135 mg/dL (18 Mar 2021 18:23)  POCT Blood Glucose.: 108 mg/dL (18 Mar 2021 12:01)  POCT Blood Glucose.: 117 mg/dL (18 Mar 2021 05:12)  POCT Blood Glucose.: 114 mg/dL (17 Mar 2021 23:57)    I&O's Summary    17 Mar 2021 07:01  -  18 Mar 2021 07:00  --------------------------------------------------------  IN: 1866.7 mL / OUT: 1145 mL / NET: 721.7 mL    18 Mar 2021 07:01  -  18 Mar 2021 20:10  --------------------------------------------------------  IN: 1121.7 mL / OUT: 2000 mL / NET: -878.3 mL        PHYSICAL EXAM:  Vital Signs Last 24 Hrs  T(C): 38 (18 Mar 2021 12:00), Max: 38.1 (18 Mar 2021 09:00)  T(F): 100.4 (18 Mar 2021 12:00), Max: 100.6 (18 Mar 2021 09:00)  HR: 119 (18 Mar 2021 19:07) (108 - 129)  BP: --  BP(mean): --  RR: 34 (18 Mar 2021 17:00) (33 - 35)  SpO2: 93% (18 Mar 2021 19:07) (88% - 93%)  CONSTITUTIONAL: NAD, well-developed, well-groomed  ENMT: Moist oral mucosa, no pharyngeal injection or exudates; normal dentition  RESPIRATORY: Normal respiratory effort; lungs are clear to auscultation bilaterally  CARDIOVASCULAR: Regular rate and rhythm, normal S1 and S2, no murmur/rub/gallop; No lower extremity edema; Peripheral pulses are 2+ bilaterally  ABDOMEN: Nontender to palpation, normoactive bowel sounds, no rebound/guarding; No hepatosplenomegaly  PSYCH: A+O to person, place, and time; affect appropriate  NEUROLOGY: CN 2-12 are intact and symmetric; no gross sensory deficits   SKIN: No rashes; no palpable lesions    LABS:                        11.1   17.08 )-----------( 354      ( 18 Mar 2021 03:49 )             35.7     03-18    143  |  103  |  69<H>  ----------------------------<  93  4.9   |  31  |  2.16<H>    Ca    9.0      18 Mar 2021 03:49  Phos  4.0     03-18  Mg     2.8     03-18      PT/INR - ( 18 Mar 2021 03:49 )   PT: 12.8 sec;   INR: 1.13 ratio                   COVID-19 PCR: Detected (28 Feb 2021 23:02)      RADIOLOGY & ADDITIONAL TESTS:  Imaging from Last 24 Hours:    Electrocardiogram/QTc Interval:    COORDINATION OF CARE:  Care Discussed with Consultants/Other Providers:   Medicine Progress Note    Patient is a 60y old  Male who presents with a chief complaint of COVID-19 Pneumonia (18 Mar 2021 15:55)      SUBJECTIVE / OVERNIGHT EVENTS: remain intubated     ADDITIONAL REVIEW OF SYSTEMS:    MEDICATIONS  (STANDING):  ALBUTerol    90 MICROgram(s) HFA Inhaler 2 Puff(s) Inhalation every 4 hours  ascorbic acid 500 milliGRAM(s) Oral daily  aspirin  chewable 81 milliGRAM(s) Oral daily  budesonide 160 MICROgram(s)/formoterol 4.5 MICROgram(s) Inhaler 2 Puff(s) Inhalation two times a day  buMETAnide Injectable 2 milliGRAM(s) IV Push two times a day  chlorhexidine 0.12% Liquid 15 milliLiter(s) Oral Mucosa every 12 hours  cholecalciferol 2000 Unit(s) Oral daily  cisatracurium Infusion 3 MICROgram(s)/kG/Min (14.8 mL/Hr) IV Continuous <Continuous>  dextrose 40% Gel 15 Gram(s) Oral once  dextrose 5%. 1000 milliLiter(s) (50 mL/Hr) IV Continuous <Continuous>  dextrose 5%. 1000 milliLiter(s) (100 mL/Hr) IV Continuous <Continuous>  dextrose 50% Injectable 25 Gram(s) IV Push once  dextrose 50% Injectable 12.5 Gram(s) IV Push once  dextrose 50% Injectable 25 Gram(s) IV Push once  diazepam    Tablet 10 milliGRAM(s) Oral every 8 hours  fenofibrate Tablet 48 milliGRAM(s) Oral daily  fentaNYL   Infusion..... 0.5 MICROgram(s)/kG/Hr (0.82 mL/Hr) IV Continuous <Continuous>  glucagon  Injectable 1 milliGRAM(s) IntraMuscular once  heparin   Injectable 5000 Unit(s) SubCutaneous every 8 hours  insulin lispro (ADMELOG) corrective regimen sliding scale   SubCutaneous every 6 hours  ketamine Infusion. 0.5 mG/kG/Hr (4.11 mL/Hr) IV Continuous <Continuous>  midazolam Infusion 0.02 mG/kG/Hr (1.64 mL/Hr) IV Continuous <Continuous>  multivitamin 1 Tablet(s) Oral daily  norepinephrine Infusion 0.05 MICROgram(s)/kG/Min (3.85 mL/Hr) IV Continuous <Continuous>  pantoprazole  Injectable 40 milliGRAM(s) IV Push daily  polyethylene glycol 3350 17 Gram(s) Oral daily  propofol Infusion 20 MICROgram(s)/kG/Min (9.85 mL/Hr) IV Continuous <Continuous>  senna Syrup 10 milliLiter(s) Oral at bedtime    MEDICATIONS  (PRN):    CAPILLARY BLOOD GLUCOSE      POCT Blood Glucose.: 135 mg/dL (18 Mar 2021 18:23)  POCT Blood Glucose.: 108 mg/dL (18 Mar 2021 12:01)  POCT Blood Glucose.: 117 mg/dL (18 Mar 2021 05:12)  POCT Blood Glucose.: 114 mg/dL (17 Mar 2021 23:57)    I&O's Summary    17 Mar 2021 07:01  -  18 Mar 2021 07:00  --------------------------------------------------------  IN: 1866.7 mL / OUT: 1145 mL / NET: 721.7 mL    18 Mar 2021 07:01  -  18 Mar 2021 20:10  --------------------------------------------------------  IN: 1121.7 mL / OUT: 2000 mL / NET: -878.3 mL        PHYSICAL EXAM:  Vital Signs Last 24 Hrs  T(C): 38 (18 Mar 2021 12:00), Max: 38.1 (18 Mar 2021 09:00)  T(F): 100.4 (18 Mar 2021 12:00), Max: 100.6 (18 Mar 2021 09:00)  HR: 119 (18 Mar 2021 19:07) (108 - 129)  BP: --  BP(mean): --  RR: 34 (18 Mar 2021 17:00) (33 - 35)  SpO2: 93% (18 Mar 2021 19:07) (88% - 93%)  CONSTITUTIONAL: NAD, well-developed, well-groomed  ENMT: Moist oral mucosa, no pharyngeal injection or exudates; normal dentition  RESPIRATORY: Normal respiratory effort; lungs are clear to auscultation bilaterally  CARDIOVASCULAR: Regular rate and rhythm, normal S1 and S2, no murmur/rub/gallop; No lower extremity edema; Peripheral pulses are 2+ bilaterally  ABDOMEN: Nontender to palpation, normoactive bowel sounds, no rebound/guarding; No hepatosplenomegaly  PSYCH: A+O to person, place, and time; affect appropriate  NEUROLOGY: CN 2-12 are intact and symmetric; no gross sensory deficits   SKIN: No rashes; no palpable lesions    LABS:                        11.1   17.08 )-----------( 354      ( 18 Mar 2021 03:49 )             35.7     03-18    143  |  103  |  69<H>  ----------------------------<  93  4.9   |  31  |  2.16<H>    Ca    9.0      18 Mar 2021 03:49  Phos  4.0     03-18  Mg     2.8     03-18      PT/INR - ( 18 Mar 2021 03:49 )   PT: 12.8 sec;   INR: 1.13 ratio                   COVID-19 PCR: Detected (28 Feb 2021 23:02)      RADIOLOGY & ADDITIONAL TESTS:  Imaging from Last 24 Hours:    Electrocardiogram/QTc Interval:    COORDINATION OF CARE:  Care Discussed with Consultants/Other Providers:

## 2021-03-18 NOTE — PROGRESS NOTE ADULT - SUBJECTIVE AND OBJECTIVE BOX
Date of Service: 03-18-21 @ 10:45    Patient is a 60y old  Male who presents with a chief complaint of COVID-19 Pneumonia (18 Mar 2021 08:51)      Any change in ROS:Cont to do poorly: sedated and intuabted and still requiring high conc of oxygen      MEDICATIONS  (STANDING):  ALBUTerol    90 MICROgram(s) HFA Inhaler 2 Puff(s) Inhalation every 4 hours  ascorbic acid 500 milliGRAM(s) Oral daily  aspirin  chewable 81 milliGRAM(s) Oral daily  budesonide 160 MICROgram(s)/formoterol 4.5 MICROgram(s) Inhaler 2 Puff(s) Inhalation two times a day  buMETAnide Injectable 2 milliGRAM(s) IV Push two times a day  chlorhexidine 0.12% Liquid 15 milliLiter(s) Oral Mucosa every 12 hours  cholecalciferol 2000 Unit(s) Oral daily  cisatracurium Infusion 3 MICROgram(s)/kG/Min (14.8 mL/Hr) IV Continuous <Continuous>  dextrose 40% Gel 15 Gram(s) Oral once  dextrose 5%. 1000 milliLiter(s) (100 mL/Hr) IV Continuous <Continuous>  dextrose 5%. 1000 milliLiter(s) (50 mL/Hr) IV Continuous <Continuous>  dextrose 50% Injectable 25 Gram(s) IV Push once  dextrose 50% Injectable 12.5 Gram(s) IV Push once  dextrose 50% Injectable 25 Gram(s) IV Push once  diazepam    Tablet 10 milliGRAM(s) Oral every 8 hours  fenofibrate Tablet 48 milliGRAM(s) Oral daily  fentaNYL   Infusion..... 0.5 MICROgram(s)/kG/Hr (0.82 mL/Hr) IV Continuous <Continuous>  glucagon  Injectable 1 milliGRAM(s) IntraMuscular once  heparin   Injectable 5000 Unit(s) SubCutaneous every 8 hours  insulin lispro (ADMELOG) corrective regimen sliding scale   SubCutaneous every 6 hours  ketamine Infusion. 0.5 mG/kG/Hr (4.11 mL/Hr) IV Continuous <Continuous>  midazolam Infusion 0.02 mG/kG/Hr (1.64 mL/Hr) IV Continuous <Continuous>  multivitamin 1 Tablet(s) Oral daily  norepinephrine Infusion 0.05 MICROgram(s)/kG/Min (3.85 mL/Hr) IV Continuous <Continuous>  pantoprazole  Injectable 40 milliGRAM(s) IV Push daily  polyethylene glycol 3350 17 Gram(s) Oral daily  propofol Infusion 20 MICROgram(s)/kG/Min (9.85 mL/Hr) IV Continuous <Continuous>  senna Syrup 10 milliLiter(s) Oral at bedtime    MEDICATIONS  (PRN):    Vital Signs Last 24 Hrs  T(C): 38.1 (18 Mar 2021 09:00), Max: 38.1 (18 Mar 2021 09:00)  T(F): 100.6 (18 Mar 2021 09:00), Max: 100.6 (18 Mar 2021 09:00)  HR: 125 (18 Mar 2021 10:00) (120 - 128)  BP: --  BP(mean): --  RR: 34 (18 Mar 2021 10:00) (33 - 35)  SpO2: 91% (18 Mar 2021 10:00) (86% - 92%)  Mode: AC/ CMV (Assist Control/ Continuous Mandatory Ventilation)  RR (machine): 34  TV (machine): 400  FiO2: 90  PEEP: 7  ITime: 0.7  MAP: 16  PIP: 38    I&O's Summary    17 Mar 2021 07:01  -  18 Mar 2021 07:00  --------------------------------------------------------  IN: 1866.7 mL / OUT: 1145 mL / NET: 721.7 mL    18 Mar 2021 07:01  -  18 Mar 2021 10:45  --------------------------------------------------------  IN: 269.1 mL / OUT: 1425 mL / NET: -1155.9 mL          Physical Exam:   GENERAL: NAD, well-groomed, well-developed  HEENT: JOAO/   Atraumatic, Normocephalic  ENMT: No tonsillar erythema, exudates, or enlargement; Moist mucous membranes, Good dentition, No lesions  NECK: Supple, No JVD, Normal thyroid  CHEST/LUNG: Clear to auscultaion  CVS: Regular rate and rhythm; No murmurs, rubs, or gallops  GI: : Soft, Nontender, Nondistended; Bowel sounds present  NERVOUS SYSTEM:  sedated and intubated and parlaysed:   EXTREMITIES:  - edema  LYMPH: No lymphadenopathy noted  SKIN: No rashes or lesions  ENDOCRINOLOGY: No Thyromegaly  PSYCH: sedated andparlaysed    Labs:  ABG - ( 18 Mar 2021 06:28 )  pH, Arterial: 7.32  pH, Blood: x     /  pCO2: 68    /  pO2: 62    / HCO3: 30    / Base Excess: 7.7   /  SaO2: 89.4                                        11.1   17.08 )-----------( 354      ( 18 Mar 2021 03:49 )             35.7                         11.1   16.20 )-----------( 346      ( 17 Mar 2021 00:49 )             35.9                         10.8   16.94 )-----------( 321      ( 16 Mar 2021 01:12 )             35.3                         11.2   18.74 )-----------( 331      ( 15 Mar 2021 02:51 )             36.7     03-18    143  |  103  |  69<H>  ----------------------------<  93  4.9   |  31  |  2.16<H>  03-17    139  |  99  |  60<H>  ----------------------------<  98  4.4   |  32<H>  |  2.04<H>  03-16    140  |  102  |  52<H>  ----------------------------<  121<H>  4.8   |  33<H>  |  2.10<H>  03-15    142  |  105  |  41<H>  ----------------------------<  131<H>  5.1   |  31  |  1.67<H>    Ca    9.0      18 Mar 2021 03:49  Ca    9.0      17 Mar 2021 00:49  Phos  4.0     03-18  Phos  4.1     03-17  Mg     2.8     03-18  Mg     2.6     03-17      CAPILLARY BLOOD GLUCOSE      POCT Blood Glucose.: 117 mg/dL (18 Mar 2021 05:12)  POCT Blood Glucose.: 114 mg/dL (17 Mar 2021 23:57)  POCT Blood Glucose.: 94 mg/dL (17 Mar 2021 17:12)  POCT Blood Glucose.: 85 mg/dL (17 Mar 2021 11:29)        PT/INR - ( 18 Mar 2021 03:49 )   PT: 12.8 sec;   INR: 1.13 ratio             D-Dimer Assay, Quantitative: 1608 ng/mL DDU (03-12 @ 05:24)  Procalcitonin, Serum: 0.34 ng/mL (03-16 @ 06:34)  Procalcitonin, Serum: 0.34 ng/mL (03-16 @ 01:12)        RECENT CULTURES:  03-14 @ 10:49 .Sputum Sputum       Rare polymorphonuclear leukocytes per low power field  No organisms seen per oil power field           No growth at 48 hours    03-12 @ 11:46 .Blood Blood-Venous     r< from: Xray Chest 1 View- PORTABLE-Urgent (Xray Chest 1 View- PORTABLE-Urgent .) (03.16.21 @ 07:01) >    EXAM:  XR CHEST PORTABLE URGENT 1V        PROCEDURE DATE:  Mar 16 2021         INTERPRETATION:  INDICATION: Endotracheal tube advancement.    TECHNIQUE: Single portal view of the chest.    COMPARISON: 3/12/2021    FINDINGS: The cardiac silhouette is normal in size. There is an endotracheal tube with its tip above the fina. There is a right IJ central venous catheter in good position. There is an enteric feeding tube with its tip below level of this film. There are bilateral diffuse airspace opacities, worse in the left lung, not significantly changed.    IMPRESSION: Lines and tubes in good position. Unchanged bilateral airspace opacities, worse in the left lung.              HERIBERTO DAVID MD; Attending Radiologist  This document has been electronically signed. Mar 17 2021 11:41AM    < end of copied text >             No Growth Final    03-12 @ 11:30 .Blood Blood-Arterial                No Growth Final    03-11 @ 20:24 .Nose Nose                No MRSA isolated  No Staph Aureus (MSSA) isolated "This can represent normal nasal  carriage.  PCR is more sensitive for identifying MRSA/MSSA carriage"    03-11 @ 14:58 .Blood Blood-Peripheral                No Growth Final          RESPIRATORY CULTURES:          Studies  Chest X-RAY  CT SCAN Chest   Venous Dopplers: LE:   CT Abdomen  Others

## 2021-03-18 NOTE — PROGRESS NOTE ADULT - PROBLEM SELECTOR PLAN 1
Pt with non-oliguric LUDIVINA likely 2/2 hemodynamically mediated ATN in the setting of shock, covid19 pneumonia/ARDS.  On admission sCr 1.0 on 2/28, rise sCr after intubation from 0.97 to 1.53 on 3/14 then 1.67 on 3/15/21.  UA showed protein/blood/rbc/wbc.  Currently patient is intubated/sedated, on vasopressor support, non-oliguric on bumex with last SCr 2.1 stable      - Currently no absolute/urgent indication for dialysis, monitor for renal recovery  - continue bumex IV 2mg BID for now given high FIO2 (goal net negative 1-2L), titrate as needed. If UOP doesn't improve would consider bumex drip 1mg/hr (titrate up as needed)  - BP optimization/antibiotic/blood transfusion per ICU team  - monitor BMP, strict I/O, avoid nephrotoxic agents (NSAIDs, PPI, contrast), renally dose medications per GFR. Monitor electrolytes while on diuretic. Consider discontinue/decreasing dose vitamin C (risk oxalate nephropathy)

## 2021-03-18 NOTE — PROGRESS NOTE ADULT - SUBJECTIVE AND OBJECTIVE BOX
INTERVAL HPI/OVERNIGHT EVENTS: No acute events overnight    SUBJECTIVE: Unable to obtain, pt intuabted/sedated    OBJECTIVE:    VITAL SIGNS:  ICU Vital Signs Last 24 Hrs  T(C): 38.1 (18 Mar 2021 09:00), Max: 38.1 (18 Mar 2021 09:00)  T(F): 100.6 (18 Mar 2021 09:00), Max: 100.6 (18 Mar 2021 09:00)  HR: 125 (18 Mar 2021 11:00) (120 - 128)  BP: --  BP(mean): --  ABP: 112/67 (18 Mar 2021 11:00) (112/67 - 151/81)  ABP(mean): 81 (18 Mar 2021 11:00) (77 - 106)  RR: 34 (18 Mar 2021 11:00) (33 - 35)  SpO2: 91% (18 Mar 2021 11:00) (86% - 92%)    Mode: AC/ CMV (Assist Control/ Continuous Mandatory Ventilation), RR (machine): 34, TV (machine): 400, FiO2: 90, PEEP: 7, ITime: 0.7, MAP: 17, PIP: 37    03-17 @ 07:01  -  03-18 @ 07:00  --------------------------------------------------------  IN: 1866.7 mL / OUT: 1145 mL / NET: 721.7 mL    03-18 @ 07:01 - 03-18 @ 11:31  --------------------------------------------------------  IN: 428.5 mL / OUT: 1475 mL / NET: -1046.5 mL      CAPILLARY BLOOD GLUCOSE      POCT Blood Glucose.: 117 mg/dL (18 Mar 2021 05:12)      PHYSICAL EXAM:    General: NAD  HEENT: NC/AT; PERRL, clear conjunctiva  Neck: supple  Respiratory: CTA b/l  Cardiovascular: +S1/S2; RRR  Abdomen: soft, NT/ND; +BS x4  Extremities: WWP, 2+ peripheral pulses b/l; no LE edema  Skin: normal color and turgor; no rash  Neurological:    MEDICATIONS:  MEDICATIONS  (STANDING):  ALBUTerol    90 MICROgram(s) HFA Inhaler 2 Puff(s) Inhalation every 4 hours  ascorbic acid 500 milliGRAM(s) Oral daily  aspirin  chewable 81 milliGRAM(s) Oral daily  budesonide 160 MICROgram(s)/formoterol 4.5 MICROgram(s) Inhaler 2 Puff(s) Inhalation two times a day  buMETAnide Injectable 2 milliGRAM(s) IV Push two times a day  chlorhexidine 0.12% Liquid 15 milliLiter(s) Oral Mucosa every 12 hours  cholecalciferol 2000 Unit(s) Oral daily  cisatracurium Infusion 3 MICROgram(s)/kG/Min (14.8 mL/Hr) IV Continuous <Continuous>  dextrose 40% Gel 15 Gram(s) Oral once  dextrose 5%. 1000 milliLiter(s) (100 mL/Hr) IV Continuous <Continuous>  dextrose 5%. 1000 milliLiter(s) (50 mL/Hr) IV Continuous <Continuous>  dextrose 50% Injectable 25 Gram(s) IV Push once  dextrose 50% Injectable 12.5 Gram(s) IV Push once  dextrose 50% Injectable 25 Gram(s) IV Push once  diazepam    Tablet 10 milliGRAM(s) Oral every 8 hours  fenofibrate Tablet 48 milliGRAM(s) Oral daily  fentaNYL   Infusion..... 0.5 MICROgram(s)/kG/Hr (0.82 mL/Hr) IV Continuous <Continuous>  glucagon  Injectable 1 milliGRAM(s) IntraMuscular once  heparin   Injectable 5000 Unit(s) SubCutaneous every 8 hours  insulin lispro (ADMELOG) corrective regimen sliding scale   SubCutaneous every 6 hours  ketamine Infusion. 0.5 mG/kG/Hr (4.11 mL/Hr) IV Continuous <Continuous>  midazolam Infusion 0.02 mG/kG/Hr (1.64 mL/Hr) IV Continuous <Continuous>  multivitamin 1 Tablet(s) Oral daily  norepinephrine Infusion 0.05 MICROgram(s)/kG/Min (3.85 mL/Hr) IV Continuous <Continuous>  pantoprazole  Injectable 40 milliGRAM(s) IV Push daily  polyethylene glycol 3350 17 Gram(s) Oral daily  propofol Infusion 20 MICROgram(s)/kG/Min (9.85 mL/Hr) IV Continuous <Continuous>  senna Syrup 10 milliLiter(s) Oral at bedtime    MEDICATIONS  (PRN):      ALLERGIES:  Allergies    No Known Allergies    Intolerances        LABS:                        11.1   17.08 )-----------( 354      ( 18 Mar 2021 03:49 )             35.7     03-18    143  |  103  |  69<H>  ----------------------------<  93  4.9   |  31  |  2.16<H>    Ca    9.0      18 Mar 2021 03:49  Phos  4.0     03-18  Mg     2.8     03-18      PT/INR - ( 18 Mar 2021 03:49 )   PT: 12.8 sec;   INR: 1.13 ratio               RADIOLOGY & ADDITIONAL TESTS: Reviewed.

## 2021-03-18 NOTE — PROGRESS NOTE ADULT - ASSESSMENT
Medical team notified temp 100.5   ASSESSMENT:  60y Male w/ PMHx of a Brain tumor s/p resection in 2018 who presented 3/1/21 with worsening shortness of breath and non-productive cough x1 week, worsening hypoxia 2/2 COVID PNA requiring intubation 3/12 and transfer to [facility]    # Hypoxic respiratory failure 2/2 COVID  -intubated on [facility] vent   -MICU following     Hypotension   - Norepinephrine for MAP >65    malnutrition  - TF   - GI ppx w/ protonix  - Senna/Miralax     PNA  - Increasing leukocytosis with elevated procalcitonin & fevers, concern for superimposed bacterial infection  -started on Iv abx     Hx of brin tumor s/p resection   -stable     poor prognosis remain full code     krista nelson MD

## 2021-03-18 NOTE — PROGRESS NOTE ADULT - ATTENDING COMMENTS
Gong: I have seen and examined the patient face to face, have reviewed and addended the HPI, PE and a/p as necessary.    61 yo M with brain tumor, s/p resection in 2018 a/w COVID 19 Pneumonia s/p intubation on 3/12 and now ARDS requiring prone positioning.    Neuro: sedated with fentanyl ketamine and versed, paralyzed on nimbex; off propofol 2/2 elevated Triglycerides  CV: Vasoplegic shock 2/2 sedation on levophed; maintain MAP>65  Pulm: COVID Pneumonia with ARDS - supined on 3/17; ETT repositioned on 3/17; will repeat ABG to reassess; currently will not benefit from proning given no change with ABGs when proned.  GI: C/w tube feeds; Protonix  Renal/Metabolic: LUDIVINA - avoid nephrotoxic agents; trend creatinine; will attempt diuresis with bumex BID; good urine output; with net negative 1L; monitor I and Os  ID: Cx negative; c/w monitoring off antibiotics  Heme: DVT with HSQ  Endo: ISS coverage; dexamethasone for elevated crp.    Dispo: Remains critically ill with severe ARDS  2/2 COVID pneumonia.

## 2021-03-18 NOTE — PROGRESS NOTE ADULT - ATTENDING COMMENTS
Pt care and plan discussed and reviewed with NP. Plan as outlined above edited by me to reflect our discussion.  Thirty five minutes of critical care time spent

## 2021-03-18 NOTE — PROGRESS NOTE ADULT - SUBJECTIVE AND OBJECTIVE BOX
Patient is a 60y old  Male who presents with a chief complaint of COVID-19 Pneumonia (18 Mar 2021 11:30)      INTERVAL HISTORY:   intubated/ sedated      MEDICATIONS:  buMETAnide Injectable 2 milliGRAM(s) IV Push two times a day  norepinephrine Infusion 0.05 MICROgram(s)/kG/Min IV Continuous <Continuous>    PHYSICAL EXAM:  T(C): 38 (03-18-21 @ 12:00), Max: 38.1 (03-18-21 @ 09:00)  HR: 123 (03-18-21 @ 15:00) (108 - 129)  BP: --  RR: 34 (03-18-21 @ 15:00) (33 - 35)  SpO2: 91% (03-18-21 @ 15:00) (88% - 92%)  Wt(kg): --  I&O's Summary    17 Mar 2021 07:01  -  18 Mar 2021 07:00  --------------------------------------------------------  IN: 1866.7 mL / OUT: 1145 mL / NET: 721.7 mL    18 Mar 2021 07:01  -  18 Mar 2021 15:55  --------------------------------------------------------  IN: 912.3 mL / OUT: 1850 mL / NET: -937.7 mL    Appearance: In no distress	  HEENT:    PERRL, EOMI	  Cardiovascular:  S1 S2, No JVD  Respiratory: Lungs clear to auscultation	  Gastrointestinal:  Soft, Non-tender, + BS	  Vascularature:  No edema of LE  Psychiatric: Appropriate affect   Neuro: no acute focal deficits                         11.1   17.08 )-----------( 354      ( 18 Mar 2021 03:49 )             35.7     03-18    143  |  103  |  69<H>  ----------------------------<  93  4.9   |  31  |  2.16<H>    Ca    9.0      18 Mar 2021 03:49  Phos  4.0     03-18  Mg     2.8     03-18    Labs personally reviewed    ASSESSMENT/PLAN: 	  This is a 59yo Male w/ PMHx of a Brain tumor s/p resection in 2018, who is presenting with worsening shortness of breath and non-productive cough for past week. Found to be COVID-19 Positive. Patient Sinus tachycardic in Emergency room.     1. Sinus Tachycardia 2/2 to worsening hypoxia from COVID-19 Pneumonia   - trop negative  - D Dimer very elevated   - (-) for DVT, changed to ppx dose by ICU   - can obtain TTE to eval heart function once acute issues resolve    2. Hypotension - likely 2/2 sedation and paralytics  - NE to keep MAP >56    3. Hypoxemia 2/2 Covid-19 pna  - remdesevir completed. Deca for 10 days completed  - completed decadron taper  - s/p RRT- intubated/ sedated in ICU   - remains intubated and sedated    4. Hyponatremic- 134 on admission likely from dehydration   -   normalized     5. Transaminitis  - elevated LFTs on admission, albumin slightly low however now nearly normalized, bilirubin wnl   - continue to monitor     6.  Fevers/Leukocytosis -  Plan: s/p Vanco and IV meropenem - under ICU care  bcx 3/7 - negative   bcx 3/11 NGTD  Observe off Abx    7. LUDIVINA - s/p Bumex gtt, Bumex 2mg IV BID to keep euvolemic given ARDS  - HD if worsening acidosis          Arpita Rodriguez ANP-C  Jaswant Weiss DO [facility]  Cardiovascular Medicine  [address] [place] [address], [address]  Office: [phone]  Cell: [phone] Patient is a 60y old  Male who presents with a chief complaint of COVID-19 Pneumonia (18 Mar 2021 11:30)      INTERVAL HISTORY:   intubated/ sedated     ROS: unable to obtain as sedated/intubated    MEDICATIONS:  buMETAnide Injectable 2 milliGRAM(s) IV Push two times a day  norepinephrine Infusion 0.05 MICROgram(s)/kG/Min IV Continuous <Continuous>    PHYSICAL EXAM:  T(C): 38 (03-18-21 @ 12:00), Max: 38.1 (03-18-21 @ 09:00)  HR: 123 (03-18-21 @ 15:00) (108 - 129)  BP: --  RR: 34 (03-18-21 @ 15:00) (33 - 35)  SpO2: 91% (03-18-21 @ 15:00) (88% - 92%)  Wt(kg): --  I&O's Summary    17 Mar 2021 07:01  -  18 Mar 2021 07:00  --------------------------------------------------------  IN: 1866.7 mL / OUT: 1145 mL / NET: 721.7 mL    18 Mar 2021 07:01  -  18 Mar 2021 15:55  --------------------------------------------------------  IN: 912.3 mL / OUT: 1850 mL / NET: -937.7 mL    Appearance: In no distress	  HEENT:    PERRL, EOMI	  Cardiovascular:  S1 S2, No JVD  Respiratory: Lungs clear to auscultation	  Gastrointestinal:  Soft, Non-tender, + BS	  Vascularature:  No edema of LE  Psychiatric: Appropriate affect   Neuro: no acute focal deficits                         11.1   17.08 )-----------( 354      ( 18 Mar 2021 03:49 )             35.7     03-18    143  |  103  |  69<H>  ----------------------------<  93  4.9   |  31  |  2.16<H>    Ca    9.0      18 Mar 2021 03:49  Phos  4.0     03-18  Mg     2.8     03-18    Labs personally reviewed    ASSESSMENT/PLAN: 	  This is a 61yo Male w/ PMHx of a Brain tumor s/p resection in 2018, who is presenting with worsening shortness of breath and non-productive cough for past week. Found to be COVID-19 Positive. Patient Sinus tachycardic in Emergency room.     1. Sinus Tachycardia 2/2 to worsening hypoxia from COVID-19 Pneumonia   - trop negative  - D Dimer very elevated   - (-) for DVT, changed to ppx dose by ICU   - can obtain TTE to eval heart function once acute issues resolve    2. Hypotension - likely 2/2 sedation and paralytics  - NE to keep MAP >56    3. Hypoxemia 2/2 Covid-19 pna  - remdesevir completed. Deca for 10 days completed  - completed decadron taper  - s/p RRT- intubated/ sedated in ICU   - remains intubated and sedated    4. Hyponatremic- 134 on admission likely from dehydration   -   normalized     5. Transaminitis  - elevated LFTs on admission, albumin slightly low however now nearly normalized, bilirubin wnl   - continue to monitor     6.  Fevers/Leukocytosis -  Plan: s/p Vanco and IV meropenem - under ICU care  bcx 3/7 - negative   bcx 3/11 NGTD  Observe off Abx    7. LUDIVINA - s/p Bumex gtt, Bumex 2mg IV BID to keep euvolemic given ARDS  - HD if worsening acidosis          Arpita Rodriguez ANP-C  Jaswant Weiss DO [facility] [place]  Cardiovascular Medicine  [address] [place] [address], [address]  Office: [phone]  Cell: [phone]

## 2021-03-18 NOTE — PROGRESS NOTE ADULT - PROBLEM SELECTOR PLAN 2
Pt with acidosis, primary respiratory acidosis in the setting of COVID19 pneumonia/ARDS.   - Ventilation optimization per ICU team, diuretic as outline above.  If acidosis worsens then will need to consider dialysis (dialysis consent obtained).  Monitor blood gas/serum bicarb    If you have any questions, please feel free to contact me  Deisi Munoz  Nephrology Fellow  Pager: [phone] / [facility]: [phone]  (After 5pm or on weekends please page the on-call fellow)

## 2021-03-18 NOTE — PROGRESS NOTE ADULT - ATTENDING COMMENTS
LUDIVINA  Metabolic Acidosis    Cont to monitor for now on Bumex, no needs for RRT right now  Will cont to monitor Is/Os daily weights

## 2021-03-18 NOTE — PROGRESS NOTE ADULT - ASSESSMENT
60y Male w/ PMHx of a Brain tumor s/p resection in 2018 who presented 3/1/21 with worsening shortness of breath and non-productive cough x1 week, worsening hypoxia 2/2 COVID PNA requiring intubation 3/12 and transfer to [facility].     Neurologic:   - Sedation with fentanyl, ketamine, and versed (IVP). Weaned off propofol due to hyper triglycerides   - Chemically paralyzed with nimbex  - Continue sedation/paralytics today with goal to re-prone     Respiratory:   - Hypoxic & hypercarbic respiratory failure 2/2 COVID  - VC/AC 34/400/7/90%  - Patient is currently supinated at ~10am. Plan to re-prone    - Continue albuterol  - Repeat ABG improved     Cardiovascular:   - Hypotensive, likely vasoplegic shock 2/2 sedation/paralysis  - Norepinephrine for MAP >65 PRN (currently off) MAP 92  - Continue ASA    Gastrointestinal/Nutrition:   - Jevity @ goal 10cc/hr  - GI ppx w/ protonix  - Senna/Miralax     Renal/Genitourinary:   - Cr increasing, making UO Cr 2.16->2.04  - Nephro following, appreciate recs; Bumex 2mg IVP bid for now, monitor UO    - Spouse (Daysi Guerra) via telephone conversation agrees to dialytic therapy if needed a/p HD note     Hematologic:   - H/H stable  - Elevated d-dimer, previously on heparin drip, now d/c'd  - BLE DVT study 3/13 negative, noted superficial saphenous vein thrombosis on LLE  - Discontinued lovenox d/t worsening renal function  - Prophylaxis Hep SubQ 5k BID     Infectious Disease:   - COVID: s/p remdesivir and Dex   - Improved leukocytosis with elevated procalcitonin, low concern for superimposed bacterial infection  - s/p Zosyn (3/07 - 3/11) and s/p Meropenem / Vancomycin (3/11 - 3/13)  - BCx 3/11 & 3/12 NGTD, sputum cx 3/14 NGTD   - Observe off abx     Endocrine:   - HgbA1C 6.0  - Blood glucose levels in target range ()  - Continue correctional scale insulin    Tubes/Lines/Drains:   - RIJ TLC  - Left radial arterial line  - Ariza  - ETT  - OGT    Disposition: RCU when medically stable

## 2021-03-19 NOTE — PROGRESS NOTE ADULT - ASSESSMENT
ASSESSMENT:  60y Male w/ PMHx of a Brain tumor s/p resection in 2018 who presented 3/1/21 with worsening shortness of breath and non-productive cough x1 week, worsening hypoxia 2/2 COVID PNA requiring intubation 3/12 and transfer to [facility]    # Hypoxic respiratory failure 2/2 COVID  -intubated on [facility] [place] [facility] vent   -MICU following     Hypotension   - Norepinephrine for MAP >65    malnutrition  - TF   - GI ppx w/ protonix  - Senna/Miralax     PNA  - Increasing leukocytosis with elevated procalcitonin & fevers, concern for superimposed bacterial infection  -started on Iv abx     Hx of brin tumor s/p resection   -stable     poor prognosis remain full code     krista nelson MD

## 2021-03-19 NOTE — PROGRESS NOTE ADULT - SUBJECTIVE AND OBJECTIVE BOX
[facility] DIVISION OF KIDNEY DISEASES AND HYPERTENSION   -- FOLLOW UP NOTE --   Anna Loera  Nephrology Fellow  Pager [facility]: [phone]   /  Pager [facility]: [phone]  (after 5pm or weekend please page the on-call fellow)  ======================================================  24 hour events/subjective:  - overnight febrile tmax 100.2F, tachycardic 120s, no hypotensive episode MAP>65 on vasopressor, total UOP noel 2.8L (net neg 0.79L) in the past 24hr  - patient seen and examined at bedside this morning intubated/sedated FIO2 100%  - vitals/lab/medications reviewed, noted for stable sCr 2.2, K 4.8    PAST HISTORY  --------------------------------------------------------------------------------  No significant changes to PMH, PSH, FHx, SHx, unless otherwise noted    ALLERGIES & MEDICATIONS  --------------------------------------------------------------------------------  Allergies  No Known Allergies    Intolerances    Standing Inpatient Medications  ALBUTerol    90 MICROgram(s) HFA Inhaler 2 Puff(s) Inhalation every 4 hours  ascorbic acid 500 milliGRAM(s) Oral daily  aspirin  chewable 81 milliGRAM(s) Oral daily  budesonide 160 MICROgram(s)/formoterol 4.5 MICROgram(s) Inhaler 2 Puff(s) Inhalation two times a day  buMETAnide Injectable 2 milliGRAM(s) IV Push two times a day  chlorhexidine 0.12% Liquid 15 milliLiter(s) Oral Mucosa every 12 hours  cholecalciferol 2000 Unit(s) Oral daily  cisatracurium Infusion 3 MICROgram(s)/kG/Min IV Continuous <Continuous>  dextrose 40% Gel 15 Gram(s) Oral once  dextrose 5%. 1000 milliLiter(s) IV Continuous <Continuous>  dextrose 5%. 1000 milliLiter(s) IV Continuous <Continuous>  dextrose 50% Injectable 25 Gram(s) IV Push once  dextrose 50% Injectable 12.5 Gram(s) IV Push once  dextrose 50% Injectable 25 Gram(s) IV Push once  diazepam    Tablet 10 milliGRAM(s) Oral every 8 hours  fenofibrate Tablet 48 milliGRAM(s) Oral daily  fentaNYL   Infusion..... 0.5 MICROgram(s)/kG/Hr IV Continuous <Continuous>  glucagon  Injectable 1 milliGRAM(s) IntraMuscular once  heparin   Injectable 5000 Unit(s) SubCutaneous every 8 hours  insulin lispro (ADMELOG) corrective regimen sliding scale   SubCutaneous every 6 hours  ketamine Infusion. 0.5 mG/kG/Hr IV Continuous <Continuous>  midazolam Infusion 0.02 mG/kG/Hr IV Continuous <Continuous>  multivitamin 1 Tablet(s) Oral daily  norepinephrine Infusion 0.05 MICROgram(s)/kG/Min IV Continuous <Continuous>  pantoprazole  Injectable 40 milliGRAM(s) IV Push daily  polyethylene glycol 3350 17 Gram(s) Oral daily  propofol Infusion 20 MICROgram(s)/kG/Min IV Continuous <Continuous>  senna Syrup 10 milliLiter(s) Oral at bedtime    PRN Inpatient Medications    REVIEW OF SYSTEMS  unable to obtain d/t intubated/sedated    VITALS/PHYSICAL EXAM  --------------------------------------------------------------------------------  T(C): 37.9 (03-19-21 @ 06:00), Max: 38.1 (03-18-21 @ 09:00)  HR: 129 (03-19-21 @ 07:00) (108 - 129)  BP: --  RR: 33 (03-19-21 @ 07:00) (33 - 34)  SpO2: 90% (03-19-21 @ 07:00) (87% - 94%)  Wt(kg): --    03-18-21 @ 07:01  -  03-19-21 @ 07:00  --------------------------------------------------------  IN: 2024.3 mL / OUT: 2815 mL / NET: -790.7 mL    03-19-21 @ 07:01  -  03-19-21 @ 07:49  --------------------------------------------------------  IN: 69.7 mL / OUT: 75 mL / NET: -5.3 mL    Physical Exam:  	Gen: intubated  	HEENT: intubated, +ETT  	Pulm: mechanical breath sounds  	CV: + tachycardic   	GI:   	: noel catheter in place  	MSK: no edema in lower extremity              Neuro: sedated    LABS/STUDIES  --------------------------------------------------------------------------------              11.0   17.15 >-----------<  350      [03-19-21 @ 01:34]              35.1     142  |  103  |  80  ----------------------------<  130      [03-19-21 @ 01:34]  4.8   |  30  |  2.22        Ca     9.6     [03-19-21 @ 01:34]      Mg     2.7     [03-19-21 @ 01:34]      Phos  4.6     [03-19-21 @ 01:34]    PT/INR: PT 13.7 , INR 1.21       [03-19-21 @ 01:34]    Creatinine Trend:  SCr 2.22 [03-19 @ 01:34]  SCr 2.16 [03-18 @ 03:49]  SCr 2.04 [03-17 @ 00:49]  SCr 2.10 [03-16 @ 01:12]  SCr 1.67 [03-15 @ 02:51]    Urinalysis - [03-12-21 @ 12:14]      Color Yellow / Appearance Slightly Turbid / SG 1.040 / pH 6.0      Gluc Trace / Ketone Small  / Bili Negative / Urobili 3 mg/dL       Blood Large / Protein 100 mg/dL / Leuk Est Negative / Nitrite Negative       / WBC 17 / Hyaline 27 / Gran  / Sq Epi  / Non Sq Epi 8 / Bacteria Negative      Ferritin 696      [03-12-21 @ 05:24]  TSH 0.18      [03-01-21 @ 08:07]  Lipid: chol --, , HDL --, LDL --      [03-17-21 @ 00:49]    HCV 0.12, Nonreact      [03-02-21 @ 13:44]

## 2021-03-19 NOTE — PROGRESS NOTE ADULT - ASSESSMENT
A 59yo Male w/ PMHx of a Brain tumor s/p resection in 2018, who is presenting with worsening shortness of breath and non-productive cough for past week. Patient got admitted due to COVID PNA requiring intubation 3/12, and transfer to [facility].    PLAN    Neuro    -sedated, on fentanyl/ Versed/ ketamine/nimbex  - d/c nimbex 1st, then plan to d/c versed    Respiratory:  - On vent, mode-volume AC, RR 34, , Fio2 100%, PEEP 8  - ABG  -Continue symbicort/albuterol    Cardio  - no pressors, On aspirin  -HR elevated- -129    GI  - Due to increased residual , tube feeds were on hold  - Plan to start tickle feeds with nepro  - On Protonix  - Bowel regimen with senna/miralax      - Ariza (+)  - Strict intake and output monitoring  - Creatinine 2.22, on bumex 2mg IVP bid    Endo   - ISS  - fingerstick blood glucose monitoring  ID   - Febrile, tyelnol PRN. Will continue to monitor  - No antibiotics ordered  - blood cultures 3/18/21, sputum cultures pending    Hem  -elevated D dimer, on heparin 5000 Units SQ q8h          GOC- Full cod     A 59yo Male w/ PMHx of a Brain tumor s/p resection in 2018, who is presenting with worsening shortness of breath and non-productive cough for past week. Patient got admitted due to COVID PNA requiring intubation 3/12, and transfer to [facility].    PLAN    Neuro    -sedated, on fentanyl/ Versed/ ketamine/nimbex,   - d/c nimbex 1st, then plan to d/c versed  - nimbex d/giovanni, restarted propofol, spo2 drops to 84. Restart nimbex.    Respiratory:  - On vent, mode-volume AC, RR 34, , Fio2 100%, PEEP 8  - ABG  -Continue symbicort/albuterol    Cardio  - no pressors, On aspirin  -HR elevated- -129    GI  - Due to increased residual , tube feeds were on hold  - Plan to start tickle feeds with nepro  - Reglan IVP PRN  - On Protonix  - Bowel regimen with senna/miralax      - Ariza (+)  - Strict intake and output monitoring  - Creatinine 2.22, on bumex 2mg IVP bid    Endo   - ISS  - fingerstick blood glucose monitoring  ID   - Febrile, tyelnol PRN. Will continue to monitor  - No antibiotics ordered  - blood cultures 3/18/21, sputum culture- pending    Hem  -elevated D dimer, on heparin 5000 Units SQ q8h    GOC- Full code     A 59yo Male w/ PMHx of a Brain tumor s/p resection in 2018, who is presenting with worsening shortness of breath and non-productive cough for past week. Patient got admitted due to COVID PNA requiring intubation 3/12, and transfer to [facility].    PLAN    Neuro    -sedated, on fentanyl/ Versed/ ketamine/nimbex,   - d/c nimbex 1st, then plan to d/c versed  - nimbex d/giovanni, restarted propofol, spo2 drops to 84. Restart nimbex.    Respiratory:  - On vent, mode-volume AC, RR 34, , Fio2 100%, PEEP 8  - ABG  -Continue symbicort/albuterol    Cardio  - no pressors, On aspirin  -HR elevated- -129    GI  - Due to increased residual , tube feeds were on hold  - Plan to start tickle feeds with nepro  - Reglan IVP PRN  - On Protonix  - Bowel regimen with senna/miralax      - Ariza (+)  - Strict intake and output monitoring  - Creatinine 2.22, on bumex 2mg IVP bid    Endo   - ISS  - fingerstick blood glucose monitoring  ID   - Febrile, tyelnol PRN. Will continue to monitor  - No antibiotics ordered  - blood cultures 3/18/21, sputum culture- pending    Hem  -elevated D dimer, on heparin 5000 Units SQ q8h    GO- Full code   Update given to patient's spouse Daysi Lou     A 59yo Male w/ PMHx of a Brain tumor s/p resection in 2018, who is presenting with worsening shortness of breath and non-productive cough for past week. Patient got admitted due to COVID PNA requiring intubation 3/12, and transfer to [facility].    PLAN    Neuro    -sedated, on fentanyl/ Versed/ ketamine/nimbex  - d/c nimbex 1st, then plan to d/c versed  - nimbex d/giovanni, restarted propofol, spo2 drops to 84. Restarted nimbex.    Respiratory:  - On vent, mode-volume AC, RR 34, , Fio2 100%, PEEP 12 (PEEP changed from 8 to 12)  - ABG  -Continue symbicort/albuterol    Cardio  - no pressors, On aspirin  -HR elevated- -129    GI  - Due to increased residual , tube feeds were on hold  - Plan to start tickle feeds with nepro  - Reglan IVP PRN  - On Protonix  - Bowel regimen with senna/miralax      - Ariza (+)  - Strict intake and output monitoring  - Creatinine 2.22, on bumex 2mg IVP bid    Endo   - ISS  - fingerstick blood glucose monitoring  ID   - Febrile, tyelnol PRN. Will continue to monitor  - No antibiotics ordered  - blood cultures 3/18/21, sputum culture- pending    Hem  -elevated D dimer, on heparin 5000 Units SQ q8h    GOC- Full code   Update given to patient's spouse Daysi Lou

## 2021-03-19 NOTE — PROGRESS NOTE ADULT - ASSESSMENT
This is a 59yo Male w/ PMHx of a Brain tumor s/p resection in 2018, who is presenting with worsening shortness of breath and non-productive cough for past week. Found to be COVID-19 Positive. Py hypoxic to 87% on RA CXR c/w COVID pneumonia. Admitted for further treatment of COVID pneumonia/hypoxemia.     Covid 19 Pneumonia: Hypoxic resp failure  Brain tumor:        3/3:      Covid 19 Pneumonia: Hypoxic resp failure: on covid rx: he is on 50% fio2: LFTS are mildly elevated: folow d dimer: currently OK  Brain tumor:  s/p resection:  DVT proparminyxlis  ruddy team    3/4:      Covid 19 Pneumonia: Hypoxic resp failure: on covid rx: he is on 50% fio2 today : increased fr om yesterday :looks better then yesterday : no overnight events Cont dexa"for a total of 10 dys: LFT as are improving and d dimer has mildly increased:   Brain tumor:  s/p resection:  DVT cole chaidez team : overall p rognosis is very giuarded"     3/7:      Covid 19 Pneumonia: Hypoxic resp failure: on covid rx: he is on100% fio2 today : remdesivir finished: on dexa: 7/10: pretty hypoxic: and has low grade fever: wbc is increasing: will start empiric zosyn: do chest xray : ID consult  Brain tumor:  s/p resection:  DVT propahyxlis  : overall p rognosis is very guarded stll :  RUDDY NP     3/8    Covid 19 Pneumonia: Hypoxic resp failure: on covid rx: he is on100% fio2 today : remdesivir finished: on dexa: 8/10: pretty hypoxic: and has low grade fever: wbc is increasing:  started empiric zosyn: do chest xray : with worsening: seen by ID:  His d dimer has increased significantly: start heparin  : call MICU with furterh deterioration as he might need intubation any time  Brain tumor:  s/p resection:  DVT propahyxlis  : overall prognosis is very guarded stll :  RUDDY CHAIDEZ NP     3/9:    Covid 19 Pneumonia: Hypoxic resp failure: on covid rx: he is on100% fio2 today : remdesivir finished: on dexa: 9/10: on zosyn: on bipap at 1005: try to wean down o2 today : his dexa may need to be extended as he isnot doing well; Yesterday his d dimer had increased 4 x : started on empiric heparin today d d iemr has decreased!  Brain tumor:  s/p resection:  DVT propahyxlis  overall prognosis is very guarded stll :  RUDDY Gray    3/10:    Covid 19 Pneumonia: he is not doign well: he is on AVAPS: on 1005 oxygen: very tenuous resp status;' cant be moved for cta: he is alerday on empiric full dose ac: try to wean off oxygen: he is alreadyon antibtiocs and is on zosyn ? upgrade to leonidas: would defer to primary ID team   Brain tumor:  s/p resection:  DVT prophylaxis  overall prognosis is very guarded still :  RUDDY Gray    3/11:  Covid 19 Pneumonia: not do ing well: on 1005 avpas: antibtiocs broadened: by ID: he wants to alycia ntuabted as a last resort: ruddy jane on floor: He might need intuabtion any time: Heis on full dose ac and his d dimer i s decreasing!  Brain tumor:  s/p resection:  DVT prophylaxis  overall prognosis is very guarded still :  RUDDY pa: [place]    3/12:    Covid 19 Pneumonia:i intubated finally today for rep failure with severe refractory hypoxemia: Now paralysed and sedated in MICU and on full vent support : lot of secretions on intubation on broad spectrum antibiotics WBC increased: follow cultures:   Brain tumor:  s/p resection:  DVT prophylaxis  Condition critical :   dw team    3/15:    Covid 19 Pneumonia: intubated finally today for rep failure with severe refractory hypoxemia: on 805 fio2: proned today : all the antibiotics have been finished  antibiotics WBC increased: follow cultures:   Renal functions : high : defer to [facility] MICU team   Brain tumor:  s/p resection:  DVT prophylaxis  Condition critical :   dw team    3/16:    Covid 19 Pneumonia: intubated finally today for rep failure with severe refractory hypoxemia: on 90% fio2: supine today with peep of 9  : all the antibiotics have been finished  antibiotics WBC increased: follow cultures: Heis acidotic, hypercarbic as wellas hypoxic:   Renal functions : high : defer to primary MICU team   Brain tumor:  s/p resection:  DVT prophylaxis  Condition critical :   dw team      3/17:      Covid 19 Pneumonia: intubated and pretty hypoxic: hypercarbic as well as acidotic: sedated and intubated along with paralyzing agent:   Renal functions : high : secondary to ATN because of shock and covid 19 pneumonia: renal following  Brain tumor:  s/p resection:  DVT prophylaxis  Condition critical :   dw team    3/18:  Covid 19 Pneumonia: Still doing very poorly: still requiring 05 of oxygen : cont current supportive care: d dimer is pretty high : on dvtpropahyxlis  Renal functions : high : secondary to ATN because of shock and covid 19 pneumonia: renal following; He is on IV diuretics!  Brain tumor:  s/p resection:  DVT prophylaxis  Condition critical :   dw team    3/19:    Covid 19 Pneumonia: Still doing very poorly: still requiring 100% of oxygen : cont current supportive care: d dimer is pretty high : on dv tpropahyxlis: off antibiotics   Renal functions : high : secondary to ATN because of shock and covid 19 pneumonia: renal following; He is on IV diuretics!  Brain tumor:  s/p resection:  DVT prophylaxis  Condition critical : still requiring 100% oxygen:   dw team

## 2021-03-19 NOTE — PROGRESS NOTE ADULT - SUBJECTIVE AND OBJECTIVE BOX
Date of Service: 03-19-21 @ 12:15    Patient is a 60y old  Male who presents with a chief complaint of COVID-19 Pneumonia (19 Mar 2021 08:27)      Any change in ROS: Doing poorly: on 1005 fio2: supine: proned not helpful     MEDICATIONS  (STANDING):  ALBUTerol    90 MICROgram(s) HFA Inhaler 2 Puff(s) Inhalation every 4 hours  ascorbic acid 500 milliGRAM(s) Oral daily  aspirin  chewable 81 milliGRAM(s) Oral daily  budesonide 160 MICROgram(s)/formoterol 4.5 MICROgram(s) Inhaler 2 Puff(s) Inhalation two times a day  buMETAnide Injectable 2 milliGRAM(s) IV Push two times a day  chlorhexidine 0.12% Liquid 15 milliLiter(s) Oral Mucosa every 12 hours  cholecalciferol 2000 Unit(s) Oral daily  cisatracurium Infusion 3 MICROgram(s)/kG/Min (14.8 mL/Hr) IV Continuous <Continuous>  dextrose 40% Gel 15 Gram(s) Oral once  dextrose 5%. 1000 milliLiter(s) (50 mL/Hr) IV Continuous <Continuous>  dextrose 5%. 1000 milliLiter(s) (100 mL/Hr) IV Continuous <Continuous>  dextrose 50% Injectable 25 Gram(s) IV Push once  dextrose 50% Injectable 12.5 Gram(s) IV Push once  dextrose 50% Injectable 25 Gram(s) IV Push once  diazepam    Tablet 10 milliGRAM(s) Oral every 8 hours  fenofibrate Tablet 48 milliGRAM(s) Oral daily  fentaNYL   Infusion..... 0.5 MICROgram(s)/kG/Hr (0.82 mL/Hr) IV Continuous <Continuous>  glucagon  Injectable 1 milliGRAM(s) IntraMuscular once  heparin   Injectable 5000 Unit(s) SubCutaneous every 8 hours  insulin lispro (ADMELOG) corrective regimen sliding scale   SubCutaneous every 6 hours  ketamine Infusion. 0.5 mG/kG/Hr (4.11 mL/Hr) IV Continuous <Continuous>  metoclopramide Injectable 10 milliGRAM(s) IV Push every 8 hours  midazolam Infusion 0.02 mG/kG/Hr (1.64 mL/Hr) IV Continuous <Continuous>  multivitamin 1 Tablet(s) Oral daily  norepinephrine Infusion 0.05 MICROgram(s)/kG/Min (3.85 mL/Hr) IV Continuous <Continuous>  pantoprazole  Injectable 40 milliGRAM(s) IV Push daily  polyethylene glycol 3350 17 Gram(s) Oral daily  propofol Infusion 20 MICROgram(s)/kG/Min (9.85 mL/Hr) IV Continuous <Continuous>  senna Syrup 10 milliLiter(s) Oral at bedtime    MEDICATIONS  (PRN):  acetaminophen    Suspension .. 650 milliGRAM(s) Oral every 6 hours PRN Temp greater or equal to 38C (100.4F)    Vital Signs Last 24 Hrs  T(C): 38.4 (19 Mar 2021 08:00), Max: 38.4 (19 Mar 2021 08:00)  T(F): 101.1 (19 Mar 2021 08:00), Max: 101.1 (19 Mar 2021 08:00)  HR: 128 (19 Mar 2021 10:15) (108 - 129)  BP: --  BP(mean): --  RR: 35 (19 Mar 2021 10:00) (33 - 35)  SpO2: 91% (19 Mar 2021 10:15) (87% - 94%)  Mode: AC/ CMV (Assist Control/ Continuous Mandatory Ventilation)  RR (machine): 34  TV (machine): 400  FiO2: 100  PEEP: 8  ITime: 0.68  MAP: 18  PIP: 41    I&O's Summary    18 Mar 2021 07:01  -  19 Mar 2021 07:00  --------------------------------------------------------  IN: 2024.3 mL / OUT: 2815 mL / NET: -790.7 mL    19 Mar 2021 07:01  -  19 Mar 2021 12:15  --------------------------------------------------------  IN: 239.4 mL / OUT: 150 mL / NET: 89.4 mL          Physical Exam:   GENERAL: NAD, well-groomed, well-developed  HEENT: JOAO/   Atraumatic, Normocephalic  ENMT: No tonsillar erythema, exudates, or enlargement; Moist mucous membranes, Good dentition, No lesions  NECK: Supple, No JVD, Normal thyroid  CHEST/LUNG: Clear to auscultaion  CVS: Regular rate and rhythm; No murmurs, rubs, or gallops  GI: : Soft, Nontender, Nondistended; Bowel sounds present  NERVOUS SYSTEM:  Intubated and sedated  EXTREMITIES:  - edema  LYMPH: No lymphadenopathy noted  SKIN: No rashes or lesions  ENDOCRINOLOGY: No Thyromegaly  PSYCH: sedated     Labs:  ABG - ( 19 Mar 2021 01:34 )  pH, Arterial: 7.33  pH, Blood: x     /  pCO2: 66    /  pO2: 63    / HCO3: 30    / Base Excess: 7.8   /  SaO2: 90.6                                        11.0   17.15 )-----------( 350      ( 19 Mar 2021 01:34 )             35.1                         11.1   17.08 )-----------( 354      ( 18 Mar 2021 03:49 )             35.7                         11.1   16.20 )-----------( 346      ( 17 Mar 2021 00:49 )             35.9                         10.8   16.94 )-----------( 321      ( 16 Mar 2021 01:12 )             35.3     03-19    142  |  103  |  80<H>  ----------------------------<  130<H>  4.8   |  30  |  2.22<H>  03-18    143  |  103  |  69<H>  ----------------------------<  93  4.9   |  31  |  2.16<H>  03-17    139  |  99  |  60<H>  ----------------------------<  98  4.4   |  32<H>  |  2.04<H>  03-16    140  |  102  |  52<H>  ----------------------------<  121<H>  4.8   |  33<H>  |  2.10<H>    Ca    9.6      19 Mar 2021 01:34  Ca    9.0      18 Mar 2021 03:49  Phos  4.6     03-19  Phos  4.0     03-18  Mg     2.7     03-19  Mg     2.8     03-18      CAPILLARY BLOOD GLUCOSE      POCT Blood Glucose.: 167 mg/dL (19 Mar 2021 11:09)  POCT Blood Glucose.: 100 mg/dL (19 Mar 2021 06:25)  POCT Blood Glucose.: 127 mg/dL (18 Mar 2021 23:33)  POCT Blood Glucose.: 135 mg/dL (18 Mar 2021 18:23)        PT/INR - ( 19 Mar 2021 01:34 )   PT: 13.7 sec;   INR: 1.21 ratio             Procalcitonin, Serum: 0.60 ng/mL (03-19 @ 01:34)  Procalcitonin, Serum: 0.34 ng/mL (03-16 @ 06:34)  Procalcitonin, Serum: 0.34 ng/mL (03-16 @ 01:12)        RECENT CULTURES:  03-14 @ 10:49 .Sputum Sputum       Rare polymorphonuclear leukocytes per low power field  No organisms seen per oil power field       r< from: Xray Chest 1 View- PORTABLE-Urgent (Xray Chest 1 View- PORTABLE-Urgent .) (03.16.21 @ 07:01) >  EXAM:  XR CHEST PORTABLE URGENT 1V        PROCEDURE DATE:  Mar 16 2021         INTERPRETATION:  INDICATION: Endotracheal tube advancement.    TECHNIQUE: Single portal view of the chest.    COMPARISON: 3/12/2021    FINDINGS: The cardiac silhouette is normal in size. There is an endotracheal tube with its tip above the fina. There is a right IJ central venous catheter in good position. There is an enteric feeding tube with its tip below level of this film. There are bilateral diffuse airspace opacities, worse in the left lung, not significantly changed.    IMPRESSION: Lines and tubes in good position. Unchanged bilateral airspace opacities, worse in the left lung.              HERIBERTO DAVID MD; Attending Radiologist  This document has been electronically signed. Mar 17 2021 11:41AM    < end of copied text >      No growth at 48 hours          RESPIRATORY CULTURES:          Studies  Chest X-RAY  CT SCAN Chest   Venous Dopplers: LE:   CT Abdomen  Others

## 2021-03-19 NOTE — PROGRESS NOTE ADULT - SUBJECTIVE AND OBJECTIVE BOX
Critical care progress note;    No acute events overnight  Patient seen and examined at bedside. Patient intubated/sedated    Allergies    No Known Allergies    Intolerances    PAST MEDICAL & SURGICAL HISTORY:  Brain cancer  in remission.    Brain tumor  s/p ressection in 2018.    LABS:                        11.0   17.15 )-----------( 350      ( 19 Mar 2021 01:34 )             35.1     03-19    142  |  103  |  80<H>  ----------------------------<  130<H>  4.8   |  30  |  2.22<H>    Ca    9.6      19 Mar 2021 01:34  Phos  4.6     03-19  Mg     2.7     03-19              PT/INR - ( 19 Mar 2021 01:34 )   PT: 13.7 sec;   INR: 1.21 ratio             Procalcitonin, Serum: 0.60 ng/mL (03-19-21 @ 01:34)          CULTURES: (if applicable)    Culture - Sputum (collected 03-14-21 @ 10:49)  Source: .Sputum Sputum  Gram Stain (03-14-21 @ 14:05):    Rare polymorphonuclear leukocytes per low power field    No organisms seen per oil power field  Final Report (03-16-21 @ 11:33):    No growth at 48 hours    Culture - Blood (collected 03-12-21 @ 11:46)  Source: .Blood Blood-Venous  Final Report (03-17-21 @ 18:00):    No Growth Final    Culture - Blood (collected 03-12-21 @ 11:30)  Source: .Blood Blood-Arterial  Final Report (03-17-21 @ 18:00):    No Growth Final    Culture - Nose (collected 03-11-21 @ 20:24)  Source: .Nose Nose  Final Report (03-13-21 @ 17:11):    No MRSA isolated    No Staph Aureus (MSSA) isolated "This can represent normal nasal    carriage.  PCR is more sensitive for identifying MRSA/MSSA carriage"    Culture - Blood (collected 03-11-21 @ 14:58)  Source: .Blood Blood-Peripheral  Final Report (03-16-21 @ 18:00):    No Growth Final    Culture - Blood (collected 03-11-21 @ 14:58)  Source: .Blood Blood-Peripheral  Final Report (03-16-21 @ 18:00):    No Growth Final    Culture - Blood (collected 03-07-21 @ 13:01)  Source: .Blood Blood-Peripheral  Final Report (03-12-21 @ 15:00):    No Growth Final    Culture - Blood (collected 03-07-21 @ 13:01)  Source: .Blood Blood-Peripheral  Final Report (03-12-21 @ 15:00):    No Growth Final          ABG - ( 19 Mar 2021 01:34 )  pH, Arterial: 7.33  pH, Blood: x     /  pCO2: 66    /  pO2: 63    / HCO3: 30    / Base Excess: 7.8   /  SaO2: 90.6              CAPILLARY BLOOD GLUCOSE      POCT Blood Glucose.: 100 mg/dL (19 Mar 2021 06:25)      RADIOLOGY  CXR:      CT:    ECHO:      VITALS:  T(C): 38.4 (03-19-21 @ 08:00), Max: 38.4 (03-19-21 @ 08:00)  T(F): 101.1 (03-19-21 @ 08:00), Max: 101.1 (03-19-21 @ 08:00)  HR: 129 (03-19-21 @ 08:00) (108 - 129)  BP: --  BP(mean): --  ABP: 125/68 (03-19-21 @ 08:00) (112/67 - 146/80)  ABP(mean): 90 (03-19-21 @ 07:00) (81 - 102)  RR: 33 (03-19-21 @ 08:00) (33 - 34)  SpO2: 90% (03-19-21 @ 08:00) (87% - 94%)  CVP(mm Hg): --  CVP(cm H2O): --    Ins and Outs     03-18-21 @ 07:01  -  03-19-21 @ 07:00  --------------------------------------------------------  IN: 2024.3 mL / OUT: 2815 mL / NET: -790.7 mL    03-19-21 @ 07:01  -  03-19-21 @ 08:30  --------------------------------------------------------  IN: 69.7 mL / OUT: 75 mL / NET: -5.3 mL            Device: Avea, Mode: AC/ CMV (Assist Control/ Continuous Mandatory Ventilation), RR (machine): 34, RR (patient): 34, TV (machine): 400, TV (patient): 370, FiO2: 100, PEEP: 8, ITime: 0.68, MAP: 18, PIP: 43    PHYSICAL EXAMINATION    General, neuro- sedated, on vent    HEENT- (-) conjunctival erythema      RESP- B/L air entry, On vent, vent settings- Mode AC/PRVC, RR- 34, Fio2 - , PEEP-    Cardio- CV- NL S1, NL S2, regular rate, regular rhythm, (-) murmurs, pulse rate and rhythm regular.(-) JVD    GI- NL abdominal inspection, soft, non-tender to palpation, (-) rebound/ guarding/rigidity.    - (+) noel catheter    Skin- warm, dry    EXT- (-) cyanosis, (-) edema   Critical care progress note;    No acute events overnight  Patient seen and examined at bedside. Patient intubated/sedated    Allergies    No Known Allergies    Intolerances    PAST MEDICAL & SURGICAL HISTORY:  Brain cancer  in remission.    Brain tumor  s/p ressection in 2018.    LABS:                        11.0   17.15 )-----------( 350      ( 19 Mar 2021 01:34 )             35.1     03-19    142  |  103  |  80<H>  ----------------------------<  130<H>  4.8   |  30  |  2.22<H>    Ca    9.6      19 Mar 2021 01:34  Phos  4.6     03-19  Mg     2.7     03-19              PT/INR - ( 19 Mar 2021 01:34 )   PT: 13.7 sec;   INR: 1.21 ratio             Procalcitonin, Serum: 0.60 ng/mL (03-19-21 @ 01:34)          CULTURES: (if applicable)    Culture - Sputum (collected 03-14-21 @ 10:49)  Source: .Sputum Sputum  Gram Stain (03-14-21 @ 14:05):    Rare polymorphonuclear leukocytes per low power field    No organisms seen per oil power field  Final Report (03-16-21 @ 11:33):    No growth at 48 hours    Culture - Blood (collected 03-12-21 @ 11:46)  Source: .Blood Blood-Venous  Final Report (03-17-21 @ 18:00):    No Growth Final    Culture - Blood (collected 03-12-21 @ 11:30)  Source: .Blood Blood-Arterial  Final Report (03-17-21 @ 18:00):    No Growth Final    Culture - Nose (collected 03-11-21 @ 20:24)  Source: .Nose Nose  Final Report (03-13-21 @ 17:11):    No MRSA isolated    No Staph Aureus (MSSA) isolated "This can represent normal nasal    carriage.  PCR is more sensitive for identifying MRSA/MSSA carriage"    Culture - Blood (collected 03-11-21 @ 14:58)  Source: .Blood Blood-Peripheral  Final Report (03-16-21 @ 18:00):    No Growth Final    Culture - Blood (collected 03-11-21 @ 14:58)  Source: .Blood Blood-Peripheral  Final Report (03-16-21 @ 18:00):    No Growth Final    Culture - Blood (collected 03-07-21 @ 13:01)  Source: .Blood Blood-Peripheral  Final Report (03-12-21 @ 15:00):    No Growth Final    Culture - Blood (collected 03-07-21 @ 13:01)  Source: .Blood Blood-Peripheral  Final Report (03-12-21 @ 15:00):    No Growth Final          ABG - ( 19 Mar 2021 01:34 )  pH, Arterial: 7.33  pH, Blood: x     /  pCO2: 66    /  pO2: 63    / HCO3: 30    / Base Excess: 7.8   /  SaO2: 90.6              CAPILLARY BLOOD GLUCOSE      POCT Blood Glucose.: 100 mg/dL (19 Mar 2021 06:25)      RADIOLOGY  CXR:      CT:    ECHO:      VITALS:  T(C): 38.4 (03-19-21 @ 08:00), Max: 38.4 (03-19-21 @ 08:00)  T(F): 101.1 (03-19-21 @ 08:00), Max: 101.1 (03-19-21 @ 08:00)  HR: 129 (03-19-21 @ 08:00) (108 - 129)  BP: --  BP(mean): --  ABP: 125/68 (03-19-21 @ 08:00) (112/67 - 146/80)  ABP(mean): 90 (03-19-21 @ 07:00) (81 - 102)  RR: 33 (03-19-21 @ 08:00) (33 - 34)  SpO2: 90% (03-19-21 @ 08:00) (87% - 94%)  CVP(mm Hg): --  CVP(cm H2O): --    Ins and Outs     03-18-21 @ 07:01  -  03-19-21 @ 07:00  --------------------------------------------------------  IN: 2024.3 mL / OUT: 2815 mL / NET: -790.7 mL    03-19-21 @ 07:01  -  03-19-21 @ 08:30  --------------------------------------------------------  IN: 69.7 mL / OUT: 75 mL / NET: -5.3 mL            Device: Avea, Mode: AC/ CMV (Assist Control/ Continuous Mandatory Ventilation), RR (machine): 34, RR (patient): 34, TV (machine): 400, TV (patient): 370, FiO2: 100, PEEP: 8, ITime: 0.68, MAP: 18, PIP: 43    PHYSICAL EXAMINATION    General, neuro- sedated, on vent    HEENT- (-) conjunctival erythema      RESP- B/L air entry, On vent, vent settings- Mode volume AC, RR- 34, Fio2 -100 , PEEP-8    Cardio- CV- NL S1, NL S2, regular rate, regular rhythm, (-) murmurs, pulse rate and rhythm regular.(-) JVD    GI- NL abdominal inspection, soft, non-tender to palpation, (-) rebound/ guarding/rigidity.    - (+) noel catheter    Skin- warm, dry    EXT- (-) cyanosis

## 2021-03-19 NOTE — PROGRESS NOTE ADULT - SUBJECTIVE AND OBJECTIVE BOX
DATE OF SERVICE: 03-19-21 @ 21:54    Patient is a 60y old  Male who presents with a chief complaint of COVID-19 Pneumonia (19 Mar 2021 12:15)      INTERVAL HISTORY: remains on 100% high flow    unable to obtain ROS as sedated    MEDICATIONS:  buMETAnide Injectable 2 milliGRAM(s) IV Push two times a day  norepinephrine Infusion 0.05 MICROgram(s)/kG/Min IV Continuous <Continuous>        PHYSICAL EXAM:  T(C): 38.2 (03-19-21 @ 20:00), Max: 38.4 (03-19-21 @ 08:00)  HR: 131 (03-19-21 @ 20:16) (115 - 131)  BP: --  RR: 34 (03-19-21 @ 20:00) (33 - 35)  SpO2: 90% (03-19-21 @ 20:16) (85% - 98%)  Wt(kg): --  I&O's Summary    18 Mar 2021 07:01  -  19 Mar 2021 07:00  --------------------------------------------------------  IN: 2024.3 mL / OUT: 2815 mL / NET: -790.7 mL    19 Mar 2021 07:01  -  19 Mar 2021 21:54  --------------------------------------------------------  IN: 904.9 mL / OUT: 600 mL / NET: 304.9 mL          Appearance: In no distress	  HEENT:    PERRL,   Cardiovascular:  S1 S2, No JVD  Respiratory: Lungs clear to auscultation	  Gastrointestinal:  Soft, Non-tender, + BS	  Vascularature:  No edema of LE  Psychiatric:  sedated   Neuro: no acute focal deficits                               11.0   17.15 )-----------( 350      ( 19 Mar 2021 01:34 )             35.1     03-19    142  |  103  |  80<H>  ----------------------------<  130<H>  4.8   |  30  |  2.22<H>    Ca    9.6      19 Mar 2021 01:34  Phos  4.6     03-19  Mg     2.7     03-19          Labs personally reviewed      ASSESSMENT/PLAN: 	  This is a 61yo Male w/ PMHx of a Brain tumor s/p resection in 2018, who is presenting with worsening shortness of breath and non-productive cough for past week. Found to be COVID-19 Positive. Patient Sinus tachycardic in Emergency room.     1. Sinus Tachycardia 2/2 to worsening hypoxia from COVID-19 Pneumonia   - trop negative  - D Dimer very elevated   - (-) for DVT, changed to ppx dose by ICU   - consider TTE when stable    2. Hypotension - likely 2/2 sedation and paralytics  - NE to keep MAP >56    3. Hypoxemia 2/2 Covid-19 pna  - remdesevir completed. Deca for 10 days completed  - completed decadron taper  - s/p RRT- intubated/ sedated in ICU   - remains intubated and sedated    4. Hyponatremic- 134 on admission likely from dehydration   -   normalized     5. Transaminitis  - elevated LFTs on admission, albumin slightly low however now nearly normalized, bilirubin wnl   - continue to monitor     6.  Fevers/Leukocytosis -  Plan: s/p Vanco and IV meropenem - under ICU care  bcx 3/7 - negative   bcx 3/11 NGTD  Observe off Abx    7. LUDIVINA - s/p Bumex gtt, Bumex 2mg IV BID to keep euvolemic given ARDS  - HD if worsening acidosis        I had a prolonged conversation with the patient's wife regarding hospital course, results of diagnostic tests thus far. Plan of care discussed with patients wife. Poor prognosis conveyed to wife.  thirty five minutes of critical care spent on total encounter, of which more than fifty percent of the encounter was spent on counseling and/or coordinating care by the attending physician.      Jaswant Weiss DO [facility]  Cardiovascular Medicine  [address] [place] [address], [address]  Office: [phone]  Cell: [phone]

## 2021-03-19 NOTE — PROGRESS NOTE ADULT - SUBJECTIVE AND OBJECTIVE BOX
Medicine Progress Note    Patient is a 60y old  Male who presents with a chief complaint of COVID-19 Pneumonia (19 Mar 2021 15:53)      SUBJECTIVE / OVERNIGHT EVENTS: remain intubated     ADDITIONAL REVIEW OF SYSTEMS:    MEDICATIONS  (STANDING):  ALBUTerol    90 MICROgram(s) HFA Inhaler 2 Puff(s) Inhalation every 4 hours  ascorbic acid 500 milliGRAM(s) Oral daily  aspirin  chewable 81 milliGRAM(s) Oral daily  budesonide 160 MICROgram(s)/formoterol 4.5 MICROgram(s) Inhaler 2 Puff(s) Inhalation two times a day  buMETAnide Injectable 2 milliGRAM(s) IV Push two times a day  chlorhexidine 0.12% Liquid 15 milliLiter(s) Oral Mucosa every 12 hours  cholecalciferol 2000 Unit(s) Oral daily  cisatracurium Infusion 3 MICROgram(s)/kG/Min (14.8 mL/Hr) IV Continuous <Continuous>  dextrose 40% Gel 15 Gram(s) Oral once  dextrose 5%. 1000 milliLiter(s) (50 mL/Hr) IV Continuous <Continuous>  dextrose 5%. 1000 milliLiter(s) (100 mL/Hr) IV Continuous <Continuous>  dextrose 50% Injectable 25 Gram(s) IV Push once  dextrose 50% Injectable 12.5 Gram(s) IV Push once  dextrose 50% Injectable 25 Gram(s) IV Push once  diazepam    Tablet 10 milliGRAM(s) Oral every 8 hours  fenofibrate Tablet 48 milliGRAM(s) Oral daily  glucagon  Injectable 1 milliGRAM(s) IntraMuscular once  heparin   Injectable 5000 Unit(s) SubCutaneous every 8 hours  insulin lispro (ADMELOG) corrective regimen sliding scale   SubCutaneous every 6 hours  ketamine Infusion. 0.5 mG/kG/Hr (4.11 mL/Hr) IV Continuous <Continuous>  metoclopramide Injectable 10 milliGRAM(s) IV Push every 8 hours  midazolam Infusion 0.02 mG/kG/Hr (1.64 mL/Hr) IV Continuous <Continuous>  multivitamin 1 Tablet(s) Oral daily  norepinephrine Infusion 0.05 MICROgram(s)/kG/Min (3.85 mL/Hr) IV Continuous <Continuous>  pantoprazole  Injectable 40 milliGRAM(s) IV Push daily  polyethylene glycol 3350 17 Gram(s) Oral daily  propofol Infusion 20 MICROgram(s)/kG/Min (9.85 mL/Hr) IV Continuous <Continuous>  senna Syrup 10 milliLiter(s) Oral at bedtime    MEDICATIONS  (PRN):  acetaminophen    Suspension .. 650 milliGRAM(s) Oral every 6 hours PRN Temp greater or equal to 38C (100.4F)    CAPILLARY BLOOD GLUCOSE      POCT Blood Glucose.: 113 mg/dL (19 Mar 2021 16:20)  POCT Blood Glucose.: 167 mg/dL (19 Mar 2021 11:09)  POCT Blood Glucose.: 100 mg/dL (19 Mar 2021 06:25)    I&O's Summary    18 Mar 2021 07:01  -  19 Mar 2021 07:00  --------------------------------------------------------  IN: 2024.3 mL / OUT: 2815 mL / NET: -790.7 mL    19 Mar 2021 07:01  -  20 Mar 2021 02:25  --------------------------------------------------------  IN: 1464.2 mL / OUT: 600 mL / NET: 864.2 mL        PHYSICAL EXAM:  Vital Signs Last 24 Hrs  T(C): 38.2 (19 Mar 2021 20:00), Max: 38.4 (19 Mar 2021 08:00)  T(F): 100.8 (19 Mar 2021 20:00), Max: 101.1 (19 Mar 2021 08:00)  HR: 131 (20 Mar 2021 02:00) (115 - 137)  BP: --  BP(mean): --  RR: 34 (20 Mar 2021 02:00) (33 - 35)  SpO2: 94% (20 Mar 2021 02:00) (85% - 98%)  CONSTITUTIONAL: NAD, well-developed, well-groomed  ENMT: Moist oral mucosa, no pharyngeal injection or exudates; normal dentition  RESPIRATORY: Normal respiratory effort; lungs are clear to auscultation bilaterally  CARDIOVASCULAR: Regular rate and rhythm, normal S1 and S2, no murmur/rub/gallop; No lower extremity edema; Peripheral pulses are 2+ bilaterally  ABDOMEN: Nontender to palpation, normoactive bowel sounds, no rebound/guarding; No hepatosplenomegaly  PSYCH: A+O to person, place, and time; affect appropriate  NEUROLOGY: CN 2-12 are intact and symmetric; no gross sensory deficits   SKIN: No rashes; no palpable lesions    LABS:                        11.0   17.15 )-----------( 350      ( 19 Mar 2021 01:34 )             35.1     03-19    142  |  103  |  80<H>  ----------------------------<  130<H>  4.8   |  30  |  2.22<H>    Ca    9.6      19 Mar 2021 01:34  Phos  4.6     03-19  Mg     2.7     03-19      PT/INR - ( 19 Mar 2021 01:34 )   PT: 13.7 sec;   INR: 1.21 ratio                   Culture - Blood (collected 18 Mar 2021 12:40)  Source: .Blood  Preliminary Report (19 Mar 2021 13:02):    No growth to date.    Culture - Blood (collected 18 Mar 2021 12:40)  Source: .Blood Blood-Peripheral  Preliminary Report (19 Mar 2021 13:02):    No growth to date.      COVID-19 PCR: Detected (28 Feb 2021 23:02)      RADIOLOGY & ADDITIONAL TESTS:  Imaging from Last 24 Hours:    Electrocardiogram/QTc Interval:    COORDINATION OF CARE:  Care Discussed with Consultants/Other Providers:

## 2021-03-19 NOTE — PROGRESS NOTE ADULT - ATTENDING COMMENTS
61 yo M with brain tumor, s/p resection in 2018 a/w COVID 19 Pneumonia s/p intubation on 3/12 and now ARDS requiring prone positioning.    Neuro: sedated with fentanyl ketamine and versed, paralyzed on nimbex; off propofol 2/2 elevated triglycerides, attempted to wean nimbex, however became severely dyssynchronous with vent.   CV: Vasoplegic shock 2/2 sedation on levophed; maintain MAP>65  Pulm: COVID Pneumonia with ARDS - supined on 3/17; ETT repositioned on 3/17; will repeat ABG to reassess; currently will not benefit from proning given no change with ABGs when proned.  Re-paralyzed; placed in LL decubitus positioning with persistent hypoxia and hypercapnia.    GI: C/w tube feeds; Protonix  Renal/Metabolic: LUDIVINA - avoid nephrotoxic agents; trend creatinine; will continue diuresis with bumex BID; good urine output; with net negative 1L; monitor I and Os  ID: Cx negative; c/w monitoring off antibiotics  Heme: DVT with HSQ  Endo: ISS coverage; dexamethasone for elevated crp.    Dispo: Remains critically ill with severe ARDS  2/2 COVID pneumonia. 61 yo M with brain tumor, s/p resection in 2018 a/w COVID 19 Pneumonia s/p intubation on 3/12 and now ARDS requiring prone positioning.  Now with refractory hypoxemia and hypercapnia.  Attempted to adjust TV, PEEP and RR with minimal improvement in saturation and CO2.      Neuro: sedated with fentanyl ketamine and versed, paralyzed on nimbex; off propofol 2/2 elevated triglycerides, attempted to wean nimbex, however became severely dyssynchronous with vent.   CV: Vasoplegic shock 2/2 sedation on levophed; maintain MAP>65  Pulm: COVID Pneumonia with ARDS - supined on 3/17; ETT repositioned on 3/17; will repeat ABG to reassess; currently will not benefit from proning given no change with ABGs when proned.  Re-paralyzed; placed in LL decubitus positioning with persistent hypoxia and hypercapnia.    GI: C/w tube feeds; Protonix  Renal/Metabolic: LUDIVINA - avoid nephrotoxic agents; trend creatinine; will continue diuresis with bumex BID; good urine output; with net negative 1L; monitor I and Os  ID: Cx negative; c/w monitoring off antibiotics  Heme: DVT with HSQ  Endo: ISS coverage; dexamethasone for elevated crp.    Dispo: Remains critically ill with severe ARDS  2/2 COVID pneumonia with now refractory hypoxemia and hypercapnia.  Overall prognosis poor, with no response with proning.

## 2021-03-19 NOTE — PROGRESS NOTE ADULT - PROBLEM SELECTOR PLAN 1
Pt with non-oliguric LUDIVINA likely 2/2 hemodynamically mediated ATN in the setting of shock, covid19 pneumonia/ARDS.  On admission sCr 1.0 on 2/28, rise sCr after intubation from 0.97 to 1.53 on 3/14 then 1.67 on 3/15/21.  UA showed protein/blood/rbc/wbc.  Currently patient is intubated/sedated, on vasopressor support, non-oliguric on bumex with last SCr 2.2 stable      - Currently no absolute/urgent indication for dialysis, monitor for renal recovery  - continue bumex IV 2mg BID for now given high FIO2 (goal net negative 1-2L), titrate as needed. If UOP doesn't improve would consider bumex drip 1mg/hr (titrate up as needed)  - BP optimization/antibiotic/blood transfusion per ICU team  - monitor BMP, strict I/O, avoid nephrotoxic agents (NSAIDs, PPI, contrast), renally dose medications per GFR. Monitor electrolytes while on diuretic. Discontinue/decreasing dose vitamin C (risk oxalate nephropathy)

## 2021-03-19 NOTE — PROGRESS NOTE ADULT - PROBLEM SELECTOR PLAN 2
Pt with acidosis, primary respiratory acidosis in the setting of COVID19 pneumonia/ARDS. Improving  - Ventilation optimization per ICU team, diuretic as outline above.  If acidosis worsens then will need to consider dialysis (dialysis consent obtained).  Monitor blood gas/serum bicarb    Anna Loera  Nephrology Fellow  [facility] In-hospital pager# [phone]  Pager NS: [phone]   (After 5 pm or on weekends please page the on-call fellow)

## 2021-03-20 NOTE — PROGRESS NOTE ADULT - SUBJECTIVE AND OBJECTIVE BOX
Date of Service: 03-20-21 @ 11:55    Patient is a 60y old  Male who presents with a chief complaint of COVID-19 Pneumonia (20 Mar 2021 09:01)      Any change in ROS:Heis doing very poorly:   still requiring very high conc of oxygen : sedated and intubated:      MEDICATIONS  (STANDING):  ALBUTerol    90 MICROgram(s) HFA Inhaler 2 Puff(s) Inhalation every 4 hours  ascorbic acid 500 milliGRAM(s) Oral daily  aspirin  chewable 81 milliGRAM(s) Oral daily  budesonide 160 MICROgram(s)/formoterol 4.5 MICROgram(s) Inhaler 2 Puff(s) Inhalation two times a day  buMETAnide Infusion 1 mG/Hr (5 mL/Hr) IV Continuous <Continuous>  chlorhexidine 0.12% Liquid 15 milliLiter(s) Oral Mucosa every 12 hours  cholecalciferol 2000 Unit(s) Oral daily  cisatracurium Infusion 3 MICROgram(s)/kG/Min (14.8 mL/Hr) IV Continuous <Continuous>  dextrose 40% Gel 15 Gram(s) Oral once  dextrose 5%. 1000 milliLiter(s) (50 mL/Hr) IV Continuous <Continuous>  dextrose 5%. 1000 milliLiter(s) (100 mL/Hr) IV Continuous <Continuous>  dextrose 50% Injectable 25 Gram(s) IV Push once  dextrose 50% Injectable 12.5 Gram(s) IV Push once  dextrose 50% Injectable 25 Gram(s) IV Push once  diazepam    Tablet 10 milliGRAM(s) Oral every 8 hours  fenofibrate Tablet 48 milliGRAM(s) Oral daily  fentaNYL   Infusion..... 0.499 MICROgram(s)/kG/Hr (0.82 mL/Hr) IV Continuous <Continuous>  glucagon  Injectable 1 milliGRAM(s) IntraMuscular once  heparin   Injectable 5000 Unit(s) SubCutaneous every 8 hours  insulin lispro (ADMELOG) corrective regimen sliding scale   SubCutaneous every 6 hours  ketamine Infusion. 0.5 mG/kG/Hr (4.11 mL/Hr) IV Continuous <Continuous>  metoclopramide Injectable 10 milliGRAM(s) IV Push every 8 hours  midazolam Infusion 0.02 mG/kG/Hr (1.64 mL/Hr) IV Continuous <Continuous>  multivitamin 1 Tablet(s) Oral daily  norepinephrine Infusion 0.05 MICROgram(s)/kG/Min (3.85 mL/Hr) IV Continuous <Continuous>  pantoprazole  Injectable 40 milliGRAM(s) IV Push daily  polyethylene glycol 3350 17 Gram(s) Oral daily  propofol Infusion 20 MICROgram(s)/kG/Min (9.85 mL/Hr) IV Continuous <Continuous>  senna Syrup 10 milliLiter(s) Oral at bedtime    MEDICATIONS  (PRN):  acetaminophen    Suspension .. 650 milliGRAM(s) Oral every 6 hours PRN Temp greater or equal to 38C (100.4F)    Vital Signs Last 24 Hrs  T(C): 37.3 (20 Mar 2021 08:00), Max: 38.2 (19 Mar 2021 12:00)  T(F): 99.1 (20 Mar 2021 08:00), Max: 100.8 (19 Mar 2021 12:00)  HR: 119 (20 Mar 2021 10:37) (114 - 137)  BP: --  BP(mean): --  RR: 32 (20 Mar 2021 08:00) (32 - 34)  SpO2: 94% (20 Mar 2021 10:37) (85% - 98%)  Mode: AC/ CMV (Assist Control/ Continuous Mandatory Ventilation)  RR (machine): 32  TV (machine): 430  FiO2: 90  PEEP: 14  ITime: 0.7  MAP: 24  PIP: 50    I&O's Summary    19 Mar 2021 07:01  -  20 Mar 2021 07:00  --------------------------------------------------------  IN: 1713.9 mL / OUT: 1282 mL / NET: 431.9 mL    20 Mar 2021 07:01  -  20 Mar 2021 11:55  --------------------------------------------------------  IN: 72.1 mL / OUT: 20 mL / NET: 52.1 mL          Physical Exam:   GENERAL: NAD, well-groomed, well-developed  HEENT: JOAO/   Atraumatic, Normocephalic  ENMT: No tonsillar erythema, exudates, or enlargement; Moist mucous membranes, Good dentition, No lesions  NECK: Supple, No JVD, Normal thyroid  CHEST/LUNG: Clear to auscultaion  CVS: Regular rate and rhythm; No murmurs, rubs, or gallops  GI: : Soft, Nontender, Nondistended; Bowel sounds present  NERVOUS SYSTEM:  Intubated and sedated  EXTREMITIES:  - edema  LYMPH: No lymphadenopathy noted  SKIN: No rashes or lesions  ENDOCRINOLOGY: No Thyromegaly  PSYCH: sedated and intuabted    Labs:  ABG - ( 20 Mar 2021 05:10 )  pH, Arterial: 7.22  pH, Blood: x     /  pCO2: 85    /  pO2: 97    / HCO3: 28    / Base Excess: 6.0   /  SaO2: 96.2                                        10.3   20.03 )-----------( 393      ( 20 Mar 2021 05:10 )             33.5                         11.0   17.15 )-----------( 350      ( 19 Mar 2021 01:34 )             35.1                         11.1   17.08 )-----------( 354      ( 18 Mar 2021 03:49 )             35.7                         11.1   16.20 )-----------( 346      ( 17 Mar 2021 00:49 )             35.9     03-20    143  |  103  |  98<H>  ----------------------------<  203<H>  5.5<H>   |  31  |  3.08<H>  03-19    142  |  103  |  80<H>  ----------------------------<  130<H>  4.8   |  30  |  2.22<H>  03-18    143  |  103  |  69<H>  ----------------------------<  93  4.9   |  31  |  2.16<H>  03-17    139  |  99  |  60<H>  ----------------------------<  98  4.4   |  32<H>  |  2.04<H>    Ca    9.5      20 Mar 2021 05:10  Ca    9.6      19 Mar 2021 01:34  Phos  4.6     03-19  Mg     2.9     03-20  Mg     2.7     03-19      CAPILLARY BLOOD GLUCOSE      POCT Blood Glucose.: 206 mg/dL (20 Mar 2021 05:42)  POCT Blood Glucose.: 113 mg/dL (19 Mar 2021 16:20)        PT/INR - ( 19 Mar 2021 01:34 )   PT: 13.7 sec;   INR: 1.21 ratio         PTT - ( 20 Mar 2021 05:10 )  PTT:33.9 sec    D-Dimer Assay, Quantitative: 722 ng/mL DDU (03-20 @ 05:10)  Procalcitonin, Serum: 0.60 ng/mL (03-19 @ 01:34)        RECENT CULTURES:  03-18 @ 12:40 .Blood Blood-Peripheral              r< from: Xray Chest 1 View- PORTABLE-Urgent (Xray Chest 1 View- PORTABLE-Urgent .) (03.16.21 @ 07:01) >  EXAM:  XR CHEST PORTABLE URGENT 1V        PROCEDURE DATE:  Mar 16 2021         INTERPRETATION:  INDICATION: Endotracheal tube advancement.    TECHNIQUE: Single portal view of the chest.    COMPARISON: 3/12/2021    FINDINGS: The cardiac silhouette is normal in size. There is an endotracheal tube with its tip above the ifna. There is a right IJ central venous catheter in good position. There is an enteric feeding tube with its tip below level of this film. There are bilateral diffuse airspace opacities, worse in the left lung, not significantly changed.    IMPRESSION: Lines and tubes in good position. Unchanged bilateral airspace opacities, worse in the left lung.              HERIBERTO DAVID MD; Attending Radiologist  This document has been electronically signed. Mar 17 2021 11:41AM    < end of copied text >    No growth to date.    03-14 @ 10:49 .Sputum Sputum       Rare polymorphonuclear leukocytes per low power field  No organisms seen per oil power field           No growth at 48 hours          RESPIRATORY CULTURES:          Studies  Chest X-RAY  CT SCAN Chest   Venous Dopplers: LE:   CT Abdomen  Others

## 2021-03-20 NOTE — PROGRESS NOTE ADULT - PROBLEM SELECTOR PLAN 2
Pt with acidosis, primary respiratory acidosis in the setting of COVID19 pneumonia/ARDS. Improving  - Ventilation optimization per ICU team, diuretic as outline above.  If acidosis worsens then will need to consider dialysis (dialysis consent obtained).  Monitor blood gas/serum bicarb    If any questions, please feel free to contact me     Mejia Patel  Nephrology Fellow  [facility] Pager: [phone]  [facility] Pager: [phone] Pt with acidosis, primary respiratory acidosis in the setting of COVID19 pneumonia/ARDS. Improving  - Ventilation optimization per ICU team, diuretic as outline above.  If acidosis worsens then will need to consider dialysis (dialysis consent obtained) to help with lung compliance.  Monitor blood gas/serum bicarb    If any questions, please feel free to contact me     Mejia Patel  Nephrology Fellow  [facility] Pager: [phone]  [facility] Pager: [phone]

## 2021-03-20 NOTE — PROGRESS NOTE ADULT - SUBJECTIVE AND OBJECTIVE BOX
FOLLOW UP:  COVID PNA  SUBJECTIVE/OBSERVATIONS: Patient seen and examined. Unable to wean overnight, remains on FIO2 90% with PEEP of 14    TELE EVENTS:     Vital Signs Last 24 Hrs  T(C): 37.3 (20 Mar 2021 08:00), Max: 38.2 (19 Mar 2021 12:00)  T(F): 99.1 (20 Mar 2021 08:00), Max: 100.8 (19 Mar 2021 12:00)  HR: 118 (20 Mar 2021 08:00) (114 - 137)  BP: --  BP(mean): --  RR: 32 (20 Mar 2021 08:00) (32 - 35)  SpO2: 95% (20 Mar 2021 08:00) (85% - 98%)  I&O's Summary    19 Mar 2021 07:01  -  20 Mar 2021 07:00  --------------------------------------------------------  IN: 1713.9 mL / OUT: 1282 mL / NET: 431.9 mL    20 Mar 2021 07:01  -  20 Mar 2021 09:02  --------------------------------------------------------  IN: 72.1 mL / OUT: 20 mL / NET: 52.1 mL        MEDICATIONS:  aspirin  chewable 81 milliGRAM(s) Oral daily  buMETAnide Injectable 2 milliGRAM(s) IV Push two times a day  heparin   Injectable 5000 Unit(s) SubCutaneous every 8 hours  norepinephrine Infusion 0.05 MICROgram(s)/kG/Min IV Continuous <Continuous>      ALBUTerol    90 MICROgram(s) HFA Inhaler 2 Puff(s) Inhalation every 4 hours  budesonide 160 MICROgram(s)/formoterol 4.5 MICROgram(s) Inhaler 2 Puff(s) Inhalation two times a day    acetaminophen    Suspension .. 650 milliGRAM(s) Oral every 6 hours PRN  cisatracurium Infusion 3 MICROgram(s)/kG/Min IV Continuous <Continuous>  diazepam    Tablet 10 milliGRAM(s) Oral every 8 hours  fentaNYL   Infusion..... 0.499 MICROgram(s)/kG/Hr IV Continuous <Continuous>  ketamine Infusion. 0.5 mG/kG/Hr IV Continuous <Continuous>  metoclopramide Injectable 10 milliGRAM(s) IV Push every 8 hours  midazolam Infusion 0.02 mG/kG/Hr IV Continuous <Continuous>  propofol Infusion 20 MICROgram(s)/kG/Min IV Continuous <Continuous>    pantoprazole  Injectable 40 milliGRAM(s) IV Push daily  polyethylene glycol 3350 17 Gram(s) Oral daily  senna Syrup 10 milliLiter(s) Oral at bedtime    dextrose 40% Gel 15 Gram(s) Oral once  dextrose 50% Injectable 25 Gram(s) IV Push once  dextrose 50% Injectable 12.5 Gram(s) IV Push once  dextrose 50% Injectable 25 Gram(s) IV Push once  fenofibrate Tablet 48 milliGRAM(s) Oral daily  glucagon  Injectable 1 milliGRAM(s) IntraMuscular once  insulin lispro (ADMELOG) corrective regimen sliding scale   SubCutaneous every 6 hours    ascorbic acid 500 milliGRAM(s) Oral daily  chlorhexidine 0.12% Liquid 15 milliLiter(s) Oral Mucosa every 12 hours  cholecalciferol 2000 Unit(s) Oral daily  dextrose 5%. 1000 milliLiter(s) IV Continuous <Continuous>  dextrose 5%. 1000 milliLiter(s) IV Continuous <Continuous>  multivitamin 1 Tablet(s) Oral daily      REVIEW OF SYSTEMS:  Complete 10point ROS negative.    PHYSICAL EXAM:  General: NAD  Cardiovascular: Normal S1 S2, No JVD, No murmurs, No edema  Respiratory: Lungs clear to auscultation	  Gastrointestinal:  Soft, Non-tender, + BS	  Skin: warm and dry, No rashes, No ecchymoses, No cyanosis	  Extremities: Normal range of motion, No clubbing, cyanosis or edema  Vascular: Peripheral pulses palpable 2+ bilaterally    LABS:	 	    CBC Full  -  ( 20 Mar 2021 05:10 )  WBC Count : 20.03 K/uL  Hemoglobin : 10.3 g/dL  Hematocrit : 33.5 %  Platelet Count - Automated : 393 K/uL  Mean Cell Volume : 90.8 fL  Mean Cell Hemoglobin : 27.9 pg  Mean Cell Hemoglobin Concentration : 30.7 gm/dL  Auto Neutrophil # : x  Auto Lymphocyte # : x  Auto Monocyte # : x  Auto Eosinophil # : x  Auto Basophil # : x  Auto Neutrophil % : x  Auto Lymphocyte % : x  Auto Monocyte % : x  Auto Eosinophil % : x  Auto Basophil % : x    03-20    143  |  103  |  98<H>  ----------------------------<  203<H>  5.5<H>   |  31  |  3.08<H>  03-19    142  |  103  |  80<H>  ----------------------------<  130<H>  4.8   |  30  |  2.22<H>    Ca    9.5      20 Mar 2021 05:10  Ca    9.6      19 Mar 2021 01:34  Phos  4.6     03-19  Mg     2.9     03-20  Mg     2.7     03-19      < from: Xray Chest 1 View- PORTABLE-Urgent (Xray Chest 1 View- PORTABLE-Urgent .) (03.16.21 @ 07:01) >  IMPRESSION: Lines and tubes in good position. Unchanged bilateral airspace opacities, worse in the left lung.        < end of copied text >    proBNP:   Lipid Profile:   HgA1c:   TSH:       CARDIAC MARKERS:            	   FOLLOW UP:  COVID PNA  SUBJECTIVE/OBSERVATIONS: Patient seen and examined. Unable to wean overnight, remains on FIO2 90% with PEEP of 14  Plateau pressure of 48    Vital Signs Last 24 Hrs  T(C): 37.3 (20 Mar 2021 08:00), Max: 38.2 (19 Mar 2021 12:00)  T(F): 99.1 (20 Mar 2021 08:00), Max: 100.8 (19 Mar 2021 12:00)  HR: 118 (20 Mar 2021 08:00) (114 - 137)    RR: 32 (20 Mar 2021 08:00) (32 - 35)  SpO2: 95% (20 Mar 2021 08:00) (85% - 98%)  I&O's Summary    19 Mar 2021 07:01  -  20 Mar 2021 07:00  --------------------------------------------------------  IN: 1713.9 mL / OUT: 1282 mL / NET: 431.9 mL    20 Mar 2021 07:01  -  20 Mar 2021 09:02  --------------------------------------------------------  IN: 72.1 mL / OUT: 20 mL / NET: 52.1 mL        MEDICATIONS:  aspirin  chewable 81 milliGRAM(s) Oral daily  buMETAnide Injectable 2 milliGRAM(s) IV Push two times a day  heparin   Injectable 5000 Unit(s) SubCutaneous every 8 hours  norepinephrine Infusion 0.05 MICROgram(s)/kG/Min IV Continuous <Continuous>  ALBUTerol    90 MICROgram(s) HFA Inhaler 2 Puff(s) Inhalation every 4 hours  budesonide 160 MICROgram(s)/formoterol 4.5 MICROgram(s) Inhaler 2 Puff(s) Inhalation two times a day  acetaminophen    Suspension .. 650 milliGRAM(s) Oral every 6 hours PRN  cisatracurium Infusion 3 MICROgram(s)/kG/Min IV Continuous <Continuous>  diazepam    Tablet 10 milliGRAM(s) Oral every 8 hours  fentaNYL   Infusion..... 0.499 MICROgram(s)/kG/Hr IV Continuous <Continuous>  ketamine Infusion. 0.5 mG/kG/Hr IV Continuous <Continuous>  metoclopramide Injectable 10 milliGRAM(s) IV Push every 8 hours  midazolam Infusion 0.02 mG/kG/Hr IV Continuous <Continuous>  propofol Infusion 20 MICROgram(s)/kG/Min IV Continuous <Continuous>    pantoprazole  Injectable 40 milliGRAM(s) IV Push daily  polyethylene glycol 3350 17 Gram(s) Oral daily  senna Syrup 10 milliLiter(s) Oral at bedtime    dextrose 40% Gel 15 Gram(s) Oral once  dextrose 50% Injectable 25 Gram(s) IV Push once  dextrose 50% Injectable 12.5 Gram(s) IV Push once  dextrose 50% Injectable 25 Gram(s) IV Push once  fenofibrate Tablet 48 milliGRAM(s) Oral daily  glucagon  Injectable 1 milliGRAM(s) IntraMuscular once  insulin lispro (ADMELOG) corrective regimen sliding scale   SubCutaneous every 6 hours    ascorbic acid 500 milliGRAM(s) Oral daily  chlorhexidine 0.12% Liquid 15 milliLiter(s) Oral Mucosa every 12 hours  cholecalciferol 2000 Unit(s) Oral daily  dextrose 5%. 1000 milliLiter(s) IV Continuous <Continuous>  dextrose 5%. 1000 milliLiter(s) IV Continuous <Continuous>  multivitamin 1 Tablet(s) Oral daily      REVIEW OF SYSTEMS:  Complete 10point ROS negative.    PHYSICAL EXAM:  General: NAD  Cardiovascular: Normal S1 S2, No JVD, No murmurs, No edema  Respiratory: Lungs clear to auscultation	  Gastrointestinal:  Soft, Non-tender, + BS	  Skin: warm and dry, No rashes, No ecchymoses, No cyanosis	  Extremities: Normal range of motion, No clubbing, cyanosis or edema  Vascular: Peripheral pulses palpable 2+ bilaterally    LABS:	 	    CBC Full  -  ( 20 Mar 2021 05:10 )  WBC Count : 20.03 K/uL  Hemoglobin : 10.3 g/dL  Hematocrit : 33.5 %  Platelet Count - Automated : 393 K/uL  Mean Cell Volume : 90.8 fL  Mean Cell Hemoglobin : 27.9 pg  Mean Cell Hemoglobin Concentration : 30.7 gm/dL  Auto Neutrophil # : x  Auto Lymphocyte # : x  Auto Monocyte # : x  Auto Eosinophil # : x  Auto Basophil # : x  Auto Neutrophil % : x  Auto Lymphocyte % : x  Auto Monocyte % : x  Auto Eosinophil % : x  Auto Basophil % : x    03-20    143  |  103  |  98<H>  ----------------------------<  203<H>  5.5<H>   |  31  |  3.08<H>  03-19    142  |  103  |  80<H>  ----------------------------<  130<H>  4.8   |  30  |  2.22<H>    Ca    9.5      20 Mar 2021 05:10  Ca    9.6      19 Mar 2021 01:34  Phos  4.6     03-19  Mg     2.9     03-20  Mg     2.7     03-19      < from: Xray Chest 1 View- PORTABLE-Urgent (Xray Chest 1 View- PORTABLE-Urgent .) (03.16.21 @ 07:01) >  IMPRESSION: Lines and tubes in good position. Unchanged bilateral airspace opacities, worse in the left lung.        < end of copied text >

## 2021-03-20 NOTE — CHART NOTE - NSCHARTNOTEFT_GEN_A_CORE
Overnight events 3/19/2021   Worsening of respiratory acidosis on ABG   7.31/69/56/30/86% on ACVC 34/400/+12/100%, PEEP increased to +14   7.22/85/97/28/92%   changed to ACVC 32/430/+14/100% Will also decrease I;E ratio, increase expiratory time.  ETT at fina level (26 cm at lips level), pull up by 2 cm, repeat CXR  Cont ketamine/versed/fentanyl/nimbex   Prognosis is guarded due to MOF   Family to be updated

## 2021-03-20 NOTE — PROGRESS NOTE ADULT - PROBLEM SELECTOR PLAN 3
hyperkalemia in the setting of LUDIVINA.  increase loop diuretics as above and monitor serum potassium.

## 2021-03-20 NOTE — PROGRESS NOTE ADULT - SUBJECTIVE AND OBJECTIVE BOX
[facility] DIVISION OF KIDNEY DISEASES AND HYPERTENSION -- FOLLOW UP NOTE  --------------------------------------------------------------------------------    24 hour events/subjective: urine output is 600 cc over 24h period, currently net 1.1L positive. Unable to obtain ROS    PAST HISTORY  --------------------------------------------------------------------------------  No significant changes to PMH, PSH, FHx, SHx, unless otherwise noted    ALLERGIES & MEDICATIONS  --------------------------------------------------------------------------------  Allergies    No Known Allergies    Intolerances    Standing Inpatient Medications  ALBUTerol    90 MICROgram(s) HFA Inhaler 2 Puff(s) Inhalation every 4 hours  ascorbic acid 500 milliGRAM(s) Oral daily  aspirin  chewable 81 milliGRAM(s) Oral daily  budesonide 160 MICROgram(s)/formoterol 4.5 MICROgram(s) Inhaler 2 Puff(s) Inhalation two times a day  buMETAnide Injectable 2 milliGRAM(s) IV Push two times a day  chlorhexidine 0.12% Liquid 15 milliLiter(s) Oral Mucosa every 12 hours  cholecalciferol 2000 Unit(s) Oral daily  cisatracurium Infusion 3 MICROgram(s)/kG/Min IV Continuous <Continuous>  dextrose 40% Gel 15 Gram(s) Oral once  dextrose 5%. 1000 milliLiter(s) IV Continuous <Continuous>  dextrose 5%. 1000 milliLiter(s) IV Continuous <Continuous>  dextrose 50% Injectable 25 Gram(s) IV Push once  dextrose 50% Injectable 12.5 Gram(s) IV Push once  dextrose 50% Injectable 25 Gram(s) IV Push once  diazepam    Tablet 10 milliGRAM(s) Oral every 8 hours  fenofibrate Tablet 48 milliGRAM(s) Oral daily  fentaNYL   Infusion..... 0.499 MICROgram(s)/kG/Hr IV Continuous <Continuous>  glucagon  Injectable 1 milliGRAM(s) IntraMuscular once  heparin   Injectable 5000 Unit(s) SubCutaneous every 8 hours  insulin lispro (ADMELOG) corrective regimen sliding scale   SubCutaneous every 6 hours  ketamine Infusion. 0.5 mG/kG/Hr IV Continuous <Continuous>  metoclopramide Injectable 10 milliGRAM(s) IV Push every 8 hours  midazolam Infusion 0.02 mG/kG/Hr IV Continuous <Continuous>  multivitamin 1 Tablet(s) Oral daily  norepinephrine Infusion 0.05 MICROgram(s)/kG/Min IV Continuous <Continuous>  pantoprazole  Injectable 40 milliGRAM(s) IV Push daily  polyethylene glycol 3350 17 Gram(s) Oral daily  propofol Infusion 20 MICROgram(s)/kG/Min IV Continuous <Continuous>  senna Syrup 10 milliLiter(s) Oral at bedtime    PRN Inpatient Medications  acetaminophen    Suspension .. 650 milliGRAM(s) Oral every 6 hours PRN    REVIEW OF SYSTEMS  --------------------------------------------------------------------------------  Unable to obtain ROS    VITALS/PHYSICAL EXAM  --------------------------------------------------------------------------------  T(C): 37.3 (03-20-21 @ 08:00), Max: 38.2 (03-19-21 @ 12:00)  HR: 118 (03-20-21 @ 08:00) (114 - 137)  BP: --  RR: 32 (03-20-21 @ 08:00) (32 - 35)  SpO2: 95% (03-20-21 @ 08:00) (85% - 98%)  Wt(kg): --        03-19-21 @ 07:01  -  03-20-21 @ 07:00  --------------------------------------------------------  IN: 1713.9 mL / OUT: 1282 mL / NET: 431.9 mL        Physical Exam:  	Gen: intubated  	HEENT: intubated, +ETT  	Pulm: mechanical breath sounds  	CV: tachycardia   	GI: no distention   	: noel catheter in place  	MSK: no edema in lower extremity      LABS/STUDIES  --------------------------------------------------------------------------------              10.3   20.03 >-----------<  393      [03-20-21 @ 05:10]              33.5     143  |  103  |  98  ----------------------------<  203      [03-20-21 @ 05:10]  5.5   |  31  |  3.08        Ca     9.5     [03-20-21 @ 05:10]      Mg     2.9     [03-20-21 @ 05:10]      Phos  4.6     [03-19-21 @ 01:34]      PT/INR: PT 13.7 , INR 1.21       [03-19-21 @ 01:34]  PTT: 33.9       [03-20-21 @ 05:10]      Creatinine Trend:  SCr 3.08 [03-20 @ 05:10]  SCr 2.22 [03-19 @ 01:34]  SCr 2.16 [03-18 @ 03:49]  SCr 2.04 [03-17 @ 00:49]  SCr 2.10 [03-16 @ 01:12]    Urinalysis - [03-12-21 @ 12:14]      Color Yellow / Appearance Slightly Turbid / SG 1.040 / pH 6.0      Gluc Trace / Ketone Small  / Bili Negative / Urobili 3 mg/dL       Blood Large / Protein 100 mg/dL / Leuk Est Negative / Nitrite Negative       / WBC 17 / Hyaline 27 / Gran  / Sq Epi  / Non Sq Epi 8 / Bacteria Negative      Ferritin 696      [03-12-21 @ 05:24]  TSH 0.18      [03-01-21 @ 08:07]  Lipid: chol --, , HDL --, LDL --      [03-17-21 @ 00:49]    HCV 0.12, Nonreact      [03-02-21 @ 13:44]

## 2021-03-20 NOTE — PROGRESS NOTE ADULT - ATTENDING COMMENTS
60M h/o brain tumor s/p resection in 2018 a/w COVID 19 Pneumonia s/p intubation on 3/12 and now ARDS requiring prone positioning now with refractory hypoxemia and hypercapnia and worsening LUDIVINA     Neuro: sedated with fentanyl, ketamine and versed; trial off nimbex  CV: vasoplegic shock 2/2 sedation on levophed; maintain MAP>65  Pulm: COVID Pneumonia with ARDS - no improvement in oxygenation/ventilation with supination  - ETT repositioned overnight, now satisfactory  - Pplat 48, no improvement when changed from volume to pressure AC   GI: c/w tube feeds; Protonix  Renal: LUDIVINA likely 2/2 ATN from hypoxia/hypotension - change bumex IVP to gtt for goal net negative 1L+/24hrs, repeat BMP this afternoon to trend K+, no indications for urgent HD at this time  ID: monitor off antibiotics  Heme: DVT ppx with HSQ  Endo: FS mostly at goal, continue ISS coverage    Overall prognosis poor, with no response with proning and worsening acidosis, LUDIVINA. Full code

## 2021-03-20 NOTE — PROGRESS NOTE ADULT - ASSESSMENT
This is a 59yo Male w/ PMHx of a Brain tumor s/p resection in 2018, who is presenting with worsening shortness of breath and non-productive cough for past week. Found to be COVID-19 Positive. Py hypoxic to 87% on RA CXR c/w COVID pneumonia. Admitted for further treatment of COVID pneumonia/hypoxemia.     Covid 19 Pneumonia: Hypoxic resp failure  Brain tumor:        3/3:      Covid 19 Pneumonia: Hypoxic resp failure: on covid rx: he is on 50% fio2: LFTS are mildly elevated: folow d dimer: currently OK  Brain tumor:  s/p resection:  DVT proparminyxlis  ruddy team    3/4:      Covid 19 Pneumonia: Hypoxic resp failure: on covid rx: he is on 50% fio2 today : increased fr om yesterday :looks better then yesterday : no overnight events Cont dexa"for a total of 10 dys: LFT as are improving and d dimer has mildly increased:   Brain tumor:  s/p resection:  DVT cole chaidez team : overall p rognosis is very giuarded"     3/7:      Covid 19 Pneumonia: Hypoxic resp failure: on covid rx: he is on100% fio2 today : remdesivir finished: on dexa: 7/10: pretty hypoxic: and has low grade fever: wbc is increasing: will start empiric zosyn: do chest xray : ID consult  Brain tumor:  s/p resection:  DVT propahyxlis  : overall p rognosis is very guarded stll :  RUDDY NP     3/8    Covid 19 Pneumonia: Hypoxic resp failure: on covid rx: he is on100% fio2 today : remdesivir finished: on dexa: 8/10: pretty hypoxic: and has low grade fever: wbc is increasing:  started empiric zosyn: do chest xray : with worsening: seen by ID:  His d dimer has increased significantly: start heparin  : call MICU with furterh deterioration as he might need intubation any time  Brain tumor:  s/p resection:  DVT propahyxlis  : overall prognosis is very guarded stll :  RUDDY CHAIDEZ NP     3/9:    Covid 19 Pneumonia: Hypoxic resp failure: on covid rx: he is on100% fio2 today : remdesivir finished: on dexa: 9/10: on zosyn: on bipap at 1005: try to wean down o2 today : his dexa may need to be extended as he isnot doing well; Yesterday his d dimer had increased 4 x : started on empiric heparin today d d iemr has decreased!  Brain tumor:  s/p resection:  DVT propahyxlis  overall prognosis is very guarded stll :  RUDDY Gray    3/10:    Covid 19 Pneumonia: he is not doign well: he is on AVAPS: on 1005 oxygen: very tenuous resp status;' cant be moved for cta: he is alerday on empiric full dose ac: try to wean off oxygen: he is alreadyon antibtiocs and is on zosyn ? upgrade to leonidas: would defer to primary ID team   Brain tumor:  s/p resection:  DVT prophylaxis  overall prognosis is very guarded still :  RUDDY Gray    3/11:  Covid 19 Pneumonia: not do ing well: on 1005 avpas: antibtiocs broadened: by ID: he wants to alycia ntuabted as a last resort: ruddy jane on floor: He might need intuabtion any time: Heis on full dose ac and his d dimer i s decreasing!  Brain tumor:  s/p resection:  DVT prophylaxis  overall prognosis is very guarded still :  RUDDY pa: [place]    3/12:    Covid 19 Pneumonia:i intubated finally today for rep failure with severe refractory hypoxemia: Now paralysed and sedated in MICU and on full vent support : lot of secretions on intubation on broad spectrum antibiotics WBC increased: follow cultures:   Brain tumor:  s/p resection:  DVT prophylaxis  Condition critical :   dw team    3/15:    Covid 19 Pneumonia: intubated finally today for rep failure with severe refractory hypoxemia: on 805 fio2: proned today : all the antibiotics have been finished  antibiotics WBC increased: follow cultures:   Renal functions : high : defer to [facility] MICU team   Brain tumor:  s/p resection:  DVT prophylaxis  Condition critical :   dw team    3/16:    Covid 19 Pneumonia: intubated finally today for rep failure with severe refractory hypoxemia: on 90% fio2: supine today with peep of 9  : all the antibiotics have been finished  antibiotics WBC increased: follow cultures: Heis acidotic, hypercarbic as wellas hypoxic:   Renal functions : high : defer to primary MICU team   Brain tumor:  s/p resection:  DVT prophylaxis  Condition critical :   dw team      3/17:      Covid 19 Pneumonia: intubated and pretty hypoxic: hypercarbic as well as acidotic: sedated and intubated along with paralyzing agent:   Renal functions : high : secondary to ATN because of shock and covid 19 pneumonia: renal following  Brain tumor:  s/p resection:  DVT prophylaxis  Condition critical :   dw team    3/18:  Covid 19 Pneumonia: Still doing very poorly: still requiring 05 of oxygen : cont current supportive care: d dimer is pretty high : on dvtpropahyxlis  Renal functions : high : secondary to ATN because of shock and covid 19 pneumonia: renal following; He is on IV diuretics!  Brain tumor:  s/p resection:  DVT prophylaxis  Condition critical :   dw team    3/19:    Covid 19 Pneumonia: Still doing very poorly: still requiring 90% of oxygen : cont current supportive care: d dimer is pretty high : on dv tpropahyxlis: on no antibtiocs at this time:   Renal functions : high : secondary to ATN because of shock and covid 19 pneumonia: renal following; He is on IV diuretics-drip  Brain tumor:  s/p resection:  DVT prophylaxis  Condition critical : still requiring 100% oxygen:   dw team

## 2021-03-20 NOTE — PROGRESS NOTE ADULT - SUBJECTIVE AND OBJECTIVE BOX
Medicine Progress Note    Patient is a 60y old  Male who presents with a chief complaint of COVID-19 Pneumonia (20 Mar 2021 11:55)      SUBJECTIVE / OVERNIGHT EVENTS: remain intubated     ADDITIONAL REVIEW OF SYSTEMS:    MEDICATIONS  (STANDING):  ALBUTerol    90 MICROgram(s) HFA Inhaler 2 Puff(s) Inhalation every 4 hours  ascorbic acid 500 milliGRAM(s) Oral daily  aspirin  chewable 81 milliGRAM(s) Oral daily  budesonide 160 MICROgram(s)/formoterol 4.5 MICROgram(s) Inhaler 2 Puff(s) Inhalation two times a day  buMETAnide Infusion 2 mG/Hr (10 mL/Hr) IV Continuous <Continuous>  chlorhexidine 0.12% Liquid 15 milliLiter(s) Oral Mucosa every 12 hours  cholecalciferol 2000 Unit(s) Oral daily  cisatracurium Infusion 3 MICROgram(s)/kG/Min (14.8 mL/Hr) IV Continuous <Continuous>  dextrose 50% Injectable 50 milliLiter(s) IV Push once  diazepam    Tablet 10 milliGRAM(s) Oral every 8 hours  fentaNYL   Infusion..... 0.499 MICROgram(s)/kG/Hr (0.82 mL/Hr) IV Continuous <Continuous>  glucagon  Injectable 1 milliGRAM(s) IntraMuscular once  heparin   Injectable 5000 Unit(s) SubCutaneous every 8 hours  insulin lispro (ADMELOG) corrective regimen sliding scale   SubCutaneous every 6 hours  insulin regular  human recombinant. 5 Unit(s) IV Push once  ketamine Infusion. 0.5 mG/kG/Hr (4.11 mL/Hr) IV Continuous <Continuous>  metoclopramide Injectable 10 milliGRAM(s) IV Push every 8 hours  midazolam Infusion 0.02 mG/kG/Hr (1.64 mL/Hr) IV Continuous <Continuous>  multivitamin 1 Tablet(s) Oral daily  norepinephrine Infusion 0.05 MICROgram(s)/kG/Min (3.85 mL/Hr) IV Continuous <Continuous>  pantoprazole  Injectable 40 milliGRAM(s) IV Push daily  polyethylene glycol 3350 17 Gram(s) Oral daily  propofol Infusion 20 MICROgram(s)/kG/Min (9.85 mL/Hr) IV Continuous <Continuous>  senna Syrup 10 milliLiter(s) Oral at bedtime  sodium zirconium cyclosilicate 10 Gram(s) Oral once    MEDICATIONS  (PRN):  acetaminophen    Suspension .. 650 milliGRAM(s) Oral every 6 hours PRN Temp greater or equal to 38C (100.4F)    CAPILLARY BLOOD GLUCOSE      POCT Blood Glucose.: 133 mg/dL (20 Mar 2021 17:54)  POCT Blood Glucose.: 125 mg/dL (20 Mar 2021 12:11)  POCT Blood Glucose.: 206 mg/dL (20 Mar 2021 05:42)    I&O's Summary    19 Mar 2021 07:01  -  20 Mar 2021 07:00  --------------------------------------------------------  IN: 1713.9 mL / OUT: 1282 mL / NET: 431.9 mL    20 Mar 2021 07:01  -  20 Mar 2021 22:53  --------------------------------------------------------  IN: 865.5 mL / OUT: 1875 mL / NET: -1009.5 mL        PHYSICAL EXAM:  Vital Signs Last 24 Hrs  T(C): 37.4 (20 Mar 2021 20:00), Max: 38.2 (20 Mar 2021 12:00)  T(F): 99.3 (20 Mar 2021 20:00), Max: 100.8 (20 Mar 2021 12:00)  HR: 109 (20 Mar 2021 21:00) (88 - 137)  BP: --  BP(mean): --  RR: 32 (20 Mar 2021 22:00) (32 - 34)  SpO2: 93% (20 Mar 2021 22:00) (90% - 96%)  CONSTITUTIONAL: NAD, well-developed, well-groomed  ENMT: Moist oral mucosa, no pharyngeal injection or exudates; normal dentition  RESPIRATORY: Normal respiratory effort; lungs are clear to auscultation bilaterally  CARDIOVASCULAR: Regular rate and rhythm, normal S1 and S2, no murmur/rub/gallop; No lower extremity edema; Peripheral pulses are 2+ bilaterally  ABDOMEN: Nontender to palpation, normoactive bowel sounds, no rebound/guarding; No hepatosplenomegaly  PSYCH: A+O to person, place, and time; affect appropriate  NEUROLOGY: CN 2-12 are intact and symmetric; no gross sensory deficits   SKIN: No rashes; no palpable lesions    LABS:                        10.3   20.03 )-----------( 393      ( 20 Mar 2021 05:10 )             33.5     03-20    143  |  104  |  113<H>  ----------------------------<  157<H>  5.6<H>   |  29  |  4.20<H>    Ca    8.7      20 Mar 2021 20:49  Phos  4.6     03-19  Mg     3.0     03-20    TPro  5.7<L>  /  Alb  2.1<L>  /  TBili  1.6<H>  /  DBili  x   /  AST  161<H>  /  ALT  162<H>  /  AlkPhos  392<H>  03-20    PT/INR - ( 19 Mar 2021 01:34 )   PT: 13.7 sec;   INR: 1.21 ratio         PTT - ( 20 Mar 2021 05:10 )  PTT:33.9 sec      Urinalysis Basic - ( 20 Mar 2021 17:54 )    Color: Yellow / Appearance: Slightly Turbid / S.018 / pH: x  Gluc: x / Ketone: Negative  / Bili: Negative / Urobili: <2 mg/dL   Blood: x / Protein: 300 mg/dL / Nitrite: Negative   Leuk Esterase: Negative / RBC: >10 /HPF / WBC <5 /HPF   Sq Epi: x / Non Sq Epi: 0-5 /HPF / Bacteria: Negative        Culture - Blood (collected 18 Mar 2021 12:40)  Source: .Blood  Preliminary Report (19 Mar 2021 13:02):    No growth to date.    Culture - Blood (collected 18 Mar 2021 12:40)  Source: .Blood Blood-Peripheral  Preliminary Report (19 Mar 2021 13:02):    No growth to date.      COVID-19 PCR: Detected (2021 23:02)      RADIOLOGY & ADDITIONAL TESTS:  Imaging from Last 24 Hours:    Electrocardiogram/QTc Interval:    COORDINATION OF CARE:  Care Discussed with Consultants/Other Providers:

## 2021-03-20 NOTE — PROGRESS NOTE ADULT - PROBLEM SELECTOR PLAN 1
Pt with non-oliguric LUDIVINA likely 2/2 hemodynamically mediated ATN in the setting of shock, covid19 pneumonia/ARDS.  On admission sCr 1.0 on 2/28, rise sCr after intubation from 0.97 to 1.53 on 3/14 then 1.67 on 3/15/21.  UA showed protein/blood/rbc/wbc.  Currently patient is intubated/sedated, on vasopressor support, non-oliguric on bumex 2 mg IVP BID. Last Scr increased to 3.08. Given increase ventilator requirement, and currently net 1.1L positive, would suggest to switch bumex IVP to bumex drip starting at 1mg/hr to improve urine output.   Currently no absolute/urgent indication for dialysis. BP optimization/antibiotic/blood transfusion per ICU team. Monitor BMP, strict I/O, avoid nephrotoxic agents (NSAIDs, PPI, contrast), renally dose medications per GFR. Monitor electrolytes while on diuretic. Discontinue/decreasing dose vitamin C (risk oxalate nephropathy) Pt with non-oliguric LUDIVINA likely 2/2 hemodynamically mediated ATN in the setting of shock, covid19 pneumonia/ARDS.  On admission sCr 1.0 on 2/28, rise sCr after intubation from 0.97 to 1.53 on 3/14 then 1.67 on 3/15/21.  UA showed protein/blood/rbc/wbc.  Currently patient is intubated/sedated, on vasopressor support, non-oliguric on bumex 2 mg IVP BID. Last Scr increased to 3.08. Given increase ventilator requirement, and currently net 1.1L positive, would suggest to switch bumex IVP to bumex drip starting at 1mg/hr to improve urine output.   Currently no absolute/urgent indication for dialysis. BP optimization/antibiotic/blood transfusion per ICU team. Monitor BMP, strict I/O, avoid nephrotoxic agents (NSAIDs, PPI, contrast), renally dose medications per GFR. Monitor electrolytes while on diuretic.

## 2021-03-20 NOTE — PROGRESS NOTE ADULT - SUBJECTIVE AND OBJECTIVE BOX
DATE OF SERVICE: 03-20-21     Patient is a 60y old  Male who presents with a chief complaint of COVID-19 Pneumonia (20 Mar 2021 22:53)      INTERVAL HISTORY: remains intubated sedated    TELEMETRY Personally reviewed: sinus tach    ROS - unable to obtain 2.2 sedated  	  MEDICATIONS:  buMETAnide Infusion 2 mG/Hr IV Continuous <Continuous>  norepinephrine Infusion 0.05 MICROgram(s)/kG/Min IV Continuous <Continuous>        PHYSICAL EXAM:  T(C): 37.4 (03-20-21 @ 20:00), Max: 38.2 (03-20-21 @ 12:00)  HR: 90 (03-21-21 @ 00:13) (88 - 135)  BP: --  RR: 32 (03-21-21 @ 00:00) (32 - 34)  SpO2: 96% (03-21-21 @ 00:13) (90% - 96%)  Wt(kg): --  I&O's Summary    19 Mar 2021 07:01  -  20 Mar 2021 07:00  --------------------------------------------------------  IN: 1713.9 mL / OUT: 1282 mL / NET: 431.9 mL    20 Mar 2021 07:01  -  21 Mar 2021 00:25  --------------------------------------------------------  IN: 1025.5 mL / OUT: 2100 mL / NET: -1074.5 mL                                 10.3   20.03 )-----------( 393      ( 20 Mar 2021 05:10 )             33.5     03-20    143  |  104  |  113<H>  ----------------------------<  157<H>  5.6<H>   |  29  |  4.20<H>    Ca    8.7      20 Mar 2021 20:49  Phos  4.6     03-19  Mg     3.0     03-20    TPro  5.7<L>  /  Alb  2.1<L>  /  TBili  1.6<H>  /  DBili  x   /  AST  161<H>  /  ALT  162<H>  /  AlkPhos  392<H>  03-20        Labs personally reviewed      ASSESSMENT/PLAN: 	  This is a 61yo Male w/ PMHx of a Brain tumor s/p resection in 2018, who is presenting with worsening shortness of breath and non-productive cough for past week. Found to be COVID-19 Positive. Patient Sinus tachycardic in Emergency room.     1. Sinus Tachycardia 2/2 to worsening hypoxia from COVID-19 Pneumonia   - trop negative  - D Dimer very elevated   - (-) for DVT, changed to ppx dose by ICU   - consider TTE when stable    2. Hypotension - likely 2/2 sedation and paralytics  - NE to keep MAP >56    3. Hypoxemia 2/2 Covid-19 pna  - remdesevir completed. Deca for 10 days completed  - completed decadron taper  - s/p RRT- intubated/ sedated in ICU   - remains intubated and sedated    4. Hyponatremic- 134 on admission likely from dehydration   -   normalized     5. Transaminitis  - elevated LFTs on admission, albumin slightly low however now nearly normalized, bilirubin wnl   - continue to monitor     6.  Fevers/Leukocytosis -  Plan: s/p Vanco and IV meropenem - under ICU care  bcx 3/7 - negative   bcx 3/11 NGTD  Observe off Abx    7. LUDIVINA -   Bumex gtt restarted, to keep euvolemic given ARDS  - HD if worsening acidosis        I had a prolonged conversation with the patient's wife regarding hospital course, results of diagnostic tests thus far. Plan of care discussed with patients wife. Poor prognosis conveyed to wife. Thirty five minutes of critical care spent on total encounter, of which more than fifty percent of the encounter was spent on counseling and/or coordinating care by the attending physician.              Jaswant Weiss DO [facility]  Cardiovascular Medicine  [address] [place] [address], [address]  Office: [phone]  Cell: [phone]

## 2021-03-20 NOTE — PROGRESS NOTE ADULT - ASSESSMENT
cxASSESSMENT:  60y Male w/ PMHx of a Brain tumor s/p resection in 2018 who presented 3/1/21 with worsening shortness of breath and non-productive cough x1 week, worsening hypoxia 2/2 COVID PNA requiring intubation 3/12 and transfer to [facility]    # Hypoxic respiratory failure 2/2 COVID  -intubated on [facility] vent   -MICU following     Hypotension   - Norepinephrine for MAP >65    malnutrition  - TF   - GI ppx w/ protonix  - Senna/Miralax     PNA  - Increasing leukocytosis with elevated procalcitonin & fevers, concern for superimposed bacterial infection  -started on Iv abx     Hx of brin tumor s/p resection   -stable     poor prognosis remain full code          ASSESSMENT:  60y Male w/ PMHx of a Brain tumor s/p resection in 2018 who presented 3/1/21 with worsening shortness of breath and non-productive cough x1 week, worsening hypoxia 2/2 COVID PNA requiring intubation 3/12 and transfer to [facility]        NEURO:  -remains sedated with fentanyl, versed, and ketamine gtt  -titrate off the nimbex today  -once nimbex is successfully titrated off, would titrate down the fentanyl gtt    # Hypoxic respiratory failure 2/2 COVID  -intubated on [facility] vent   -VENT SETTINGS REMAIN   -AC 32//PEEP 124/FIO2 90%, plateau pressure 48  -blood gas to be repeated at 2pm    Hypotension   - Norepinephrine for MAP >65    malnutrition  - TF   - GI ppx w/ protonix  - Senna/Miralax     PNA  - Increasing leukocytosis with elevated procalcitonin & fevers, concern for superimposed bacterial infection  -monitoring off antibiotics    Hx of brin tumor s/p resection   -stable     poor prognosis remain full code

## 2021-03-20 NOTE — PROGRESS NOTE ADULT - ASSESSMENT
ASSESSMENT:  60y Male w/ PMHx of a Brain tumor s/p resection in 2018 who presented 3/1/21 with worsening shortness of breath and non-productive cough x1 week, worsening hypoxia 2/2 COVID PNA requiring intubation 3/12 and transfer to [facility]    # Hypoxic respiratory failure 2/2 COVID  -intubated on [facility] vent   -MICU following     Hypotension   - Norepinephrine for MAP >65    malnutrition  - TF   - GI ppx w/ protonix  - Senna/Miralax     PNA  - Increasing leukocytosis with elevated procalcitonin & fevers, concern for superimposed bacterial infection  -started on Iv abx     Hx of brin tumor s/p resection   -stable     poor prognosis remain full code     krista nelson MD

## 2021-03-21 NOTE — PROGRESS NOTE ADULT - PROBLEM SELECTOR PLAN 2
Pt with acidosis, primary respiratory acidosis in the setting of COVID19 pneumonia/ARDS. Improving  - Ventilation optimization per ICU team, diuretic as outline above.  If acidosis worsens then will need to consider dialysis (dialysis consent obtained) to help with lung compliance.  Monitor blood gas/serum bicarb    If any questions, please feel free to contact me     Mejia Patel  Nephrology Fellow  [facility] Pager: [phone]  [facility] Pager: [phone]

## 2021-03-21 NOTE — PROGRESS NOTE ADULT - ASSESSMENT
ASSESSMENT:  60y Male w/ PMHx of a Brain tumor s/p resection in 2018 who presented 3/1/21 with worsening shortness of breath and non-productive cough x1 week, worsening hypoxia 2/2 COVID PNA requiring intubation 3/12 and transfer to [facility]    NEURO:  -remains sedated with fentanyl, versed, and ketamine gtt  -restarted nimbex overnight    PULMONARY:  # Hypoxic respiratory failure 2/2 COVID  -intubated on [facility] vent   -VENT SETTINGS REMAIN   -AC 32//PEEP 124/FIO2 90%, plateau pressure 48  -blood gas to be repeated at 2pm    Hypotension   - Norepinephrine for MAP >65    malnutrition  - TF   - GI ppx w/ protonix  - Senna/Miralax     PNA  - Increasing leukocytosis with elevated procalcitonin & fevers, concern for superimposed bacterial infection  -monitoring off antibiotics    Hx of brin tumor s/p resection   -stable     poor prognosis remain full code    ASSESSMENT:  60y Male w/ PMHx of a Brain tumor s/p resection in 2018 who presented 3/1/21 with worsening shortness of breath and non-productive cough x1 week, worsening hypoxia 2/2 COVID PNA requiring intubation 3/12 and transfer to [facility]    NEURO:  -remains sedated with fentanyl, versed, and ketamine gtt  -restarted nimbex overnight    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  -stable     PULMONARY:  # Hypoxic respiratory failure 2/2 COVID  [facility]   -VENT SETTINGS REMAIN   -AC 30/480/PEEP 10/FIO2 90%, plateau pressure 48  -patient to be proned today    Bacterial Pneumonia:  - Increasing leukocytosis with elevated procalcitonin & fevers, concern for superimposed bacterial infection  -monitoring off antibiotics  Hypotension   - Norepinephrine for MAP >65    GI:  Transaminitis:  -RUQ done, revealing mild gall bladder thickness, otherwise normal  -LFTs are uptrending  -repeat labs sent    malnutrition  - TF   - GI ppx w/ protonix  - Senna/Miralax     :  Worsening LUDIVINA  -BUN and creatine uptrending to     SOCIAL ISSUES:  poor prognosis remain full code, wife updated on his condition today

## 2021-03-21 NOTE — PROGRESS NOTE ADULT - SUBJECTIVE AND OBJECTIVE BOX
FOLLOW UP:  COVID+,LUDIVINA,   SUBJECTIVE/OBSERVATIONS: Patient seen and examined  OVERNIGHT EVENTS:  Overnight event 3/20/2021    Severe ARDS   Nimbex was resumed for vent synchrony   on ACVC 32/430/+14/90% ABG 7.22/85/97, PEEP decreased to +12, RR 30    ABG 7.24/73/87, PEEP to +10 ABG 7.27/67/74, will decrease FiO2 to 80%   Hyperkalemia, renal failure K 5,8, received lokelma 10+10 mg on 3/20/2021   Bumex drip increased from 1 to 2 mg/hr, I/O 1345/2575 (neg 1.2l)   GI ppx   DVT ppx   Prognosis is guarded.    TELE EVENTS:     Vital Signs Last 24 Hrs  T(C): 35.9 (21 Mar 2021 08:00), Max: 38.2 (20 Mar 2021 12:00)  T(F): 96.6 (21 Mar 2021 08:00), Max: 100.8 (20 Mar 2021 12:00)  HR: 100 (21 Mar 2021 08:00) (88 - 119)  RR: 29 (21 Mar 2021 08:00) (29 - 33)  SpO2: 92% (21 Mar 2021 08:00) (90% - 96%)  I&O's Summary    20 Mar 2021 07:01  -  21 Mar 2021 07:00  --------------------------------------------------------  IN: 1345.5 mL / OUT: 2575 mL / NET: -1229.5 mL    21 Mar 2021 07:01  -  21 Mar 2021 08:39  --------------------------------------------------------  IN: 97.5 mL / OUT: 325 mL / NET: -227.5 mL      MEDICATIONS:  aspirin  chewable 81 milliGRAM(s) Oral daily  buMETAnide Infusion 2 mG/Hr IV Continuous <Continuous>  heparin   Injectable 5000 Unit(s) SubCutaneous every 8 hours  norepinephrine Infusion 0.05 MICROgram(s)/kG/Min IV Continuous <Continuous>  ALBUTerol    90 MICROgram(s) HFA Inhaler 2 Puff(s) Inhalation every 4 hours  budesonide 160 MICROgram(s)/formoterol 4.5 MICROgram(s) Inhaler 2 Puff(s) Inhalation two times a day  acetaminophen    Suspension .. 650 milliGRAM(s) Oral every 6 hours PRN  cisatracurium Infusion 3 MICROgram(s)/kG/Min IV Continuous <Continuous>  diazepam    Tablet 10 milliGRAM(s) Oral every 8 hours  fentaNYL   Infusion..... 0.499 MICROgram(s)/kG/Hr IV Continuous <Continuous>  ketamine Infusion. 0.5 mG/kG/Hr IV Continuous <Continuous>  metoclopramide Injectable 10 milliGRAM(s) IV Push every 8 hours  midazolam Infusion 0.02 mG/kG/Hr IV Continuous <Continuous>  propofol Infusion 20 MICROgram(s)/kG/Min IV Continuous <Continuous>    pantoprazole  Injectable 40 milliGRAM(s) IV Push daily  polyethylene glycol 3350 17 Gram(s) Oral daily  senna Syrup 10 milliLiter(s) Oral at bedtime    dextrose 50% Injectable 50 milliLiter(s) IV Push once  glucagon  Injectable 1 milliGRAM(s) IntraMuscular once  insulin lispro (ADMELOG) corrective regimen sliding scale   SubCutaneous every 6 hours    ascorbic acid 500 milliGRAM(s) Oral daily  chlorhexidine 0.12% Liquid 15 milliLiter(s) Oral Mucosa every 12 hours  cholecalciferol 2000 Unit(s) Oral daily  multivitamin 1 Tablet(s) Oral daily      REVIEW OF SYSTEMS:  Complete 10point ROS negative.    PHYSICAL EXAM:  General: NAD  HEENT: lip is swollen  Cardiovascular: Normal S1 S2, No JVD, No murmurs, No edema  Respiratory: Lungs with  diminished  Gastrointestinal:  Softly distended  Skin: warm and dry, No rashes, No ecchymoses, No cyanosis	  Extremities: Normal range of motion, No clubbing, cyanosis or edema  Vascular: Peripheral pulses palpable 2+ bilaterally    LABS:	 	    CBC Full  -  ( 21 Mar 2021 05:29 )  WBC Count : 21.32 K/uL  Hemoglobin : 9.6 g/dL  Hematocrit : 30.4 %  Platelet Count - Automated : 349 K/uL  Mean Cell Volume : 88.9 fL  Mean Cell Hemoglobin : 28.1 pg  Mean Cell Hemoglobin Concentration : 31.6 gm/dL  Auto Neutrophil # : x  Auto Lymphocyte # : x  Auto Monocyte # : x  Auto Eosinophil # : x  Auto Basophil # : x  Auto Neutrophil % : x  Auto Lymphocyte % : x  Auto Monocyte % : x  Auto Eosinophil % : x  Auto Basophil % : x    03-21    144  |  104  |  120<H>  ----------------------------<  157<H>  5.2   |  27  |  4.04<H>  03-20    143  |  104  |  113<H>  ----------------------------<  157<H>  5.6<H>   |  29  |  4.20<H>    Ca    9.2      21 Mar 2021 05:29  Ca    8.7      20 Mar 2021 20:49  Phos  5.4     03-21  Mg     2.9     03-21  Mg     3.0     03-20    TPro  5.7<L>  /  Alb  2.1<L>  /  TBili  1.6<H>  /  DBili  x   /  AST  161<H>  /  ALT  162<H>  /  AlkPhos  392<H>  03-20  TPro  5.8<L>  /  Alb  2.4<L>  /  TBili  1.4<H>  /  DBili  x   /  AST  139<H>  /  ALT  146<H>  /  AlkPhos  369<H>  03-20     FOLLOW UP:  COVID+ and worsening LUDIVINA  SUBJECTIVE/OBSERVATIONS: Patient seen and examined. Required paralytic to be turned back on overnight for desynchrony with the vent.  OVERNIGHT EVENTS:  Overnight event 3/20/2021    Severe ARDS   Nimbex was resumed for vent synchrony   on ACVC 32/430/+14/90% ABG 7.22/85/97, PEEP decreased to +12, RR 30    ABG 7.24/73/87, PEEP to +10 ABG 7.27/67/74, will decrease FiO2 to 80%   Hyperkalemia, renal failure K 5,8, received lokelma 10+10 mg on 3/20/2021   Bumex drip increased from 1 to 2 mg/hr, I/O 1345/2575 (neg 1.2l)   GI ppx   DVT ppx   Prognosis is guarded.    TELE EVENTS:     Vital Signs Last 24 Hrs  T(C): 35.9 (21 Mar 2021 08:00), Max: 38.2 (20 Mar 2021 12:00)  T(F): 96.6 (21 Mar 2021 08:00), Max: 100.8 (20 Mar 2021 12:00)  HR: 100 (21 Mar 2021 08:00) (88 - 119)  RR: 29 (21 Mar 2021 08:00) (29 - 33)  SpO2: 92% (21 Mar 2021 08:00) (90% - 96%)  I&O's Summary    20 Mar 2021 07:01  -  21 Mar 2021 07:00  --------------------------------------------------------  IN: 1345.5 mL / OUT: 2575 mL / NET: -1229.5 mL    21 Mar 2021 07:01  -  21 Mar 2021 08:39  --------------------------------------------------------  IN: 97.5 mL / OUT: 325 mL / NET: -227.5 mL      MEDICATIONS:  aspirin  chewable 81 milliGRAM(s) Oral daily  buMETAnide Infusion 2 mG/Hr IV Continuous <Continuous>  heparin   Injectable 5000 Unit(s) SubCutaneous every 8 hours  norepinephrine Infusion 0.05 MICROgram(s)/kG/Min IV Continuous <Continuous>  ALBUTerol    90 MICROgram(s) HFA Inhaler 2 Puff(s) Inhalation every 4 hours  budesonide 160 MICROgram(s)/formoterol 4.5 MICROgram(s) Inhaler 2 Puff(s) Inhalation two times a day  acetaminophen    Suspension .. 650 milliGRAM(s) Oral every 6 hours PRN  cisatracurium Infusion 3 MICROgram(s)/kG/Min IV Continuous <Continuous>  diazepam    Tablet 10 milliGRAM(s) Oral every 8 hours  fentaNYL   Infusion..... 0.499 MICROgram(s)/kG/Hr IV Continuous <Continuous>  ketamine Infusion. 0.5 mG/kG/Hr IV Continuous <Continuous>  metoclopramide Injectable 10 milliGRAM(s) IV Push every 8 hours  midazolam Infusion 0.02 mG/kG/Hr IV Continuous <Continuous>  propofol Infusion 20 MICROgram(s)/kG/Min IV Continuous <Continuous>    pantoprazole  Injectable 40 milliGRAM(s) IV Push daily  polyethylene glycol 3350 17 Gram(s) Oral daily  senna Syrup 10 milliLiter(s) Oral at bedtime    dextrose 50% Injectable 50 milliLiter(s) IV Push once  glucagon  Injectable 1 milliGRAM(s) IntraMuscular once  insulin lispro (ADMELOG) corrective regimen sliding scale   SubCutaneous every 6 hours    ascorbic acid 500 milliGRAM(s) Oral daily  chlorhexidine 0.12% Liquid 15 milliLiter(s) Oral Mucosa every 12 hours  cholecalciferol 2000 Unit(s) Oral daily  multivitamin 1 Tablet(s) Oral daily      REVIEW OF SYSTEMS:  Complete 10point ROS negative.    PHYSICAL EXAM:  General: NAD  HEENT: lip is swollen  Cardiovascular: Normal S1 S2, No JVD, No murmurs, No edema  Respiratory: Lungs with  diminished breath sounds  Gastrointestinal:  Softly distended  Skin: warm and dry, No rashes, No ecchymoses, No cyanosis	  Extremities: Normal range of motion, No clubbing, cyanosis or edema  Vascular: Peripheral pulses palpable 2+ bilaterally    LABS:	 	    CBC Full  -  ( 21 Mar 2021 05:29 )  WBC Count : 21.32 K/uL  Hemoglobin : 9.6 g/dL  Hematocrit : 30.4 %  Platelet Count - Automated : 349 K/uL  Mean Cell Volume : 88.9 fL  Mean Cell Hemoglobin : 28.1 pg  Mean Cell Hemoglobin Concentration : 31.6 gm/dL  Auto Neutrophil # : x  Auto Lymphocyte # : x  Auto Monocyte # : x  Auto Eosinophil # : x  Auto Basophil # : x  Auto Neutrophil % : x  Auto Lymphocyte % : x  Auto Monocyte % : x  Auto Eosinophil % : x  Auto Basophil % : x    03-21    144  |  104  |  120<H>  ----------------------------<  157<H>  5.2   |  27  |  4.04<H>  03-20    143  |  104  |  113<H>  ----------------------------<  157<H>  5.6<H>   |  29  |  4.20<H>    Ca    9.2      21 Mar 2021 05:29  Ca    8.7      20 Mar 2021 20:49  Phos  5.4     03-21  Mg     2.9     03-21  Mg     3.0     03-20    TPro  5.7<L>  /  Alb  2.1<L>  /  TBili  1.6<H>  /  DBili  x   /  AST  161<H>  /  ALT  162<H>  /  AlkPhos  392<H>  03-20  TPro  5.8<L>  /  Alb  2.4<L>  /  TBili  1.4<H>  /  DBili  x   /  AST  139<H>  /  ALT  146<H>  /  AlkPhos  369<H>  03-20    < from: US Abdomen Upper Quadrant Right (03.21.21 @ 13:46) >    FINDINGS:    Liver: Within normal limits.  Bile ducts: Normal caliber. Common bile duct measures 3 mm.  Gallbladder: No cholelithiasis or sludge. Mild gallbladder wall thickening..  Pancreas: Visualized portions are within normal limits.  Right kidney: 12.9 cm. No hydronephrosis.  Ascites: None.  IVC: Visualized portions are within normal limits.    IMPRESSION:    Very mild gallbladder wall thickening but an otherwise normal gallbladder. No other abnormality.                REBECCA ROWELL MD; Attending Radiologist  This document has been electronically signed. Mar 21 2021  2:13PM    < end of copied text >

## 2021-03-21 NOTE — CHART NOTE - NSCHARTNOTEFT_GEN_A_CORE
Overnight event 3/20/2021   COVID pneumonia  Acute hypoxic hypercapneic respiratory failure, Severe ARDS   Nimbex was resumed for vent synchrony   on ACVC 32/430/+14/90% ABG 7.22/85/97, PEEP decreased to +12, RR 30    ABG 7.24/73/87, PEEP to +10 ABG 7.27/67/74, will decrease FiO2 to 80%   Hyperkalemia, renal failure K 5,8, received lokelma 10+10 mg on 3/20/2021   Bumex drip increased from 1 to 2 mg/hr, I/O 1345/2575 (neg 1.2l)   GI ppx   DVT ppx   Prognosis is guarded

## 2021-03-21 NOTE — PROGRESS NOTE ADULT - ATTENDING COMMENTS
60M h/o brain tumor s/p resection in 2018 a/w COVID 19 Pneumonia s/p intubation on 3/12 and now ARDS requiring prone positioning now with refractory hypoxemia and hypercapnia and worsening LUDIVINA     Neuro: sedated with fentanyl, ketamine and versed; trialed off nimbex but became dysychronous with vent, restarted   CV: vasoplegic shock 2/2 sedation now off levophed; maintain MAP>65  Pulm: COVID Pneumonia with ARDS with refractory hypercarbia and hypoxia - no improvement in oxygenation/ventilation with proning, family requesting one final attempt  GI: c/w enteral feeds, ppx with protonix  - LFTS uptrending, will get RUQ sono  Renal: LUDIVINA likely 2/2 ATN from hypoxia/hypotension vs possible post-renal from obstruction now resolved after changing noel - continuee bumex gtt for goal net negative, repeat BMP this afternoon to electrolytes with aggressive diuresis, no indications for urgent HD at this time  ID: monitor off antibiotics  Heme: DVT ppx with HSQ  Endo: FS mostly at goal, continue ISS coverage

## 2021-03-21 NOTE — PROGRESS NOTE ADULT - ASSESSMENT
ASSESSMENT:  60y Male w/ PMHx of a Brain tumor s/p resection in 2018 who presented 3/1/21 with worsening shortness of breath and non-productive cough x1 week, worsening hypoxia 2/2 COVID PNA requiring intubation 3/12 and transfer to [facility]    # Hypoxic respiratory failure 2/2 COVID  -[facility]   -MICU following     Hypotension   - Norepinephrine for MAP >65    malnutrition  - TF   - GI ppx w/ protonix  - Senna/Miralax     PNA  - Increasing leukocytosis with elevated procalcitonin & fevers, concern for superimposed bacterial infection  -started on Iv abx     Ac renal failure   -worsening renal fx     Hx of brin tumor s/p resection   -stable     poor prognosis remain full code     krista nelson MD

## 2021-03-21 NOTE — PROGRESS NOTE ADULT - PROBLEM SELECTOR PLAN 3
hyperkalemia in the setting of LUDIVINA. Increase loop diuretics as above and monitor serum potassium.    If any questions, please feel free to contact me     Mejia Patel  Nephrology Fellow  [facility] Pager: [phone]  [facility] Pager: [phone] hyperkalemia in the setting of LUDIVINA. loop diuretics as above and monitor serum potassium.    If any questions, please feel free to contact me     Mejia Patel  Nephrology Fellow  [facility] Pager: [phone]  [facility] Pager: [phone]

## 2021-03-21 NOTE — PROGRESS NOTE ADULT - PROBLEM SELECTOR PLAN 1
Pt with non-oliguric LUDIVINA likely 2/2 hemodynamically mediated ATN in the setting of shock, covid19 pneumonia/ARDS.  On admission sCr 1.0 on 2/28, rise sCr after intubation from 0.97 to 1.53 on 3/14 then 1.67 on 3/15/21.  UA showed protein/blood/rbc/wbc.  Currently patient is intubated/sedated, on vasopressor support, currently with good urine output on Bumex drip. Last Scr increased to 4.04. Continue IV bumex at 2mg/hr.   Currently no absolute/urgent indication for dialysis. BP optimization/antibiotic/blood transfusion per ICU team. Monitor BMP, strict I/O, avoid nephrotoxic agents (NSAIDs, PPI, contrast), renally dose medications per GFR. Monitor electrolytes while on diuretic. Pt with non-oliguric LUDIVINA likely 2/2 hemodynamically mediated ATN in the setting of shock, covid19 pneumonia/ARDS.  On admission sCr 1.0 on 2/28, rise sCr after intubation from 0.97 to 1.53 on 3/14 then 1.67 on 3/15/21.  UA showed protein/blood/rbc/wbc.  Currently patient is intubated/sedated, currently with good urine output on Bumex drip. Last Scr decreased to 4.04. Continue IV bumex at 2mg/hr with a goal to keep the patient net negative.  Currently no absolute/urgent indication for dialysis. BP optimization/antibiotic/blood transfusion per ICU team. Monitor BMP, strict I/O, avoid nephrotoxic agents (NSAIDs, PPI, contrast), renally dose medications per GFR. Monitor electrolytes while on diuretic.

## 2021-03-21 NOTE — PROGRESS NOTE ADULT - SUBJECTIVE AND OBJECTIVE BOX
DATE OF SERVICE: 03-21-21 @ 22:05    Patient is a 60y old  Male who presents with a chief complaint of COVID-19 Pneumonia (21 Mar 2021 18:34)      INTERVAL HISTORY: remains intubated and sedated   Severe ARDS   Nimbex was resumed for vent synchrony   on ACVC 32/430/+14/90% ABG 7.22/85/97, PEEP decreased to +12, RR 30    ABG 7.24/73/87, PEEP to +10 ABG 7.27/67/74, will decrease FiO2 to 80%   Hyperkalemia, renal failure K 5,8, received lokelma 10+10 mg on 3/20/2021   Bumex drip increased from 1 to 2 mg/hr, I/O 1345/2575 (neg 1.2l)       TELEMETRY Personally reviewed: sinus tach  	  MEDICATIONS:  buMETAnide Infusion 2 mG/Hr IV Continuous <Continuous>  norepinephrine Infusion 0.1 MICROgram(s)/kG/Min IV Continuous <Continuous>         PHYSICAL EXAM:  T(C): 36.3 (03-21-21 @ 20:00), Max: 37.6 (03-21-21 @ 12:00)  HR: 104 (03-21-21 @ 21:00) (90 - 114)  BP: --  RR: 30 (03-21-21 @ 21:00) (29 - 32)  SpO2: 96% (03-21-21 @ 21:00) (91% - 97%)  Wt(kg): --  I&O's Summary    20 Mar 2021 07:01  -  21 Mar 2021 07:00  --------------------------------------------------------  IN: 1345.5 mL / OUT: 2575 mL / NET: -1229.5 mL    21 Mar 2021 07:01  -  21 Mar 2021 22:06  --------------------------------------------------------  IN: 1512.4 mL / OUT: 2000 mL / NET: -487.6 mL                                     9.6    21.32 )-----------( 349      ( 21 Mar 2021 05:29 )             30.4     03-21    144  |  102  |  128<H>  ----------------------------<  177<H>  5.5<H>   |  30  |  4.14<H>    Ca    8.8      21 Mar 2021 16:41  Phos  5.7     03-21  Mg     2.8     03-21    TPro  5.9<L>  /  Alb  2.3<L>  /  TBili  2.7<H>  /  DBili  x   /  AST  185<H>  /  ALT  196<H>  /  AlkPhos  500<H>  03-21        Labs personally reviewed      ASSESSMENT/PLAN: 	  This is a 61yo Male w/ PMHx of a Brain tumor s/p resection in 2018, who is presenting with worsening shortness of breath and non-productive cough for past week. Found to be COVID-19 Positive. Patient Sinus tachycardic in Emergency room.     1. Sinus Tachycardia 2/2 to worsening hypoxia from COVID-19 Pneumonia   - trop negative  - D Dimer very elevated   - (-) for DVT, changed to ppx dose by ICU   - consider TTE when stable    2. Hypotension - likely 2/2 sedation and paralytics  - NE to keep MAP >56    3. Hypoxemia 2/2 Covid-19 pna  - remdesevir completed. Deca for 10 days completed  - completed decadron taper  - s/p RRT- intubated/ sedated in ICU   - remains intubated and sedated    4. Hyponatremic- 134 on admission likely from dehydration   -   normalized     5. Transaminitis  - elevated LFTs on admission, albumin slightly low however now nearly normalized, bilirubin wnl   - continue to monitor     6.  Fevers/Leukocytosis -  Plan: s/p Vanco and IV meropenem - under ICU care  bcx 3/7 - negative   bcx 3/11 NGTD  Observe off Abx    7. LUDIVINA with rising Cr-   Bumex gtt restarted, to keep euvolemic given ARDS  - HD if worsening acidosis  - nephrology follow up     Thirty five minutes of critical care spent on total encounter, of which more than fifty percent of the encounter was spent on counseling and/or coordinating care by the attending physician.      Jaswant Weiss DO [facility]  Cardiovascular Medicine  [address], [address]  Office: [phone]  Cell: [phone]

## 2021-03-21 NOTE — PROGRESS NOTE ADULT - SUBJECTIVE AND OBJECTIVE BOX
Medicine Progress Note    Patient is a 60y old  Male who presents with a chief complaint of COVID-19 Pneumonia (21 Mar 2021 09:30)      SUBJECTIVE / OVERNIGHT EVENTS: remain intubated and on sedation     ADDITIONAL REVIEW OF SYSTEMS:    MEDICATIONS  (STANDING):  ALBUTerol    90 MICROgram(s) HFA Inhaler 2 Puff(s) Inhalation every 4 hours  ascorbic acid 500 milliGRAM(s) Oral daily  aspirin  chewable 81 milliGRAM(s) Oral daily  budesonide 160 MICROgram(s)/formoterol 4.5 MICROgram(s) Inhaler 2 Puff(s) Inhalation two times a day  buMETAnide Infusion 2 mG/Hr (10 mL/Hr) IV Continuous <Continuous>  chlorhexidine 0.12% Liquid 15 milliLiter(s) Oral Mucosa every 12 hours  cholecalciferol 2000 Unit(s) Oral daily  cisatracurium Infusion 3 MICROgram(s)/kG/Min (14.8 mL/Hr) IV Continuous <Continuous>  dextrose 50% Injectable 50 milliLiter(s) IV Push once  diazepam    Tablet 10 milliGRAM(s) Oral every 8 hours  fentaNYL   Infusion..... 0.499 MICROgram(s)/kG/Hr (0.82 mL/Hr) IV Continuous <Continuous>  glucagon  Injectable 1 milliGRAM(s) IntraMuscular once  heparin   Injectable 5000 Unit(s) SubCutaneous every 8 hours  insulin lispro (ADMELOG) corrective regimen sliding scale   SubCutaneous every 6 hours  ketamine Infusion. 0.5 mG/kG/Hr (4.11 mL/Hr) IV Continuous <Continuous>  metoclopramide Injectable 10 milliGRAM(s) IV Push every 8 hours  midazolam Infusion 0.02 mG/kG/Hr (1.64 mL/Hr) IV Continuous <Continuous>  multivitamin 1 Tablet(s) Oral daily  norepinephrine Infusion 0.1 MICROgram(s)/kG/Min (15.4 mL/Hr) IV Continuous <Continuous>  pantoprazole  Injectable 40 milliGRAM(s) IV Push daily  polyethylene glycol 3350 17 Gram(s) Oral daily  propofol Infusion 20 MICROgram(s)/kG/Min (9.85 mL/Hr) IV Continuous <Continuous>  senna Syrup 10 milliLiter(s) Oral at bedtime  sodium zirconium cyclosilicate 10 Gram(s) Oral every 8 hours    MEDICATIONS  (PRN):  acetaminophen    Suspension .. 650 milliGRAM(s) Oral every 6 hours PRN Temp greater or equal to 38C (100.4F)    CAPILLARY BLOOD GLUCOSE      POCT Blood Glucose.: 156 mg/dL (21 Mar 2021 18:18)  POCT Blood Glucose.: 180 mg/dL (21 Mar 2021 12:00)  POCT Blood Glucose.: 149 mg/dL (21 Mar 2021 05:55)  POCT Blood Glucose.: 140 mg/dL (20 Mar 2021 22:53)    I&O's Summary    20 Mar 2021 07:01  -  21 Mar 2021 07:00  --------------------------------------------------------  IN: 1345.5 mL / OUT: 2575 mL / NET: -1229.5 mL    21 Mar 2021 07:01  -  21 Mar 2021 18:34  --------------------------------------------------------  IN: 1295 mL / OUT: 1300 mL / NET: -5 mL        PHYSICAL EXAM:  Vital Signs Last 24 Hrs  T(C): 37.4 (21 Mar 2021 16:00), Max: 37.6 (21 Mar 2021 12:00)  T(F): 99.3 (21 Mar 2021 16:00), Max: 99.7 (21 Mar 2021 12:00)  HR: 105 (21 Mar 2021 18:00) (88 - 114)  BP: --  BP(mean): --  RR: 30 (21 Mar 2021 18:00) (29 - 32)  SpO2: 97% (21 Mar 2021 18:00) (91% - 97%)  CONSTITUTIONAL: NAD, well-developed, well-groomed  ENMT: Moist oral mucosa, no pharyngeal injection or exudates; normal dentition  RESPIRATORY: Normal respiratory effort; lungs are clear to auscultation bilaterally  CARDIOVASCULAR: Regular rate and rhythm, normal S1 and S2, no murmur/rub/gallop; No lower extremity edema; Peripheral pulses are 2+ bilaterally  ABDOMEN: Nontender to palpation, normoactive bowel sounds, no rebound/guarding; No hepatosplenomegaly  PSYCH: A+O to person, place, and time; affect appropriate  NEUROLOGY: CN 2-12 are intact and symmetric; no gross sensory deficits   SKIN: No rashes; no palpable lesions    LABS:                        9.6    .32 )-----------( 349      ( 21 Mar 2021 05:29 )             30.4     -    144  |  102  |  128<H>  ----------------------------<  177<H>  5.5<H>   |  30  |  4.14<H>    Ca    8.8      21 Mar 2021 16:41  Phos  5.7       Mg     2.8         TPro  5.9<L>  /  Alb  2.3<L>  /  TBili  2.7<H>  /  DBili  x   /  AST  185<H>  /  ALT  196<H>  /  AlkPhos  500<H>      PTT - ( 21 Mar 2021 05:29 )  PTT:36.2 sec      Urinalysis Basic - ( 20 Mar 2021 17:54 )    Color: Yellow / Appearance: Slightly Turbid / S.018 / pH: x  Gluc: x / Ketone: Negative  / Bili: Negative / Urobili: <2 mg/dL   Blood: x / Protein: 300 mg/dL / Nitrite: Negative   Leuk Esterase: Negative / RBC: >10 /HPF / WBC <5 /HPF   Sq Epi: x / Non Sq Epi: 0-5 /HPF / Bacteria: Negative        Culture - Urine (collected 20 Mar 2021 16:00)  Source: .Urine Clean Catch (Midstream)  Final Report (21 Mar 2021 16:32):    No growth      COVID-19 PCR: Detected (2021 23:02)      RADIOLOGY & ADDITIONAL TESTS:  Imaging from Last 24 Hours:    Electrocardiogram/QTc Interval:    COORDINATION OF CARE:  Care Discussed with Consultants/Other Providers:

## 2021-03-21 NOTE — PROGRESS NOTE ADULT - SUBJECTIVE AND OBJECTIVE BOX
[facility] DIVISION OF KIDNEY DISEASES AND HYPERTENSION -- FOLLOW UP NOTE  --------------------------------------------------------------------------------    24 hour events/subjective: urine output is 2.5L over 24h, net 1.2L negative. Currently on Bumex drip at 2 mg/hr. On FIO2 of 90%. Unable to obtain ROS    PAST HISTORY  --------------------------------------------------------------------------------  No significant changes to PMH, PSH, FHx, SHx, unless otherwise noted    ALLERGIES & MEDICATIONS  --------------------------------------------------------------------------------  Allergies    No Known Allergies    Intolerances      Standing Inpatient Medications  ALBUTerol    90 MICROgram(s) HFA Inhaler 2 Puff(s) Inhalation every 4 hours  ascorbic acid 500 milliGRAM(s) Oral daily  aspirin  chewable 81 milliGRAM(s) Oral daily  budesonide 160 MICROgram(s)/formoterol 4.5 MICROgram(s) Inhaler 2 Puff(s) Inhalation two times a day  buMETAnide Infusion 2 mG/Hr IV Continuous <Continuous>  chlorhexidine 0.12% Liquid 15 milliLiter(s) Oral Mucosa every 12 hours  cholecalciferol 2000 Unit(s) Oral daily  cisatracurium Infusion 3 MICROgram(s)/kG/Min IV Continuous <Continuous>  dextrose 50% Injectable 50 milliLiter(s) IV Push once  diazepam    Tablet 10 milliGRAM(s) Oral every 8 hours  fentaNYL   Infusion..... 0.499 MICROgram(s)/kG/Hr IV Continuous <Continuous>  glucagon  Injectable 1 milliGRAM(s) IntraMuscular once  heparin   Injectable 5000 Unit(s) SubCutaneous every 8 hours  insulin lispro (ADMELOG) corrective regimen sliding scale   SubCutaneous every 6 hours  ketamine Infusion. 0.5 mG/kG/Hr IV Continuous <Continuous>  metoclopramide Injectable 10 milliGRAM(s) IV Push every 8 hours  midazolam Infusion 0.02 mG/kG/Hr IV Continuous <Continuous>  multivitamin 1 Tablet(s) Oral daily  norepinephrine Infusion 0.05 MICROgram(s)/kG/Min IV Continuous <Continuous>  pantoprazole  Injectable 40 milliGRAM(s) IV Push daily  polyethylene glycol 3350 17 Gram(s) Oral daily  propofol Infusion 20 MICROgram(s)/kG/Min IV Continuous <Continuous>  senna Syrup 10 milliLiter(s) Oral at bedtime    PRN Inpatient Medications  acetaminophen    Suspension .. 650 milliGRAM(s) Oral every 6 hours PRN    REVIEW OF SYSTEMS  --------------------------------------------------------------------------------  Unable to obtain ROS    VITALS/PHYSICAL EXAM  --------------------------------------------------------------------------------  T(C): 35.9 (03-21-21 @ 08:00), Max: 38.2 (03-20-21 @ 12:00)  HR: 100 (03-21-21 @ 08:00) (88 - 119)  BP: --  RR: 29 (03-21-21 @ 08:00) (29 - 33)  SpO2: 92% (03-21-21 @ 08:00) (90% - 96%)  Wt(kg): --    03-20-21 @ 07:01  -  03-21-21 @ 07:00  --------------------------------------------------------  IN: 1345.5 mL / OUT: 2575 mL / NET: -1229.5 mL    03-21-21 @ 07:01  -  03-21-21 @ 09:30  --------------------------------------------------------  IN: 97.5 mL / OUT: 500 mL / NET: -402.5 mL    Physical Exam:  	Physical Exam:  	Gen: intubated  	HEENT: intubated, +ETT  	Pulm: mechanical breath sounds  	CV: sinus rhythm   	GI: no distention   	: noel catheter in place  	MSK: no edema in lower extremity    LABS/STUDIES  --------------------------------------------------------------------------------              9.6    21.32 >-----------<  349      [03-21-21 @ 05:29]              30.4     144  |  104  |  120  ----------------------------<  157      [03-21-21 @ 05:29]  5.2   |  27  |  4.04        Ca     9.2     [03-21-21 @ 05:29]      Mg     2.9     [03-21-21 @ 05:29]      Phos  5.4     [03-21-21 @ 05:29]    TPro  5.7  /  Alb  2.1  /  TBili  1.6  /  DBili  x   /  AST  161  /  ALT  162  /  AlkPhos  392  [03-20-21 @ 20:49]      PTT: 36.2       [03-21-21 @ 05:29]      Creatinine Trend:  SCr 4.04 [03-21 @ 05:29]  SCr 4.20 [03-20 @ 20:49]  SCr 3.93 [03-20 @ 13:43]  SCr 3.08 [03-20 @ 05:10]  SCr 2.22 [03-19 @ 01:34]    Urinalysis - [03-20-21 @ 17:54]      Color Yellow / Appearance Slightly Turbid / SG 1.018 / pH 6.5      Gluc Trace / Ketone Negative  / Bili Negative / Urobili <2 mg/dL       Blood Moderate / Protein 300 mg/dL / Leuk Est Negative / Nitrite Negative      RBC >10 / WBC <5 / Hyaline few / Gran  / Sq Epi  / Non Sq Epi 0-5 / Bacteria Negative      Ferritin 696      [03-12-21 @ 05:24]  TSH 0.18      [03-01-21 @ 08:07]  Lipid: chol --, , HDL --, LDL --      [03-17-21 @ 00:49]    HCV 0.12, Nonreact      [03-02-21 @ 13:44]

## 2021-03-22 NOTE — PROGRESS NOTE ADULT - PROBLEM SELECTOR PLAN 2
Pt with acidosis, primary respiratory acidosis in the setting of COVID19 pneumonia/ARDS. Improving - Ventilation optimization per ICU team, diuretic as outline above.  If acidosis worsens then will need to consider dialysis (dialysis consent obtained) to help with lung compliance.  Monitor blood gas/serum bicarb

## 2021-03-22 NOTE — PROGRESS NOTE ADULT - SUBJECTIVE AND OBJECTIVE BOX
Medicine Progress Note    Patient is a 60y old  Male who presents with a chief complaint of COVID-19 Pneumonia (22 Mar 2021 17:35)      SUBJECTIVE / OVERNIGHT EVENTS: remain intubated on [facility] vent    ADDITIONAL REVIEW OF SYSTEMS:    MEDICATIONS  (STANDING):  ALBUTerol    90 MICROgram(s) HFA Inhaler 2 Puff(s) Inhalation every 4 hours  ascorbic acid 500 milliGRAM(s) Oral daily  aspirin  chewable 81 milliGRAM(s) Oral daily  budesonide 160 MICROgram(s)/formoterol 4.5 MICROgram(s) Inhaler 2 Puff(s) Inhalation two times a day  buMETAnide 2 milliGRAM(s) Oral two times a day  chlorhexidine 0.12% Liquid 15 milliLiter(s) Oral Mucosa every 12 hours  cholecalciferol 2000 Unit(s) Oral daily  cisatracurium Infusion 3 MICROgram(s)/kG/Min (14.8 mL/Hr) IV Continuous <Continuous>  dextrose 50% Injectable 50 milliLiter(s) IV Push once  diazepam    Tablet 10 milliGRAM(s) Oral every 8 hours  fentaNYL   Infusion..... 0.499 MICROgram(s)/kG/Hr (0.82 mL/Hr) IV Continuous <Continuous>  glucagon  Injectable 1 milliGRAM(s) IntraMuscular once  heparin   Injectable 5000 Unit(s) SubCutaneous every 8 hours  insulin lispro (ADMELOG) corrective regimen sliding scale   SubCutaneous every 6 hours  ketamine Infusion. 0.25 mG/kG/Hr (2.05 mL/Hr) IV Continuous <Continuous>  metoclopramide Injectable 10 milliGRAM(s) IV Push every 8 hours  midazolam Infusion 0.02 mG/kG/Hr (1.64 mL/Hr) IV Continuous <Continuous>  multivitamin 1 Tablet(s) Oral daily  norepinephrine Infusion 0.1 MICROgram(s)/kG/Min (15.4 mL/Hr) IV Continuous <Continuous>  pantoprazole  Injectable 40 milliGRAM(s) IV Push daily  polyethylene glycol 3350 17 Gram(s) Oral daily  propofol Infusion 20 MICROgram(s)/kG/Min (9.85 mL/Hr) IV Continuous <Continuous>  senna Syrup 10 milliLiter(s) Oral at bedtime    MEDICATIONS  (PRN):  acetaminophen    Suspension .. 650 milliGRAM(s) Oral every 6 hours PRN Temp greater or equal to 38C (100.4F)    CAPILLARY BLOOD GLUCOSE      POCT Blood Glucose.: 160 mg/dL (23 Mar 2021 00:33)  POCT Blood Glucose.: 189 mg/dL (22 Mar 2021 16:22)  POCT Blood Glucose.: 165 mg/dL (22 Mar 2021 11:15)  POCT Blood Glucose.: 153 mg/dL (22 Mar 2021 06:14)    I&O's Summary    21 Mar 2021 07:01  -  22 Mar 2021 07:00  --------------------------------------------------------  IN: 2420.7 mL / OUT: 3775 mL / NET: -1354.3 mL    22 Mar 2021 07:01  -  23 Mar 2021 01:41  --------------------------------------------------------  IN: 1627.5 mL / OUT: 1945 mL / NET: -317.5 mL        PHYSICAL EXAM:  Vital Signs Last 24 Hrs  T(C): 37.2 (23 Mar 2021 00:00), Max: 37.2 (23 Mar 2021 00:00)  T(F): 99 (23 Mar 2021 00:00), Max: 99 (23 Mar 2021 00:00)  HR: 111 (23 Mar 2021 01:00) (13 - 111)  BP: --  BP(mean): --  RR: 30 (23 Mar 2021 01:00) (29 - 30)  SpO2: 93% (23 Mar 2021 01:00) (93% - 97%)  CONSTITUTIONAL: NAD, well-developed, well-groomed  ENMT: Moist oral mucosa, no pharyngeal injection or exudates; normal dentition  RESPIRATORY: Normal respiratory effort; lungs are clear to auscultation bilaterally  CARDIOVASCULAR: Regular rate and rhythm, normal S1 and S2, no murmur/rub/gallop; No lower extremity edema; Peripheral pulses are 2+ bilaterally  ABDOMEN: Nontender to palpation, normoactive bowel sounds, no rebound/guarding; No hepatosplenomegaly  PSYCH: A+O to person, place, and time; affect appropriate  NEUROLOGY: CN 2-12 are intact and symmetric; no gross sensory deficits   SKIN: No rashes; no palpable lesions    LABS:                        10.1   26.72 )-----------( 390      ( 22 Mar 2021 05:29 )             31.7     03-22    144  |  99  |  130<H>  ----------------------------<  144<H>  5.0   |  29  |  4.03<H>    Ca    8.8      22 Mar 2021 05:29  Phos  5.7     03-21  Mg     2.7     03-22    TPro  6.4  /  Alb  2.2<L>  /  TBili  3.9<H>  /  DBili  3.9<H>  /  AST  196<H>  /  ALT  230<H>  /  AlkPhos  632<H>  03-22    PTT - ( 22 Mar 2021 05:29 )  PTT:32.9 sec          Culture - Urine (collected 20 Mar 2021 16:00)  Source: .Urine Clean Catch (Midstream)  Final Report (21 Mar 2021 16:32):    No growth      COVID-19 PCR: Detected (28 Feb 2021 23:02)      RADIOLOGY & ADDITIONAL TESTS:  Imaging from Last 24 Hours:    Electrocardiogram/QTc Interval:    COORDINATION OF CARE:  Care Discussed with Consultants/Other Providers:

## 2021-03-22 NOTE — PROGRESS NOTE ADULT - ASSESSMENT
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  -stable     PULMONARY:  # Hypoxic respiratory failure 2/2 COVID  [facility]   -VENT SETTINGS REMAIN   -AC 34/400/PEEP 10/FIO2 80%  -ABG 7.28/68/83/27/95     Bacterial Pneumonia:  - Increasing leukocytosis with elevated procalcitonin & fevers, concern for superimposed bacterial infection  -monitoring off antibiotics  Hypotension   - Norepinephrine for MAP >65    GI:  Transaminitis:  -RUQ done, revealing mild gall bladder thickness, otherwise normal  - Trend LFTs       malnutrition  - TF   - GI ppx w/ protonix  - Senna/Miralax     :  Worsening LUDIVINA  -Creat 4.0      SOCIAL ISSUES:  poor prognosis remain full code   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  -stable     PULMONARY:  # Hypoxic respiratory failure 2/2 COVID  -intubated on [facility] vent   -VENT SETTINGS REMAIN   -AC 34/400/PEEP 10/FIO2 80%  -ABG 7.28/68/83/27/95     Bacterial Pneumonia:  - Increasing leukocytosis with elevated procalcitonin & fevers, concern for superimposed bacterial infection  -monitoring off antibiotics  Hypotension   - Norepinephrine for MAP >65    GI:  Transaminitis:  -RUQ done, revealing mild gall bladder thickness, otherwise normal  - Trend LFTs       malnutrition  - TF   - GI ppx w/ protonix  - Senna/Miralax     :  Improving LUDIVINA  -Creat 4.0      SOCIAL ISSUES:  poor prognosis remain full code

## 2021-03-22 NOTE — PROGRESS NOTE ADULT - SUBJECTIVE AND OBJECTIVE BOX
Patient is a 60y old  Male who presents with a chief complaint of COVID-19 Pneumonia (22 Mar 2021 14:24)      INTERVAL HISTORY: intubated/ sedated  	  MEDICATIONS:  buMETAnide 2 milliGRAM(s) Oral two times a day  norepinephrine Infusion 0.1 MICROgram(s)/kG/Min IV Continuous <Continuous>        PHYSICAL EXAM:  T(C): 36.7 (03-22-21 @ 17:00), Max: 36.7 (03-22-21 @ 17:00)  HR: 104 (03-22-21 @ 17:00) (13 - 107)  BP: --  RR: 30 (03-22-21 @ 17:00) (29 - 30)  SpO2: 93% (03-22-21 @ 17:00) (93% - 97%)  Wt(kg): --  I&O's Summary    21 Mar 2021 07:01  -  22 Mar 2021 07:00  --------------------------------------------------------  IN: 2420.7 mL / OUT: 3775 mL / NET: -1354.3 mL    22 Mar 2021 07:01  -  22 Mar 2021 17:37  --------------------------------------------------------  IN: 840.9 mL / OUT: 1475 mL / NET: -634.1 mL                 10.1   26.72 )-----------( 390      ( 22 Mar 2021 05:29 )             31.7     03-22    144  |  99  |  130<H>  ----------------------------<  144<H>  5.0   |  29  |  4.03<H>    Ca    8.8      22 Mar 2021 05:29  Phos  5.7     03-21  Mg     2.7     03-22    TPro  6.4  /  Alb  2.2<L>  /  TBili  3.9<H>  /  DBili  3.9<H>  /  AST  196<H>  /  ALT  230<H>  /  AlkPhos  632<H>  03-22  Labs personally reviewed    ASSESSMENT/PLAN: 	  This is a 61yo Male w/ PMHx of a Brain tumor s/p resection in 2018, who is presenting with worsening shortness of breath and non-productive cough for past week. Found to be COVID-19 Positive. Patient Sinus tachycardic in Emergency room.     1. Sinus Tachycardia 2/2 to worsening hypoxia from COVID-19 Pneumonia   - trop negative  - D Dimer very elevated   - (-) for DVT, changed to ppx dose by ICU   - consider TTE when stable    2. Hypotension - likely 2/2 sedation and paralytics  - NE to keep MAP >56    3. Hypoxemia 2/2 Covid-19 pna  - remdesevir completed. Deca for 10 days completed  - completed decadron taper  - s/p RRT- intubated/ sedated in ICU   - remains intubated and sedated    4. Hyponatremic- 134 on admission likely from dehydration   -   normalized     5. Transaminitis  - elevated LFTs on admission, albumin slightly low however now nearly normalized, bilirubin wnl   - continue to monitor     6.  Fevers/Leukocytosis -  Plan: s/p Vanco and IV meropenem - under ICU care  bcx 3/7 - negative   bcx 3/11 NGTD  Observe off Abx    7. LUDIVINA with rising Cr-   Bumex gtt  to keep euvolemic given ARDS- responding well  - HD if worsening acidosis  - nephrology follow up         Arpita JONES-C  Jaswant Weiss DO [facility]  Cardiovascular Medicine  [address] [place] [address], [address]  Office: [phone]  Cell: [phone]

## 2021-03-22 NOTE — PROGRESS NOTE ADULT - SUBJECTIVE AND OBJECTIVE BOX
[facility] DIVISION OF KIDNEY DISEASES AND HYPERTENSION -- FOLLOW UP NOTE  --------------------------------------------------------------------------------  If any questions, please feel free to contact me  NS pager: [phone], [facility]: [phone]  Mendez Marie M.D.  Nephrology Fellow    (After 5 pm or on weekends please page the on-call fellow)  --------------------------------------------------------------------------------    Chief Complaint:  Patient is a 60y old  Male who presents with a chief complaint of COVID-19 Pneumonia (22 Mar 2021 11:27)    24 hour events/subjective:  Patient seen and examined at bedside, urine output is 3.7L over 24h. this morning on Bumex drip at 2 mg/hr. Vitals/labs/imaging reviewed           PAST HISTORY  --------------------------------------------------------------------------------  No significant changes to PMH, PSH, FHx, SHx, unless otherwise noted    ALLERGIES & MEDICATIONS  --------------------------------------------------------------------------------  Allergies    No Known Allergies    Intolerances      Standing Inpatient Medications  ALBUTerol    90 MICROgram(s) HFA Inhaler 2 Puff(s) Inhalation every 4 hours  ascorbic acid 500 milliGRAM(s) Oral daily  aspirin  chewable 81 milliGRAM(s) Oral daily  budesonide 160 MICROgram(s)/formoterol 4.5 MICROgram(s) Inhaler 2 Puff(s) Inhalation two times a day  buMETAnide 2 milliGRAM(s) Oral two times a day  chlorhexidine 0.12% Liquid 15 milliLiter(s) Oral Mucosa every 12 hours  cholecalciferol 2000 Unit(s) Oral daily  cisatracurium Infusion 3 MICROgram(s)/kG/Min IV Continuous <Continuous>  dextrose 50% Injectable 50 milliLiter(s) IV Push once  diazepam    Tablet 10 milliGRAM(s) Oral every 8 hours  fentaNYL   Infusion..... 0.499 MICROgram(s)/kG/Hr IV Continuous <Continuous>  glucagon  Injectable 1 milliGRAM(s) IntraMuscular once  heparin   Injectable 5000 Unit(s) SubCutaneous every 8 hours  insulin lispro (ADMELOG) corrective regimen sliding scale   SubCutaneous every 6 hours  ketamine Infusion. 0.25 mG/kG/Hr IV Continuous <Continuous>  metoclopramide Injectable 10 milliGRAM(s) IV Push every 8 hours  midazolam Infusion 0.02 mG/kG/Hr IV Continuous <Continuous>  multivitamin 1 Tablet(s) Oral daily  norepinephrine Infusion 0.1 MICROgram(s)/kG/Min IV Continuous <Continuous>  pantoprazole  Injectable 40 milliGRAM(s) IV Push daily  polyethylene glycol 3350 17 Gram(s) Oral daily  propofol Infusion 20 MICROgram(s)/kG/Min IV Continuous <Continuous>  senna Syrup 10 milliLiter(s) Oral at bedtime    PRN Inpatient Medications  acetaminophen    Suspension .. 650 milliGRAM(s) Oral every 6 hours PRN      REVIEW OF SYSTEMS  --------------------------------------------------------------------------------  unable to obtain due to current medical conditions     VITALS/PHYSICAL EXAM  --------------------------------------------------------------------------------  T(C): 36.6 (03-22-21 @ 14:00), Max: 37.4 (03-21-21 @ 16:00)  HR: 104 (03-22-21 @ 14:00) (13 - 114)  BP: --  RR: 30 (03-22-21 @ 14:00) (29 - 30)  SpO2: 94% (03-22-21 @ 14:00) (93% - 97%)  Wt(kg): --        03-21-21 @ 07:01  -  03-22-21 @ 07:00  --------------------------------------------------------  IN: 2420.7 mL / OUT: 3775 mL / NET: -1354.3 mL    03-22-21 @ 07:01  -  03-22-21 @ 14:25  --------------------------------------------------------  IN: 580.9 mL / OUT: 1265 mL / NET: -684.1 mL        Physical Exam:  	Gen: intubated  	HEENT: intubated, +ETT  	Pulm: mechanical breath sounds  	CV: sinus rhythm   	GI: no distention   	: noel catheter in place  	MSK: no edema in lower extremity        LABS/STUDIES  --------------------------------------------------------------------------------              10.1   26.72 >-----------<  390      [03-22-21 @ 05:29]              31.7     144  |  99  |  130  ----------------------------<  144      [03-22-21 @ 05:29]  5.0   |  29  |  4.03        Ca     8.8     [03-22-21 @ 05:29]      Mg     2.7     [03-22-21 @ 05:29]      Phos  5.7     [03-21-21 @ 16:41]    TPro  5.9  /  Alb  2.3  /  TBili  2.7  /  DBili  x   /  AST  185  /  ALT  196  /  AlkPhos  500  [03-21-21 @ 16:41]      PTT: 32.9       [03-22-21 @ 05:29]      Creatinine Trend:  SCr 4.03 [03-22 @ 05:29]  SCr 4.14 [03-21 @ 16:41]  SCr 4.04 [03-21 @ 05:29]  SCr 4.20 [03-20 @ 20:49]  SCr 3.93 [03-20 @ 13:43]    Urinalysis - [03-20-21 @ 17:54]      Color Yellow / Appearance Slightly Turbid / SG 1.018 / pH 6.5      Gluc Trace / Ketone Negative  / Bili Negative / Urobili <2 mg/dL       Blood Moderate / Protein 300 mg/dL / Leuk Est Negative / Nitrite Negative      RBC >10 / WBC <5 / Hyaline few / Gran  / Sq Epi  / Non Sq Epi 0-5 / Bacteria Negative      Ferritin 696      [03-12-21 @ 05:24]  TSH 0.18      [03-01-21 @ 08:07]  Lipid: chol --, , HDL --, LDL --      [03-17-21 @ 00:49]    HCV 0.12, Nonreact      [03-02-21 @ 13:44]

## 2021-03-22 NOTE — PROGRESS NOTE ADULT - ASSESSMENT
ASSESSMENT:  60y Male w/ PMHx of a Brain tumor s/p resection in 2018 who presented 3/1/21 with worsening shortness of breath and non-productive cough x1 week, worsening hypoxia 2/2 COVID PNA requiring intubation 3/12 and transfer to [facility]    # Hypoxic respiratory failure 2/2 COVID  [facility]   -[facility] following     Hypotension   - Norepinephrine for MAP >65    malnutrition  - TF   - GI ppx w/ protonix  - Senna/Miralax     PNA  - Increasing leukocytosis with elevated procalcitonin & fevers, concern for superimposed bacterial infection  -started on Iv abx     Ac renal failure   -worsening renal fx     Hx of brin tumor s/p resection   -stable     poor prognosis remain full code     krista nelson MD

## 2021-03-22 NOTE — PROGRESS NOTE ADULT - SUBJECTIVE AND OBJECTIVE BOX
JANETH RAO  MRN-[id number]  Patient is a 60y old  Male who presents with a chief complaint of COVID-19 Pneumonia (22 Mar 2021 10:37)    HPI:  This is a 61yo Male w/ PMHx of a Brain tumor s/p resection in 2018, who is presenting with worsening shortness of breath and non-productive cough for past week. Pt reports he went to his PMD for his symptoms, had a negative COVID swab, was instructed to take an inhaler; however, it has given him little relief.  His SOB was getting worse which prompted him to come to ED. He has increased lethargy and ALVARES. Decreased appetite. He denies having fevers at home, no sick contacts, no recent travel, no loss of taste/smell. Pt denies having chest pain, palpitations, HA, dizziness, visual changes, abdominal pain, N/V/D/C, dysuria, hematuria, melena or hematochezia.     ED Course: EK bpm, Sinus tachycardia, qtc: 419. CXR: c/w COVID PNA. COVID-19 PCR: Positive. Initially SPO2 87% on RA. Now SPO2 95% on 6L NC. s/p Decadron and NS IVF in ED.  (01 Mar 2021 05:15)      Today: Patient intubated and sedated.     REVIEW OF SYSTEMS:  Gen: No fever  EYES/ENT: No visual changes;  No vertigo or throat pain   NECK: No pain   RES:  No shortness of breath or Cough  Chest: + incisional pain  CARD: No chest pain   GI: No abdominal pain  : No dysuria  NEURO: No weakness  SKIN: No itching, rashes     Physical Exam:  Vital Signs Last 24 Hrs  T(C): 36 (22 Mar 2021 11:00), Max: 37.6 (21 Mar 2021 12:00)  T(F): 96.8 (22 Mar 2021 11:00), Max: 99.7 (21 Mar 2021 12:00)  HR: 99 (22 Mar 2021 11:00) (13 - 114)  BP: --  BP(mean): --  RR: 30 (22 Mar 2021 11:00) (29 - 30)  SpO2: 93% (22 Mar 2021 11:00) (93% - 97%)  Gen:  Awake, alert   CNS: non focal 	  Neck: no JVD  RES : clear , no wheezing    Chest:   + chest tubes                     CVS: Regular  rhythm. Normal S1/S2  Abd: Soft, non-distended. Bowel sounds present.  Skin: No rash.  Ext:  no edema, A Line    ============================I/O===========================   I&O's Detail    21 Mar 2021 07:01  -  22 Mar 2021 07:00  --------------------------------------------------------  IN:    Bumetanide: 230 mL    Cisatracurium: 399.2 mL    Enteral Tube Flush: 100 mL    FentaNYL: 170.2 mL    Ketamine: 32.8 mL    Ketamine: 344.4 mL    Midazolam: 168.6 mL    Nepro with Carb Steady: 735 mL    Norepinephrine: 240.5 mL  Total IN: 2420.7 mL    OUT:    Indwelling Catheter - Urethral (mL): 3775 mL    Norepinephrine: 0 mL    Propofol: 0 mL  Total OUT: 3775 mL    Total NET: -1354.3 mL      22 Mar 2021 07:01  -  22 Mar 2021 11:28  --------------------------------------------------------  IN:    Bumetanide: 50 mL    Cisatracurium: 74 mL    FentaNYL: 37 mL    Ketamine: 82 mL    Midazolam: 33 mL    Nepro with Carb Steady: 40 mL    Norepinephrine: 92.5 mL  Total IN: 408.5 mL    OUT:    Indwelling Catheter - Urethral (mL): 910 mL  Total OUT: 910 mL    Total NET: -501.5 mL        ============================ LABS =========================                        10.1   26.72 )-----------( 390      ( 22 Mar 2021 05:29 )             31.7         144  |  99  |  130<H>  ----------------------------<  144<H>  5.0   |  29  |  4.03<H>    Ca    8.8      22 Mar 2021 05:29  Phos  5.7       Mg     2.7         TPro  5.9<L>  /  Alb  2.3<L>  /  TBili  2.7<H>  /  DBili  x   /  AST  185<H>  /  ALT  196<H>  /  AlkPhos  500<H>      LIVER FUNCTIONS - ( 21 Mar 2021 16:41 )  Alb: 2.3 g/dL / Pro: 5.9 g/dL / ALK PHOS: 500 U/L / ALT: 196 U/L / AST: 185 U/L / GGT: x           PTT - ( 22 Mar 2021 05:29 )  PTT:32.9 sec  ABG - ( 22 Mar 2021 05:29 )  pH, Arterial: 7.28  pH, Blood: x     /  pCO2: 68    /  pO2: 83    / HCO3: 27    / Base Excess: 4.5   /  SaO2: 94.6              Urinalysis Basic - ( 20 Mar 2021 17:54 )    Color: Yellow / Appearance: Slightly Turbid / S.018 / pH: x  Gluc: x / Ketone: Negative  / Bili: Negative / Urobili: <2 mg/dL   Blood: x / Protein: 300 mg/dL / Nitrite: Negative   Leuk Esterase: Negative / RBC: >10 /HPF / WBC <5 /HPF   Sq Epi: x / Non Sq Epi: 0-5 /HPF / Bacteria: Negative      ======================Micro/Rad/Cardio=================  Culture: Reviewed   CXR: Reviewed  Echo:Reviewed  ======================================================  PAST MEDICAL & SURGICAL HISTORY:  Brain cancer  in remission.    Brain tumor  s/p ressection in 2018.

## 2021-03-22 NOTE — PROGRESS NOTE ADULT - PROBLEM SELECTOR PLAN 3
hyperkalemia in the setting of LUDIVINA. loop diuretics as above and monitor serum potassium.    If any questions, please feel free to contact me     Mejia Patel  Nephrology Fellow  [facility] Pager: [phone]  [facility] Pager: [phone] hyperkalemia in the setting of LUDIVINA. loop diuretics as above and monitor serum potassium.    If any questions, please feel free to contact me     Mendez Newsome  Nephrology Fellow  [facility] Pager: [phone]

## 2021-03-22 NOTE — PROGRESS NOTE ADULT - SUBJECTIVE AND OBJECTIVE BOX
Date of Service: 21 @ 10:37    Patient is a 60y old  Male who presents with a chief complaint of COVID-19 Pneumonia (21 Mar 2021 18:34)      Any change in ROS: Pt cont to do poorly: he is sedated and paralysed: requiring very high conc of oxygen   on bumex drip     MEDICATIONS  (STANDING):  ALBUTerol    90 MICROgram(s) HFA Inhaler 2 Puff(s) Inhalation every 4 hours  ascorbic acid 500 milliGRAM(s) Oral daily  aspirin  chewable 81 milliGRAM(s) Oral daily  budesonide 160 MICROgram(s)/formoterol 4.5 MICROgram(s) Inhaler 2 Puff(s) Inhalation two times a day  buMETAnide Infusion 2 mG/Hr (10 mL/Hr) IV Continuous <Continuous>  chlorhexidine 0.12% Liquid 15 milliLiter(s) Oral Mucosa every 12 hours  cholecalciferol 2000 Unit(s) Oral daily  cisatracurium Infusion 3 MICROgram(s)/kG/Min (14.8 mL/Hr) IV Continuous <Continuous>  dextrose 50% Injectable 50 milliLiter(s) IV Push once  diazepam    Tablet 10 milliGRAM(s) Oral every 8 hours  fentaNYL   Infusion..... 0.499 MICROgram(s)/kG/Hr (0.82 mL/Hr) IV Continuous <Continuous>  glucagon  Injectable 1 milliGRAM(s) IntraMuscular once  heparin   Injectable 5000 Unit(s) SubCutaneous every 8 hours  insulin lispro (ADMELOG) corrective regimen sliding scale   SubCutaneous every 6 hours  ketamine Infusion. 0.25 mG/kG/Hr (2.05 mL/Hr) IV Continuous <Continuous>  metoclopramide Injectable 10 milliGRAM(s) IV Push every 8 hours  midazolam Infusion 0.02 mG/kG/Hr (1.64 mL/Hr) IV Continuous <Continuous>  multivitamin 1 Tablet(s) Oral daily  norepinephrine Infusion 0.1 MICROgram(s)/kG/Min (15.4 mL/Hr) IV Continuous <Continuous>  pantoprazole  Injectable 40 milliGRAM(s) IV Push daily  polyethylene glycol 3350 17 Gram(s) Oral daily  propofol Infusion 20 MICROgram(s)/kG/Min (9.85 mL/Hr) IV Continuous <Continuous>  senna Syrup 10 milliLiter(s) Oral at bedtime    MEDICATIONS  (PRN):  acetaminophen    Suspension .. 650 milliGRAM(s) Oral every 6 hours PRN Temp greater or equal to 38C (100.4F)    Vital Signs Last 24 Hrs  T(C): 36.1 (22 Mar 2021 10:00), Max: 37.6 (21 Mar 2021 12:00)  T(F): 97 (22 Mar 2021 10:00), Max: 99.7 (21 Mar 2021 12:00)  HR: 100 (22 Mar 2021 10:00) (13 - 114)  BP: --  BP(mean): --  RR: 30 (22 Mar 2021 10:00) (29 - 30)  SpO2: 93% (22 Mar 2021 10:00) (93% - 97%)  Mode: AC/ CMV (Assist Control/ Continuous Mandatory Ventilation)  RR (machine): 30  TV (machine): 480  FiO2: 80  PEEP: 10  ITime: 0.64  MAP: 21  PIP: 47    I&O's Summary    21 Mar 2021 07:01  -  22 Mar 2021 07:00  --------------------------------------------------------  IN: 2420.7 mL / OUT: 3775 mL / NET: -1354.3 mL    22 Mar 2021 07:01  -  22 Mar 2021 10:37  --------------------------------------------------------  IN: 241.1 mL / OUT: 755 mL / NET: -513.9 mL          Physical Exam:   GENERAL: sedated and paralysed  HEENT: JOAO/   Atraumatic, Normocephalic  ENMT: No tonsillar erythema, exudates, or enlargement; Moist mucous membranes, Good dentition, No lesions  NECK: Supple, No JVD, Normal thyroid  CHEST/LUNG: Clear to auscultaion  CVS: Regular rate and rhythm; No murmurs, rubs, or gallops  GI: : Soft, Nontender, Nondistended; Bowel sounds present  NERVOUS SYSTEM:  sedated andparalysed  EXTREMITIES: - edema  LYMPH: No lymphadenopathy noted  SKIN: No rashes or lesions  ENDOCRINOLOGY: No Thyromegaly  PSYCH: sedated     Labs:  ABG - ( 22 Mar 2021 05:29 )  pH, Arterial: 7.28  pH, Blood: x     /  pCO2: 68    /  pO2: 83    / HCO3: 27    / Base Excess: 4.5   /  SaO2: 94.6                                        10.1   26.72 )-----------( 390      ( 22 Mar 2021 05:29 )             31.7                         9.6    21.32 )-----------( 349      ( 21 Mar 2021 05:29 )             30.4                         10.3   20.03 )-----------( 393      ( 20 Mar 2021 05:10 )             33.5                         11.0   17.15 )-----------( 350      ( 19 Mar 2021 01:34 )             35.1     03-22    144  |  99  |  130<H>  ----------------------------<  144<H>  5.0   |  29  |  4.03<H>  03-21    144  |  102  |  128<H>  ----------------------------<  177<H>  5.5<H>   |  30  |  4.14<H>  03-21    144  |  104  |  120<H>  ----------------------------<  157<H>  5.2   |  27  |  4.04<H>  03-20    143  |  104  |  113<H>  ----------------------------<  157<H>  5.6<H>   |  29  |  4.20<H>  03-20    144  |  105  |  108<H>  ----------------------------<  183<H>  5.8<H>   |  30  |  3.93<H>  03-20    143  |  103  |  98<H>  ----------------------------<  203<H>  5.5<H>   |  31  |  3.08<H>      142  |  103  |  80<H>  ----------------------------<  130<H>  4.8   |  30  |  2.22<H>    Ca    8.8      22 Mar 2021 05:29  Ca    8.8      21 Mar 2021 16:41  Ca    9.2      21 Mar 2021 05:29  Ca    8.7      20 Mar 2021 20:49  Ca    9.0      20 Mar 2021 13:43  Phos  5.7     -  Phos  5.4     -  Mg     2.7     -  Mg     2.8     -  Mg     2.9     -21  Mg     3.0         TPro  5.9<L>  /  Alb  2.3<L>  /  TBili  2.7<H>  /  DBili  x   /  AST  185<H>  /  ALT  196<H>  /  AlkPhos  500<H>    TPro  5.7<L>  /  Alb  2.0<L>  /  TBili  2.0<H>  /  DBili  2.0<H>  /  AST  169<H>  /  ALT  178<H>  /  AlkPhos  410<H>    TPro  5.7<L>  /  Alb  2.1<L>  /  TBili  1.6<H>  /  DBili  x   /  AST  161<H>  /  ALT  162<H>  /  AlkPhos  392<H>    TPro  5.8<L>  /  Alb  2.4<L>  /  TBili  1.4<H>  /  DBili  x   /  AST  139<H>  /  ALT  146<H>  /  AlkPhos  369<H>      CAPILLARY BLOOD GLUCOSE      POCT Blood Glucose.: 153 mg/dL (22 Mar 2021 06:14)  POCT Blood Glucose.: 134 mg/dL (22 Mar 2021 00:04)  POCT Blood Glucose.: 156 mg/dL (21 Mar 2021 18:18)  POCT Blood Glucose.: 180 mg/dL (21 Mar 2021 12:00)      LIVER FUNCTIONS - ( 21 Mar 2021 16:41 )  Alb: 2.3 g/dL / Pro: 5.9 g/dL / ALK PHOS: 500 U/L / ALT: 196 U/L / AST: 185 U/L / GGT: x           PTT - ( 22 Mar 2021 05:29 )  PTT:32.9 sec  Urinalysis Basic - ( 20 Mar 2021 17:54 )    Color: Yellow / Appearance: Slightly Turbid / S.018 / pH: x  Gluc: x / Ketone: Negative  / Bili: Negative / Urobili: <2 mg/dL   Blood: x / Protein: 300 mg/dL / Nitrite: Negative   Leuk Esterase: Negative / RBC: >10 /HPF / WBC <5 /HPF   Sq Epi: x / Non Sq Epi: 0-5 /HPF / Bacteria: Negative      D-Dimer Assay, Quantitative: 452 ng/mL DDU ( @ 05:29)  D-Dimer Assay, Quantitative: 548 ng/mL DDU ( @ 13:43)  D-Dimer Assay, Quantitative: 722 ng/mL DDU ( @ 05:10)  Procalcitonin, Serum: 0.60 ng/mL ( @ 01:34)        RECENT CULTURES:   @ 16:00 .Urine Clean Catch (Midstream)         d< from: Xray Chest 1 View- PORTABLE-Urgent (Xray Chest 1 View- PORTABLE-Urgent .) (21 @ 08:02) >    INTERPRETATION:  TIME OF EXAM: 2021 at 6:10 AM and 7:53 AM    CLINICAL INFORMATION: Endotracheal tube position    TECHNIQUE:   Portable chest    INTERPRETATION:    6:10 AM:  The endotracheal and enteric tube in addition to a right IJ line are seen in satisfactory position. Bilateral diffuse airspace disease with sparing of the upper lobes to some extent is about the same. No effusions or pneumothorax.    7:53 AM:  Tubes and line are unchanged. Airspace disease secondary to ARDS from covid 19 pneumonia unchanged. No complicating effusions or pneumothorax.      COMPARISON:        IMPRESSION:  Follow-up ARDS with tubes and line in place and no interval change.              LEONARDO PELLETIER MD; Attending Radiologist  This document has been electronically signed. Mar 21 2021  2:29PM    < end of copied text >         No growth     @ 12:40 .Blood Blood-Peripheral                No growth to date.          RESPIRATORY CULTURES:          Studies  Chest X-RAY  CT SCAN Chest   Venous Dopplers: LE:   CT Abdomen  Others

## 2021-03-22 NOTE — PROGRESS NOTE ADULT - PROBLEM SELECTOR PLAN 1
Pt with non-oliguric LUDIVINA likely 2/2 hemodynamically mediated ATN in the setting of shock, covid19 pneumonia/ARDS. On admission sCr 1.0 on 2/28, rise sCr after intubation from 0.97 to 1.53 on 3/14 then 1.67 on 3/15/21.  UA showed protein/blood/rbc/wbc.  Currently patient is intubated/sedated, currently with good urine output on Bumex. Last Scr decreased to 4.0. At this time consider changing to IV bumex pushes q12hrs - Currently no absolute/urgent indication for dialysis. BP optimization/antibiotic/blood transfusion per ICU team. Monitor BMP, strict I/O, avoid nephrotoxic agents (NSAIDs, PPI, contrast), renally dose medications per GFR. Monitor electrolytes while on diuretic.

## 2021-03-22 NOTE — PROGRESS NOTE ADULT - ATTENDING COMMENTS
60M h/o brain tumor s/p resection in 2018 a/w COVID 19 Pneumonia s/p intubation on 3/12 and now ARDS requiring prone positioning now with refractory hypoxemia and hypercapnia and worsening LUDIVINA     Neuro: sedated with fentanyl, ketamine and versed; paralysis with nimbex   CV: vasoplegic shock 2/2 sedation on/off levophed; maintain MAP>65  Pulm: COVID Pneumonia with ARDS with refractory hypercarbia and hypoxia  - PPlat 41, P/F ~100, refractory hypercarbia - no improvement in oxygenation/ventilation with proning, became unstable on most recent attempts  GI: c/w enteral feeds, ppx with protonix  - RUQ sono no acute findings, trend LFTs  Renal: LUDIVINA slowly improving, maintaining good response to bumex gtt, will change to BID dosing today  ID: monitor off antibiotics  Heme: DVT ppx with HSQ  Endo: continue ISS coverage.

## 2021-03-22 NOTE — PROGRESS NOTE ADULT - ATTENDING COMMENTS
LUDIVINA  Oliguria    Responding to bumex gtt, would consider switching to IVP as he appears better volume wise

## 2021-03-22 NOTE — PROGRESS NOTE ADULT - NSHPATTENDINGPLANDISCUSS_GEN_ALL_CORE
team
pa
team
team
NP
team
ICU TEAM
NP
team
ICU team
team

## 2021-03-22 NOTE — PROGRESS NOTE ADULT - ASSESSMENT
This is a 61yo Male w/ PMHx of a Brain tumor s/p resection in 2018, who is presenting with worsening shortness of breath and non-productive cough for past week. Found to be COVID-19 Positive. Py hypoxic to 87% on RA CXR c/w COVID pneumonia. Admitted for further treatment of COVID pneumonia/hypoxemia.     Covid 19 Pneumonia: Hypoxic resp failure  Brain tumor:        3/3:      Covid 19 Pneumonia: Hypoxic resp failure: on covid rx: he is on 50% fio2: LFTS are mildly elevated: folow d dimer: currently OK  Brain tumor:  s/p resection:  DVT proparminyxlis  ruddy team    3/4:      Covid 19 Pneumonia: Hypoxic resp failure: on covid rx: he is on 50% fio2 today : increased fr om yesterday :looks better then yesterday : no overnight events Cont dexa"for a total of 10 dys: LFT as are improving and d dimer has mildly increased:   Brain tumor:  s/p resection:  DVT cole chaidez team : overall p rognosis is very giuarded"     3/7:      Covid 19 Pneumonia: Hypoxic resp failure: on covid rx: he is on100% fio2 today : remdesivir finished: on dexa: 7/10: pretty hypoxic: and has low grade fever: wbc is increasing: will start empiric zosyn: do chest xray : ID consult  Brain tumor:  s/p resection:  DVT propahyxlis  : overall p rognosis is very guarded stll :  RUDDY NP     3/8    Covid 19 Pneumonia: Hypoxic resp failure: on covid rx: he is on100% fio2 today : remdesivir finished: on dexa: 8/10: pretty hypoxic: and has low grade fever: wbc is increasing:  started empiric zosyn: do chest xray : with worsening: seen by ID:  His d dimer has increased significantly: start heparin  : call MICU with furterh deterioration as he might need intubation any time  Brain tumor:  s/p resection:  DVT propahyxlis  : overall prognosis is very guarded stll :  RUDDY CHAIDEZ NP     3/9:    Covid 19 Pneumonia: Hypoxic resp failure: on covid rx: he is on100% fio2 today : remdesivir finished: on dexa: 9/10: on zosyn: on bipap at 1005: try to wean down o2 today : his dexa may need to be extended as he isnot doing well; Yesterday his d dimer had increased 4 x : started on empiric heparin today d d iemr has decreased!  Brain tumor:  s/p resection:  DVT propahyxlis  overall prognosis is very guarded stll :  RUDDY Gray    3/10:    Covid 19 Pneumonia: he is not doign well: he is on AVAPS: on 1005 oxygen: very tenuous resp status;' cant be moved for cta: he is alerday on empiric full dose ac: try to wean off oxygen: he is alreadyon antibtiocs and is on zosyn ? upgrade to leonidas: would defer to primary ID team   Brain tumor:  s/p resection:  DVT prophylaxis  overall prognosis is very guarded still :  RUDDY Gray    3/11:  Covid 19 Pneumonia: not do ing well: on 1005 avpas: antibtiocs broadened: by ID: he wants to alycia ntuabted as a last resort: ruddy jane on floor: He might need intuabtion any time: Heis on full dose ac and his d dimer i s decreasing!  Brain tumor:  s/p resection:  DVT prophylaxis  overall prognosis is very guarded still :  RUDDY pa: [place]    3/12:    Covid 19 Pneumonia:i intubated finally today for rep failure with severe refractory hypoxemia: Now paralysed and sedated in MICU and on full vent support : lot of secretions on intubation on broad spectrum antibiotics WBC increased: follow cultures:   Brain tumor:  s/p resection:  DVT prophylaxis  Condition critical :   dw team    3/15:    Covid 19 Pneumonia: intubated finally today for rep failure with severe refractory hypoxemia: on 805 fio2: proned today : all the antibiotics have been finished  antibiotics WBC increased: follow cultures:   Renal functions : high : defer to [facility] MICU team   Brain tumor:  s/p resection:  DVT prophylaxis  Condition critical :   dw team    3/16:    Covid 19 Pneumonia: intubated finally today for rep failure with severe refractory hypoxemia: on 90% fio2: supine today with peep of 9  : all the antibiotics have been finished  antibiotics WBC increased: follow cultures: Heis acidotic, hypercarbic as wellas hypoxic:   Renal functions : high : defer to primary MICU team   Brain tumor:  s/p resection:  DVT prophylaxis  Condition critical :   dw team      3/17:      Covid 19 Pneumonia: intubated and pretty hypoxic: hypercarbic as well as acidotic: sedated and intubated along with paralyzing agent:   Renal functions : high : secondary to ATN because of shock and covid 19 pneumonia: renal following  Brain tumor:  s/p resection:  DVT prophylaxis  Condition critical :   dw team    3/18:  Covid 19 Pneumonia: Still doing very poorly: still requiring 05 of oxygen : cont current supportive care: d dimer is pretty high : on dvtpropahyxlis  Renal functions : high : secondary to ATN because of shock and covid 19 pneumonia: renal following; He is on IV diuretics!  Brain tumor:  s/p resection:  DVT prophylaxis  Condition critical :   dw team    3/19:    Covid 19 Pneumonia: Still doing very poorly: still requiring 90% of oxygen : cont current supportive care: d dimer is pretty high : on dv tpropahyxlis: on no antibtiocs at this time:   Renal functions : high : secondary to ATN because of shock and covid 19 pneumonia: renal following; He is on IV diuretics-drip  Brain tumor:  s/p resection:  DVT prophylaxis  Condition critical : still requiring 100% oxygen:   dw team    3/21:    Covid 19 Pneumonia: Still doing very poorly: still requiring 90% of oxygen : he cont to be very hypoxic: despite all the treatment" proning per MICU : Hi9s WBC has increased signficantly: add? empiric antibtiocs:   Renal functions : high : secondary to ATN because of shock and covid 19 pneumonia: renal following; He is on IV diuretics-drip  Brain tumor:  s/p resection:  DVT prophylaxis  Condition critical : still requiring 100% oxygen:   dw team

## 2021-03-23 NOTE — PROGRESS NOTE ADULT - SUBJECTIVE AND OBJECTIVE BOX
[facility] DIVISION OF KIDNEY DISEASES AND HYPERTENSION -- FOLLOW UP NOTE  --------------------------------------------------------------------------------  If any questions, please feel free to contact me  [facility] pager: [phone], [facility]: [phone]  Mendez Marie M.D.  Nephrology Fellow    (After 5 pm or on weekends please page the on-call fellow)  --------------------------------------------------------------------------------    Chief Complaint:  Patient is a 60y old  Male who presents with a chief complaint of COVID-19 Pneumonia (22 Mar 2021 21:41)    24 hour events/subjective:  Patient seen and examined at bedside, urine output is 2L over 24h. On Bumex IV 2mg bid. Cr at 4.3mg/dl Vitals/labs/imaging reviewed       PAST HISTORY  --------------------------------------------------------------------------------  No significant changes to PMH, PSH, FHx, SHx, unless otherwise noted    ALLERGIES & MEDICATIONS  --------------------------------------------------------------------------------  Allergies    No Known Allergies    Intolerances      Standing Inpatient Medications  ALBUTerol    90 MICROgram(s) HFA Inhaler 2 Puff(s) Inhalation every 4 hours  ascorbic acid 500 milliGRAM(s) Oral daily  aspirin  chewable 81 milliGRAM(s) Oral daily  budesonide 160 MICROgram(s)/formoterol 4.5 MICROgram(s) Inhaler 2 Puff(s) Inhalation two times a day  buMETAnide 2 milliGRAM(s) Oral two times a day  chlorhexidine 0.12% Liquid 15 milliLiter(s) Oral Mucosa every 12 hours  cholecalciferol 2000 Unit(s) Oral daily  cisatracurium Infusion 3 MICROgram(s)/kG/Min IV Continuous <Continuous>  dextrose 50% Injectable 50 milliLiter(s) IV Push once  diazepam    Tablet 10 milliGRAM(s) Oral every 8 hours  fentaNYL   Infusion..... 0.499 MICROgram(s)/kG/Hr IV Continuous <Continuous>  glucagon  Injectable 1 milliGRAM(s) IntraMuscular once  heparin   Injectable 5000 Unit(s) SubCutaneous every 8 hours  insulin lispro (ADMELOG) corrective regimen sliding scale   SubCutaneous every 6 hours  ketamine Infusion. 0.25 mG/kG/Hr IV Continuous <Continuous>  metoclopramide Injectable 10 milliGRAM(s) IV Push every 8 hours  midazolam Infusion 0.02 mG/kG/Hr IV Continuous <Continuous>  multivitamin 1 Tablet(s) Oral daily  norepinephrine Infusion 0.1 MICROgram(s)/kG/Min IV Continuous <Continuous>  pantoprazole  Injectable 40 milliGRAM(s) IV Push daily  polyethylene glycol 3350 17 Gram(s) Oral daily  propofol Infusion 20 MICROgram(s)/kG/Min IV Continuous <Continuous>  senna Syrup 10 milliLiter(s) Oral at bedtime    PRN Inpatient Medications  acetaminophen    Suspension .. 650 milliGRAM(s) Oral every 6 hours PRN      REVIEW OF SYSTEMS  --------------------------------------------------------------------------------  unable to obtain due to current medical conditions     VITALS/PHYSICAL EXAM  --------------------------------------------------------------------------------  T(C): 37.3 (03-23-21 @ 04:00), Max: 37.3 (03-23-21 @ 04:00)  HR: 104 (03-23-21 @ 08:24) (99 - 112)  BP: --  RR: 30 (03-23-21 @ 06:00) (29 - 30)  SpO2: 95% (03-23-21 @ 08:24) (93% - 97%)  Wt(kg): --        03-22-21 @ 07:01  -  03-23-21 @ 07:00  --------------------------------------------------------  IN: 2273.3 mL / OUT: 2150 mL / NET: 123.3 mL        Physical Exam:  	Gen: intubated  	HEENT: intubated, +ETT  	Pulm: mechanical breath sounds  	CV:S1S2, RRR  	GI: no distention   	: noel catheter in place  	MSK: no edema in lower extremity      LABS/STUDIES  --------------------------------------------------------------------------------              10.1   28.96 >-----------<  378      [03-23-21 @ 03:10]              32.1     141  |  98  |  148  ----------------------------<  154      [03-23-21 @ 03:10]  5.4   |  25  |  4.30        Ca     8.6     [03-23-21 @ 03:10]      Mg     2.6     [03-23-21 @ 03:10]      Phos  5.8     [03-23-21 @ 03:10]    TPro  6.4  /  Alb  2.2  /  TBili  3.9  /  DBili  3.9  /  AST  196  /  ALT  230  /  AlkPhos  632  [03-22-21 @ 05:38]      PTT: 37.9       [03-23-21 @ 03:10]      Creatinine Trend:  SCr 4.30 [03-23 @ 03:10]  SCr 4.03 [03-22 @ 05:29]  SCr 4.14 [03-21 @ 16:41]  SCr 4.04 [03-21 @ 05:29]  SCr 4.20 [03-20 @ 20:49]    Urinalysis - [03-20-21 @ 17:54]      Color Yellow / Appearance Slightly Turbid / SG 1.018 / pH 6.5      Gluc Trace / Ketone Negative  / Bili Negative / Urobili <2 mg/dL       Blood Moderate / Protein 300 mg/dL / Leuk Est Negative / Nitrite Negative      RBC >10 / WBC <5 / Hyaline few / Gran  / Sq Epi  / Non Sq Epi 0-5 / Bacteria Negative      Ferritin 696      [03-12-21 @ 05:24]  TSH 0.18      [03-01-21 @ 08:07]  Lipid: chol --, , HDL --, LDL --      [03-17-21 @ 00:49]    HCV 0.12, Nonreact      [03-02-21 @ 13:44]

## 2021-03-23 NOTE — PROGRESS NOTE ADULT - PROBLEM SELECTOR PLAN 1
Pt with non-oliguric LUDIVINA likely 2/2 hemodynamically mediated ATN in the setting of shock, covid19 pneumonia/ARDS. On admission sCr 1.0 on 2/28, rise sCr after intubation from 0.97 to 1.53 on 3/14 then 1.67 on 3/15/21.  UA showed protein/blood/rbc/wbc.  Currently patient is intubated/sedated, currently with good urine output on Bumex IV 2mg bid. Last Scr at 4.3 today. - Currently no absolute/urgent indication for dialysis. BP optimization/antibiotic/blood transfusion per ICU team. Monitor BMP, strict I/O, avoid nephrotoxic agents (NSAIDs, PPI, contrast), renally dose medications per GFR. Monitor electrolytes while on diuretic.

## 2021-03-23 NOTE — PROGRESS NOTE ADULT - ASSESSMENT
This is a 59yo Male w/ PMHx of a Brain tumor s/p resection in 2018, who is presenting with worsening shortness of breath and non-productive cough for past week. Found to be COVID-19 Positive. Py hypoxic to 87% on RA CXR c/w COVID pneumonia. Admitted for further treatment of COVID pneumonia/hypoxemia.     Covid 19 Pneumonia: Hypoxic resp failure  Brain tumor:        3/3:      Covid 19 Pneumonia: Hypoxic resp failure: on covid rx: he is on 50% fio2: LFTS are mildly elevated: folow d dimer: currently OK  Brain tumor:  s/p resection:  DVT proparminyxlis  ruddy team    3/4:      Covid 19 Pneumonia: Hypoxic resp failure: on covid rx: he is on 50% fio2 today : increased fr om yesterday :looks better then yesterday : no overnight events Cont dexa"for a total of 10 dys: LFT as are improving and d dimer has mildly increased:   Brain tumor:  s/p resection:  DVT cole chaidez team : overall p rognosis is very giuarded"     3/7:      Covid 19 Pneumonia: Hypoxic resp failure: on covid rx: he is on100% fio2 today : remdesivir finished: on dexa: 7/10: pretty hypoxic: and has low grade fever: wbc is increasing: will start empiric zosyn: do chest xray : ID consult  Brain tumor:  s/p resection:  DVT propahyxlis  : overall p rognosis is very guarded stll :  RUDDY NP     3/8    Covid 19 Pneumonia: Hypoxic resp failure: on covid rx: he is on100% fio2 today : remdesivir finished: on dexa: 8/10: pretty hypoxic: and has low grade fever: wbc is increasing:  started empiric zosyn: do chest xray : with worsening: seen by ID:  His d dimer has increased significantly: start heparin  : call MICU with furterh deterioration as he might need intubation any time  Brain tumor:  s/p resection:  DVT propahyxlis  : overall prognosis is very guarded stll :  RUDDY CHAIDEZ NP     3/9:    Covid 19 Pneumonia: Hypoxic resp failure: on covid rx: he is on100% fio2 today : remdesivir finished: on dexa: 9/10: on zosyn: on bipap at 1005: try to wean down o2 today : his dexa may need to be extended as he isnot doing well; Yesterday his d dimer had increased 4 x : started on empiric heparin today d d iemr has decreased!  Brain tumor:  s/p resection:  DVT propahyxlis  overall prognosis is very guarded stll :  RUDDY Gray    3/10:    Covid 19 Pneumonia: he is not doign well: he is on AVAPS: on 1005 oxygen: very tenuous resp status;' cant be moved for cta: he is alerday on empiric full dose ac: try to wean off oxygen: he is alreadyon antibtiocs and is on zosyn ? upgrade to leonidas: would defer to primary ID team   Brain tumor:  s/p resection:  DVT prophylaxis  overall prognosis is very guarded still :  RUDDY Gray    3/11:  Covid 19 Pneumonia: not do ing well: on 1005 avpas: antibtiocs broadened: by ID: he wants to alycia ntuabted as a last resort: ruddy jane on floor: He might need intuabtion any time: Heis on full dose ac and his d dimer i s decreasing!  Brain tumor:  s/p resection:  DVT prophylaxis  overall prognosis is very guarded still :  RUDDY pa: [place]    3/12:    Covid 19 Pneumonia:i intubated finally today for rep failure with severe refractory hypoxemia: Now paralysed and sedated in MICU and on full vent support : lot of secretions on intubation on broad spectrum antibiotics WBC increased: follow cultures:   Brain tumor:  s/p resection:  DVT prophylaxis  Condition critical :   dw team    3/15:    Covid 19 Pneumonia: intubated finally today for rep failure with severe refractory hypoxemia: on 805 fio2: proned today : all the antibiotics have been finished  antibiotics WBC increased: follow cultures:   Renal functions : high : defer to [facility] MICU team   Brain tumor:  s/p resection:  DVT prophylaxis  Condition critical :   dw team    3/16:    Covid 19 Pneumonia: intubated finally today for rep failure with severe refractory hypoxemia: on 90% fio2: supine today with peep of 9  : all the antibiotics have been finished  antibiotics WBC increased: follow cultures: Heis acidotic, hypercarbic as wellas hypoxic:   Renal functions : high : defer to primary MICU team   Brain tumor:  s/p resection:  DVT prophylaxis  Condition critical :   dw team      3/17:      Covid 19 Pneumonia: intubated and pretty hypoxic: hypercarbic as well as acidotic: sedated and intubated along with paralyzing agent:   Renal functions : high : secondary to ATN because of shock and covid 19 pneumonia: renal following  Brain tumor:  s/p resection:  DVT prophylaxis  Condition critical :   dw team    3/18:  Covid 19 Pneumonia: Still doing very poorly: still requiring 05 of oxygen : cont current supportive care: d dimer is pretty high : on dvtpropahyxlis  Renal functions : high : secondary to ATN because of shock and covid 19 pneumonia: renal following; He is on IV diuretics!  Brain tumor:  s/p resection:  DVT prophylaxis  Condition critical :   dw team    3/19:    Covid 19 Pneumonia: Still doing very poorly: still requiring 90% of oxygen : cont current supportive care: d dimer is pretty high : on dv tpropahyxlis: on no antibtiocs at this time:   Renal functions : high : secondary to ATN because of shock and covid 19 pneumonia: renal following; He is on IV diuretics-drip  Brain tumor:  s/p resection:  DVT prophylaxis  Condition critical : still requiring 100% oxygen:   dw team    3/22:    Covid 19 Pneumonia: Still doing very poorly: still requiring 90% of oxygen : he cont to be very hypoxic: despite all the treatment" proning per MICU : Hi9s WBC has increased signficantly: add? empiric antibtiocs:   Renal functions : high : secondary to ATN because of shock and covid 19 pneumonia: renal following; He is on IV diuretics-drip  Brain tumor:  s/p resection:  DVT prophylaxis  Condition critical : still requiring 100% oxygen:   dw team    3/23:    Covid 19 Pneumonia: Still doing very poorly: still requiring 80% of oxygen with peep of 10 : he cont to be very hypoxicacidotic: hypercarbic:  despite all the treatment. : Hi9s WBC has increased signficantly: again today: would defer to micu for the same  Renal functions : high : secondary to ATN because of shock and covid 19 pneumonia: renal following; on po bumex: defer to renal   Brain tumor:  s/p resection:  DVT prophylaxis  Condition critical : still requiring 80 oxygen:    will sign off now: defer care to MICU : if he comes out of MICU I will see hi again: please call at    [facility]

## 2021-03-23 NOTE — PROGRESS NOTE ADULT - SUBJECTIVE AND OBJECTIVE BOX
CHIEF COMPLAINT:    Interval Events:    HPI:  This is a 61yo Male w/ PMHx of a Brain tumor s/p resection in 2018, who is presenting with worsening shortness of breath and non-productive cough for past week. Pt reports he went to his PMD for his symptoms, had a negative COVID swab, was instructed to take an inhaler; however, it has given him little relief.  His SOB was getting worse which prompted him to come to ED. He has increased lethargy and ALVARES. Decreased appetite. He denies having fevers at home, no sick contacts, no recent travel, no loss of taste/smell. Pt denies having chest pain, palpitations, HA, dizziness, visual changes, abdominal pain, N/V/D/C, dysuria, hematuria, melena or hematochezia.     ED Course: EK bpm, Sinus tachycardia, qtc: 419. CXR: c/w COVID PNA. COVID-19 PCR: Positive. Initially SPO2 87% on RA. Now SPO2 95% on 6L NC. s/p Decadron and NS IVF in ED.  (01 Mar 2021 05:15)      REVIEW OF SYSTEMS:      [ ] Unable to assess ROS because ________    OBJECTIVE:  ICU Vital Signs Last 24 Hrs  T(C): 37.4 (23 Mar 2021 11:00), Max: 37.4 (23 Mar 2021 11:00)  T(F): 99.3 (23 Mar 2021 11:00), Max: 99.3 (23 Mar 2021 11:00)  HR: 108 (23 Mar 2021 11:00) (100 - 112)  BP: --  BP(mean): --  ABP: 99/47 (23 Mar 2021 11:00) (94/46 - 130/60)  ABP(mean): 62 (23 Mar 2021 11:00) (59 - 83)  RR: 30 (23 Mar 2021 11:00) (29 - 30)  SpO2: 92% (23 Mar 2021 11:00) (92% - 97%)    Mode: AC/ CMV (Assist Control/ Continuous Mandatory Ventilation), RR (machine): 30, TV (machine): 480, FiO2: 80, PEEP: 10, ITime: 0.84, MAP: 21, PIP: 47    - @ 07: @ 07:00  --------------------------------------------------------  IN: 2273.3 mL / OUT: 2150 mL / NET: 123.3 mL     @ 07: @ 11:49  --------------------------------------------------------  IN: 680.9 mL / OUT: 190 mL / NET: 490.9 mL      CAPILLARY BLOOD GLUCOSE      POCT Blood Glucose.: 138 mg/dL (23 Mar 2021 10:44)      Physical Exam:   GENERAL: intubated, sedated  HEENT:  NC/AT  EYES: PERRLA, conjunctiva and sclera clear  NECK: Supple, No JVD  CHEST/LUNG: diminished bilaterally; No wheezes, rales, or rhonchi  HEART: Regular rate and rhythm; No murmurs, rubs, or gallops  ABDOMEN: Soft, Nontender, Nondistended; Bowel sounds present  EXTREMITIES:  2+ Peripheral Pulses, No clubbing, cyanosis, or edema  PSYCH: unable as pt is sedated  NEUROLOGY: sedated  SKIN: No rashes or lesions      LINES:    HOSPITAL MEDICATIONS:  Standing Meds:  ALBUTerol    90 MICROgram(s) HFA Inhaler 2 Puff(s) Inhalation every 4 hours  ascorbic acid 500 milliGRAM(s) Oral daily  aspirin  chewable 81 milliGRAM(s) Oral daily  budesonide 160 MICROgram(s)/formoterol 4.5 MICROgram(s) Inhaler 2 Puff(s) Inhalation two times a day  buMETAnide 2 milliGRAM(s) Oral daily  chlorhexidine 0.12% Liquid 15 milliLiter(s) Oral Mucosa every 12 hours  cholecalciferol 2000 Unit(s) Oral daily  cisatracurium Infusion 3 MICROgram(s)/kG/Min IV Continuous <Continuous>  dextrose 50% Injectable 50 milliLiter(s) IV Push once  diazepam    Tablet 10 milliGRAM(s) Oral every 8 hours  fentaNYL   Infusion..... 0.499 MICROgram(s)/kG/Hr IV Continuous <Continuous>  glucagon  Injectable 1 milliGRAM(s) IntraMuscular once  heparin   Injectable 5000 Unit(s) SubCutaneous every 8 hours  insulin lispro (ADMELOG) corrective regimen sliding scale   SubCutaneous every 6 hours  ketamine Infusion. 0.25 mG/kG/Hr IV Continuous <Continuous>  metoclopramide Injectable 10 milliGRAM(s) IV Push every 8 hours  midazolam Infusion 0.02 mG/kG/Hr IV Continuous <Continuous>  multivitamin 1 Tablet(s) Oral daily  norepinephrine Infusion 0.1 MICROgram(s)/kG/Min IV Continuous <Continuous>  pantoprazole  Injectable 40 milliGRAM(s) IV Push daily  polyethylene glycol 3350 17 Gram(s) Oral daily  propofol Infusion 20 MICROgram(s)/kG/Min IV Continuous <Continuous>  senna Syrup 10 milliLiter(s) Oral at bedtime  sodium zirconium cyclosilicate 10 Gram(s) Oral once      PRN Meds:  acetaminophen    Suspension .. 650 milliGRAM(s) Oral every 6 hours PRN      LABS:                        10.1   28.96 )-----------( 378      ( 23 Mar 2021 03:10 )             32.1     Hgb Trend: 10.1<--, 10.1<--, 9.6<--, 10.3<--, 11.0<--      141  |  98  |  148<H>  ----------------------------<  154<H>  5.4<H>   |  25  |  4.30<H>    Ca    8.6      23 Mar 2021 03:10  Phos  5.8       Mg     2.6         TPro  6.2  /  Alb  2.1<L>  /  TBili  5.4<H>  /  DBili  5.0<H>  /  AST  257<H>  /  ALT  276<H>  /  AlkPhos  883<H>      Creatinine Trend: 4.30<--, 4.03<--, 4.14<--, 4.04<--, 4.20<--, 3.93<--  PTT - ( 23 Mar 2021 03:10 )  PTT:37.9 sec    Arterial Blood Gas:   @ 03:10  7.25/68/85/26/94.7/2.6  ABG lactate: --  Arterial Blood Gas:   @ 05:29  7.28/68/83/27/94.6/4.5  ABG lactate: --        MICROBIOLOGY:     Culture - Urine (collected 20 Mar 2021 16:00)  Source: .Urine Clean Catch (Midstream)  Final Report (21 Mar 2021 16:32):    No growth      RADIOLOGY:  [ ] Reviewed and interpreted by me

## 2021-03-23 NOTE — PROGRESS NOTE ADULT - PROBLEM SELECTOR PLAN 3
hyperkalemia in the setting of LUDIVINA. loop diuretics as above and monitor serum potassium.- medical management prn.

## 2021-03-23 NOTE — PROGRESS NOTE ADULT - ATTENDING COMMENTS
LUDIVINA  in the setting of infectious state and shock likely from ATN with acidosis and oliguria but responding  to diuresis    Cont IV Bumex IVP  Monitor labs closely, Is/Os and daily weights

## 2021-03-23 NOTE — PROGRESS NOTE ADULT - ASSESSMENT
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  -stable     PULMONARY:  # Hypoxic respiratory failure 2/2 COVID  -intubated on mech vent   -VENT SETTINGS REMAIN   -AC 34/400/PEEP 10/FIO2 80%  -ABG 7.28/68/83/27/95     Bacterial Pneumonia:  - Increasing leukocytosis with elevated procalcitonin & fevers, concern for superimposed bacterial infection  -monitoring off antibiotics  Hypotension   - Norepinephrine for MAP >65    GI:  Transaminitis:  -RUQ done, revealing mild gall bladder thickness, otherwise normal  - Trend LFTs       malnutrition  - TF   - GI ppx w/ protonix  - Senna/Miralax     :  Improving LUDIVINA  -Creat 4.0      SOCIAL ISSUES:  poor prognosis remain full code

## 2021-03-23 NOTE — PROGRESS NOTE ADULT - NSREFPHYEXREFTO_GEN_ALL_CORE
Inpatient Physical Exam
Other
Other
Inpatient Physical Exam

## 2021-03-23 NOTE — PROGRESS NOTE ADULT - SUBJECTIVE AND OBJECTIVE BOX
Medicine Progress Note    Patient is a 60y old  Male who presents with a chief complaint of COVID-19 Pneumonia (23 Mar 2021 14:51)      SUBJECTIVE / OVERNIGHT EVENTS: remain intubated and sedated     ADDITIONAL REVIEW OF SYSTEMS:    MEDICATIONS  (STANDING):  ALBUTerol    90 MICROgram(s) HFA Inhaler 2 Puff(s) Inhalation every 4 hours  ascorbic acid 500 milliGRAM(s) Oral daily  aspirin  chewable 81 milliGRAM(s) Oral daily  budesonide 160 MICROgram(s)/formoterol 4.5 MICROgram(s) Inhaler 2 Puff(s) Inhalation two times a day  buMETAnide 2 milliGRAM(s) Oral daily  chlorhexidine 0.12% Liquid 15 milliLiter(s) Oral Mucosa every 12 hours  cholecalciferol 2000 Unit(s) Oral daily  cisatracurium Infusion 3 MICROgram(s)/kG/Min (14.8 mL/Hr) IV Continuous <Continuous>  dextrose 50% Injectable 50 milliLiter(s) IV Push once  dextrose 50% Injectable 50 milliLiter(s) IV Push once  diazepam    Tablet 10 milliGRAM(s) Oral every 8 hours  fentaNYL   Infusion..... 0.499 MICROgram(s)/kG/Hr (0.82 mL/Hr) IV Continuous <Continuous>  glucagon  Injectable 1 milliGRAM(s) IntraMuscular once  heparin   Injectable 5000 Unit(s) SubCutaneous every 8 hours  insulin lispro (ADMELOG) corrective regimen sliding scale   SubCutaneous every 6 hours  insulin regular  human recombinant. 5 Unit(s) IV Push once  ketamine Infusion. 0.25 mG/kG/Hr (2.05 mL/Hr) IV Continuous <Continuous>  metoclopramide Injectable 10 milliGRAM(s) IV Push every 8 hours  metoclopramide Injectable 10 milliGRAM(s) IV Push once  midazolam Infusion 0.02 mG/kG/Hr (1.64 mL/Hr) IV Continuous <Continuous>  multivitamin 1 Tablet(s) Oral daily  norepinephrine Infusion 0.05 MICROgram(s)/kG/Min (3.85 mL/Hr) IV Continuous <Continuous>  pantoprazole  Injectable 40 milliGRAM(s) IV Push daily  polyethylene glycol 3350 17 Gram(s) Oral daily  propofol Infusion 20 MICROgram(s)/kG/Min (9.85 mL/Hr) IV Continuous <Continuous>  senna Syrup 10 milliLiter(s) Oral at bedtime  sodium bicarbonate  Injectable 50 milliEquivalent(s) IV Push once    MEDICATIONS  (PRN):  acetaminophen    Suspension .. 650 milliGRAM(s) Oral every 6 hours PRN Temp greater or equal to 38C (100.4F)    CAPILLARY BLOOD GLUCOSE      POCT Blood Glucose.: 148 mg/dL (23 Mar 2021 17:08)  POCT Blood Glucose.: 138 mg/dL (23 Mar 2021 10:44)  POCT Blood Glucose.: 145 mg/dL (23 Mar 2021 06:21)  POCT Blood Glucose.: 160 mg/dL (23 Mar 2021 00:33)    I&O's Summary    22 Mar 2021 07:01  -  23 Mar 2021 07:00  --------------------------------------------------------  IN: 2273.3 mL / OUT: 2150 mL / NET: 123.3 mL    23 Mar 2021 07:01  -  23 Mar 2021 22:45  --------------------------------------------------------  IN: 1635.4 mL / OUT: 231 mL / NET: 1404.4 mL        PHYSICAL EXAM:  Vital Signs Last 24 Hrs  T(C): 35.6 (23 Mar 2021 20:00), Max: 37.8 (23 Mar 2021 13:00)  T(F): 96.1 (23 Mar 2021 20:00), Max: 100 (23 Mar 2021 13:00)  HR: 97 (23 Mar 2021 22:00) (93 - 112)  BP: --  BP(mean): --  RR: 30 (23 Mar 2021 22:00) (30 - 30)  SpO2: 100% (23 Mar 2021 22:00) (88% - 100%)  CONSTITUTIONAL: NAD, well-developed, well-groomed  ENMT: Moist oral mucosa, no pharyngeal injection or exudates; normal dentition  RESPIRATORY: Normal respiratory effort; lungs are clear to auscultation bilaterally  CARDIOVASCULAR: Regular rate and rhythm, normal S1 and S2, no murmur/rub/gallop; No lower extremity edema; Peripheral pulses are 2+ bilaterally  ABDOMEN: Nontender to palpation, normoactive bowel sounds, no rebound/guarding; No hepatosplenomegaly  PSYCH: A+O to person, place, and time; affect appropriate  NEUROLOGY: CN 2-12 are intact and symmetric; no gross sensory deficits   SKIN: No rashes; no palpable lesions    LABS:                        10.1   28.96 )-----------( 378      ( 23 Mar 2021 03:10 )             32.1     03-23    139  |  97<L>  |  156<H>  ----------------------------<  152<H>  6.0<H>   |  25  |  4.80<H>    Ca    8.1<L>      23 Mar 2021 15:42  Phos  5.8     03-23  Mg     2.6     03-23    TPro  6.2  /  Alb  2.1<L>  /  TBili  5.4<H>  /  DBili  5.0<H>  /  AST  257<H>  /  ALT  276<H>  /  AlkPhos  883<H>  03-23    PTT - ( 23 Mar 2021 03:10 )  PTT:37.9 sec          COVID-19 PCR: Detected (28 Feb 2021 23:02)      RADIOLOGY & ADDITIONAL TESTS:  Imaging from Last 24 Hours:    Electrocardiogram/QTc Interval:    COORDINATION OF CARE:  Care Discussed with Consultants/Other Providers:

## 2021-03-23 NOTE — PROGRESS NOTE ADULT - SUBJECTIVE AND OBJECTIVE BOX
Date of Service: 03-23-21 @ 11:19    Patient is a 60y old  Male who presents with a chief complaint of COVID-19 Pneumonia (23 Mar 2021 08:28)      Any change in ROS: Pt is doing poorly:  intubated and sedated     MEDICATIONS  (STANDING):  ALBUTerol    90 MICROgram(s) HFA Inhaler 2 Puff(s) Inhalation every 4 hours  ascorbic acid 500 milliGRAM(s) Oral daily  aspirin  chewable 81 milliGRAM(s) Oral daily  budesonide 160 MICROgram(s)/formoterol 4.5 MICROgram(s) Inhaler 2 Puff(s) Inhalation two times a day  buMETAnide 2 milliGRAM(s) Oral daily  chlorhexidine 0.12% Liquid 15 milliLiter(s) Oral Mucosa every 12 hours  cholecalciferol 2000 Unit(s) Oral daily  cisatracurium Infusion 3 MICROgram(s)/kG/Min (14.8 mL/Hr) IV Continuous <Continuous>  dextrose 50% Injectable 50 milliLiter(s) IV Push once  diazepam    Tablet 10 milliGRAM(s) Oral every 8 hours  fentaNYL   Infusion..... 0.499 MICROgram(s)/kG/Hr (0.82 mL/Hr) IV Continuous <Continuous>  glucagon  Injectable 1 milliGRAM(s) IntraMuscular once  heparin   Injectable 5000 Unit(s) SubCutaneous every 8 hours  insulin lispro (ADMELOG) corrective regimen sliding scale   SubCutaneous every 6 hours  ketamine Infusion. 0.25 mG/kG/Hr (2.05 mL/Hr) IV Continuous <Continuous>  metoclopramide Injectable 10 milliGRAM(s) IV Push every 8 hours  midazolam Infusion 0.02 mG/kG/Hr (1.64 mL/Hr) IV Continuous <Continuous>  multivitamin 1 Tablet(s) Oral daily  norepinephrine Infusion 0.1 MICROgram(s)/kG/Min (15.4 mL/Hr) IV Continuous <Continuous>  pantoprazole  Injectable 40 milliGRAM(s) IV Push daily  polyethylene glycol 3350 17 Gram(s) Oral daily  propofol Infusion 20 MICROgram(s)/kG/Min (9.85 mL/Hr) IV Continuous <Continuous>  senna Syrup 10 milliLiter(s) Oral at bedtime  sodium zirconium cyclosilicate 10 Gram(s) Oral once    MEDICATIONS  (PRN):  acetaminophen    Suspension .. 650 milliGRAM(s) Oral every 6 hours PRN Temp greater or equal to 38C (100.4F)    Vital Signs Last 24 Hrs  T(C): 37.4 (23 Mar 2021 11:00), Max: 37.4 (23 Mar 2021 11:00)  T(F): 99.3 (23 Mar 2021 11:00), Max: 99.3 (23 Mar 2021 11:00)  HR: 108 (23 Mar 2021 11:00) (99 - 112)  BP: --  BP(mean): --  RR: 30 (23 Mar 2021 11:00) (29 - 30)  SpO2: 92% (23 Mar 2021 11:00) (92% - 97%)  Mode: AC/ CMV (Assist Control/ Continuous Mandatory Ventilation)  RR (machine): 30  TV (machine): 480  FiO2: 80  PEEP: 10  ITime: 0.84  MAP: 21  PIP: 47    I&O's Summary    22 Mar 2021 07:01  -  23 Mar 2021 07:00  --------------------------------------------------------  IN: 2273.3 mL / OUT: 2150 mL / NET: 123.3 mL    23 Mar 2021 07:01  -  23 Mar 2021 11:19  --------------------------------------------------------  IN: 680.9 mL / OUT: 190 mL / NET: 490.9 mL          Physical Exam:   GENERAL: NAD, well-groomed, well-developed  HEENT: JOAO/   Atraumatic, Normocephalic  ENMT: No tonsillar erythema, exudates, or enlargement; Moist mucous membranes, Good dentition, No lesions  NECK: Supple, No JVD, Normal thyroid  CHEST/LUNG: Clear to auscultaion  CVS: Regular rate and rhythm; No murmurs, rubs, or gallops  GI: : Soft, Nontender, Nondistended; Bowel sounds present  NERVOUS SYSTEM:  Intubated and sedated   EXTREMITIES:  -edema  LYMPH: No lymphadenopathy noted  SKIN: No rashes or lesions  ENDOCRINOLOGY: No Thyromegaly  PSYCH: sedated    Labs:  ABG - ( 23 Mar 2021 03:10 )  pH, Arterial: 7.25  pH, Blood: x     /  pCO2: 68    /  pO2: 85    / HCO3: 26    / Base Excess: 2.6   /  SaO2: 94.7                                        10.1   28.96 )-----------( 378      ( 23 Mar 2021 03:10 )             32.1                         10.1   26.72 )-----------( 390      ( 22 Mar 2021 05:29 )             31.7                         9.6    21.32 )-----------( 349      ( 21 Mar 2021 05:29 )             30.4                         10.3   20.03 )-----------( 393      ( 20 Mar 2021 05:10 )             33.5     03-23    141  |  98  |  148<H>  ----------------------------<  154<H>  5.4<H>   |  25  |  4.30<H>  03-22    144  |  99  |  130<H>  ----------------------------<  144<H>  5.0   |  29  |  4.03<H>  03-21    144  |  102  |  128<H>  ----------------------------<  177<H>  5.5<H>   |  30  |  4.14<H>  03-21    144  |  104  |  120<H>  ----------------------------<  157<H>  5.2   |  27  |  4.04<H>  03-20    143  |  104  |  113<H>  ----------------------------<  157<H>  5.6<H>   |  29  |  4.20<H>  03-20    144  |  105  |  108<H>  ----------------------------<  183<H>  5.8<H>   |  30  |  3.93<H>  03-20    143  |  103  |  98<H>  ----------------------------<  203<H>  5.5<H>   |  31  |  3.08<H>    Ca    8.6      23 Mar 2021 03:10  Ca    8.8      22 Mar 2021 05:29  Ca    8.8      21 Mar 2021 16:41  Phos  5.8     03-23  Phos  5.7     03-21  Mg     2.6     03-23  Mg     2.7     03-22  Mg     2.8     03-21    TPro  6.2  /  Alb  2.1<L>  /  TBili  5.4<H>  /  DBili  5.0<H>  /  AST  257<H>  /  ALT  276<H>  /  AlkPhos  883<H>  03-23  TPro  6.4  /  Alb  2.2<L>  /  TBili  3.9<H>  /  DBili  3.9<H>  /  AST  196<H>  /  ALT  230<H>  /  AlkPhos  632<H>  03-22  TPro  5.9<L>  /  Alb  2.3<L>  /  TBili  2.7<H>  /  DBili  x   /  AST  185<H>  /  ALT  196<H>  /  AlkPhos  500<H>  03-21  TPro  5.7<L>  /  Alb  2.0<L>  /  TBili  2.0<H>  /  DBili  2.0<H>  /  AST  169<H>  /  ALT  178<H>  /  AlkPhos  410<H>  03-21  TPro  5.7<L>  /  Alb  2.1<L>  /  TBili  1.6<H>  /  DBili  x   /  AST  161<H>  /  ALT  162<H>  /  AlkPhos  392<H>  03-20  TPro  5.8<L>  /  Alb  2.4<L>  /  TBili  1.4<H>  /  DBili  x   /  AST  139<H>  /  ALT  146<H>  /  AlkPhos  369<H>  03-20    CAPILLARY BLOOD GLUCOSE      POCT Blood Glucose.: 138 mg/dL (23 Mar 2021 10:44)  POCT Blood Glucose.: 145 mg/dL (23 Mar 2021 06:21)  POCT Blood Glucose.: 160 mg/dL (23 Mar 2021 00:33)  POCT Blood Glucose.: 189 mg/dL (22 Mar 2021 16:22)      LIVER FUNCTIONS - ( 23 Mar 2021 03:26 )  Alb: 2.1 g/dL / Pro: 6.2 g/dL / ALK PHOS: 883 U/L / ALT: 276 U/L / AST: 257 U/L / GGT: x           PTT - ( 23 Mar 2021 03:10 )  PTT:37.9 sec    D-Dimer Assay, Quantitative: 452 ng/mL DDU (03-21 @ 05:29)  D-Dimer Assay, Quantitative: 548 ng/mL DDU (03-20 @ 13:43)  D-Dimer Assay, Quantitative: 722 ng/mL DDU (03-20 @ 05:10)        RECENT CULTURES:  03-20 @ 16:00 .Urine Clean Catch (Midstream)         ra< from: US Kidney and Bladder (03.22.21 @ 11:51) >  TECHNIQUE: Sonography of the kidneys and bladder.    FINDINGS:    Right kidney: 11.7 cm. No renal mass, hydronephrosis or calculi. Increased echogenicity.    Left kidney: 10.7 cm. No renal mass, hydronephrosis or calculi. Increased echogenicity.    Urinary bladder: Not visualized    IMPRESSION:    *  Increased echogenicity of both kidneys, consistent with underlying parenchymal disease.  *  No hydronephrosis.                    ELAINA OLVERA MD; Attending Radiologist  This document has been electronically signed. Mar 22 2021 11:53AM    < end of copied text >  < from: US Abdomen Upper Quadrant Right (03.21.21 @ 13:46) >  PROCEDURE DATE:  Mar 21 2021         INTERPRETATION:  CLINICAL INFORMATION: Abdominal distention.    COMPARISON: None available.    TECHNIQUE: Sonography of the right upper quadrant.    FINDINGS:    Liver: Within normal limits.  Bile ducts: Normal caliber. Common bile duct measures 3 mm.  Gallbladder: No cholelithiasis or sludge. Mild gallbladder wall thickening..  Pancreas: Visualized portions are within normal limits.  Right kidney: 12.9 cm. No hydronephrosis.  Ascites: None.  IVC: Visualized portions are within normal limits.    IMPRESSION:    Very mild gallbladder wall thickening but an otherwise normal gallbladder. No other abnormality.                REBECCA ROWELL MD; Attending Radiologist  This document has been electronically signed. Mar 21 2021  2:13PM    < end of copied text >  < from: Xray Chest 1 View- PORTABLE-Urgent (Xray Chest 1 View- PORTABLE-Urgent .) (03.21.21 @ 08:02) >  ATION:  TIME OF EXAM: March 21, 2021 at 6:10 AM and 7:53 AM    CLINICAL INFORMATION: Endotracheal tube position    TECHNIQUE:   Portable chest    INTERPRETATION:    6:10 AM:  The endotracheal and enteric tube in addition to a right IJ line are seen in satisfactory position. Bilateral diffuse airspace disease with sparing of the upper lobes to some extent is about the same. No effusions or pneumothorax.    7:53 AM:  Tubes and line are unchanged. Airspace disease secondary to ARDS from covid 19 pneumonia unchanged. No complicating effusions or pneumothorax.      COMPARISON:  March 20      IMPRESSION:  Follow-up ARDS with tubes and line in place and no interval change.              LEONARDO PELLETIER MD; Attending Radiologist  This document has been electronically signed. Mar 21 2021  2:29PM    < end of copied text >         No growth    03-18 @ 12:40 .Blood Blood-Peripheral                No growth to date.          RESPIRATORY CULTURES:          Studies  Chest X-RAY  CT SCAN Chest   Venous Dopplers: LE:   CT Abdomen  Others

## 2021-03-23 NOTE — PROGRESS NOTE ADULT - SUBJECTIVE AND OBJECTIVE BOX
Patient is a 60y old  Male who presents with a chief complaint of COVID-19 Pneumonia (23 Mar 2021 11:48)      INTERVAL HISTORY: intubated/ sedated    	  MEDICATIONS:  buMETAnide 2 milliGRAM(s) Oral daily  norepinephrine Infusion 0.1 MICROgram(s)/kG/Min IV Continuous <Continuous>    PHYSICAL EXAM:  T(C): 37.5 (03-23-21 @ 14:00), Max: 37.8 (03-23-21 @ 13:00)  HR: 107 (03-23-21 @ 14:00) (103 - 112)  BP: --  RR: 30 (03-23-21 @ 14:00) (29 - 30)  SpO2: 96% (03-23-21 @ 14:00) (90% - 97%)  Wt(kg): --  I&O's Summary    22 Mar 2021 07:01  -  23 Mar 2021 07:00  --------------------------------------------------------  IN: 2273.3 mL / OUT: 2150 mL / NET: 123.3 mL    23 Mar 2021 07:01  -  23 Mar 2021 14:51  --------------------------------------------------------  IN: 1151.8 mL / OUT: 216 mL / NET: 935.8 mL                        10.1   28.96 )-----------( 378      ( 23 Mar 2021 03:10 )             32.1     03-23    141  |  98  |  148<H>  ----------------------------<  154<H>  5.4<H>   |  25  |  4.30<H>    Ca    8.6      23 Mar 2021 03:10  Phos  5.8     03-23  Mg     2.6     03-23    TPro  6.2  /  Alb  2.1<L>  /  TBili  5.4<H>  /  DBili  5.0<H>  /  AST  257<H>  /  ALT  276<H>  /  AlkPhos  883<H>  03-2    ASSESSMENT/PLAN: 	  This is a 59yo Male w/ PMHx of a Brain tumor s/p resection in 2018, who is presenting with worsening shortness of breath and non-productive cough for past week. Found to be COVID-19 Positive. Patient Sinus tachycardic in Emergency room.     1. Sinus Tachycardia 2/2 to worsening hypoxia from COVID-19 Pneumonia   - trop negative  - D Dimer very elevated   - (-) for DVT, changed to ppx dose by ICU   - consider TTE when stable    2. Hypotension - likely 2/2 sedation and paralytics  - NE to keep MAP >56    3. Hypoxemia 2/2 Covid-19 pna  - remdesevir completed. Deca for 10 days completed  - completed decadron taper  - s/p RRT- intubated/ sedated in ICU   - remains intubated and sedated    4. Hyponatremic- 134 on admission likely from dehydration   -   normalized     5. Transaminitis  - elevated LFTs on admission, albumin slightly low however now nearly normalized, bilirubin wnl   - continue to monitor     6.  Fevers/Leukocytosis -  Plan: s/p Vanco and IV meropenem - under ICU care  bcx 3/7 - negative   bcx 3/11 NGTD  Observe off Abx    7. LUDIVINA with rising Cr-   Bumex gtt  to keep euvolemic given ARDS- responding well  - HD if worsening acidosis  - nephrology follow up           Arpita Weiss DO [facility]  Cardiovascular Medicine  [address] [place] [address], [address]  Office: [phone]  Cell: [phone] Patient is a 60y old  Male who presents with a chief complaint of COVID-19 Pneumonia (23 Mar 2021 11:48)      INTERVAL HISTORY: intubated/ sedated    	  MEDICATIONS:  buMETAnide 2 milliGRAM(s) Oral daily  norepinephrine Infusion 0.1 MICROgram(s)/kG/Min IV Continuous <Continuous>    PHYSICAL EXAM:  T(C): 37.5 (03-23-21 @ 14:00), Max: 37.8 (03-23-21 @ 13:00)  HR: 107 (03-23-21 @ 14:00) (103 - 112)  BP: --  RR: 30 (03-23-21 @ 14:00) (29 - 30)  SpO2: 96% (03-23-21 @ 14:00) (90% - 97%)  Wt(kg): --  I&O's Summary    22 Mar 2021 07:01  -  23 Mar 2021 07:00  --------------------------------------------------------  IN: 2273.3 mL / OUT: 2150 mL / NET: 123.3 mL    23 Mar 2021 07:01  -  23 Mar 2021 14:51  --------------------------------------------------------  IN: 1151.8 mL / OUT: 216 mL / NET: 935.8 mL                        10.1   28.96 )-----------( 378      ( 23 Mar 2021 03:10 )             32.1     03-23    141  |  98  |  148<H>  ----------------------------<  154<H>  5.4<H>   |  25  |  4.30<H>    Ca    8.6      23 Mar 2021 03:10  Phos  5.8     03-23  Mg     2.6     03-23    TPro  6.2  /  Alb  2.1<L>  /  TBili  5.4<H>  /  DBili  5.0<H>  /  AST  257<H>  /  ALT  276<H>  /  AlkPhos  883<H>  03-2    ASSESSMENT/PLAN: 	  This is a 59yo Male w/ PMHx of a Brain tumor s/p resection in 2018, who is presenting with worsening shortness of breath and non-productive cough for past week. Found to be COVID-19 Positive. Patient Sinus tachycardic in Emergency room.     1. Sinus Tachycardia 2/2 to worsening hypoxia from COVID-19 Pneumonia   - trop negative  - D Dimer very elevated   - (-) for DVT, changed to ppx dose by ICU   - consider TTE when stable    2. Hypotension - likely 2/2 sedation and paralytics  - NE to keep MAP >56    3. Hypoxemia 2/2 Covid-19 pna  - remdesevir completed. Deca for 10 days completed  - completed decadron taper  - s/p RRT- intubated/ sedated in ICU   - remains intubated and sedated    4. Hyponatremic- 134 on admission likely from dehydration   -   normalized     5. Transaminitis  - elevated LFTs on admission, albumin slightly low however now nearly normalized, bilirubin wnl   - continue to monitor     6.  Fevers/Leukocytosis -  Plan: s/p Vanco and IV meropenem - under ICU care  bcx 3/7 - negative   bcx 3/11 NGTD  Observe off Abx    7. LUDIVINA with rising Cr-  s/p Bumex gtt  to keep euvolemic given ARDS-  - Bumex BID  - HD if worsening acidosis  - nephrology follow up           Arpita JONES-C  Jaswant Weiss DO [facility]  Cardiovascular Medicine  [address] [place] [address], [address]  Office: [phone]  Cell: [phone]

## 2021-03-23 NOTE — PROGRESS NOTE ADULT - PROBLEM SELECTOR PLAN 2
Pt with acidosis, primary respiratory acidosis in the setting of COVID19 pneumonia/ARDS. Improving - Ventilation optimization per ICU team, diuretic as outline above.  If acidosis worsens then will need to consider dialysis (dialysis consent obtained).  Monitor blood gas/serum bicarb

## 2021-03-23 NOTE — PROGRESS NOTE ADULT - ASSESSMENT
ASSESSMENT:  60y Male w/ PMHx of a Brain tumor s/p resection in 2018 who presented 3/1/21 with worsening shortness of breath and non-productive cough x1 week, worsening hypoxia 2/2 COVID PNA requiring intubation 3/12 and transfer to [facility]    # Hypoxic respiratory failure 2/2 COVID  [facility]   -[facility] following     Hypotension   - Norepinephrine for MAP >65    malnutrition  - TF   - GI ppx w/ protonix  - Senna/Miralax     PNA  - Increasing leukocytosis with elevated procalcitonin & fevers, concern for superimposed bacterial infection  -started on Iv abx     Ac renal failure   -worsening renal fx     Hx of brin tumor s/p resection   -stable     poor prognosis remain full code     krista nelson MD

## 2021-03-23 NOTE — PROGRESS NOTE ADULT - ATTENDING COMMENTS
60M h/o brain tumor s/p resection in 2018 a/w COVID 19 Pneumonia s/p intubation on 3/12 and now ARDS requiring prone positioning now with refractory hypoxemia and hypercapnia and worsening LUDIVINA     Neuro: sedated with fentanyl, ketamine and versed; paralysis with nimbex   CV: vasoplegic shock 2/2 sedation on levophed; maintain MAP>65  Pulm: COVID Pneumonia with ARDS with refractory hypercarbia and hypoxia, air leak noted during rounds, ET tube to be advance 2-3cm   - no improvement in oxygenation/ventilation with proning, became unstable on most recent attempts  GI: c/w enteral feeds, ppx with protonix  - LFTs still uptrending likely from Covid, no acute findings on RUQ sono  Renal: LUDIVINA from [facility] on bumex BID, lokelma as needed for hyperkalemia  - renal following, no acute indications for HD at this time  ID: monitor off antibiotics  Heme: DVT ppx with HSQ  Endo: continue ISS coverage.

## 2021-03-24 NOTE — PROCEDURE NOTE - NSINDICATIONS_GEN_A_CORE
critical illness/emergency venous access/venous access
critical patient
arterial puncture to obtain ABG's/critical patient/monitoring purposes
dialysis/CRRT

## 2021-03-24 NOTE — PROGRESS NOTE ADULT - PROBLEM SELECTOR PROBLEM 1
LUDVIINA (acute kidney injury)
Pneumonia due to COVID-19 virus
LUDIVINA (acute kidney injury)
Pneumonia due to COVID-19 virus
LUDIVINA (acute kidney injury)
Pneumonia due to COVID-19 virus

## 2021-03-24 NOTE — PROCEDURE NOTE - NSPROCDETAILS_GEN_ALL_CORE
location identified, draped/prepped, sterile technique used, needle inserted/introduced/positive blood return obtained via catheter/connected to a pressurized flush line/sutured in place/hemostasis with direct pressure, dressing applied/Seldinger technique/all materials/supplies accounted for at end of procedure
guidewire recovered/lumen(s) aspirated and flushed/sterile dressing applied/sterile technique, catheter placed/ultrasound guidance with use of sterile gel and probe cove
guidewire recovered/lumen(s) aspirated and flushed/sterile dressing applied/sterile technique, catheter placed/ultrasound guidance with use of sterile gel and probe cove
location identified, draped/prepped, sterile technique used, needle inserted/introduced/positive blood return obtained via catheter/connected to a pressurized flush line/sutured in place/Seldinger technique/all materials/supplies accounted for at end of procedure

## 2021-03-24 NOTE — CHART NOTE - NSCHARTNOTEFT_GEN_A_CORE
Nutrition Follow-Up Note   Reason for follow-up: Critical care length of stay follow- up. Medical record reviewed. Spoke to RN and medical team.    Clinical Course history: Patient with medical history of  Brain tumor s/p resection in 2018 who presented 3/1/21 with worsening shortness of breath and non-productive cough x1 week, worsening hypoxia 2/2 COVID PNA requiring intubation 3/12 and transfer to [facility]. Sedated on Fentanyl, Versed, Ketamine, paralyzed with Nimbex. Vasopressor support with Vaso and Levo. On Bumex gtt - plan to start CRRT.     Diet Order: Diet, NPO with Tube Feed:   Tube Feeding Modality: Nasogastric  Nepro with Carb Steady (NEPRORTH)  Total Volume for 24 Hours (mL): 840  Continuous  Starting Tube Feed Rate {mL per Hour}: 10  Increase Tube Feed Rate by (mL): 10     Every 8 hours  Until Goal Tube Feed Rate (mL per Hour): 35  Tube Feed Duration (in Hours): 24  Tube Feed Start Time: 15:00  No Carb Prosource (1pkg = 15gms Protein)     Qty per Day:  3 (03-19-21 @ 16:04)    CURRENT EN ORDER PROVIDES: *TF off 2/2 coffee ground emesis/output**  Total Volume: 840mL/day  Energy: 1512Kcal/day  Protein: 113g protein/day  Free Water: 724mL/day    Nutrition Related Information: - TF currently off 2/2 coffee ground emesis since overnight. Continues with NGT output.   - Tube feeding intake (Nepro): 3/17-18: 220mL, 3/18-19: 445mL, 3/19-20: 140mL, 3/20-21: 200mL, 3/21-22: 735mL, 3/22-23: 350mL, 3/23-24: 355mL. **Overall meeting <50% of nutrition needs >5days.  - Per discussion with RN and team, plan for CRRT today. Noted hyperkalemia, remaining elevated despite medical management. K+ 6.3 today. Nephrology following - plan for CRRT.   - Glucose WDL - ordered for SSI.   - Phosphorous elevated to 5.8 on 3/23.   - Resumption of enteral nutrition pending resolution of emesis/NGT output. Noted suboptimal nutrition intake during admission. If unable to resume enteral nutrition, may need to consider TPN in order for patient to meet nutrition needs if medically feasible.     GI: - Last BM 3/12 per flowsheets.   - +vomiting, NGT output.  - RN reports patient abdomen soft today.   - Patient ordered for Reglan (10mg q8hrs) for promotility.   - Bowel regimen: Senna, Miralax. Consider more aggressive regimen if constipation persists.     Anthropometric Measurements:   Height (cm): 180.3 (03-01-21 @ 21:00)  Weight (kg): 75.5 (3/24), 82.1 (3/01)  *Noted weight decrease, 8% weight loss in 23 days. Possibly true dry weight loss in setting of critical illness and suboptimal nutrition intake.  BMI (kg/m2): 25.3 (3/01)  IBW: 78.2kg +/-10%    Medications: MEDICATIONS  (STANDING):  ascorbic acid 500 milliGRAM(s) Oral daily  aspirin  chewable 81 milliGRAM(s) Oral daily  buMETAnide Infusion 2 mG/Hr (10 mL/Hr) IV Continuous <Continuous>  chlorhexidine 0.12% Liquid 15 milliLiter(s) Oral Mucosa every 12 hours  cholecalciferol 2000 Unit(s) Oral daily  cisatracurium Infusion 3 MICROgram(s)/kG/Min (14.8 mL/Hr) IV Continuous   CRRT Treatment    <Continuous>  dextrose 50% Injectable 50 milliLiter(s) IV Push once  dextrose 50% Injectable 50 milliLiter(s) IV Push once  diazepam    Tablet 10 milliGRAM(s) Oral every 8 hours  fentaNYL   Infusion..... 0.499 MICROgram(s)/kG/Hr (0.82 mL/Hr) IV Continuous <Continuous>  glucagon  Injectable 1 milliGRAM(s) IntraMuscular once  heparin   Injectable 5000 Unit(s) SubCutaneous every 8 hours  insulin lispro (ADMELOG) corrective regimen sliding scale   SubCutaneous every 6 hours  ketamine Infusion. 0.25 mG/kG/Hr (2.05 mL/Hr) IV Continuous <Continuous>  metoclopramide Injectable 10 milliGRAM(s) IV Push every 8 hours  midazolam Infusion 0.02 mG/kG/Hr (1.64 mL/Hr) IV Continuous <Continuous>  multivitamin 1 Tablet(s) Oral daily  norepinephrine Infusion 0.05 MICROgram(s)/kG/Min (3.85 mL/Hr) IV Continuous <Continuous>  pantoprazole  Injectable 40 milliGRAM(s) IV Push daily  polyethylene glycol 3350 17 Gram(s) Oral daily  PrismaSATE Dialysate BK 0 / 3.5 5000 milliLiter(s) (1400 mL/Hr) CRRT <Continuous>  PrismaSOL Filtration BGK 0 / 2.5 5000 milliLiter(s) (1000 mL/Hr) CRRT <Continuous>  PrismaSOL Filtration BGK 0 / 2.5 5000 milliLiter(s) (200 mL/Hr) CRRT <Continuous>  senna Syrup 10 milliLiter(s) Oral at bedtime  sodium bicarbonate  Injectable 50 milliEquivalent(s) IV Push once  vasopressin Infusion 0.04 Unit(s)/Min (2.4 mL/Hr) IV Continuous <Continuous>    Labs: 03-24 @ 04:57: Sodium 138, Potassium 6.3<HH>, Calcium 8.1<L>, Hemoglobin 8.4<L>, Hematocrit 25.8<L>, Ferritin --, C-Reactive Protein --, Creatine Kinase <<27>  03-23 @ 22:35: Sodium 137, Potassium 5.9<H>, Calcium 7.8<L>  03-23 @ 15:42: Sodium 139, Potassium 6.0<H>, Calcium 8.1<L>    Triglycerides, Serum: 712 mg/dL (03-23-21 @ 22:35)  Triglycerides, Serum: 415 mg/dL (03-17-21 @ 00:49)  Triglycerides, Serum: 429 mg/dL (03-16-21 @ 01:12)  Triglycerides, Serum: 381 mg/dL (03-15-21 @ 02:51)  Triglycerides, Serum: 239 mg/dL (03-13-21 @ 02:10)  Triglycerides, Serum: 88 mg/dL (03-01-21 @ 08:07)    POCT Blood Glucose.: 119 mg/dL (03-24-21 @ 11:15)  POCT Blood Glucose.: 148 mg/dL (03-23-21 @ 17:08)    Skin: lip wound (device associated)  Edema: 2+ left/right leg, foot    Estimated Nutrition Needs: calculated using current weight 75.5kg   Estimated Energy Needs (20-25Kcal/kg): 1510-1888Kcal/day  Estimated Protein Needs (1.5-2.0g/kg): 113-151g  *Increased nutrition need given critical illness and CRRT*    Previous Nutrition Diagnosis:  Inadequate protein intake  Diagnosis is ongoing     New Nutrition Diagnosis: Malnutrition, severe in the context of acute illness related to COVID-19 As evidenced by meeting <50% estimated nutrition needs >5 days and 8% weight loss in 23 days.    Nutrition Interventions/Recommendations:   1. Resume enteral nutrition when medically appropriate: Nepro @ 10mL/hr and advance 10mL q4hr to goal rate of 40mL/hr x24 hours + No Carb Prosource 5x daily [at goal provides 960mL total volume, 1728Kcal, 153g protein, 698mL free water] (~23Kcal/kg ABW and 2.0g/kg ABW).  2. Additional free water per physician discretion.   3. Consider Nephro-Roslyn (d/c multivitamin). Continue Vitamin D supplementation.  4. Bowel regimen as clinically indicated.  5. Trend glucose, and electrolytes.   6. Obtain weekly weight.   7. If unable to resume enteral nutrition, may need to consider TPN in order for patient to meet nutrition needs if medically feasible.      Monitoring and Evaluation:   Monitor nutrition provision, tolerance to diet, weights, labs, skin integrity and GI function.   RD remains available, please consult as needed. Will follow up per protocol.  Nga Church, MS, RD, CDN, [facility] Pager #[phone]

## 2021-03-24 NOTE — PROGRESS NOTE ADULT - PROBLEM SELECTOR PROBLEM 3
Hypoxemia
Hypoxemia
Hyperkalemia
Hypoxemia
Hyperkalemia
Hypoxemia
Hyperkalemia

## 2021-03-24 NOTE — PROGRESS NOTE ADULT - ASSESSMENT
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  -stable     PULMONARY:  # Hypoxic respiratory failure 2/2 COVID  -intubated on mech vent   -VENT SETTINGS REMAIN   -AC 34/400/PEEP 10/FIO2 80%  -ABG 7.28/68/83/27/95     Bacterial Pneumonia:  - Increasing leukocytosis with elevated procalcitonin & fevers, concern for superimposed bacterial infection  -monitoring off antibiotics  Hypotension   - Norepinephrine for MAP >65    GI:  Transaminitis:  -RUQ done, revealing mild gall bladder thickness, otherwise normal  - Trend LFTs       malnutrition  - TF   - GI ppx w/ protonix  - Senna/Miralax     :  Improving LUDIVINA  -Creat 4.0      SOCIAL ISSUES:  poor prognosis remain full code   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  -stable     PULMONARY:  # Hypoxic respiratory failure 2/2 COVID  [facility]   -VENT SETTINGS REMAIN   -AC 34/400/PEEP 10/FIO2 80%  -ABG 7.28/68/83/27/95     Bacterial Pneumonia:  - Increasing leukocytosis with elevated procalcitonin & fevers, concern for superimposed bacterial infection  -monitoring off antibiotics  Hypotension   - Norepinephrine for MAP >65    GI:  Transaminitis:  -RUQ done, revealing mild gall bladder thickness, otherwise normal  - Trend LFTs       malnutrition  - TF   - GI ppx w/ protonix  - Senna/Miralax     :  Improving LUDIVINA  -Cr increasing 5.36  -CRRT today  -f/u pm BMP    Endo:  -ISS  -FS q6h while NPO  -No active issues    Hematologic:   - H/H stable  - Elevated d-dimer, previously on heparin drip, now d/c'd  - BLE DVT study 3/13 negative, noted superficial saphenous vein thrombosis on LLE  - Discontinued lovenox d/t worsening renal function  - Prophylaxis Hep SubQ 5,000 TID      ID:  - COVID: s/p remdesivir and dexamethasone  - Improved leukocytosis with elevated procalcitonin, low concern for superimposed bacterial infection  - s/p Zosyn (3/07 - 3/11) and s/p Meropenem / Vancomycin (3/11 - 3/13)  - BCx 3/11 & 3/12 NGTD, sputum cx 3/14 NGTD     SOCIAL ISSUES:  poor prognosis remain full code

## 2021-03-24 NOTE — PROGRESS NOTE ADULT - SUBJECTIVE AND OBJECTIVE BOX
ICU Daily PROGRESS NOTE  ===============================  HPI  60y Male  (Insert from previous note)***    Interval Events: ***    VITAL SIGNS, INS/OUTS (last 24 hours):  --------------------------------------------------------------------------------------  T(C): 38.5 (03-24-21 @ 11:02), Max: 38.7 (03-24-21 @ 10:41)  HR: 106 (03-24-21 @ 11:02) (93 - 118)  BP: --  BP(mean): --  ABP: 146/62 (03-24-21 @ 11:45) (91/41 - 160/65)  ABP(mean): 85 (03-24-21 @ 11:45) (55 - 94)  RR: 30 (03-24-21 @ 11:02) (30 - 30)  SpO2: 90% (03-24-21 @ 11:02) (88% - 100%)  Wt(kg): --  CVP(mm Hg): --  CI: --  CAPILLARY BLOOD GLUCOSE      POCT Blood Glucose.: 119 mg/dL (24 Mar 2021 11:15)  POCT Blood Glucose.: 148 mg/dL (23 Mar 2021 17:08)   N/A      03-23 @ 07:01  -  03-24 @ 07:00  --------------------------------------------------------  IN:    Cisatracurium: 472.8 mL    FentaNYL: 158.4 mL    Ketamine: 393.6 mL    Midazolam: 158.4 mL    Nepro with Carb Steady: 355 mL    Norepinephrine: 1061.2 mL    Norepinephrine: 1038.8 mL  Total IN: 3638.2 mL    OUT:    Indwelling Catheter - Urethral (mL): 306 mL    Nasogastric/Oral tube (mL): 2400 mL  Total OUT: 2706 mL    Total NET: 932.2 mL      03-24 @ 07:01  -  03-24 @ 12:11  --------------------------------------------------------  IN:    Bumetanide: 30 mL    Cisatracurium: 59.1 mL    FentaNYL: 26.8 mL    Ketamine: 65.6 mL    Midazolam: 26.4 mL    Norepinephrine: 597 mL    Vasopressin: 7.2 mL  Total IN: 812.1 mL    OUT:    Indwelling Catheter - Urethral (mL): 80 mL  Total OUT: 80 mL    Total NET: 732.1 mL  --------------------------------------------------------------------------------------    EXAM  NEUROLOGY  Exam: Normal, NAD, alert, oriented x3, no focal deficits. PERRLA.   ***    RESPIRATORY  Exam: Lungs clear to auscultation, Normal expansion/effort.  ***  [] Tracheostomy   [] Intubated  Mechanical Ventilation:     CARDIOVASCULAR  Exam: S1, S2.  Regular rate and rhythm ****    GI/NUTRITION  Exam: Abdomen soft, Non-tender, Non-distended.  Gastrostomy / Jejunostomy tube in place.  Nasogastric tube in place.  Colostomy / Ileostomy.  ***  Wound: ***  Current Diet:  ***    METABOLIC/FLUIDS/ELECTROLYTES  ascorbic acid 500 milliGRAM(s) Oral daily  cholecalciferol 2000 Unit(s) Oral daily  multivitamin 1 Tablet(s) Oral daily  sodium bicarbonate  Injectable 50 milliEquivalent(s) IV Push once      HEMATOLOGIC  [x] DVT Prophylaxis: aspirin  chewable 81 milliGRAM(s) Oral daily  heparin   Injectable 5000 Unit(s) SubCutaneous every 8 hours    Transfusions:	[] PRBC	[] Platelets		[] FFP	[] Cryoprecipitate    INFECTIOUS DISEASE  Antimicrobials/Immunologic Medications:    Day# of  ***    VASCULAR  Exam: Extremities warm, pink, well-perfused.  ****    MUSCULOSKELETAL  Exam: All extremities moving spontaneously without limitations.  ***    SKIN  Exam: Good skin turgor, no skin breakdown.  ***    LABS  --------------------------------------------------------------------------------------  CBC (03-24 @ 04:57)                              8.4<L>                         24.85<H>  )----------------(  286        --    % Neutrophils, --    % Lymphocytes, ANC: --                                  25.8<L>  CBC (03-23 @ 22:35)                              9.1<L>                         25.68<H>  )----------------(  290        --    % Neutrophils, --    % Lymphocytes, ANC: --                                  27.9<L>    BMP (03-24 @ 04:57)             138     |  95<L>   |  180<H>		Ca++ --      Ca 8.1<L>             ---------------------------------( 179<H>		Mg --                 6.3<HH>  |  23      |  5.36<H>			Ph --      BMP (03-23 @ 22:35)             137     |  95<L>   |  174<H>		Ca++ --      Ca 7.8<L>             ---------------------------------( 170<H>		Mg --                 5.9<H>  |  23      |  5.16<H>			Ph --        LFTs (03-23 @ 03:26)      TPro 6.2 / Alb 2.1<L> / TBili 5.4<H> / DBili 5.0<H> / <H> / <H> / AlkPhos 883<H>    Coags (03-24 @ 04:57)  aPTT 45.2<H> / INR 2.01<H> / PT 22.4<H>  Coags (03-23 @ 22:35)  aPTT 46.5<H> / INR -- / PT --      ABG (03-24 @ 04:57)     7.24<L> / 60<H> / 63<L> / 23 / -1.3 / 88.1<L>%     Lactate:     ABG (03-23 @ 22:35)     7.24<L> / 54<H> / 106 / 21<L> / -3.7<L> / 97.0%     Lactate:           -> .Urine Clean Catch (Midstream) Culture (03-20 @ 16:00)     NG    NG    No growth    -> .Blood Blood-Peripheral Culture (03-18 @ 10:10)     NG    NG    No Growth Final    -> .Blood Culture (03-18 @ 08:20)     NG    NG    No Growth Final  --------------------------------------------------------------------------------------    IMAGING STUDIES     ICU Daily PROGRESS NOTE  ===============================  HPI  61yo Male w/ PMHx of a Brain tumor s/p resection in 2018 who presented 3/1/21 with worsening shortness of breath and non-productive cough x1 week. He initially saw his PCP, had a negative COVID swab and was started on an inhaler without improvement. He presented with increased SOB, lethargy and appetite. He tested positive for COVID on admission and was started on dexamethasone and remdesivir, he required 6L NC on admission for room air saturation of 87%. He remained hypoxemic and required HFNC. On 3/5 he developed fevers and his procalcitonin began to uptrend. He was started on empiric Zosyn for concern of superimposed bacterial PNA. Patient was a rapid response on 3/8 for hypoxia to 80s and  found to be disconnected from high flow NC. Placed on AVAPS. He was transitioned to BiPAP and continued to deteriorate with increased work of breathing and hypoxia. He was intubated on 3/12 and transferred to [facility].      Interval Events:   -R JESU kohler placed this morning for CRRT, elevated K Cr. 5.36 this am  -UOP 15-20cc/hr  -Continues to require Volume AC: 30/480/12/90    VITAL SIGNS, INS/OUTS (last 24 hours):  --------------------------------------------------------------------------------------  T(C): 38.5 (03-24-21 @ 11:02), Max: 38.7 (03-24-21 @ 10:41)  HR: 106 (03-24-21 @ 11:02) (93 - 118)  BP: --  BP(mean): --  ABP: 146/62 (03-24-21 @ 11:45) (91/41 - 160/65)  ABP(mean): 85 (03-24-21 @ 11:45) (55 - 94)  RR: 30 (03-24-21 @ 11:02) (30 - 30)  SpO2: 90% (03-24-21 @ 11:02) (88% - 100%)  Wt(kg): --  CVP(mm Hg): --  CI: --  CAPILLARY BLOOD GLUCOSE      POCT Blood Glucose.: 119 mg/dL (24 Mar 2021 11:15)  POCT Blood Glucose.: 148 mg/dL (23 Mar 2021 17:08)   N/A      03-23 @ 07:01  -  03-24 @ 07:00  --------------------------------------------------------  IN:    Cisatracurium: 472.8 mL    FentaNYL: 158.4 mL    Ketamine: 393.6 mL    Midazolam: 158.4 mL    Nepro with Carb Steady: 355 mL    Norepinephrine: 1061.2 mL    Norepinephrine: 1038.8 mL  Total IN: 3638.2 mL    OUT:    Indwelling Catheter - Urethral (mL): 306 mL    Nasogastric/Oral tube (mL): 2400 mL  Total OUT: 2706 mL    Total NET: 932.2 mL      03-24 @ 07:01  -  03-24 @ 12:11  --------------------------------------------------------  IN:    Bumetanide: 30 mL    Cisatracurium: 59.1 mL    FentaNYL: 26.8 mL    Ketamine: 65.6 mL    Midazolam: 26.4 mL    Norepinephrine: 597 mL    Vasopressin: 7.2 mL  Total IN: 812.1 mL    OUT:    Indwelling Catheter - Urethral (mL): 80 mL  Total OUT: 80 mL    Total NET: 732.1 mL  --------------------------------------------------------------------------------------    EXAM  NEUROLOGY  Exam: Sedated, paralyzed    RESPIRATORY  Exam: Lungs clear to auscultation, Normal expansion/effort.    [] Tracheostomy   [x] Intubated  Mechanical Ventilation: Volume AC RR: 30, TV: 480, PEEP: 12, FiO2: 90%    CARDIOVASCULAR  Exam: S1, S2.  Regular rate and rhythm     GI/NUTRITION  Exam: Abdomen soft, Non-tender, Non-distended.    Nasogastric tube in place to LCWS  Current Diet:  NPO    METABOLIC/FLUIDS/ELECTROLYTES  ascorbic acid 500 milliGRAM(s) Oral daily  cholecalciferol 2000 Unit(s) Oral daily  multivitamin 1 Tablet(s) Oral daily  sodium bicarbonate  Injectable 50 milliEquivalent(s) IV Push once      HEMATOLOGIC  [x] DVT Prophylaxis: aspirin  chewable 81 milliGRAM(s) Oral daily  heparin   Injectable 5000 Unit(s) SubCutaneous every 8 hours    Transfusions:	[] PRBC	[] Platelets		[] FFP	[] Cryoprecipitate    INFECTIOUS DISEASE  Antimicrobials/Immunologic Medications:    Day# of  ***    VASCULAR  Exam: Extremities warm, pink, well-perfused.      MUSCULOSKELETAL  Exam: All extremities moving spontaneously without limitations.     SKIN  Exam: Good skin turgor, no skin breakdown.     LABS  --------------------------------------------------------------------------------------  CBC (03-24 @ 04:57)                              8.4<L>                         24.85<H>  )----------------(  286        --    % Neutrophils, --    % Lymphocytes, ANC: --                                  25.8<L>  CBC (03-23 @ 22:35)                              9.1<L>                         25.68<H>  )----------------(  290        --    % Neutrophils, --    % Lymphocytes, ANC: --                                  27.9<L>    BMP (03-24 @ 04:57)             138     |  95<L>   |  180<H>		Ca++ --      Ca 8.1<L>             ---------------------------------( 179<H>		Mg --                 6.3<HH>  |  23      |  5.36<H>			Ph --      BMP (03-23 @ 22:35)             137     |  95<L>   |  174<H>		Ca++ --      Ca 7.8<L>             ---------------------------------( 170<H>		Mg --                 5.9<H>  |  23      |  5.16<H>			Ph --        LFTs (03-23 @ 03:26)      TPro 6.2 / Alb 2.1<L> / TBili 5.4<H> / DBili 5.0<H> / <H> / <H> / AlkPhos 883<H>    Coags (03-24 @ 04:57)  aPTT 45.2<H> / INR 2.01<H> / PT 22.4<H>  Coags (03-23 @ 22:35)  aPTT 46.5<H> / INR -- / PT --      ABG (03-24 @ 04:57)     7.24<L> / 60<H> / 63<L> / 23 / -1.3 / 88.1<L>%     Lactate:     ABG (03-23 @ 22:35)     7.24<L> / 54<H> / 106 / 21<L> / -3.7<L> / 97.0%     Lactate:           -> .Urine Clean Catch (Midstream) Culture (03-20 @ 16:00)     NG    NG    No growth    -> .Blood Blood-Peripheral Culture (03-18 @ 10:10)     NG    NG    No Growth Final    -> .Blood Culture (03-18 @ 08:20)     NG    NG    No Growth Final  --------------------------------------------------------------------------------------    IMAGING STUDIES

## 2021-03-24 NOTE — PROGRESS NOTE ADULT - PROBLEM SELECTOR PROBLEM 2
Acidosis
Viral sepsis
Acidosis
Acidosis
Viral sepsis
Viral sepsis
Acidosis
Viral sepsis
Viral sepsis
Acidosis
Acidosis
Viral sepsis
Acidosis
Viral sepsis

## 2021-03-24 NOTE — PROGRESS NOTE ADULT - PROBLEM SELECTOR PLAN 2
Pt with acidosis, primary respiratory acidosis in the setting of COVID19 pneumonia/ARDS. Improving - Ventilation optimization per ICU team,Will start CRRT today -  Monitor blood gas/serum bicarb

## 2021-03-24 NOTE — PROGRESS NOTE ADULT - NUTRITIONAL ASSESSMENT
This patient has been assessed with a concern for Malnutrition and has been determined to have a diagnosis/diagnoses of Severe protein-calorie malnutrition.    This patient is being managed with:   Diet NPO with Tube Feed-  Tube Feeding Modality: Nasogastric  Nepro with Carb Steady (NEPRORTH)  Total Volume for 24 Hours (mL): 840  Continuous  Starting Tube Feed Rate {mL per Hour}: 10  Increase Tube Feed Rate by (mL): 10     Every 8 hours  Until Goal Tube Feed Rate (mL per Hour): 35  Tube Feed Duration (in Hours): 24  Tube Feed Start Time: 15:00  No Carb Prosource (1pkg = 15gms Protein)     Qty per Day:  3  Entered: Mar 19 2021  4:04PM

## 2021-03-24 NOTE — CHART NOTE - NSCHARTNOTEFT_GEN_A_CORE
Overnight events 3/23/2021   Patient is hypotensive, on levophed 2 mcg/kg/min, MAP 60, will add vasopressin  earlier had vomiting, feeding held, KUB - non specific gas pattern, zofran/reglan  Coffee-ground content on NG suction, will change protonix to bid, Hb 9-8.4   Send DIC work up: INR, fibrinogen, Ddimer  Persistent hyperkalemia overnight despite 2 runs of hyperkalemia protocol: lokelma, D50/insulin/bicarb/calcium. no BM.   Renal recalled initially advised resume bumex drip, then advised HD cath to start CVVH, Shiley cath to be placed.   overall prognosis is poor  Family to be called and updated.

## 2021-03-24 NOTE — PROGRESS NOTE ADULT - SUBJECTIVE AND OBJECTIVE BOX
Medicine Progress Note    Patient is a 60y old  Male who presents with a chief complaint of COVID-19 Pneumonia (24 Mar 2021 15:33)      SUBJECTIVE / OVERNIGHT EVENTS:    ADDITIONAL REVIEW OF SYSTEMS:    MEDICATIONS  (STANDING):  ALBUTerol    90 MICROgram(s) HFA Inhaler 4 Puff(s) Inhalation every 6 hours  ascorbic acid 500 milliGRAM(s) Oral daily  aspirin  chewable 81 milliGRAM(s) Oral daily  budesonide 160 MICROgram(s)/formoterol 4.5 MICROgram(s) Inhaler 2 Puff(s) Inhalation two times a day  buMETAnide Infusion 2 mG/Hr (10 mL/Hr) IV Continuous <Continuous>  chlorhexidine 0.12% Liquid 15 milliLiter(s) Oral Mucosa every 12 hours  cholecalciferol 2000 Unit(s) Oral daily  cisatracurium Infusion 3 MICROgram(s)/kG/Min (14.8 mL/Hr) IV Continuous <Continuous>  CRRT Treatment    <Continuous>  dextrose 50% Injectable 50 milliLiter(s) IV Push once  diazepam    Tablet 10 milliGRAM(s) Oral every 8 hours  fentaNYL   Infusion..... 0.499 MICROgram(s)/kG/Hr (0.82 mL/Hr) IV Continuous <Continuous>  glucagon  Injectable 1 milliGRAM(s) IntraMuscular once  heparin   Injectable 5000 Unit(s) SubCutaneous every 8 hours  hydrocortisone sodium succinate Injectable 100 milliGRAM(s) IV Push once  insulin lispro (ADMELOG) corrective regimen sliding scale   SubCutaneous every 6 hours  ketamine Infusion. 0.25 mG/kG/Hr (2.05 mL/Hr) IV Continuous <Continuous>  metoclopramide Injectable 10 milliGRAM(s) IV Push every 8 hours  midazolam Infusion 0.02 mG/kG/Hr (1.64 mL/Hr) IV Continuous <Continuous>  multivitamin 1 Tablet(s) Oral daily  norepinephrine Infusion 0.05 MICROgram(s)/kG/Min (3.85 mL/Hr) IV Continuous <Continuous>  pantoprazole  Injectable 40 milliGRAM(s) IV Push two times a day  piperacillin/tazobactam IVPB. 3.375 Gram(s) IV Intermittent once  piperacillin/tazobactam IVPB.. 3.375 Gram(s) IV Intermittent every 12 hours  polyethylene glycol 3350 17 Gram(s) Oral daily  PrismaSATE Dialysate BK 0 / 3.5 5000 milliLiter(s) (1600 mL/Hr) CRRT <Continuous>  PrismaSOL Filtration BGK 0 / 2.5 5000 milliLiter(s) (200 mL/Hr) CRRT <Continuous>  PrismaSOL Filtration BGK 0 / 2.5 5000 milliLiter(s) (1200 mL/Hr) CRRT <Continuous>  senna Syrup 10 milliLiter(s) Oral at bedtime  sodium bicarbonate  Infusion 0.274 mEq/kG/Hr (150 mL/Hr) IV Continuous <Continuous>  sodium bicarbonate  Injectable 50 milliEquivalent(s) IV Push once  vancomycin  IVPB 1000 milliGRAM(s) IV Intermittent once  vasopressin Infusion 0.04 Unit(s)/Min (2.4 mL/Hr) IV Continuous <Continuous>    MEDICATIONS  (PRN):  acetaminophen    Suspension .. 650 milliGRAM(s) Oral every 6 hours PRN Temp greater or equal to 38C (100.4F)    CAPILLARY BLOOD GLUCOSE      POCT Blood Glucose.: 81 mg/dL (24 Mar 2021 17:42)  POCT Blood Glucose.: 60 mg/dL (24 Mar 2021 17:22)  POCT Blood Glucose.: 34 mg/dL (24 Mar 2021 17:19)  POCT Blood Glucose.: <25 mg/dL (24 Mar 2021 16:55)  POCT Blood Glucose.: <25 mg/dL (24 Mar 2021 16:53)  POCT Blood Glucose.: 119 mg/dL (24 Mar 2021 11:15)    I&O's Summary    23 Mar 2021 07:  -  24 Mar 2021 07:00  --------------------------------------------------------  IN: 3638.2 mL / OUT: 2706 mL / NET: 932.2 mL    24 Mar 2021 07:  -  24 Mar 2021 21:42  --------------------------------------------------------  IN: 3762.2 mL / OUT: 1430 mL / NET: 2332.2 mL        PHYSICAL EXAM:  Vital Signs Last 24 Hrs  T(C): 33.7 (24 Mar 2021 20:00), Max: 38.7 (24 Mar 2021 10:41)  T(F): 92.7 (24 Mar 2021 20:00), Max: 101.7 (24 Mar 2021 10:41)  HR: 72 (24 Mar 2021 20:00) (71 - 118)  BP: --  BP(mean): --  RR: 30 (24 Mar 2021 20:00) (30 - 30)  SpO2: 92% (24 Mar 2021 20:00) (90% - 100%)  CONSTITUTIONAL: NAD, well-developed, well-groomed  ENMT: Moist oral mucosa, no pharyngeal injection or exudates; normal dentition  RESPIRATORY: Normal respiratory effort; lungs are clear to auscultation bilaterally  CARDIOVASCULAR: Regular rate and rhythm, normal S1 and S2, no murmur/rub/gallop; No lower extremity edema; Peripheral pulses are 2+ bilaterally  ABDOMEN: Nontender to palpation, normoactive bowel sounds, no rebound/guarding; No hepatosplenomegaly  PSYCH: A+O to person, place, and time; affect appropriate  NEUROLOGY: CN 2-12 are intact and symmetric; no gross sensory deficits   SKIN: No rashes; no palpable lesions    LABS:                        8.1    17.91 )-----------( 232      ( 24 Mar 2021 16:29 )             24.6     03-24    139  |  98  |  156<H>  ----------------------------<  31<LL>  6.8<HH>   |  19<L>  |  5.11<H>    Ca    7.5<L>      24 Mar 2021 16:29  Phos  8.0     03-24  Mg     2.6     03-24    TPro  5.2<L>  /  Alb  2.1<L>  /  TBili  7.4<H>  /  DBili  x   /  AST  937<H>  /  ALT  602<H>  /  AlkPhos  1309<H>  03-24    PT/INR - ( 24 Mar 2021 16:33 )   PT: 26.1 sec;   INR: 2.39 ratio         PTT - ( 24 Mar 2021 16:33 )  PTT:60.7 sec      Urinalysis Basic - ( 24 Mar 2021 14:02 )    Color: Orange / Appearance: Turbid / S.020 / pH: x  Gluc: x / Ketone: Negative  / Bili: Large / Urobili: <2 mg/dL   Blood: x / Protein: 300 mg/dL / Nitrite: Negative   Leuk Esterase: Small / RBC: MANY /HPF / WBC 35 /HPF   Sq Epi: x / Non Sq Epi: 3 /HPF / Bacteria: Many        COVID-19 PCR: Detected (2021 23:02)      RADIOLOGY & ADDITIONAL TESTS:  Imaging from Last 24 Hours:    Electrocardiogram/QTc Interval:    COORDINATION OF CARE:  Care Discussed with Consultants/Other Providers:

## 2021-03-24 NOTE — CHART NOTE - NSCHARTNOTEFT_GEN_A_CORE
ID fellow brief chart note  -----------------------------------  I did not see the patient as formal consult. Plan made based on chart review and quality review purpose. Please make the clinical decision based on clinical pictures.    Recommend:  - Given that pt spikes new fever, please obtain BCx and sputum cx    Recs conveyed to primary team    Mariusz Tobar MD, PGY4   ID fellow  Pager: [phone]  After 5pm/weekends call [phone]

## 2021-03-24 NOTE — PROGRESS NOTE ADULT - ASSESSMENT
ASSESSMENT:  60y Male w/ PMHx of a Brain tumor s/p resection in 2018 who presented 3/1/21 with worsening shortness of breath and non-productive cough x1 week, worsening hypoxia 2/2 COVID PNA requiring intubation 3/12 and transfer to [facility]    # Hypoxic respiratory failure 2/2 COVID  [facility]   -MIC[facility] following     Hypotension   - Norepinephrine for MAP >65    malnutrition  - TF   - GI ppx w/ protonix  - Senna/Miralax     PNA  - Increasing leukocytosis with elevated procalcitonin & fevers, concern for superimposed bacterial infection  -started on Iv abx     Ac renal failure   -worsening renal fx     Hx of brin tumor s/p resection   -stable     poor prognosis remain full code     krista nelson MD

## 2021-03-24 NOTE — PROGRESS NOTE ADULT - PROBLEM SELECTOR PLAN 3
Hyperkalemia in the setting of worsening LUDIVINA. failed medical management, will start CRRT today with 0K bath - monitor BMP

## 2021-03-24 NOTE — PROGRESS NOTE ADULT - ATTENDING COMMENTS
60M h/o brain tumor s/p resection in 2018 a/w COVID 19 Pneumonia s/p intubation on 3/12 and now ARDS requiring prone positioning now with refractory hypoxemia and hypercapnia and worsening LUDIVINA with persistently hyperkalemia and oliguria despite bumex, now on CRRT, further c/b likely UGIB    Neuro: sedated with fentanyl, ketamine and versed; paralysis with nimbex   CV: vasoplegic shock 2/2 sedation on levophed and vaso; maintain MAP>65  Pulm: COVID Pneumonia with ARDS with refractory hypercarbia and hypoxia, air leak now resolved   - no improvement in oxygenation/ventilation with proning, became unstable on most recent attempts  GI: vomited x1 overnight, OGT placed to suction with return of coffee-ground appearing material, KUB with no evidence of obstruction - 2.4L total output in previous 24hrs, continue to low continuous suction, maintain NPO and increase protonix to bid dosing, trend H/H  - LFTs still uptrending likely from Covid and worsening clinical decline  Renal: LUDIVINA from ATN on bumex now oliguric with persistent hyperkalemia - to start CRRT today, dc bumex gtt once begun and tolerated  ID: infectious workup for superimposed bacterial infections to date, persistent fevers likely in setting of continued overall decline - repeat all cultures today, monitor off antibiotics  Heme: DVT ppx with HSQ  Endo: continue ISS coverage

## 2021-03-24 NOTE — DIETITIAN NUTRITION RISK NOTIFICATION - TREATMENT: THE FOLLOWING DIET HAS BEEN RECOMMENDED
Diet, NPO with Tube Feed:   Tube Feeding Modality: Nasogastric  Nepro with Carb Steady (NEPRORTH)  Total Volume for 24 Hours (mL): 840  Continuous  Starting Tube Feed Rate {mL per Hour}: 10  Increase Tube Feed Rate by (mL): 10     Every 8 hours  Until Goal Tube Feed Rate (mL per Hour): 35  Tube Feed Duration (in Hours): 24  Tube Feed Start Time: 15:00  No Carb Prosource (1pkg = 15gms Protein)     Qty per Day:  3 (03-19-21 @ 16:04) [Active]

## 2021-03-24 NOTE — PROGRESS NOTE ADULT - SUBJECTIVE AND OBJECTIVE BOX
DATE OF SERVICE: 03-24-21 @ 15:34    Patient is a 60y old  Male who presents with a chief complaint of COVID-19 Pneumonia (24 Mar 2021 12:10)      INTERVAL HISTORY:  no improvement    ROS - unable to obtain 2/2 sedation        MEDICATIONS:  buMETAnide Infusion 2 mG/Hr IV Continuous <Continuous>  norepinephrine Infusion 0.05 MICROgram(s)/kG/Min IV Continuous <Continuous>        PHYSICAL EXAM:  T(C): 36.1 (03-24-21 @ 15:18), Max: 38.7 (03-24-21 @ 10:41)  HR: 88 (03-24-21 @ 15:18) (88 - 118)  BP: --  RR: 30 (03-24-21 @ 15:18) (30 - 30)  SpO2: 97% (03-24-21 @ 15:18) (88% - 100%)  Wt(kg): --  I&O's Summary    23 Mar 2021 07:01  -  24 Mar 2021 07:00  --------------------------------------------------------  IN: 3638.2 mL / OUT: 2706 mL / NET: 932.2 mL    24 Mar 2021 07:01  -  24 Mar 2021 15:34  --------------------------------------------------------  IN: 812.1 mL / OUT: 80 mL / NET: 732.1 mL                                     8.4    24.85 )-----------( 286      ( 24 Mar 2021 04:57 )             25.8     03-24    138  |  95<L>  |  180<H>  ----------------------------<  179<H>  6.3<HH>   |  23  |  5.36<H>    Ca    8.1<L>      24 Mar 2021 04:57  Phos  5.8     03-23  Mg     2.6     03-23    TPro  5.5<L>  /  Alb  1.8<L>  /  TBili  7.1<H>  /  DBili  6.9<H>  /  AST  426<H>  /  ALT  384<H>  /  AlkPhos  1078<H>  03-24        Labs personally reviewed      ASSESSMENT/PLAN: 	  This is a 59yo Male w/ PMHx of a Brain tumor s/p resection in 2018, who is presenting with worsening shortness of breath and non-productive cough for past week. Found to be COVID-19 Positive. Patient Sinus tachycardic in Emergency room.     1. Sinus Tachycardia 2/2 to worsening hypoxia from COVID-19 Pneumonia   - trop negative  - D Dimer very elevated   - (-) for DVT, changed to ppx dose by ICU   - consider TTE when stable    2. Hypotension - likely 2/2 sedation and paralytics  - NE PRN to keep MAP >56    3. Hypoxemia 2/2 Covid-19 pna  - remdesevir completed. Deca for 10 days completed  - completed decadron taper  - s/p RRT- intubated/ sedated in ICU   - remains intubated and sedated    4. Hyponatremic- 134 on admission likely from dehydration   -   normalized     5. Transaminitis  - elevated LFTs on admission, albumin slightly low however now nearly normalized, bilirubin wnl   - Worsening 2/2 COVID    6.  Fevers/Leukocytosis -  Plan: s/p Vanco and IV meropenem - under ICU care  bcx 3/7 - negative   bcx 3/11 NGTD  Observe off Abx    7. LUDIVINA with rising Cr-  Bumex gtt resumed to keep euvolemic given ARDS- worsening renal function  - starting CRRT tonight  - nephrology follow up         Jaswant Weiss DO [facility]  Cardiovascular Medicine  [address], [address]  Office: [phone]  Cell: [phone]

## 2021-03-24 NOTE — PROGRESS NOTE ADULT - SUBJECTIVE AND OBJECTIVE BOX
[facility] DIVISION OF KIDNEY DISEASES AND HYPERTENSION -- FOLLOW UP NOTE  --------------------------------------------------------------------------------  If any questions, please feel free to contact me  NS pager: [phone], [facility]: [phone]  Mendez Marie M.D.  Nephrology Fellow    (After 5 pm or on weekends please page the on-call fellow)  --------------------------------------------------------------------------------  Chief Complaint:  Patient is a 60y old  Male who presents with a chief complaint of COVID-19 Pneumonia (23 Mar 2021 22:45)    24 hour events/subjective:  Patient seen and examined at bedside this am, remains intubated and in critical condition. Noted hyperkalemic overnight. with worsening azotemia. Vitals/labs/imaging reviewed       PAST HISTORY  --------------------------------------------------------------------------------  No significant changes to PMH, PSH, FHx, SHx, unless otherwise noted    ALLERGIES & MEDICATIONS  --------------------------------------------------------------------------------  Allergies    No Known Allergies    Intolerances      Standing Inpatient Medications  ALBUTerol    90 MICROgram(s) HFA Inhaler 2 Puff(s) Inhalation every 4 hours  ascorbic acid 500 milliGRAM(s) Oral daily  aspirin  chewable 81 milliGRAM(s) Oral daily  budesonide 160 MICROgram(s)/formoterol 4.5 MICROgram(s) Inhaler 2 Puff(s) Inhalation two times a day  buMETAnide Infusion 2 mG/Hr IV Continuous <Continuous>  chlorhexidine 0.12% Liquid 15 milliLiter(s) Oral Mucosa every 12 hours  cholecalciferol 2000 Unit(s) Oral daily  cisatracurium Infusion 3 MICROgram(s)/kG/Min IV Continuous <Continuous>  CRRT Treatment    <Continuous>  dextrose 50% Injectable 50 milliLiter(s) IV Push once  dextrose 50% Injectable 50 milliLiter(s) IV Push once  diazepam    Tablet 10 milliGRAM(s) Oral every 8 hours  fentaNYL   Infusion..... 0.499 MICROgram(s)/kG/Hr IV Continuous <Continuous>  glucagon  Injectable 1 milliGRAM(s) IntraMuscular once  heparin   Injectable 5000 Unit(s) SubCutaneous every 8 hours  insulin lispro (ADMELOG) corrective regimen sliding scale   SubCutaneous every 6 hours  ketamine Infusion. 0.25 mG/kG/Hr IV Continuous <Continuous>  metoclopramide Injectable 10 milliGRAM(s) IV Push every 8 hours  metoclopramide Injectable 10 milliGRAM(s) IV Push once  midazolam Infusion 0.02 mG/kG/Hr IV Continuous <Continuous>  multivitamin 1 Tablet(s) Oral daily  norepinephrine Infusion 0.05 MICROgram(s)/kG/Min IV Continuous <Continuous>  pantoprazole  Injectable 80 milliGRAM(s) IV Push once  pantoprazole  Injectable 40 milliGRAM(s) IV Push daily  polyethylene glycol 3350 17 Gram(s) Oral daily  PrismaSATE Dialysate BK 0 / 3.5 5000 milliLiter(s) CRRT <Continuous>  PrismaSOL Filtration BGK 0 / 2.5 5000 milliLiter(s) CRRT <Continuous>  PrismaSOL Filtration BGK 0 / 2.5 5000 milliLiter(s) CRRT <Continuous>  propofol Infusion 20 MICROgram(s)/kG/Min IV Continuous <Continuous>  senna Syrup 10 milliLiter(s) Oral at bedtime  sodium bicarbonate  Injectable 50 milliEquivalent(s) IV Push once  sodium zirconium cyclosilicate 10 Gram(s) Oral once  vasopressin Infusion 0.04 Unit(s)/Min IV Continuous <Continuous>    PRN Inpatient Medications  acetaminophen    Suspension .. 650 milliGRAM(s) Oral every 6 hours PRN      REVIEW OF SYSTEMS  --------------------------------------------------------------------------------  unable to obtain due to current medical conditions     VITALS/PHYSICAL EXAM  --------------------------------------------------------------------------------  T(C): 36.7 (03-24-21 @ 00:00), Max: 37.8 (03-23-21 @ 13:00)  HR: 115 (03-24-21 @ 07:37) (93 - 115)  BP: --  RR: 30 (03-24-21 @ 03:00) (30 - 30)  SpO2: 96% (03-24-21 @ 07:37) (88% - 100%)  Wt(kg): --        03-23-21 @ 07:01  -  03-24-21 @ 07:00  --------------------------------------------------------  IN: 3175.9 mL / OUT: 2681 mL / NET: 494.9 mL        Physical Exam:  	Gen: intubated  	HEENT: intubated, +ETT  	Pulm: mechanical breath sounds  	CV:S1S2, RRR  	GI: no distention   	: noel catheter in place  	MSK: no edema in lower extremity    LABS/STUDIES  --------------------------------------------------------------------------------              8.4    24.85 >-----------<  286      [03-24-21 @ 04:57]              25.8     138  |  95  |  180  ----------------------------<  179      [03-24-21 @ 04:57]  6.3   |  23  |  5.36        Ca     8.1     [03-24-21 @ 04:57]      Mg     2.6     [03-23-21 @ 03:10]      Phos  5.8     [03-23-21 @ 03:10]    TPro  6.2  /  Alb  2.1  /  TBili  5.4  /  DBili  5.0  /  AST  257  /  ALT  276  /  AlkPhos  883  [03-23-21 @ 03:26]    PT/INR: PT 22.4 , INR 2.01       [03-24-21 @ 04:57]  PTT: 45.2       [03-24-21 @ 04:57]      Creatinine Trend:  SCr 5.36 [03-24 @ 04:57]  SCr 5.16 [03-23 @ 22:35]  SCr 4.80 [03-23 @ 15:42]  SCr 4.30 [03-23 @ 03:10]  SCr 4.03 [03-22 @ 05:29]    Urinalysis - [03-20-21 @ 17:54]      Color Yellow / Appearance Slightly Turbid / SG 1.018 / pH 6.5      Gluc Trace / Ketone Negative  / Bili Negative / Urobili <2 mg/dL       Blood Moderate / Protein 300 mg/dL / Leuk Est Negative / Nitrite Negative      RBC >10 / WBC <5 / Hyaline few / Gran  / Sq Epi  / Non Sq Epi 0-5 / Bacteria Negative      Ferritin 696      [03-12-21 @ 05:24]  TSH 0.18      [03-01-21 @ 08:07]  Lipid: chol --, , HDL --, LDL --      [03-23-21 @ 22:35]    HCV 0.12, Nonreact      [03-02-21 @ 13:44]

## 2021-03-24 NOTE — PROGRESS NOTE ADULT - REASON FOR ADMISSION
;      [facility]  [address], [place] [address]  [phone]     Clinical Summary     PERSON INFORMATION   Name MELINDA LORA Age  26 Years  1992 12:00 AM   Acct# [id number] Sex Male Phone [phone]   Dispo Type Still a patient - 30 Arrival 2018 10:56 AM Checkout 2018 3:19 PM    Address: [address] [place] [address] [address] [place] [address]      Visit Reason PERITONSILLAR ABSCESS SORE THROAT TOOTH PAIN     ED Physician Note     Patient:   MELINDA LORA            MRN: [id number]            FIN: [id number]               Age:   26 years     Sex:  Male     :  1992   Associated Diagnoses:   None   Author:   Florentin OCASIO, Grant Walter      Basic Information   Addendum: Time of addendum:: 2018 14:06:00 .      Medical Decision Making   I saw and evaluated the patient in addition to the mid-level provider patient has significant trismus with approximately 1 cm of mouth opening he has a clear clinical right peritonsillar abscess that was confirmed by CT is Yasmani been given Decadron and Clinda and continues to be very symptomatic.  Given this aspiration was attempted in an effort to improve symptoms and potentially send the patient home.  However, we had only minimal return and the patient's symptoms remain he remains p.o. intolerant given this patient was again discussed with otolaryngology patient will be admitted for observation with them to see later on today.  The patient is felt stable for the floor at this time patient was also discussed with the admitting provider.    Grant Lerma MD       Procedure   Peritonsillar abscess drainage:  Throat was numbed with nebulized lidocaine 5 mL's of 2%.  After patient consent was obtained Cetacaine spray was used and an 18-gauge needle with a needle guard in place was used at 3 attempted aspiration sites scant purulent return but minimal and not enough to send for culture.  Care was taken to 
COVID-19 Pneumonia
always remain medial and superior to the tonsil patient had very minimal bleeding he tolerated the procedure well without immediate complication    Grant Lerma MD       Impression and Plan   Diagnosis   right PTA      Patient:   MELINDA LORA            MRN: [id number]            FIN: [id number]               Age:   26 years     Sex:  Male     :  1992   Associated Diagnoses:   Peritonsillar abscess   Author:   Pantera CONTI, Jannet Gerardo      Basic Information   History source: Patient.   Arrival mode: Private vehicle, walking.   History limitation: None.   Additional information: Chief Complaint from Nursing Triage Note : Chief Complaint   2018 12:45          Chief Complaint           throat pain  .      History of Present Illness   The patient presents with sore throat.  The onset was 1  weeks ago.  The course/duration of symptoms is constant.  Location: throat. The character of symptoms is swelling.  The degree at present is moderate.  There are exacerbating factors including speaking and swallowing.  The relieving factor is none.  Risk factors consist of none.  Prior episodes: none.  Therapy today: none.  Associated symptoms: chills, difficulty swallowing, denies fever, denies nausea, denies vomiting, denies cough, denies rhinorrhea, denies nasal congestion, denies dyspnea and denies change in voice.        Review of Systems   Constitutional symptoms:  No fever,    Skin symptoms:  No rash,    Eye symptoms:  No recent vision problems,    ENMT symptoms:  Sore throat.   Respiratory symptoms:  No shortness of breath,    Cardiovascular symptoms:  No chest pain,    Gastrointestinal symptoms:  No abdominal pain,    Genitourinary symptoms:  No dysuria,    Musculoskeletal symptoms:  No Joint pain,    Neurologic symptoms:  No headache,       Health Status   Allergies: No known allergies.   Medications: None.      Past Medical/ Family/ Social History      Medical history   Negative.   Surgical history: 
COVID-19 Pneumonia
Negative.   Family history: Reviewed as documented in chart.   Social history: Reviewed as documented in chart.      Physical Examination               Vital Signs   Vital Signs   6/30/2018 10:59          Temperature Oral          99.0 F                             Peripheral Pulse Rate     81 bpm                             Respiratory Rate          18 br/min                             Systolic Blood Pressure   133 mmHg                             Diastolic Blood Pressure  85 mmHg                             Mean Arterial Pressure    101 mmHg                             Oxygen Saturation         98 %  .               Per nurse's notes.              Oxygen saturation:  98 %.   Vital Signs were reviewed.   General:  Alert, no acute distress.    Skin:  Warm, dry.    Head:  Normocephalic, atraumatic.    Neck:  Supple, trachea midline.    Eye:  Pupils are equal, round and reactive to light, extraocular movements are intact.    Ears, nose, mouth and throat:  Oral mucosa moist, Throat: Right, pharynx, tonsil, erythema, swelling, gag reflex present, uvula shift, no bleeding, no palatal petechiae.    Cardiovascular:  Regular rate and rhythm, S1, S2.    Respiratory:  Lungs are clear to auscultation, respirations are non-labored, Symmetrical chest wall expansion.    Gastrointestinal:  Soft, Nontender, Non distended.    Musculoskeletal:  Normal ROM, no swelling, no deformity.    Neurological:  Alert and oriented to person, place, time, and situation, No focal neurological deficit observed.    Psychiatric:  Appropriate mood & affect.      Medical Decision Making   multiple medical diagnosis considered. pt presents to  ER with c/o sore throat ×1 week that has worsened over the last 24 hours.  Patient states that he was seen at [facility] emergency room yesterday and given a prescription for amoxicillin for throat infection.  Patient states when he woke up today the swelling to his throat had  increased.  Patient states 
COVID-19 Pneumonia
he is having some difficulty swallowing and is spitting out his secretions.  Patient denies fever.  Denies chest pain or shortness of breath.  Denies headache.  Denies neck pain.  Patient does have full painless range of motion of his neck. pt is afebrile, non-toxic appearing, and in no acute distress. vital signs are stable.  Patient was given IV Decadron.  Labs show WBC 16.1, AST 38, , total bili 1.1.  Rapid strep is negative.  Strep culture is pending.  Patient was given IV clindamycin.  CT scan of the neck shows a mildly enlarged right pontine tonsil with central poorly marginated 1.7×2.6×2.7 cm heterogeneous low-density area which is compatible with evolving abscess formation.  The inflammation/edema extends inferiorly to involve the right aspect of the hypopharynx to the upper laryngeal level with thickening of the wall and aryepiglottic folds and compromise piriform sinus on the right.  The tonsillar absence was attempted to be drained by Dr. Lerma.  Afterwards patient is still not tolerating his oral secretions and having difficulty swallowing.  Called spoke with Dr. Mancilla.  Dr. Mancilla advised that the patient be admitted observation and kept n.p.o.  Updated the patient on plan of care.  Patient agrees with admission and plan of care at this time.  Patient left the emergency room in stable condition.  Vital signs are stable.  No acute distress.   Results review:  Lab results : Lab View   6/30/2018 12:15          Rapid Strep A             Negative    6/30/2018 11:39          Glucose Lvl               159 mg/dL  HI                             BUN                       7 mg/dL                             Creatinine                0.81 mg/dL                             eGFR AfrAmer              >60 mL/min/1.73m2  NA                             eGFR NonAfrAmer           >60 mL/min/1.73m2  NA                             Sodium                    135 mEq/L  LOW                             Potassium               
  3.9 mEq/L                             Chloride                  103 mEq/L                             TCO2                      22 mEq/L                             AGAP                      14 mEq/L                             Calcium                   9.2 mg/dL                             Alk Phos                  120 unit/L                             Bili Total                1.1 mg/dL  HI                             Total Protein             7.8 g/dL                             Albumin                   3.6 g/dL                             Globulin_                 4.2 g/dL  HI                             A/G Ratio_                0.9  NA                             AST/GOT                   38 unit/L  HI                             ALT/GPT                   105 unit/L  HI                             WBC                       16.5 K/cumm  HI                             RBC                       4.62 M/cumm                             Hgb                       13.6 g/dL                             Hct                       40 %                             MCV                       88 FL                             MCH                       29 pg                             MCHC                      34 g/dL                             RDW                       12.9 %                             Platelet                  257 K/cumm                             NRBC                      0.0 %                             Abs Neutro                11.4 K/cumm  HI                             Neutrophil                69 %  NA                             Abs Lymph                 3.4 K/cumm                             Lymphocyte                21 %  NA                             Abs Mono                  1.2 K/cumm  HI                             Monocyte                  8 %  NA                             Immature Gran             0.6 %  HI                             Eosinophil                2 %                   
COVID-19 Pneumonia
          Basophil                  0 %                             RBC Morph                 Normal                             Plt Estimate              Adequate  .   Soft tissue neck CT:    IMPRESSION: There is significantly enlarged right palatine tonsil with  a poorly marginated heterogeneous low density area measuring  approximately 1.7 x 2.6 x 2.7 cm.  The enlarged right palatine tonsil  protruding onto the oral airway to the left paracentral aspect with  mildly compromised oral airway.  There is moderate edema/inflammation  extending inferiorly to involve the right aspect of the hypopharynx,  aryepiglottic folds to the level of superior glottic wall.  The  nasopharynx and larynx are unremarkable.  Multiple small lymph nodes  symmetrically in the soft tissue neck are not specific.  There is mild  parapharyngeal fat stranding at this level.  The left palatine  tonsillar fossa is unremarkable.       The base of the brain is unremarkable.  There is mild to moderate  mucosal thickening of the paranasal sinuses especially at the left  maxillary sinus and the right sphenoid sinus.  There is generalized  osteopenia and mild to moderate spondylosis of mid to lower cervical  spine with small disc/osteophytes complex resulting in mild spinal  canal stenosis.      The lung apex and upper mediastinum are unremarkable.      IMPRESSION:  1.  Mildly enlarged right palatine tonsil with central poorly  marginated 1.7 x 2.6 x 2.7 cm heterogeneous low density area which is  compatible with phlegmon/evolving abscess formation.  The  inflammation/edema extends inferiorly to involve the right aspect of  the hypopharynx to the upper laryngeal level with thickening of the  wall and aryepiglottic folds and compromised puriform sinus on the  right.  Clinical correlation and follow-up are recommended.  2.  Mild to moderate paranasal sinus disease.  .      Reexamination/ Reevaluation   on re-examination pt is resting comfortably on 
COVID-19 Pneumonia
cart. no acute distress.    Vital signs   results included from flowsheet : Vital Signs   6/30/2018 13:59          Peripheral Pulse Rate     83 bpm                             Respiratory Rate          20 br/min                             Systolic Blood Pressure   141 mmHg  HI                             Diastolic Blood Pressure  85 mmHg                             Mean Arterial Pressure    104 mmHg                             Oxygen Saturation         96 %     Course: unchanged.   Pain status: unchanged.   Assessment: exam unchanged.   Interventions: Order Profile (Selected)   Inpatient Orders  Ordered  Insert IV:   Vital Signs:   Ordered (Collected)  Culture Grp A Beta Strep:   Completed  CBC with Differential:   CMP:   CT Neck w/ Contrast:   Decadron 10 mg/mL injection: 10 mg, 1 mL, IV Push, Once  Differential Morph:   Strep, Rapid Screen:   clindamycin: 600 mg, 50 mL, 100 mL/hr, IV Piggyback, Once  zAuto Diff: .      Impression and Plan   Diagnosis   Peritonsillar abscess (HMD05-PC J36, Discharge, Medical)   Plan   Condition: Stable.    Disposition: Place in Observation Unit, Patient care transitioned to: Time: 6/30/2018 13:40:00, John OCASIO, Jordy LANGFORD.    Counseled: Patient, Family, Regarding diagnosis, Regarding diagnostic results, Regarding treatment plan, Patient indicated understanding of instructions.        ED Time Seen By Provider Entered On:  6/30/2018 10:59     Performed On:  6/30/2018 10:59  by Jannet Mott NP               Time Seen By Provider   Time Seen by Provider :   6/30/2018 10:59    Jannet Mott NP - 6/30/2018 10:59           VITALS INFORMATION  Vitals/Ht/Wt  Temperature Oral:  99.0 F  Peripheral Pulse Rate:  81 bpm  Respiratory Rate:  18 br/min  Oxygen Saturation:  98 %  Oxygen Therapy:  Room air  Systolic Blood Pressure:  133 mmHg  Diastolic Blood Pressure:  85 mmHg  Mean Arterial Pressure:  101 mmHg  Height:  185 cm  Weight:  172.36 kg       MEDICAL INFORMATION   
Allergy Info:          NKA             Prescriptions:            DISCHARGE INFORMATION   Discharge Disposition: Still a patient - 30     PATIENT EDUCATION INFORMATION   Instructions:          Follow up:            DIAGNOSIS  Peritonsillar abscess        
COVID-19 Pneumonia

## 2021-03-24 NOTE — PROGRESS NOTE ADULT - PROBLEM SELECTOR PLAN 1
Pt with non-oliguric LUDIVINA likely 2/2 hemodynamically mediated ATN in the setting of shock, covid19 pneumonia/ARDS. On admission sCr 1.0 on 2/28, rise sCr after intubation from 0.97 to 1.53 on 3/14 then 1.67 on 3/15/21.  UA showed protein/blood/rbc/wbc. Currently patient is intubated/sedated, Patient had good urine output on Bumex IV. On 3/23/21 night with peristent hyperkalemia despite medical management, oliguric and worsening azotemia BUN: 180/Cr5.3 - At this time discussed with family and agree to start CRRT for electrolyte imbalance and clearance. Monitor BMP, strict I/O, avoid nephrotoxic agents (NSAIDs, PPI, contrast), renally dose medications per CRRT. Monitor electrolytes while on diuretic.

## 2021-03-24 NOTE — PROGRESS NOTE ADULT - PROVIDER SPECIALTY LIST ADULT
Cardiology
Critical Care
Internal Medicine
Internal Medicine
Nephrology
Pulmonology
Cardiology
Critical Care
Infectious Disease
Infectious Disease
Internal Medicine
Pulmonology
Pulmonology
Cardiology
Critical Care
Infectious Disease
Internal Medicine
Nephrology
Pulmonology
Infectious Disease
Infectious Disease
Internal Medicine
Nephrology
Pulmonology
Cardiology
Internal Medicine
Internal Medicine
Nephrology
Pulmonology
Nephrology
Nephrology
Pulmonology
Internal Medicine
Internal Medicine
Nephrology
Internal Medicine
Nephrology
Nephrology
Internal Medicine
Internal Medicine

## 2021-03-25 NOTE — DISCHARGE NOTE FOR THE EXPIRED PATIENT - HOSPITAL COURSE
60y Male w/ PMHx of a brain tumor s/p resection in 2018 who presented on 3/1/21 w/ hypoxic respiratory failure sec. to COVID PNA. Pt. was promptly intubated and transferred to [facility]. Pt. was sedated/ paralyzed and intermittently proned to assuage refractory hypoxemia. Pt. progressively dev. multiorgan failure __ notably vasoplegic shock, oligoanuric ATN and ischemic hepatopathy. Pt. started on CRRT on 3/23 but cont. to dev. worsening metabolic acidosis despite ongoing CRRT + HCO3 gtt. Pt. had a PEA arrest on 3/25 at 03:40am and despite optimal medical mgmt.  at 03:55am.

## 2021-03-25 NOTE — PROVIDER CONTACT NOTE (CHANGE IN STATUS NOTIFICATION) - SITUATION
MAP: 45, BRADYCARDIA: 45, HEMODYNAMICALLY UNRESPONSIVE TO DOUBLE CONCENTRATED LEVOPHED AND VASOPRESSIN. CRRT D/C @ 0100 D/T HYPOTENSION.

## 2021-03-25 NOTE — PROVIDER CONTACT NOTE (CHANGE IN STATUS NOTIFICATION) - ASSESSMENT
ACLS MEDICATION ADMINISTRATION INITIATED. 1 DOSE OF EPINEPHRINE, 2 DOSES OF SODIUM BICARB, 1 DOSE OF CALCIUM CHLORIDE ADMINISTERED. BP RESPONSIVE. WILL CONTINUE TO MONITOR.

## 2021-03-26 LAB
-  AMIKACIN: SIGNIFICANT CHANGE UP
-  AMIKACIN: SIGNIFICANT CHANGE UP
-  AMPICILLIN/SULBACTAM: SIGNIFICANT CHANGE UP
-  AMPICILLIN/SULBACTAM: SIGNIFICANT CHANGE UP
-  AMPICILLIN: SIGNIFICANT CHANGE UP
-  AMPICILLIN: SIGNIFICANT CHANGE UP
-  AZTREONAM: SIGNIFICANT CHANGE UP
-  AZTREONAM: SIGNIFICANT CHANGE UP
-  CEFAZOLIN: SIGNIFICANT CHANGE UP
-  CEFAZOLIN: SIGNIFICANT CHANGE UP
-  CEFEPIME: SIGNIFICANT CHANGE UP
-  CEFEPIME: SIGNIFICANT CHANGE UP
-  CEFOXITIN: SIGNIFICANT CHANGE UP
-  CEFOXITIN: SIGNIFICANT CHANGE UP
-  CEFTAZIDIME/AVIBACTAM: SIGNIFICANT CHANGE UP
-  CEFTRIAXONE: SIGNIFICANT CHANGE UP
-  CEFTRIAXONE: SIGNIFICANT CHANGE UP
-  CIPROFLOXACIN: SIGNIFICANT CHANGE UP
-  CIPROFLOXACIN: SIGNIFICANT CHANGE UP
-  ERTAPENEM: SIGNIFICANT CHANGE UP
-  ERTAPENEM: SIGNIFICANT CHANGE UP
-  GENTAMICIN: SIGNIFICANT CHANGE UP
-  GENTAMICIN: SIGNIFICANT CHANGE UP
-  IMIPENEM: SIGNIFICANT CHANGE UP
-  IMIPENEM: SIGNIFICANT CHANGE UP
-  LEVOFLOXACIN: SIGNIFICANT CHANGE UP
-  LEVOFLOXACIN: SIGNIFICANT CHANGE UP
-  MEROPENEM: SIGNIFICANT CHANGE UP
-  MEROPENEM: SIGNIFICANT CHANGE UP
-  PIPERACILLIN/TAZOBACTAM: SIGNIFICANT CHANGE UP
-  PIPERACILLIN/TAZOBACTAM: SIGNIFICANT CHANGE UP
-  TIGECYCLINE: SIGNIFICANT CHANGE UP
-  TOBRAMYCIN: SIGNIFICANT CHANGE UP
-  TOBRAMYCIN: SIGNIFICANT CHANGE UP
-  TRIMETHOPRIM/SULFAMETHOXAZOLE: SIGNIFICANT CHANGE UP
-  TRIMETHOPRIM/SULFAMETHOXAZOLE: SIGNIFICANT CHANGE UP
CULTURE RESULTS: SIGNIFICANT CHANGE UP
CULTURE RESULTS: SIGNIFICANT CHANGE UP
METHOD TYPE: SIGNIFICANT CHANGE UP
METHOD TYPE: SIGNIFICANT CHANGE UP
ORGANISM # SPEC MICROSCOPIC CNT: SIGNIFICANT CHANGE UP
SPECIMEN SOURCE: SIGNIFICANT CHANGE UP
SPECIMEN SOURCE: SIGNIFICANT CHANGE UP

## 2023-01-01 NOTE — CHART NOTE - NSCHARTNOTESELECT_GEN_ALL_CORE
Event Note
Event Note
Follow-Up/Nutrition Services
Infectious Diseases/Event Note
ACP/Event Note
DIALYSIS CONSENT/Event Note
Event Note
Follow-Up/Nutrition Services
Event Note
For more information on Synagis risk factors, visit: https://publications.aap.org/redbook/book/347/chapter/2926589/Respiratory-Syncytial-Virus

## 2024-09-13 NOTE — PROGRESS NOTE ADULT - ASSESSMENT
This is a 59yo Male w/ PMHx of a Brain tumor s/p resection in 2018, who is presenting with worsening shortness of breath and non-productive cough for past week. Found to be COVID-19 Positive. Py hypoxic to 87% on RA CXR c/w COVID pneumonia. Admitted for further treatment of COVID pneumonia/hypoxemia.     Covid 19 Pneumonia: Hypoxic resp failure  Brain tumor:        3/3:      Covid 19 Pneumonia: Hypoxic resp failure: on covid rx: he is on 50% fio2: LFTS are mildly elevated: folow d dimer: currently OK  Brain tumor:  s/p resection:  DVT proparminyxlis  ruddy team    3/4:      Covid 19 Pneumonia: Hypoxic resp failure: on covid rx: he is on 50% fio2 today : increased fr om yesterday :looks better then yesterday : no overnight events Cont dexa"for a total of 10 dys: LFT as are improving and d dimer has mildly increased:   Brain tumor:  s/p resection:  DVT cole chaidez team : overall p rognosis is very giuarded"     3/7:      Covid 19 Pneumonia: Hypoxic resp failure: on covid rx: he is on100% fio2 today : remdesivir finished: on dexa: 7/10: pretty hypoxic: and has low grade fever: wbc is increasing: will start empiric zosyn: do chest xray : ID consult  Brain tumor:  s/p resection:  DVT propahyxlis  : overall p rognosis is very guarded stll :  RUDDY NP     3/8    Covid 19 Pneumonia: Hypoxic resp failure: on covid rx: he is on100% fio2 today : remdesivir finished: on dexa: 8/10: pretty hypoxic: and has low grade fever: wbc is increasing:  started empiric zosyn: do chest xray : with worsening: seen by ID:  His d dimer has increased significantly: start heparin  : call MICU with furterh deterioration as he might need intubation any time  Brain tumor:  s/p resection:  DVT propahyxlis  : overall prognosis is very guarded stll :  RUDDY CHAIDEZ NP     3/9:    Covid 19 Pneumonia: Hypoxic resp failure: on covid rx: he is on100% fio2 today : remdesivir finished: on dexa: 9/10: on zosyn: on bipap at 1005: try to wean down o2 today : his dexa may need to be extended as he isnot doing well; Yesterday his d dimer had increased 4 x : started on empiric heparin today d d iemr has decreased!  Brain tumor:  s/p resection:  DVT propahyxlis  overall prognosis is very guarded stll :  RUDDY Gray    3/10:    Covid 19 Pneumonia: he is not doign well: he is on AVAPS: on 1005 oxygen: very tenuous resp status;' cant be moved for cta: he is alerday on empiric full dose ac: try to wean off oxygen: he is alreadyon antibtiocs and is on zosyn ? upgrade to leonidas: would defer to primary ID team   Brain tumor:  s/p resection:  DVT prophylaxis  overall prognosis is very guarded still :  RUDDY Gray    3/11:  Covid 19 Pneumonia: not do ing well: on 1005 avpas: antibtiocs broadened: by ID: he wants to alycia ntuabted as a last resort: ruddy jane on floor: He might need intuabtion any time: Heis on full dose ac and his d dimer i s decreasing!  Brain tumor:  s/p resection:  DVT prophylaxis  overall prognosis is very guarded still :  RUDDY pa: [place]    3/12:    Covid 19 Pneumonia:i intubated finally today for rep failure with severe refractory hypoxemia: Now paralysed and sedated in MICU and on full vent support : lot of secretions on intubation on broad spectrum antibiotics WBC increased: follow cultures:   Brain tumor:  s/p resection:  DVT prophylaxis  Condition critical :   dw team    3/15:    Covid 19 Pneumonia: intubated finally today for rep failure with severe refractory hypoxemia: on 805 fio2: proned today : all the antibiotics have been finished  antibiotics WBC increased: follow cultures:   Renal functions : high : defer to [facility] MICU team   Brain tumor:  s/p resection:  DVT prophylaxis  Condition critical :   dw team    3/16:    Covid 19 Pneumonia: intubated finally today for rep failure with severe refractory hypoxemia: on 90% fio2: supine today with peep of 9  : all the antibiotics have been finished  antibiotics WBC increased: follow cultures: Heis acidotic, hypercarbic as wellas hypoxic:   Renal functions : high : defer to primary MICU team   Brain tumor:  s/p resection:  DVT prophylaxis  Condition critical :   dw team      3/17:      Covid 19 Pneumonia: intubated and pretty hypoxic: hypercarbic as well as acidotic: sedated and intubated along with paralyzing agent:   Renal functions : high : secondary to ATN because of shock and covid 19 pneumonia: renal following  Brain tumor:  s/p resection:  DVT prophylaxis  Condition critical :   dw team    3/18:  Covid 19 Pneumonia: Still doing very poorly: still requiring 05 of oxygen : cont current supportive care: d dimer is pretty high : on dvtpropahyxlis  Renal functions : high : secondary to ATN because of shock and covid 19 pneumonia: renal following; He is on IV diuretics!  Brain tumor:  s/p resection:  DVT prophylaxis  Condition critical :   dw team       Name band;